# Patient Record
Sex: FEMALE | Race: BLACK OR AFRICAN AMERICAN | NOT HISPANIC OR LATINO | Employment: UNEMPLOYED | URBAN - METROPOLITAN AREA
[De-identification: names, ages, dates, MRNs, and addresses within clinical notes are randomized per-mention and may not be internally consistent; named-entity substitution may affect disease eponyms.]

---

## 2018-01-01 ENCOUNTER — ANESTHESIA EVENT (OUTPATIENT)
Dept: SURGERY | Facility: HOSPITAL | Age: 23
End: 2018-01-01

## 2018-01-01 ENCOUNTER — HOSPITAL ENCOUNTER (INPATIENT)
Facility: HOSPITAL | Age: 23
LOS: 22 days | DRG: 004 | End: 2018-04-25
Attending: INTERNAL MEDICINE | Admitting: INTERNAL MEDICINE
Payer: MEDICAID

## 2018-01-01 ENCOUNTER — ANESTHESIA EVENT (OUTPATIENT)
Dept: SURGERY | Facility: HOSPITAL | Age: 23
DRG: 004 | End: 2018-01-01
Payer: MEDICAID

## 2018-01-01 ENCOUNTER — TELEPHONE (OUTPATIENT)
Dept: PULMONOLOGY | Facility: HOSPITAL | Age: 23
End: 2018-01-01

## 2018-01-01 ENCOUNTER — ANESTHESIA (OUTPATIENT)
Dept: SURGERY | Facility: HOSPITAL | Age: 23
End: 2018-01-01

## 2018-01-01 ENCOUNTER — ANESTHESIA (OUTPATIENT)
Dept: SURGERY | Facility: HOSPITAL | Age: 23
DRG: 004 | End: 2018-01-01
Payer: MEDICAID

## 2018-01-01 ENCOUNTER — TELEPHONE (OUTPATIENT)
Dept: OBSTETRICS AND GYNECOLOGY | Facility: HOSPITAL | Age: 23
End: 2018-01-01

## 2018-01-01 VITALS
TEMPERATURE: 99 F | BODY MASS INDEX: 42.52 KG/M2 | RESPIRATION RATE: 27 BRPM | SYSTOLIC BLOOD PRESSURE: 129 MMHG | WEIGHT: 264.56 LBS | OXYGEN SATURATION: 90 % | DIASTOLIC BLOOD PRESSURE: 46 MMHG | HEART RATE: 105 BPM | HEIGHT: 66 IN

## 2018-01-01 DIAGNOSIS — E78.1 PURE HYPERGLYCERIDEMIA: ICD-10-CM

## 2018-01-01 DIAGNOSIS — B37.49 CANDIDA UTI: ICD-10-CM

## 2018-01-01 DIAGNOSIS — J15.212 PNEUMONIA OF RIGHT LOWER LOBE DUE TO METHICILLIN RESISTANT STAPHYLOCOCCUS AUREUS (MRSA): ICD-10-CM

## 2018-01-01 DIAGNOSIS — D69.6 THROMBOCYTOPENIA: ICD-10-CM

## 2018-01-01 DIAGNOSIS — K70.11 ALCOHOLIC HEPATITIS WITH ASCITES: ICD-10-CM

## 2018-01-01 DIAGNOSIS — E16.2 HYPOGLYCEMIA: ICD-10-CM

## 2018-01-01 DIAGNOSIS — R57.9 SHOCK, UNSPECIFIED: ICD-10-CM

## 2018-01-01 DIAGNOSIS — D68.9 COAGULOPATHY: ICD-10-CM

## 2018-01-01 DIAGNOSIS — E87.70 HYPERVOLEMIA, UNSPECIFIED HYPERVOLEMIA TYPE: ICD-10-CM

## 2018-01-01 DIAGNOSIS — Z98.890 S/P DILATATION AND CURETTAGE: ICD-10-CM

## 2018-01-01 DIAGNOSIS — J96.01 ACUTE RESPIRATORY FAILURE WITH HYPOXIA AND HYPERCAPNIA: ICD-10-CM

## 2018-01-01 DIAGNOSIS — K81.9 ACALCULOUS CHOLECYSTITIS: ICD-10-CM

## 2018-01-01 DIAGNOSIS — R79.89 ELEVATED LFTS: ICD-10-CM

## 2018-01-01 DIAGNOSIS — B96.89 UTI DUE TO KLEBSIELLA SPECIES: ICD-10-CM

## 2018-01-01 DIAGNOSIS — N39.0 UTI DUE TO KLEBSIELLA SPECIES: ICD-10-CM

## 2018-01-01 DIAGNOSIS — K72.00 SHOCK LIVER: ICD-10-CM

## 2018-01-01 DIAGNOSIS — R56.9 SEIZURE: ICD-10-CM

## 2018-01-01 DIAGNOSIS — R65.20 SEPSIS DUE TO UNDETERMINED ORGANISM WITH METABOLIC ENCEPHALOPATHY: ICD-10-CM

## 2018-01-01 DIAGNOSIS — I46.9 ASYSTOLE: ICD-10-CM

## 2018-01-01 DIAGNOSIS — E87.8 ELECTROLYTE IMBALANCE: ICD-10-CM

## 2018-01-01 DIAGNOSIS — R73.9 HYPERGLYCEMIA, UNSPECIFIED: ICD-10-CM

## 2018-01-01 DIAGNOSIS — N17.9 AKI (ACUTE KIDNEY INJURY): ICD-10-CM

## 2018-01-01 DIAGNOSIS — R91.8 LUNG INFILTRATE ON CT: ICD-10-CM

## 2018-01-01 DIAGNOSIS — A49.8 CLOSTRIDIUM DIFFICILE INFECTION: ICD-10-CM

## 2018-01-01 DIAGNOSIS — Z87.59 HISTORY OF IUFD: ICD-10-CM

## 2018-01-01 DIAGNOSIS — R16.0 HEPATOMEGALY: ICD-10-CM

## 2018-01-01 DIAGNOSIS — E78.1 HYPERTRIGLYCERIDEMIA: ICD-10-CM

## 2018-01-01 DIAGNOSIS — G93.41 SEPSIS DUE TO UNDETERMINED ORGANISM WITH METABOLIC ENCEPHALOPATHY: ICD-10-CM

## 2018-01-01 DIAGNOSIS — E83.52 HYPERCALCEMIA: ICD-10-CM

## 2018-01-01 DIAGNOSIS — E80.6 HYPERBILIRUBINEMIA: ICD-10-CM

## 2018-01-01 DIAGNOSIS — A41.9 SEPSIS DUE TO UNDETERMINED ORGANISM WITH METABOLIC ENCEPHALOPATHY: ICD-10-CM

## 2018-01-01 DIAGNOSIS — R65.20 SEVERE SEPSIS: ICD-10-CM

## 2018-01-01 DIAGNOSIS — E87.20 LACTIC ACIDOSIS: ICD-10-CM

## 2018-01-01 DIAGNOSIS — R50.9 FEVER, UNSPECIFIED FEVER CAUSE: ICD-10-CM

## 2018-01-01 DIAGNOSIS — A41.9 SEPTIC SHOCK: ICD-10-CM

## 2018-01-01 DIAGNOSIS — F10.10 ETOH ABUSE: Chronic | ICD-10-CM

## 2018-01-01 DIAGNOSIS — J96.90 RESPIRATORY FAILURE: ICD-10-CM

## 2018-01-01 DIAGNOSIS — J96.90 RESPIRATORY FAILURE, UNSPECIFIED CHRONICITY, UNSPECIFIED WHETHER WITH HYPOXIA OR HYPERCAPNIA: ICD-10-CM

## 2018-01-01 DIAGNOSIS — R65.21 SEPTIC SHOCK: ICD-10-CM

## 2018-01-01 DIAGNOSIS — N39.0 ACUTE URINARY TRACT INFECTION: ICD-10-CM

## 2018-01-01 DIAGNOSIS — E87.20 METABOLIC ACIDOSIS: ICD-10-CM

## 2018-01-01 DIAGNOSIS — J96.01 ACUTE HYPOXEMIC RESPIRATORY FAILURE: ICD-10-CM

## 2018-01-01 DIAGNOSIS — J96.02 ACUTE RESPIRATORY FAILURE WITH HYPOXIA AND HYPERCAPNIA: ICD-10-CM

## 2018-01-01 DIAGNOSIS — E87.1 HYPONATREMIA: ICD-10-CM

## 2018-01-01 DIAGNOSIS — J15.212 PNEUMONIA OF BOTH LOWER LOBES DUE TO METHICILLIN RESISTANT STAPHYLOCOCCUS AUREUS (MRSA): ICD-10-CM

## 2018-01-01 DIAGNOSIS — E87.3 ALKALOSIS: ICD-10-CM

## 2018-01-01 DIAGNOSIS — E83.51 HYPOCALCEMIA: ICD-10-CM

## 2018-01-01 DIAGNOSIS — I46.9 CARDIOPULMONARY ARREST: ICD-10-CM

## 2018-01-01 DIAGNOSIS — A41.9 SEVERE SEPSIS: ICD-10-CM

## 2018-01-01 DIAGNOSIS — K76.82 HEPATIC ENCEPHALOPATHY: ICD-10-CM

## 2018-01-01 DIAGNOSIS — D62 ACUTE BLOOD LOSS ANEMIA: ICD-10-CM

## 2018-01-01 LAB
25(OH)D3+25(OH)D2 SERPL-MCNC: 5 NG/ML
ABO + RH BLD: NORMAL
ALBUMIN FLD-MCNC: 1.2 G/DL
ALBUMIN SERPL BCP-MCNC: 1.3 G/DL
ALBUMIN SERPL BCP-MCNC: 1.5 G/DL
ALBUMIN SERPL BCP-MCNC: 1.6 G/DL
ALBUMIN SERPL BCP-MCNC: 1.6 G/DL
ALBUMIN SERPL BCP-MCNC: 1.7 G/DL
ALBUMIN SERPL BCP-MCNC: 1.7 G/DL
ALBUMIN SERPL BCP-MCNC: 1.8 G/DL
ALBUMIN SERPL BCP-MCNC: 1.9 G/DL
ALBUMIN SERPL BCP-MCNC: 2 G/DL
ALBUMIN SERPL BCP-MCNC: 2.1 G/DL
ALLENS TEST: ABNORMAL
ALLENS TEST: NORMAL
ALLENS TEST: NORMAL
ALP SERPL-CCNC: 106 U/L
ALP SERPL-CCNC: 107 U/L
ALP SERPL-CCNC: 120 U/L
ALP SERPL-CCNC: 125 U/L
ALP SERPL-CCNC: 125 U/L
ALP SERPL-CCNC: 126 U/L
ALP SERPL-CCNC: 127 U/L
ALP SERPL-CCNC: 127 U/L
ALP SERPL-CCNC: 129 U/L
ALP SERPL-CCNC: 129 U/L
ALP SERPL-CCNC: 132 U/L
ALP SERPL-CCNC: 142 U/L
ALP SERPL-CCNC: 144 U/L
ALP SERPL-CCNC: 149 U/L
ALP SERPL-CCNC: 159 U/L
ALP SERPL-CCNC: 86 U/L
ALP SERPL-CCNC: 91 U/L
ALP SERPL-CCNC: 93 U/L
ALP SERPL-CCNC: 93 U/L
ALP SERPL-CCNC: 95 U/L
ALP SERPL-CCNC: 96 U/L
ALP SERPL-CCNC: 97 U/L
ALP SERPL-CCNC: 97 U/L
ALT SERPL W/O P-5'-P-CCNC: 11 U/L
ALT SERPL W/O P-5'-P-CCNC: 11 U/L
ALT SERPL W/O P-5'-P-CCNC: 12 U/L
ALT SERPL W/O P-5'-P-CCNC: 14 U/L
ALT SERPL W/O P-5'-P-CCNC: 15 U/L
ALT SERPL W/O P-5'-P-CCNC: 16 U/L
ALT SERPL W/O P-5'-P-CCNC: 16 U/L
ALT SERPL W/O P-5'-P-CCNC: 17 U/L
ALT SERPL W/O P-5'-P-CCNC: 20 U/L
ALT SERPL W/O P-5'-P-CCNC: 20 U/L
ALT SERPL W/O P-5'-P-CCNC: 25 U/L
ALT SERPL W/O P-5'-P-CCNC: 28 U/L
ALT SERPL W/O P-5'-P-CCNC: 29 U/L
ALT SERPL W/O P-5'-P-CCNC: 31 U/L
ALT SERPL W/O P-5'-P-CCNC: 36 U/L
ALT SERPL W/O P-5'-P-CCNC: 42 U/L
ALT SERPL W/O P-5'-P-CCNC: 44 U/L
ALT SERPL W/O P-5'-P-CCNC: 48 U/L
ALT SERPL W/O P-5'-P-CCNC: 49 U/L
ALT SERPL W/O P-5'-P-CCNC: 50 U/L
ALT SERPL W/O P-5'-P-CCNC: 53 U/L
ALT SERPL W/O P-5'-P-CCNC: 57 U/L
ALT SERPL W/O P-5'-P-CCNC: 66 U/L
AMMONIA PLAS-SCNC: 101 UMOL/L
AMMONIA PLAS-SCNC: 54 UMOL/L
AMMONIA PLAS-SCNC: 67 UMOL/L
AMMONIA PLAS-SCNC: 68 UMOL/L
AMMONIA PLAS-SCNC: 71 UMOL/L
AMMONIA PLAS-SCNC: 77 UMOL/L
AMMONIA PLAS-SCNC: 80 UMOL/L
AMMONIA PLAS-SCNC: 82 UMOL/L
AMMONIA PLAS-SCNC: 86 UMOL/L
AMYLASE SERPL-CCNC: 34 U/L
AMYLASE, BODY FLUID: 18 U/L
ANA SER QL IF: NORMAL
ANION GAP SERPL CALC-SCNC: 11 MMOL/L
ANION GAP SERPL CALC-SCNC: 12 MMOL/L
ANION GAP SERPL CALC-SCNC: 13 MMOL/L
ANION GAP SERPL CALC-SCNC: 14 MMOL/L
ANION GAP SERPL CALC-SCNC: 15 MMOL/L
ANION GAP SERPL CALC-SCNC: 16 MMOL/L
ANION GAP SERPL CALC-SCNC: 17 MMOL/L
ANION GAP SERPL CALC-SCNC: 18 MMOL/L
ANION GAP SERPL CALC-SCNC: 19 MMOL/L
ANION GAP SERPL CALC-SCNC: 20 MMOL/L
ANION GAP SERPL CALC-SCNC: 21 MMOL/L
ANION GAP SERPL CALC-SCNC: 21 MMOL/L
ANION GAP SERPL CALC-SCNC: 23 MMOL/L
ANION GAP SERPL CALC-SCNC: 26 MMOL/L
ANION GAP SERPL CALC-SCNC: 29 MMOL/L
ANION GAP SERPL CALC-SCNC: 29 MMOL/L
ANISOCYTOSIS BLD QL SMEAR: ABNORMAL
ANISOCYTOSIS BLD QL SMEAR: SLIGHT
APPEARANCE FLD: NORMAL
APTT BLDCRRT: 39.9 SEC
APTT BLDCRRT: 53.1 SEC
AST SERPL-CCNC: 100 U/L
AST SERPL-CCNC: 100 U/L
AST SERPL-CCNC: 101 U/L
AST SERPL-CCNC: 103 U/L
AST SERPL-CCNC: 106 U/L
AST SERPL-CCNC: 109 U/L
AST SERPL-CCNC: 109 U/L
AST SERPL-CCNC: 113 U/L
AST SERPL-CCNC: 115 U/L
AST SERPL-CCNC: 118 U/L
AST SERPL-CCNC: 120 U/L
AST SERPL-CCNC: 122 U/L
AST SERPL-CCNC: 160 U/L
AST SERPL-CCNC: 74 U/L
AST SERPL-CCNC: 77 U/L
AST SERPL-CCNC: 78 U/L
AST SERPL-CCNC: 83 U/L
AST SERPL-CCNC: 86 U/L
AST SERPL-CCNC: 87 U/L
AST SERPL-CCNC: 93 U/L
AST SERPL-CCNC: 967 U/L
BACTERIA #/AREA URNS HPF: ABNORMAL /HPF
BACTERIA #/AREA URNS HPF: ABNORMAL /HPF
BACTERIA BLD CULT: NORMAL
BACTERIA SPEC AEROBE CULT: NORMAL
BACTERIA UR CULT: NO GROWTH
BACTERIA UR CULT: NORMAL
BASO STIPL BLD QL SMEAR: ABNORMAL
BASOPHILS # BLD AUTO: 0.02 K/UL
BASOPHILS # BLD AUTO: 0.03 K/UL
BASOPHILS # BLD AUTO: 0.04 K/UL
BASOPHILS # BLD AUTO: 0.06 K/UL
BASOPHILS # BLD AUTO: 0.1 K/UL
BASOPHILS # BLD AUTO: 0.12 K/UL
BASOPHILS # BLD AUTO: 0.19 K/UL
BASOPHILS # BLD AUTO: 0.24 K/UL
BASOPHILS # BLD AUTO: 0.28 K/UL
BASOPHILS # BLD AUTO: 0.29 K/UL
BASOPHILS # BLD AUTO: ABNORMAL K/UL
BASOPHILS NFR BLD: 0 %
BASOPHILS NFR BLD: 0.1 %
BASOPHILS NFR BLD: 0.1 %
BASOPHILS NFR BLD: 0.2 %
BASOPHILS NFR BLD: 0.3 %
BASOPHILS NFR BLD: 0.6 %
BASOPHILS NFR BLD: 0.6 %
BASOPHILS NFR BLD: 0.9 %
BASOPHILS NFR BLD: 1 %
BASOPHILS NFR BLD: 1.3 %
BASOPHILS NFR BLD: 1.8 %
BASOPHILS NFR BLD: 2 %
BASOPHILS NFR BLD: 3 %
BILIRUB FLD-MCNC: 9.5 MG/DL
BILIRUB SERPL-MCNC: 11.1 MG/DL
BILIRUB SERPL-MCNC: 11.7 MG/DL
BILIRUB SERPL-MCNC: 12.2 MG/DL
BILIRUB SERPL-MCNC: 12.9 MG/DL
BILIRUB SERPL-MCNC: 13.2 MG/DL
BILIRUB SERPL-MCNC: 13.8 MG/DL
BILIRUB SERPL-MCNC: 13.9 MG/DL
BILIRUB SERPL-MCNC: 14.2 MG/DL
BILIRUB SERPL-MCNC: 14.8 MG/DL
BILIRUB SERPL-MCNC: 15.9 MG/DL
BILIRUB SERPL-MCNC: 18.3 MG/DL
BILIRUB SERPL-MCNC: 3.9 MG/DL
BILIRUB SERPL-MCNC: 4.1 MG/DL
BILIRUB SERPL-MCNC: 4.6 MG/DL
BILIRUB SERPL-MCNC: 5.3 MG/DL
BILIRUB SERPL-MCNC: 6.2 MG/DL
BILIRUB SERPL-MCNC: 6.5 MG/DL
BILIRUB SERPL-MCNC: 7.2 MG/DL
BILIRUB SERPL-MCNC: 8.7 MG/DL
BILIRUB SERPL-MCNC: 9.1 MG/DL
BILIRUB SERPL-MCNC: 9.5 MG/DL
BILIRUB SERPL-MCNC: 9.8 MG/DL
BILIRUB SERPL-MCNC: 9.8 MG/DL
BILIRUB UR QL STRIP: ABNORMAL
BILIRUB UR QL STRIP: ABNORMAL
BLASTS NFR BLD MANUAL: 1 %
BLASTS NFR BLD MANUAL: 2 %
BLASTS NFR BLD MANUAL: 2 %
BLD GP AB SCN CELLS X3 SERPL QL: NORMAL
BLD PROD TYP BPU: NORMAL
BLOOD UNIT EXPIRATION DATE: NORMAL
BLOOD UNIT TYPE CODE: 5100
BLOOD UNIT TYPE CODE: 6200
BLOOD UNIT TYPE CODE: 9500
BLOOD UNIT TYPE: NORMAL
BNP SERPL-MCNC: 257 PG/ML
BODY FLD TYPE: NORMAL
BODY FLUID SOURCE AMYLASE: NORMAL
BODY FLUID SOURCE, BILIRUBIN: NORMAL
BODY FLUID SOURCE, CREATININE: NORMAL
BODY FLUID SOURCE, LDH: NORMAL
BUN SERPL-MCNC: 10 MG/DL
BUN SERPL-MCNC: 10 MG/DL
BUN SERPL-MCNC: 12 MG/DL
BUN SERPL-MCNC: 14 MG/DL
BUN SERPL-MCNC: 16 MG/DL
BUN SERPL-MCNC: 19 MG/DL
BUN SERPL-MCNC: 2 MG/DL
BUN SERPL-MCNC: 22 MG/DL
BUN SERPL-MCNC: 25 MG/DL
BUN SERPL-MCNC: 28 MG/DL
BUN SERPL-MCNC: 29 MG/DL
BUN SERPL-MCNC: 3 MG/DL
BUN SERPL-MCNC: 4 MG/DL
BUN SERPL-MCNC: 5 MG/DL
BUN SERPL-MCNC: 6 MG/DL
BUN SERPL-MCNC: 7 MG/DL
BUN SERPL-MCNC: 7 MG/DL
BUN SERPL-MCNC: 8 MG/DL
BUN SERPL-MCNC: 9 MG/DL
BUN SERPL-MCNC: 9 MG/DL
BUN SERPL-MCNC: <2 MG/DL
C DIFF GDH STL QL: NEGATIVE
C DIFF GDH STL QL: POSITIVE
C DIFF TOX A+B STL QL IA: NEGATIVE
C DIFF TOX A+B STL QL IA: NEGATIVE
C DIFF TOX GENS STL QL NAA+PROBE: POSITIVE
CA-I BLDV-SCNC: 0.64 MMOL/L
CA-I BLDV-SCNC: 0.86 MMOL/L
CALCIUM SERPL-MCNC: 10 MG/DL
CALCIUM SERPL-MCNC: 10 MG/DL
CALCIUM SERPL-MCNC: 10.1 MG/DL
CALCIUM SERPL-MCNC: 10.2 MG/DL
CALCIUM SERPL-MCNC: 10.3 MG/DL
CALCIUM SERPL-MCNC: 10.4 MG/DL
CALCIUM SERPL-MCNC: 10.4 MG/DL
CALCIUM SERPL-MCNC: 10.5 MG/DL
CALCIUM SERPL-MCNC: 10.6 MG/DL
CALCIUM SERPL-MCNC: 10.6 MG/DL
CALCIUM SERPL-MCNC: 10.9 MG/DL
CALCIUM SERPL-MCNC: 11.2 MG/DL
CALCIUM SERPL-MCNC: 11.5 MG/DL
CALCIUM SERPL-MCNC: 5.6 MG/DL
CALCIUM SERPL-MCNC: 5.7 MG/DL
CALCIUM SERPL-MCNC: 5.7 MG/DL
CALCIUM SERPL-MCNC: 5.8 MG/DL
CALCIUM SERPL-MCNC: 5.9 MG/DL
CALCIUM SERPL-MCNC: 5.9 MG/DL
CALCIUM SERPL-MCNC: 6 MG/DL
CALCIUM SERPL-MCNC: 6.2 MG/DL
CALCIUM SERPL-MCNC: 6.4 MG/DL
CALCIUM SERPL-MCNC: 6.4 MG/DL
CALCIUM SERPL-MCNC: 6.5 MG/DL
CALCIUM SERPL-MCNC: 6.6 MG/DL
CALCIUM SERPL-MCNC: 6.7 MG/DL
CALCIUM SERPL-MCNC: 6.8 MG/DL
CALCIUM SERPL-MCNC: 7 MG/DL
CALCIUM SERPL-MCNC: 7.2 MG/DL
CALCIUM SERPL-MCNC: 7.3 MG/DL
CALCIUM SERPL-MCNC: 7.3 MG/DL
CALCIUM SERPL-MCNC: 7.5 MG/DL
CALCIUM SERPL-MCNC: 7.6 MG/DL
CALCIUM SERPL-MCNC: 7.8 MG/DL
CALCIUM SERPL-MCNC: 8 MG/DL
CALCIUM SERPL-MCNC: 8.4 MG/DL
CALCIUM SERPL-MCNC: 8.5 MG/DL
CALCIUM SERPL-MCNC: 8.7 MG/DL
CALCIUM SERPL-MCNC: 8.8 MG/DL
CALCIUM SERPL-MCNC: 8.9 MG/DL
CALCIUM SERPL-MCNC: 8.9 MG/DL
CALCIUM SERPL-MCNC: 9 MG/DL
CALCIUM SERPL-MCNC: 9.2 MG/DL
CALCIUM SERPL-MCNC: 9.2 MG/DL
CALCIUM SERPL-MCNC: 9.4 MG/DL
CALCIUM SERPL-MCNC: 9.6 MG/DL
CALCIUM SERPL-MCNC: 9.6 MG/DL
CALCIUM SERPL-MCNC: 9.7 MG/DL
CHLORIDE SERPL-SCNC: 100 MMOL/L
CHLORIDE SERPL-SCNC: 101 MMOL/L
CHLORIDE SERPL-SCNC: 102 MMOL/L
CHLORIDE SERPL-SCNC: 103 MMOL/L
CHLORIDE SERPL-SCNC: 103 MMOL/L
CHLORIDE SERPL-SCNC: 104 MMOL/L
CHLORIDE SERPL-SCNC: 105 MMOL/L
CHLORIDE SERPL-SCNC: 105 MMOL/L
CHLORIDE SERPL-SCNC: 106 MMOL/L
CHLORIDE SERPL-SCNC: 107 MMOL/L
CHLORIDE SERPL-SCNC: 108 MMOL/L
CHLORIDE SERPL-SCNC: 109 MMOL/L
CHLORIDE SERPL-SCNC: 111 MMOL/L
CHLORIDE SERPL-SCNC: 113 MMOL/L
CHLORIDE SERPL-SCNC: 96 MMOL/L
CHLORIDE SERPL-SCNC: 97 MMOL/L
CHLORIDE SERPL-SCNC: 98 MMOL/L
CHLORIDE SERPL-SCNC: 99 MMOL/L
CK SERPL-CCNC: 2296 U/L
CK SERPL-CCNC: 237 U/L
CK SERPL-CCNC: 475 U/L
CLARITY UR: ABNORMAL
CLARITY UR: ABNORMAL
CO2 SERPL-SCNC: 10 MMOL/L
CO2 SERPL-SCNC: 11 MMOL/L
CO2 SERPL-SCNC: 12 MMOL/L
CO2 SERPL-SCNC: 13 MMOL/L
CO2 SERPL-SCNC: 13 MMOL/L
CO2 SERPL-SCNC: 14 MMOL/L
CO2 SERPL-SCNC: 15 MMOL/L
CO2 SERPL-SCNC: 17 MMOL/L
CO2 SERPL-SCNC: 18 MMOL/L
CO2 SERPL-SCNC: 19 MMOL/L
CO2 SERPL-SCNC: 19 MMOL/L
CO2 SERPL-SCNC: 20 MMOL/L
CO2 SERPL-SCNC: 21 MMOL/L
CO2 SERPL-SCNC: 22 MMOL/L
CO2 SERPL-SCNC: 23 MMOL/L
CO2 SERPL-SCNC: 25 MMOL/L
CO2 SERPL-SCNC: 26 MMOL/L
CO2 SERPL-SCNC: 27 MMOL/L
CO2 SERPL-SCNC: 27 MMOL/L
CO2 SERPL-SCNC: 28 MMOL/L
CO2 SERPL-SCNC: 29 MMOL/L
CO2 SERPL-SCNC: 29 MMOL/L
CO2 SERPL-SCNC: 30 MMOL/L
CO2 SERPL-SCNC: 31 MMOL/L
CO2 SERPL-SCNC: 32 MMOL/L
CO2 SERPL-SCNC: 8 MMOL/L
CO2 SERPL-SCNC: 8 MMOL/L
CODING SYSTEM: NORMAL
COLOR FLD: YELLOW
COLOR UR: ABNORMAL
COLOR UR: YELLOW
CORTIS SERPL-MCNC: 13.7 UG/DL
CREAT FLD-MCNC: 3.1 MG/DL
CREAT SERPL-MCNC: 0.7 MG/DL
CREAT SERPL-MCNC: 0.8 MG/DL
CREAT SERPL-MCNC: 0.9 MG/DL
CREAT SERPL-MCNC: 1 MG/DL
CREAT SERPL-MCNC: 1.1 MG/DL
CREAT SERPL-MCNC: 1.3 MG/DL
CREAT SERPL-MCNC: 1.4 MG/DL
CREAT SERPL-MCNC: 1.6 MG/DL
CREAT SERPL-MCNC: 1.7 MG/DL
CREAT SERPL-MCNC: 1.7 MG/DL
CREAT SERPL-MCNC: 1.8 MG/DL
CREAT SERPL-MCNC: 1.9 MG/DL
CREAT SERPL-MCNC: 2 MG/DL
CREAT SERPL-MCNC: 2.1 MG/DL
CREAT SERPL-MCNC: 2.2 MG/DL
CREAT SERPL-MCNC: 2.3 MG/DL
CREAT SERPL-MCNC: 2.4 MG/DL
CREAT SERPL-MCNC: 2.4 MG/DL
CREAT SERPL-MCNC: 2.5 MG/DL
CREAT SERPL-MCNC: 2.5 MG/DL
CREAT SERPL-MCNC: 2.6 MG/DL
CREAT SERPL-MCNC: 2.6 MG/DL
CREAT SERPL-MCNC: 2.9 MG/DL
CREAT SERPL-MCNC: 3.1 MG/DL
CREAT SERPL-MCNC: 3.1 MG/DL
CREAT SERPL-MCNC: 3.2 MG/DL
CREAT SERPL-MCNC: 3.4 MG/DL
DACRYOCYTES BLD QL SMEAR: ABNORMAL
DELSYS: ABNORMAL
DELSYS: NORMAL
DELSYS: NORMAL
DIASTOLIC DYSFUNCTION: NO
DIFFERENTIAL METHOD: ABNORMAL
DISPENSE STATUS: NORMAL
EOSINOPHIL # BLD AUTO: 0 K/UL
EOSINOPHIL # BLD AUTO: 0.1 K/UL
EOSINOPHIL # BLD AUTO: 0.2 K/UL
EOSINOPHIL # BLD AUTO: 0.3 K/UL
EOSINOPHIL # BLD AUTO: 0.3 K/UL
EOSINOPHIL # BLD AUTO: ABNORMAL K/UL
EOSINOPHIL NFR BLD: 0 %
EOSINOPHIL NFR BLD: 0.1 %
EOSINOPHIL NFR BLD: 0.5 %
EOSINOPHIL NFR BLD: 0.6 %
EOSINOPHIL NFR BLD: 0.7 %
EOSINOPHIL NFR BLD: 0.7 %
EOSINOPHIL NFR BLD: 0.8 %
EOSINOPHIL NFR BLD: 0.9 %
EOSINOPHIL NFR BLD: 0.9 %
EOSINOPHIL NFR BLD: 1 %
EOSINOPHIL NFR BLD: 1.2 %
EOSINOPHIL NFR BLD: 1.9 %
EOSINOPHIL NFR BLD: 3 %
EOSINOPHIL NFR BLD: 4 %
EOSINOPHIL NFR BLD: 7 %
ERYTHROCYTE [DISTWIDTH] IN BLOOD BY AUTOMATED COUNT: 17.5 %
ERYTHROCYTE [DISTWIDTH] IN BLOOD BY AUTOMATED COUNT: 17.8 %
ERYTHROCYTE [DISTWIDTH] IN BLOOD BY AUTOMATED COUNT: 17.9 %
ERYTHROCYTE [DISTWIDTH] IN BLOOD BY AUTOMATED COUNT: 18 %
ERYTHROCYTE [DISTWIDTH] IN BLOOD BY AUTOMATED COUNT: 18.1 %
ERYTHROCYTE [DISTWIDTH] IN BLOOD BY AUTOMATED COUNT: 18.2 %
ERYTHROCYTE [DISTWIDTH] IN BLOOD BY AUTOMATED COUNT: 18.4 %
ERYTHROCYTE [DISTWIDTH] IN BLOOD BY AUTOMATED COUNT: 18.4 %
ERYTHROCYTE [DISTWIDTH] IN BLOOD BY AUTOMATED COUNT: 18.5 %
ERYTHROCYTE [DISTWIDTH] IN BLOOD BY AUTOMATED COUNT: 18.5 %
ERYTHROCYTE [DISTWIDTH] IN BLOOD BY AUTOMATED COUNT: 18.6 %
ERYTHROCYTE [DISTWIDTH] IN BLOOD BY AUTOMATED COUNT: 18.8 %
ERYTHROCYTE [DISTWIDTH] IN BLOOD BY AUTOMATED COUNT: 18.8 %
ERYTHROCYTE [DISTWIDTH] IN BLOOD BY AUTOMATED COUNT: 19.2 %
ERYTHROCYTE [DISTWIDTH] IN BLOOD BY AUTOMATED COUNT: 19.5 %
ERYTHROCYTE [DISTWIDTH] IN BLOOD BY AUTOMATED COUNT: 20.1 %
ERYTHROCYTE [DISTWIDTH] IN BLOOD BY AUTOMATED COUNT: 20.1 %
ERYTHROCYTE [DISTWIDTH] IN BLOOD BY AUTOMATED COUNT: 20.2 %
ERYTHROCYTE [DISTWIDTH] IN BLOOD BY AUTOMATED COUNT: 20.4 %
ERYTHROCYTE [DISTWIDTH] IN BLOOD BY AUTOMATED COUNT: 21.4 %
ERYTHROCYTE [DISTWIDTH] IN BLOOD BY AUTOMATED COUNT: 22.6 %
ERYTHROCYTE [DISTWIDTH] IN BLOOD BY AUTOMATED COUNT: 23.1 %
ERYTHROCYTE [DISTWIDTH] IN BLOOD BY AUTOMATED COUNT: 23.7 %
ERYTHROCYTE [DISTWIDTH] IN BLOOD BY AUTOMATED COUNT: 24.6 %
ERYTHROCYTE [DISTWIDTH] IN BLOOD BY AUTOMATED COUNT: 25.9 %
ERYTHROCYTE [DISTWIDTH] IN BLOOD BY AUTOMATED COUNT: 27.9 %
ERYTHROCYTE [SEDIMENTATION RATE] IN BLOOD BY WESTERGREN METHOD: 14 MM/H
ERYTHROCYTE [SEDIMENTATION RATE] IN BLOOD BY WESTERGREN METHOD: 14 MM/H
ERYTHROCYTE [SEDIMENTATION RATE] IN BLOOD BY WESTERGREN METHOD: 18 MM/H
ERYTHROCYTE [SEDIMENTATION RATE] IN BLOOD BY WESTERGREN METHOD: 20 MM/H
ERYTHROCYTE [SEDIMENTATION RATE] IN BLOOD BY WESTERGREN METHOD: 20 MM/H
EST. GFR  (AFRICAN AMERICAN): 21 ML/MIN/1.73 M^2
EST. GFR  (AFRICAN AMERICAN): 22 ML/MIN/1.73 M^2
EST. GFR  (AFRICAN AMERICAN): 23 ML/MIN/1.73 M^2
EST. GFR  (AFRICAN AMERICAN): 23 ML/MIN/1.73 M^2
EST. GFR  (AFRICAN AMERICAN): 25 ML/MIN/1.73 M^2
EST. GFR  (AFRICAN AMERICAN): 29 ML/MIN/1.73 M^2
EST. GFR  (AFRICAN AMERICAN): 29 ML/MIN/1.73 M^2
EST. GFR  (AFRICAN AMERICAN): 30 ML/MIN/1.73 M^2
EST. GFR  (AFRICAN AMERICAN): 30 ML/MIN/1.73 M^2
EST. GFR  (AFRICAN AMERICAN): 32 ML/MIN/1.73 M^2
EST. GFR  (AFRICAN AMERICAN): 32 ML/MIN/1.73 M^2
EST. GFR  (AFRICAN AMERICAN): 34 ML/MIN/1.73 M^2
EST. GFR  (AFRICAN AMERICAN): 35 ML/MIN/1.73 M^2
EST. GFR  (AFRICAN AMERICAN): 37 ML/MIN/1.73 M^2
EST. GFR  (AFRICAN AMERICAN): 40 ML/MIN/1.73 M^2
EST. GFR  (AFRICAN AMERICAN): 42 ML/MIN/1.73 M^2
EST. GFR  (AFRICAN AMERICAN): 45 ML/MIN/1.73 M^2
EST. GFR  (AFRICAN AMERICAN): 48 ML/MIN/1.73 M^2
EST. GFR  (AFRICAN AMERICAN): 48 ML/MIN/1.73 M^2
EST. GFR  (AFRICAN AMERICAN): 52 ML/MIN/1.73 M^2
EST. GFR  (AFRICAN AMERICAN): >60 ML/MIN/1.73 M^2
EST. GFR  (NON AFRICAN AMERICAN): 18 ML/MIN/1.73 M^2
EST. GFR  (NON AFRICAN AMERICAN): 20 ML/MIN/1.73 M^2
EST. GFR  (NON AFRICAN AMERICAN): 22 ML/MIN/1.73 M^2
EST. GFR  (NON AFRICAN AMERICAN): 25 ML/MIN/1.73 M^2
EST. GFR  (NON AFRICAN AMERICAN): 25 ML/MIN/1.73 M^2
EST. GFR  (NON AFRICAN AMERICAN): 26 ML/MIN/1.73 M^2
EST. GFR  (NON AFRICAN AMERICAN): 26 ML/MIN/1.73 M^2
EST. GFR  (NON AFRICAN AMERICAN): 28 ML/MIN/1.73 M^2
EST. GFR  (NON AFRICAN AMERICAN): 28 ML/MIN/1.73 M^2
EST. GFR  (NON AFRICAN AMERICAN): 29 ML/MIN/1.73 M^2
EST. GFR  (NON AFRICAN AMERICAN): 31 ML/MIN/1.73 M^2
EST. GFR  (NON AFRICAN AMERICAN): 32 ML/MIN/1.73 M^2
EST. GFR  (NON AFRICAN AMERICAN): 34 ML/MIN/1.73 M^2
EST. GFR  (NON AFRICAN AMERICAN): 37 ML/MIN/1.73 M^2
EST. GFR  (NON AFRICAN AMERICAN): 39 ML/MIN/1.73 M^2
EST. GFR  (NON AFRICAN AMERICAN): 42 ML/MIN/1.73 M^2
EST. GFR  (NON AFRICAN AMERICAN): 42 ML/MIN/1.73 M^2
EST. GFR  (NON AFRICAN AMERICAN): 45 ML/MIN/1.73 M^2
EST. GFR  (NON AFRICAN AMERICAN): 53 ML/MIN/1.73 M^2
EST. GFR  (NON AFRICAN AMERICAN): 58 ML/MIN/1.73 M^2
EST. GFR  (NON AFRICAN AMERICAN): >60 ML/MIN/1.73 M^2
FIBRINOGEN PPP-MCNC: 575 MG/DL
FIBRINOGEN PPP-MCNC: NORMAL MG/DL
FIO2: 100
FIO2: 30
FIO2: 35
FIO2: 40
FIO2: 45
FIO2: 45
FIO2: 60
FLOW: 2
GIANT PLATELETS BLD QL SMEAR: PRESENT
GLUCOSE FLD-MCNC: 97 MG/DL
GLUCOSE SERPL-MCNC: 108 MG/DL
GLUCOSE SERPL-MCNC: 108 MG/DL
GLUCOSE SERPL-MCNC: 109 MG/DL
GLUCOSE SERPL-MCNC: 109 MG/DL
GLUCOSE SERPL-MCNC: 110 MG/DL
GLUCOSE SERPL-MCNC: 118 MG/DL
GLUCOSE SERPL-MCNC: 119 MG/DL
GLUCOSE SERPL-MCNC: 119 MG/DL
GLUCOSE SERPL-MCNC: 120 MG/DL
GLUCOSE SERPL-MCNC: 121 MG/DL
GLUCOSE SERPL-MCNC: 123 MG/DL
GLUCOSE SERPL-MCNC: 123 MG/DL
GLUCOSE SERPL-MCNC: 124 MG/DL
GLUCOSE SERPL-MCNC: 124 MG/DL
GLUCOSE SERPL-MCNC: 125 MG/DL
GLUCOSE SERPL-MCNC: 129 MG/DL
GLUCOSE SERPL-MCNC: 136 MG/DL
GLUCOSE SERPL-MCNC: 141 MG/DL
GLUCOSE SERPL-MCNC: 145 MG/DL
GLUCOSE SERPL-MCNC: 148 MG/DL
GLUCOSE SERPL-MCNC: 151 MG/DL
GLUCOSE SERPL-MCNC: 153 MG/DL
GLUCOSE SERPL-MCNC: 153 MG/DL
GLUCOSE SERPL-MCNC: 154 MG/DL
GLUCOSE SERPL-MCNC: 154 MG/DL
GLUCOSE SERPL-MCNC: 159 MG/DL
GLUCOSE SERPL-MCNC: 159 MG/DL
GLUCOSE SERPL-MCNC: 161 MG/DL
GLUCOSE SERPL-MCNC: 163 MG/DL
GLUCOSE SERPL-MCNC: 177 MG/DL
GLUCOSE SERPL-MCNC: 189 MG/DL
GLUCOSE SERPL-MCNC: 189 MG/DL
GLUCOSE SERPL-MCNC: 190 MG/DL
GLUCOSE SERPL-MCNC: 210 MG/DL
GLUCOSE SERPL-MCNC: 216 MG/DL
GLUCOSE SERPL-MCNC: 221 MG/DL
GLUCOSE SERPL-MCNC: 228 MG/DL
GLUCOSE SERPL-MCNC: 231 MG/DL
GLUCOSE SERPL-MCNC: 231 MG/DL
GLUCOSE SERPL-MCNC: 259 MG/DL
GLUCOSE SERPL-MCNC: 268 MG/DL
GLUCOSE SERPL-MCNC: 287 MG/DL
GLUCOSE SERPL-MCNC: 37 MG/DL (ref 70–110)
GLUCOSE SERPL-MCNC: 68 MG/DL
GLUCOSE SERPL-MCNC: 71 MG/DL
GLUCOSE SERPL-MCNC: 72 MG/DL
GLUCOSE SERPL-MCNC: 73 MG/DL
GLUCOSE SERPL-MCNC: 77 MG/DL
GLUCOSE SERPL-MCNC: 93 MG/DL
GLUCOSE SERPL-MCNC: 94 MG/DL
GLUCOSE SERPL-MCNC: 95 MG/DL
GLUCOSE SERPL-MCNC: 95 MG/DL
GLUCOSE SERPL-MCNC: 96 MG/DL
GLUCOSE UR QL STRIP: ABNORMAL
GLUCOSE UR QL STRIP: NEGATIVE
GRAM STN SPEC: NORMAL
HAV IGM SERPL QL IA: NEGATIVE
HBV CORE IGM SERPL QL IA: NEGATIVE
HBV SURFACE AG SERPL QL IA: NEGATIVE
HCO3 UR-SCNC: 10 MMOL/L (ref 24–28)
HCO3 UR-SCNC: 10.9 MMOL/L (ref 24–28)
HCO3 UR-SCNC: 14.7 MMOL/L (ref 24–28)
HCO3 UR-SCNC: 14.9 MMOL/L (ref 24–28)
HCO3 UR-SCNC: 15.3 MMOL/L (ref 24–28)
HCO3 UR-SCNC: 18.3 MMOL/L (ref 24–28)
HCO3 UR-SCNC: 19.2 MMOL/L (ref 24–28)
HCO3 UR-SCNC: 21.5 MMOL/L (ref 24–28)
HCO3 UR-SCNC: 24 MMOL/L (ref 24–28)
HCO3 UR-SCNC: 24.1 MMOL/L (ref 24–28)
HCO3 UR-SCNC: 24.5 MMOL/L (ref 24–28)
HCO3 UR-SCNC: 24.9 MMOL/L (ref 24–28)
HCO3 UR-SCNC: 25.3 MMOL/L (ref 24–28)
HCO3 UR-SCNC: 27.5 MMOL/L (ref 24–28)
HCO3 UR-SCNC: 29.5 MMOL/L (ref 24–28)
HCO3 UR-SCNC: 29.6 MMOL/L (ref 24–28)
HCO3 UR-SCNC: 30.1 MMOL/L (ref 24–28)
HCO3 UR-SCNC: 30.2 MMOL/L (ref 24–28)
HCO3 UR-SCNC: 31.4 MMOL/L (ref 24–28)
HCO3 UR-SCNC: 31.8 MMOL/L (ref 24–28)
HCO3 UR-SCNC: 7.8 MMOL/L (ref 24–28)
HCO3 UR-SCNC: 8 MMOL/L (ref 24–28)
HCT VFR BLD AUTO: 20.8 %
HCT VFR BLD AUTO: 23.7 %
HCT VFR BLD AUTO: 24.3 %
HCT VFR BLD AUTO: 24.6 %
HCT VFR BLD AUTO: 24.8 %
HCT VFR BLD AUTO: 24.9 %
HCT VFR BLD AUTO: 25.4 %
HCT VFR BLD AUTO: 25.6 %
HCT VFR BLD AUTO: 25.7 %
HCT VFR BLD AUTO: 26.3 %
HCT VFR BLD AUTO: 26.7 %
HCT VFR BLD AUTO: 26.7 %
HCT VFR BLD AUTO: 26.9 %
HCT VFR BLD AUTO: 27.7 %
HCT VFR BLD AUTO: 28 %
HCT VFR BLD AUTO: 28.1 %
HCT VFR BLD AUTO: 28.2 %
HCT VFR BLD AUTO: 28.3 %
HCT VFR BLD AUTO: 29 %
HCT VFR BLD AUTO: 29.1 %
HCT VFR BLD AUTO: 29.9 %
HCT VFR BLD AUTO: 30.2 %
HCT VFR BLD AUTO: 30.8 %
HCT VFR BLD AUTO: 31.2 %
HCT VFR BLD CALC: 24 %PCV (ref 36–54)
HCV AB SERPL QL IA: NEGATIVE
HGB BLD-MCNC: 10.1 G/DL
HGB BLD-MCNC: 10.2 G/DL
HGB BLD-MCNC: 10.3 G/DL
HGB BLD-MCNC: 7.3 G/DL
HGB BLD-MCNC: 7.5 G/DL
HGB BLD-MCNC: 7.8 G/DL
HGB BLD-MCNC: 7.9 G/DL
HGB BLD-MCNC: 7.9 G/DL
HGB BLD-MCNC: 8 G/DL
HGB BLD-MCNC: 8 G/DL
HGB BLD-MCNC: 8.4 G/DL
HGB BLD-MCNC: 8.4 G/DL
HGB BLD-MCNC: 8.5 G/DL
HGB BLD-MCNC: 8.7 G/DL
HGB BLD-MCNC: 8.7 G/DL
HGB BLD-MCNC: 8.9 G/DL
HGB BLD-MCNC: 9 G/DL
HGB BLD-MCNC: 9.1 G/DL
HGB BLD-MCNC: 9.3 G/DL
HGB BLD-MCNC: 9.6 G/DL
HGB BLD-MCNC: 9.7 G/DL
HGB BLD-MCNC: 9.8 G/DL
HGB UR QL STRIP: ABNORMAL
HGB UR QL STRIP: ABNORMAL
HIV1+2 IGG SERPL QL IA.RAPID: NEGATIVE
HOWELL-JOLLY BOD BLD QL SMEAR: ABNORMAL
HYALINE CASTS #/AREA URNS LPF: 0 /LPF
HYPOCHROMIA BLD QL SMEAR: ABNORMAL
INR PPP: 1.2
INR PPP: 1.3
INR PPP: 1.5
INR PPP: 1.6
INR PPP: 1.7
INR PPP: 1.8
INR PPP: 1.9
INR PPP: 2.1
INR PPP: 2.2
INR PPP: 2.9
IP: 20
IP: 25
IT: 0.65
IT: 0.72
IT: 0.76
IT: 0.8
IT: 0.91
IT: 0.91
KETONES UR QL STRIP: ABNORMAL
KETONES UR QL STRIP: NEGATIVE
LACTATE SERPL-SCNC: 2.3 MMOL/L
LACTATE SERPL-SCNC: 2.4 MMOL/L
LACTATE SERPL-SCNC: 3.3 MMOL/L
LACTATE SERPL-SCNC: 4.8 MMOL/L
LACTATE SERPL-SCNC: 5.3 MMOL/L
LACTATE SERPL-SCNC: 6.1 MMOL/L
LACTATE SERPL-SCNC: 6.3 MMOL/L
LACTATE SERPL-SCNC: 6.6 MMOL/L
LACTATE SERPL-SCNC: >12 MMOL/L
LACTATE SERPL-SCNC: >12 MMOL/L
LDH FLD L TO P-CCNC: 194 U/L
LEUKOCYTE ESTERASE UR QL STRIP: ABNORMAL
LEUKOCYTE ESTERASE UR QL STRIP: ABNORMAL
LIPASE SERPL-CCNC: 34 U/L
LIPASE SERPL-CCNC: 54 U/L
LYMPHOCYTES # BLD AUTO: 1.8 K/UL
LYMPHOCYTES # BLD AUTO: 2 K/UL
LYMPHOCYTES # BLD AUTO: 2.2 K/UL
LYMPHOCYTES # BLD AUTO: 2.3 K/UL
LYMPHOCYTES # BLD AUTO: 2.4 K/UL
LYMPHOCYTES # BLD AUTO: 2.5 K/UL
LYMPHOCYTES # BLD AUTO: 3.5 K/UL
LYMPHOCYTES # BLD AUTO: 3.5 K/UL
LYMPHOCYTES # BLD AUTO: 3.6 K/UL
LYMPHOCYTES # BLD AUTO: 3.7 K/UL
LYMPHOCYTES # BLD AUTO: ABNORMAL K/UL
LYMPHOCYTES NFR BLD: 10 %
LYMPHOCYTES NFR BLD: 10 %
LYMPHOCYTES NFR BLD: 10.1 %
LYMPHOCYTES NFR BLD: 11 %
LYMPHOCYTES NFR BLD: 11.5 %
LYMPHOCYTES NFR BLD: 12.2 %
LYMPHOCYTES NFR BLD: 12.4 %
LYMPHOCYTES NFR BLD: 12.7 %
LYMPHOCYTES NFR BLD: 13 %
LYMPHOCYTES NFR BLD: 14 %
LYMPHOCYTES NFR BLD: 15 %
LYMPHOCYTES NFR BLD: 15 %
LYMPHOCYTES NFR BLD: 16 %
LYMPHOCYTES NFR BLD: 16.5 %
LYMPHOCYTES NFR BLD: 17 %
LYMPHOCYTES NFR BLD: 17 %
LYMPHOCYTES NFR BLD: 18.7 %
LYMPHOCYTES NFR BLD: 21.4 %
LYMPHOCYTES NFR BLD: 22 %
LYMPHOCYTES NFR BLD: 24 %
LYMPHOCYTES NFR BLD: 39 %
LYMPHOCYTES NFR BLD: 41 %
LYMPHOCYTES NFR BLD: 6 %
LYMPHOCYTES NFR BLD: 7 %
LYMPHOCYTES NFR BLD: 8 %
LYMPHOCYTES NFR FLD MANUAL: 39 %
MAGNESIUM SERPL-MCNC: 1.4 MG/DL
MAGNESIUM SERPL-MCNC: 1.5 MG/DL
MAGNESIUM SERPL-MCNC: 1.6 MG/DL
MAGNESIUM SERPL-MCNC: 1.6 MG/DL
MAGNESIUM SERPL-MCNC: 1.7 MG/DL
MAGNESIUM SERPL-MCNC: 1.8 MG/DL
MAGNESIUM SERPL-MCNC: 1.9 MG/DL
MAGNESIUM SERPL-MCNC: 2 MG/DL
MAGNESIUM SERPL-MCNC: 2.1 MG/DL
MAGNESIUM SERPL-MCNC: 2.1 MG/DL
MAGNESIUM SERPL-MCNC: 2.2 MG/DL
MAGNESIUM SERPL-MCNC: 2.3 MG/DL
MAGNESIUM SERPL-MCNC: 2.3 MG/DL
MAGNESIUM SERPL-MCNC: 2.6 MG/DL
MCH RBC QN AUTO: 29.8 PG
MCH RBC QN AUTO: 30 PG
MCH RBC QN AUTO: 30.2 PG
MCH RBC QN AUTO: 30.4 PG
MCH RBC QN AUTO: 30.4 PG
MCH RBC QN AUTO: 30.5 PG
MCH RBC QN AUTO: 30.6 PG
MCH RBC QN AUTO: 30.7 PG
MCH RBC QN AUTO: 30.8 PG
MCH RBC QN AUTO: 30.9 PG
MCH RBC QN AUTO: 31.1 PG
MCH RBC QN AUTO: 31.2 PG
MCH RBC QN AUTO: 31.3 PG
MCH RBC QN AUTO: 31.6 PG
MCH RBC QN AUTO: 31.7 PG
MCH RBC QN AUTO: 32.2 PG
MCH RBC QN AUTO: 32.9 PG
MCH RBC QN AUTO: 33.3 PG
MCH RBC QN AUTO: 33.7 PG
MCH RBC QN AUTO: 34.8 PG
MCHC RBC AUTO-ENTMCNC: 29.7 G/DL
MCHC RBC AUTO-ENTMCNC: 31.1 G/DL
MCHC RBC AUTO-ENTMCNC: 31.2 G/DL
MCHC RBC AUTO-ENTMCNC: 31.4 G/DL
MCHC RBC AUTO-ENTMCNC: 31.5 G/DL
MCHC RBC AUTO-ENTMCNC: 31.6 G/DL
MCHC RBC AUTO-ENTMCNC: 31.7 G/DL
MCHC RBC AUTO-ENTMCNC: 31.7 G/DL
MCHC RBC AUTO-ENTMCNC: 31.9 G/DL
MCHC RBC AUTO-ENTMCNC: 32 G/DL
MCHC RBC AUTO-ENTMCNC: 32 G/DL
MCHC RBC AUTO-ENTMCNC: 32.1 G/DL
MCHC RBC AUTO-ENTMCNC: 32.2 G/DL
MCHC RBC AUTO-ENTMCNC: 32.3 G/DL
MCHC RBC AUTO-ENTMCNC: 32.4 G/DL
MCHC RBC AUTO-ENTMCNC: 32.5 G/DL
MCHC RBC AUTO-ENTMCNC: 32.6 G/DL
MCHC RBC AUTO-ENTMCNC: 33.6 G/DL
MCHC RBC AUTO-ENTMCNC: 34.8 G/DL
MCHC RBC AUTO-ENTMCNC: 36.6 G/DL
MCHC RBC AUTO-ENTMCNC: 36.7 G/DL
MCHC RBC AUTO-ENTMCNC: 36.8 G/DL
MCHC RBC AUTO-ENTMCNC: 38 G/DL
MCV RBC AUTO: 102 FL
MCV RBC AUTO: 108 FL
MCV RBC AUTO: 89 FL
MCV RBC AUTO: 91 FL
MCV RBC AUTO: 92 FL
MCV RBC AUTO: 92 FL
MCV RBC AUTO: 93 FL
MCV RBC AUTO: 93 FL
MCV RBC AUTO: 94 FL
MCV RBC AUTO: 95 FL
MCV RBC AUTO: 95 FL
MCV RBC AUTO: 96 FL
MCV RBC AUTO: 97 FL
MCV RBC AUTO: 98 FL
MCV RBC AUTO: 98 FL
MCV RBC AUTO: 99 FL
METAMYELOCYTES NFR BLD MANUAL: 1 %
METAMYELOCYTES NFR BLD MANUAL: 10 %
METAMYELOCYTES NFR BLD MANUAL: 11 %
METAMYELOCYTES NFR BLD MANUAL: 2 %
METAMYELOCYTES NFR BLD MANUAL: 4 %
METAMYELOCYTES NFR BLD MANUAL: 4 %
METAMYELOCYTES NFR BLD MANUAL: 5 %
METAMYELOCYTES NFR BLD MANUAL: 6 %
METAMYELOCYTES NFR BLD MANUAL: ABNORMAL %
MICROSCOPIC COMMENT: ABNORMAL
MICROSCOPIC COMMENT: ABNORMAL
MODE: ABNORMAL
MODE: NORMAL
MODE: NORMAL
MONOCYTES # BLD AUTO: 0.9 K/UL
MONOCYTES # BLD AUTO: 1 K/UL
MONOCYTES # BLD AUTO: 1.4 K/UL
MONOCYTES # BLD AUTO: 1.7 K/UL
MONOCYTES # BLD AUTO: 2.2 K/UL
MONOCYTES # BLD AUTO: 2.2 K/UL
MONOCYTES # BLD AUTO: 2.3 K/UL
MONOCYTES # BLD AUTO: 2.3 K/UL
MONOCYTES # BLD AUTO: 3.6 K/UL
MONOCYTES # BLD AUTO: 4.1 K/UL
MONOCYTES # BLD AUTO: ABNORMAL K/UL
MONOCYTES NFR BLD: 0 %
MONOCYTES NFR BLD: 1 %
MONOCYTES NFR BLD: 10 %
MONOCYTES NFR BLD: 10.2 %
MONOCYTES NFR BLD: 10.4 %
MONOCYTES NFR BLD: 11.3 %
MONOCYTES NFR BLD: 12.1 %
MONOCYTES NFR BLD: 14 %
MONOCYTES NFR BLD: 16 %
MONOCYTES NFR BLD: 18 %
MONOCYTES NFR BLD: 19.3 %
MONOCYTES NFR BLD: 2 %
MONOCYTES NFR BLD: 22.2 %
MONOCYTES NFR BLD: 4 %
MONOCYTES NFR BLD: 5 %
MONOCYTES NFR BLD: 6 %
MONOCYTES NFR BLD: 6 %
MONOCYTES NFR BLD: 6.2 %
MONOCYTES NFR BLD: 8 %
MONOCYTES NFR BLD: 8 %
MONOCYTES NFR BLD: 9 %
MONOCYTES NFR BLD: 9.6 %
MONOS+MACROS NFR FLD MANUAL: 59 %
MYELOCYTES NFR BLD MANUAL: 1 %
MYELOCYTES NFR BLD MANUAL: 2 %
MYELOCYTES NFR BLD MANUAL: 3 %
MYELOCYTES NFR BLD MANUAL: 5 %
MYELOCYTES NFR BLD MANUAL: 6 %
MYELOCYTES NFR BLD MANUAL: 7 %
NEUTROPHILS # BLD AUTO: 10.7 K/UL
NEUTROPHILS # BLD AUTO: 10.8 K/UL
NEUTROPHILS # BLD AUTO: 13.3 K/UL
NEUTROPHILS # BLD AUTO: 14.2 K/UL
NEUTROPHILS # BLD AUTO: 15 K/UL
NEUTROPHILS # BLD AUTO: 15.7 K/UL
NEUTROPHILS # BLD AUTO: 16.7 K/UL
NEUTROPHILS # BLD AUTO: 18.9 K/UL
NEUTROPHILS # BLD AUTO: 29.2 K/UL
NEUTROPHILS # BLD AUTO: 7.5 K/UL
NEUTROPHILS # BLD AUTO: ABNORMAL K/UL
NEUTROPHILS # BLD AUTO: ABNORMAL K/UL
NEUTROPHILS NFR BLD: 23 %
NEUTROPHILS NFR BLD: 33 %
NEUTROPHILS NFR BLD: 34 %
NEUTROPHILS NFR BLD: 40 %
NEUTROPHILS NFR BLD: 44 %
NEUTROPHILS NFR BLD: 48 %
NEUTROPHILS NFR BLD: 50 %
NEUTROPHILS NFR BLD: 51 %
NEUTROPHILS NFR BLD: 55 %
NEUTROPHILS NFR BLD: 56 %
NEUTROPHILS NFR BLD: 56 %
NEUTROPHILS NFR BLD: 58 %
NEUTROPHILS NFR BLD: 58.3 %
NEUTROPHILS NFR BLD: 60 %
NEUTROPHILS NFR BLD: 65 %
NEUTROPHILS NFR BLD: 65 %
NEUTROPHILS NFR BLD: 66 %
NEUTROPHILS NFR BLD: 66.5 %
NEUTROPHILS NFR BLD: 67 %
NEUTROPHILS NFR BLD: 70.3 %
NEUTROPHILS NFR BLD: 72.8 %
NEUTROPHILS NFR BLD: 74 %
NEUTROPHILS NFR BLD: 76.8 %
NEUTROPHILS NFR BLD: 78.5 %
NEUTROPHILS NFR BLD: 81.9 %
NEUTROPHILS NFR BLD: 82.6 %
NEUTROPHILS NFR BLD: 83.1 %
NEUTROPHILS NFR BLD: 86.1 %
NEUTROPHILS NFR FLD MANUAL: 2 %
NEUTS BAND NFR BLD MANUAL: 1 %
NEUTS BAND NFR BLD MANUAL: 13 %
NEUTS BAND NFR BLD MANUAL: 14 %
NEUTS BAND NFR BLD MANUAL: 19 %
NEUTS BAND NFR BLD MANUAL: 20 %
NEUTS BAND NFR BLD MANUAL: 22 %
NEUTS BAND NFR BLD MANUAL: 23 %
NEUTS BAND NFR BLD MANUAL: 24 %
NEUTS BAND NFR BLD MANUAL: 26 %
NEUTS BAND NFR BLD MANUAL: 32 %
NEUTS BAND NFR BLD MANUAL: 33 %
NEUTS BAND NFR BLD MANUAL: 37 %
NEUTS BAND NFR BLD MANUAL: 4 %
NEUTS BAND NFR BLD MANUAL: 4 %
NEUTS BAND NFR BLD MANUAL: 5 %
NEUTS BAND NFR BLD MANUAL: 5 %
NITRITE UR QL STRIP: ABNORMAL
NITRITE UR QL STRIP: NEGATIVE
NUM UNITS TRANS FFP: NORMAL
NUM UNITS TRANS PACKED RBC: NORMAL
OVALOCYTES BLD QL SMEAR: ABNORMAL
PATH INTERP FLD-IMP: NORMAL
PATH REV BLD -IMP: NORMAL
PCO2 BLDA: 24.3 MMHG (ref 35–45)
PCO2 BLDA: 24.9 MMHG (ref 35–45)
PCO2 BLDA: 25.9 MMHG (ref 35–45)
PCO2 BLDA: 26.1 MMHG (ref 35–45)
PCO2 BLDA: 27.6 MMHG (ref 35–45)
PCO2 BLDA: 27.7 MMHG (ref 35–45)
PCO2 BLDA: 28 MMHG (ref 35–45)
PCO2 BLDA: 30.6 MMHG (ref 35–45)
PCO2 BLDA: 32.1 MMHG (ref 35–45)
PCO2 BLDA: 36.7 MMHG (ref 35–45)
PCO2 BLDA: 37 MMHG (ref 35–45)
PCO2 BLDA: 39.1 MMHG (ref 35–45)
PCO2 BLDA: 39.8 MMHG (ref 35–45)
PCO2 BLDA: 40 MMHG (ref 35–45)
PCO2 BLDA: 40.3 MMHG (ref 35–45)
PCO2 BLDA: 40.7 MMHG (ref 35–45)
PCO2 BLDA: 41.3 MMHG (ref 35–45)
PCO2 BLDA: 41.9 MMHG (ref 35–45)
PCO2 BLDA: 43.2 MMHG (ref 35–45)
PCO2 BLDA: 43.9 MMHG (ref 35–45)
PCO2 BLDA: 48.4 MMHG (ref 35–45)
PCO2 BLDA: 49.9 MMHG (ref 35–45)
PEEP: 5
PEEP: 8
PEEP: 8
PH SMN: 7.07 [PH] (ref 7.35–7.45)
PH SMN: 7.08 [PH] (ref 7.35–7.45)
PH SMN: 7.16 [PH] (ref 7.35–7.45)
PH SMN: 7.25 [PH] (ref 7.35–7.45)
PH SMN: 7.26 [PH] (ref 7.35–7.45)
PH SMN: 7.34 [PH] (ref 7.35–7.45)
PH SMN: 7.35 [PH] (ref 7.35–7.45)
PH SMN: 7.36 [PH] (ref 7.35–7.45)
PH SMN: 7.36 [PH] (ref 7.35–7.45)
PH SMN: 7.37 [PH] (ref 7.35–7.45)
PH SMN: 7.38 [PH] (ref 7.35–7.45)
PH SMN: 7.4 [PH] (ref 7.35–7.45)
PH SMN: 7.4 [PH] (ref 7.35–7.45)
PH SMN: 7.44 [PH] (ref 7.35–7.45)
PH SMN: 7.46 [PH] (ref 7.35–7.45)
PH SMN: 7.47 [PH] (ref 7.35–7.45)
PH SMN: 7.47 [PH] (ref 7.35–7.45)
PH SMN: 7.49 [PH] (ref 7.35–7.45)
PH SMN: 7.52 [PH] (ref 7.35–7.45)
PH SMN: 7.52 [PH] (ref 7.35–7.45)
PH UR STRIP: 6 [PH] (ref 5–8)
PH UR STRIP: ABNORMAL [PH] (ref 5–8)
PHOSPHATE SERPL-MCNC: 1.1 MG/DL
PHOSPHATE SERPL-MCNC: 1.2 MG/DL
PHOSPHATE SERPL-MCNC: 1.3 MG/DL
PHOSPHATE SERPL-MCNC: 1.4 MG/DL
PHOSPHATE SERPL-MCNC: 1.7 MG/DL
PHOSPHATE SERPL-MCNC: 1.7 MG/DL
PHOSPHATE SERPL-MCNC: 1.9 MG/DL
PHOSPHATE SERPL-MCNC: 2 MG/DL
PHOSPHATE SERPL-MCNC: 2.1 MG/DL
PHOSPHATE SERPL-MCNC: 2.2 MG/DL
PHOSPHATE SERPL-MCNC: 2.3 MG/DL
PHOSPHATE SERPL-MCNC: 2.3 MG/DL
PHOSPHATE SERPL-MCNC: 2.4 MG/DL
PHOSPHATE SERPL-MCNC: 2.4 MG/DL
PHOSPHATE SERPL-MCNC: 2.5 MG/DL
PHOSPHATE SERPL-MCNC: 2.5 MG/DL
PHOSPHATE SERPL-MCNC: 2.6 MG/DL
PHOSPHATE SERPL-MCNC: 2.7 MG/DL
PHOSPHATE SERPL-MCNC: 2.8 MG/DL
PHOSPHATE SERPL-MCNC: 3 MG/DL
PHOSPHATE SERPL-MCNC: 3.1 MG/DL
PHOSPHATE SERPL-MCNC: 3.3 MG/DL
PHOSPHATE SERPL-MCNC: 3.3 MG/DL
PHOSPHATE SERPL-MCNC: 3.5 MG/DL
PHOSPHATE SERPL-MCNC: 3.5 MG/DL
PHOSPHATE SERPL-MCNC: 3.6 MG/DL
PHOSPHATE SERPL-MCNC: 4.2 MG/DL
PHOSPHATE SERPL-MCNC: 4.4 MG/DL
PHOSPHATE SERPL-MCNC: 5 MG/DL
PHOSPHATE SERPL-MCNC: 6 MG/DL
PHOSPHATE SERPL-MCNC: 7 MG/DL
PHOSPHATE SERPL-MCNC: 7 MG/DL
PHOSPHATE SERPL-MCNC: <1 MG/DL
PLATELET # BLD AUTO: 102 K/UL
PLATELET # BLD AUTO: 115 K/UL
PLATELET # BLD AUTO: 148 K/UL
PLATELET # BLD AUTO: 153 K/UL
PLATELET # BLD AUTO: 154 K/UL
PLATELET # BLD AUTO: 162 K/UL
PLATELET # BLD AUTO: 162 K/UL
PLATELET # BLD AUTO: 165 K/UL
PLATELET # BLD AUTO: 171 K/UL
PLATELET # BLD AUTO: 188 K/UL
PLATELET # BLD AUTO: 193 K/UL
PLATELET # BLD AUTO: 202 K/UL
PLATELET # BLD AUTO: 217 K/UL
PLATELET # BLD AUTO: 223 K/UL
PLATELET # BLD AUTO: 234 K/UL
PLATELET # BLD AUTO: 24 K/UL
PLATELET # BLD AUTO: 246 K/UL
PLATELET # BLD AUTO: 25 K/UL
PLATELET # BLD AUTO: 27 K/UL
PLATELET # BLD AUTO: 29 K/UL
PLATELET # BLD AUTO: 43 K/UL
PLATELET # BLD AUTO: 50 K/UL
PLATELET # BLD AUTO: 57 K/UL
PLATELET # BLD AUTO: 77 K/UL
PLATELET # BLD AUTO: 78 K/UL
PLATELET # BLD AUTO: 96 K/UL
PLATELET # BLD AUTO: 97 K/UL
PLATELET # BLD AUTO: 99 K/UL
PLATELET BLD QL SMEAR: ABNORMAL
PMV BLD AUTO: 10 FL
PMV BLD AUTO: 10 FL
PMV BLD AUTO: 10.3 FL
PMV BLD AUTO: 11.2 FL
PMV BLD AUTO: 11.6 FL
PMV BLD AUTO: 12.1 FL
PMV BLD AUTO: 12.4 FL
PMV BLD AUTO: 12.5 FL
PMV BLD AUTO: 12.6 FL
PMV BLD AUTO: 12.6 FL
PMV BLD AUTO: 12.8 FL
PMV BLD AUTO: 12.8 FL
PMV BLD AUTO: 12.9 FL
PMV BLD AUTO: 13.1 FL
PMV BLD AUTO: 13.1 FL
PMV BLD AUTO: 13.2 FL
PMV BLD AUTO: 13.3 FL
PMV BLD AUTO: 13.5 FL
PMV BLD AUTO: 9.4 FL
PMV BLD AUTO: 9.5 FL
PMV BLD AUTO: 9.7 FL
PMV BLD AUTO: 9.9 FL
PMV BLD AUTO: ABNORMAL FL
PMV BLD AUTO: ABNORMAL FL
PO2 BLDA: 100 MMHG (ref 80–100)
PO2 BLDA: 116 MMHG (ref 80–100)
PO2 BLDA: 118 MMHG (ref 80–100)
PO2 BLDA: 120 MMHG (ref 80–100)
PO2 BLDA: 122 MMHG (ref 80–100)
PO2 BLDA: 124 MMHG (ref 80–100)
PO2 BLDA: 169 MMHG (ref 80–100)
PO2 BLDA: 252 MMHG (ref 80–100)
PO2 BLDA: 29 MMHG (ref 40–60)
PO2 BLDA: 344 MMHG (ref 80–100)
PO2 BLDA: 47 MMHG (ref 80–100)
PO2 BLDA: 67 MMHG (ref 80–100)
PO2 BLDA: 74 MMHG (ref 80–100)
PO2 BLDA: 75 MMHG (ref 80–100)
PO2 BLDA: 79 MMHG (ref 80–100)
PO2 BLDA: 79 MMHG (ref 80–100)
PO2 BLDA: 85 MMHG (ref 80–100)
PO2 BLDA: 87 MMHG (ref 80–100)
PO2 BLDA: 94 MMHG (ref 80–100)
PO2 BLDA: 95 MMHG (ref 80–100)
PO2 BLDA: 96 MMHG (ref 80–100)
PO2 BLDA: 99 MMHG (ref 80–100)
POC BE: -1 MMOL/L
POC BE: -1 MMOL/L
POC BE: -10 MMOL/L
POC BE: -11 MMOL/L
POC BE: -11 MMOL/L
POC BE: -16 MMOL/L
POC BE: -19 MMOL/L
POC BE: -22 MMOL/L
POC BE: -22 MMOL/L
POC BE: -6 MMOL/L
POC BE: -6 MMOL/L
POC BE: -7 MMOL/L
POC BE: 0 MMOL/L
POC BE: 0 MMOL/L
POC BE: 1 MMOL/L
POC BE: 2 MMOL/L
POC BE: 6 MMOL/L
POC BE: 6 MMOL/L
POC BE: 7 MMOL/L
POC BE: 7 MMOL/L
POC BE: 8 MMOL/L
POC BE: 9 MMOL/L
POC IONIZED CALCIUM: 1.25 MMOL/L (ref 1.06–1.42)
POC SATURATED O2: 100 % (ref 95–100)
POC SATURATED O2: 53 % (ref 95–100)
POC SATURATED O2: 66 % (ref 95–100)
POC SATURATED O2: 90 % (ref 95–100)
POC SATURATED O2: 94 % (ref 95–100)
POC SATURATED O2: 95 % (ref 95–100)
POC SATURATED O2: 95 % (ref 95–100)
POC SATURATED O2: 96 % (ref 95–100)
POC SATURATED O2: 96 % (ref 95–100)
POC SATURATED O2: 97 % (ref 95–100)
POC SATURATED O2: 98 % (ref 95–100)
POC SATURATED O2: 98 % (ref 95–100)
POC SATURATED O2: 99 % (ref 95–100)
POCT GLUCOSE: 102 MG/DL (ref 70–110)
POCT GLUCOSE: 103 MG/DL (ref 70–110)
POCT GLUCOSE: 107 MG/DL (ref 70–110)
POCT GLUCOSE: 108 MG/DL (ref 70–110)
POCT GLUCOSE: 110 MG/DL (ref 70–110)
POCT GLUCOSE: 111 MG/DL (ref 70–110)
POCT GLUCOSE: 112 MG/DL (ref 70–110)
POCT GLUCOSE: 112 MG/DL (ref 70–110)
POCT GLUCOSE: 113 MG/DL (ref 70–110)
POCT GLUCOSE: 114 MG/DL (ref 70–110)
POCT GLUCOSE: 115 MG/DL (ref 70–110)
POCT GLUCOSE: 117 MG/DL (ref 70–110)
POCT GLUCOSE: 117 MG/DL (ref 70–110)
POCT GLUCOSE: 120 MG/DL (ref 70–110)
POCT GLUCOSE: 123 MG/DL (ref 70–110)
POCT GLUCOSE: 123 MG/DL (ref 70–110)
POCT GLUCOSE: 125 MG/DL (ref 70–110)
POCT GLUCOSE: 125 MG/DL (ref 70–110)
POCT GLUCOSE: 127 MG/DL (ref 70–110)
POCT GLUCOSE: 128 MG/DL (ref 70–110)
POCT GLUCOSE: 129 MG/DL (ref 70–110)
POCT GLUCOSE: 130 MG/DL (ref 70–110)
POCT GLUCOSE: 131 MG/DL (ref 70–110)
POCT GLUCOSE: 132 MG/DL (ref 70–110)
POCT GLUCOSE: 132 MG/DL (ref 70–110)
POCT GLUCOSE: 136 MG/DL (ref 70–110)
POCT GLUCOSE: 136 MG/DL (ref 70–110)
POCT GLUCOSE: 137 MG/DL (ref 70–110)
POCT GLUCOSE: 137 MG/DL (ref 70–110)
POCT GLUCOSE: 139 MG/DL (ref 70–110)
POCT GLUCOSE: 141 MG/DL (ref 70–110)
POCT GLUCOSE: 145 MG/DL (ref 70–110)
POCT GLUCOSE: 153 MG/DL (ref 70–110)
POCT GLUCOSE: 156 MG/DL (ref 70–110)
POCT GLUCOSE: 157 MG/DL (ref 70–110)
POCT GLUCOSE: 163 MG/DL (ref 70–110)
POCT GLUCOSE: 179 MG/DL (ref 70–110)
POCT GLUCOSE: 182 MG/DL (ref 70–110)
POCT GLUCOSE: 182 MG/DL (ref 70–110)
POCT GLUCOSE: 184 MG/DL (ref 70–110)
POCT GLUCOSE: 186 MG/DL (ref 70–110)
POCT GLUCOSE: 190 MG/DL (ref 70–110)
POCT GLUCOSE: 205 MG/DL (ref 70–110)
POCT GLUCOSE: 209 MG/DL (ref 70–110)
POCT GLUCOSE: 257 MG/DL (ref 70–110)
POCT GLUCOSE: 266 MG/DL (ref 70–110)
POCT GLUCOSE: 274 MG/DL (ref 70–110)
POCT GLUCOSE: 33 MG/DL (ref 70–110)
POCT GLUCOSE: 53 MG/DL (ref 70–110)
POCT GLUCOSE: 60 MG/DL (ref 70–110)
POCT GLUCOSE: 64 MG/DL (ref 70–110)
POCT GLUCOSE: 65 MG/DL (ref 70–110)
POCT GLUCOSE: 69 MG/DL (ref 70–110)
POCT GLUCOSE: 71 MG/DL (ref 70–110)
POCT GLUCOSE: 71 MG/DL (ref 70–110)
POCT GLUCOSE: 72 MG/DL (ref 70–110)
POCT GLUCOSE: 73 MG/DL (ref 70–110)
POCT GLUCOSE: 75 MG/DL (ref 70–110)
POCT GLUCOSE: 75 MG/DL (ref 70–110)
POCT GLUCOSE: 76 MG/DL (ref 70–110)
POCT GLUCOSE: 77 MG/DL (ref 70–110)
POCT GLUCOSE: 82 MG/DL (ref 70–110)
POCT GLUCOSE: 82 MG/DL (ref 70–110)
POCT GLUCOSE: 83 MG/DL (ref 70–110)
POCT GLUCOSE: 86 MG/DL (ref 70–110)
POCT GLUCOSE: 92 MG/DL (ref 70–110)
POCT GLUCOSE: 93 MG/DL (ref 70–110)
POCT GLUCOSE: 98 MG/DL (ref 70–110)
POCT GLUCOSE: 98 MG/DL (ref 70–110)
POIKILOCYTOSIS BLD QL SMEAR: ABNORMAL
POIKILOCYTOSIS BLD QL SMEAR: ABNORMAL
POIKILOCYTOSIS BLD QL SMEAR: SLIGHT
POLYCHROMASIA BLD QL SMEAR: ABNORMAL
POTASSIUM BLD-SCNC: 4.1 MMOL/L (ref 3.5–5.1)
POTASSIUM SERPL-SCNC: 2.6 MMOL/L
POTASSIUM SERPL-SCNC: 2.9 MMOL/L
POTASSIUM SERPL-SCNC: 3 MMOL/L
POTASSIUM SERPL-SCNC: 3.1 MMOL/L
POTASSIUM SERPL-SCNC: 3.2 MMOL/L
POTASSIUM SERPL-SCNC: 3.3 MMOL/L
POTASSIUM SERPL-SCNC: 3.4 MMOL/L
POTASSIUM SERPL-SCNC: 3.5 MMOL/L
POTASSIUM SERPL-SCNC: 3.5 MMOL/L
POTASSIUM SERPL-SCNC: 3.6 MMOL/L
POTASSIUM SERPL-SCNC: 3.7 MMOL/L
POTASSIUM SERPL-SCNC: 3.8 MMOL/L
POTASSIUM SERPL-SCNC: 3.9 MMOL/L
POTASSIUM SERPL-SCNC: 4 MMOL/L
POTASSIUM SERPL-SCNC: 4.3 MMOL/L
POTASSIUM SERPL-SCNC: 4.5 MMOL/L
POTASSIUM SERPL-SCNC: 4.7 MMOL/L
POTASSIUM SERPL-SCNC: 5 MMOL/L
POTASSIUM SERPL-SCNC: 5.1 MMOL/L
POTASSIUM SERPL-SCNC: 5.1 MMOL/L
PROCALCITONIN SERPL IA-MCNC: 11.6 NG/ML
PROCALCITONIN SERPL IA-MCNC: 6.16 NG/ML
PROCALCITONIN SERPL IA-MCNC: 7.27 NG/ML
PROCALCITONIN SERPL IA-MCNC: 7.51 NG/ML
PROCALCITONIN SERPL IA-MCNC: 7.77 NG/ML
PROCALCITONIN SERPL IA-MCNC: 8.59 NG/ML
PROCALCITONIN SERPL IA-MCNC: 9.5 NG/ML
PROCALCITONIN SERPL IA-MCNC: 9.62 NG/ML
PROMYELOCYTES NFR BLD MANUAL: 1 %
PROMYELOCYTES NFR BLD MANUAL: 2 %
PROT FLD-MCNC: 3.2 G/DL
PROT SERPL-MCNC: 4.6 G/DL
PROT SERPL-MCNC: 4.7 G/DL
PROT SERPL-MCNC: 4.8 G/DL
PROT SERPL-MCNC: 4.8 G/DL
PROT SERPL-MCNC: 4.9 G/DL
PROT SERPL-MCNC: 5.2 G/DL
PROT SERPL-MCNC: 5.3 G/DL
PROT SERPL-MCNC: 5.3 G/DL
PROT SERPL-MCNC: 5.6 G/DL
PROT SERPL-MCNC: 5.7 G/DL
PROT SERPL-MCNC: 5.8 G/DL
PROT SERPL-MCNC: 6 G/DL
PROT SERPL-MCNC: 6.1 G/DL
PROT SERPL-MCNC: 6.1 G/DL
PROT SERPL-MCNC: 6.2 G/DL
PROT SERPL-MCNC: 6.3 G/DL
PROT SERPL-MCNC: 6.5 G/DL
PROT SERPL-MCNC: 6.6 G/DL
PROT SERPL-MCNC: 6.6 G/DL
PROT UR QL STRIP: ABNORMAL
PROT UR QL STRIP: ABNORMAL
PROTHROMBIN TIME: 12.3 SEC
PROTHROMBIN TIME: 12.7 SEC
PROTHROMBIN TIME: 12.9 SEC
PROTHROMBIN TIME: 13.6 SEC
PROTHROMBIN TIME: 15.3 SEC
PROTHROMBIN TIME: 16.2 SEC
PROTHROMBIN TIME: 16.3 SEC
PROTHROMBIN TIME: 16.4 SEC
PROTHROMBIN TIME: 16.7 SEC
PROTHROMBIN TIME: 16.8 SEC
PROTHROMBIN TIME: 17 SEC
PROTHROMBIN TIME: 17 SEC
PROTHROMBIN TIME: 17.5 SEC
PROTHROMBIN TIME: 18.3 SEC
PROTHROMBIN TIME: 18.7 SEC
PROTHROMBIN TIME: 18.7 SEC
PROTHROMBIN TIME: 19.4 SEC
PROTHROMBIN TIME: 21.7 SEC
PROTHROMBIN TIME: 22.7 SEC
PROTHROMBIN TIME: 29.9 SEC
PTH-INTACT SERPL-MCNC: 244.6 PG/ML
RBC # BLD AUTO: 2.27 M/UL
RBC # BLD AUTO: 2.27 M/UL
RBC # BLD AUTO: 2.4 M/UL
RBC # BLD AUTO: 2.51 M/UL
RBC # BLD AUTO: 2.53 M/UL
RBC # BLD AUTO: 2.56 M/UL
RBC # BLD AUTO: 2.6 M/UL
RBC # BLD AUTO: 2.64 M/UL
RBC # BLD AUTO: 2.73 M/UL
RBC # BLD AUTO: 2.76 M/UL
RBC # BLD AUTO: 2.81 M/UL
RBC # BLD AUTO: 2.83 M/UL
RBC # BLD AUTO: 2.86 M/UL
RBC # BLD AUTO: 2.91 M/UL
RBC # BLD AUTO: 2.91 M/UL
RBC # BLD AUTO: 2.92 M/UL
RBC # BLD AUTO: 3.01 M/UL
RBC # BLD AUTO: 3.03 M/UL
RBC # BLD AUTO: 3.03 M/UL
RBC # BLD AUTO: 3.05 M/UL
RBC # BLD AUTO: 3.08 M/UL
RBC # BLD AUTO: 3.09 M/UL
RBC # BLD AUTO: 3.1 M/UL
RBC # BLD AUTO: 3.16 M/UL
RBC # BLD AUTO: 3.2 M/UL
RBC # BLD AUTO: 3.31 M/UL
RBC #/AREA URNS HPF: 8 /HPF (ref 0–4)
RBC #/AREA URNS HPF: >100 /HPF (ref 0–4)
RETIRED EF AND QEF - SEE NOTES: 55 (ref 55–65)
SAMPLE: ABNORMAL
SAMPLE: NORMAL
SAMPLE: NORMAL
SITE: ABNORMAL
SITE: NORMAL
SITE: NORMAL
SMOOTH MUSCLE AB TITR SER IF: NORMAL {TITER}
SMUDGE CELLS BLD QL SMEAR: PRESENT
SODIUM BLD-SCNC: 146 MMOL/L (ref 136–145)
SODIUM SERPL-SCNC: 129 MMOL/L
SODIUM SERPL-SCNC: 130 MMOL/L
SODIUM SERPL-SCNC: 130 MMOL/L
SODIUM SERPL-SCNC: 131 MMOL/L
SODIUM SERPL-SCNC: 135 MMOL/L
SODIUM SERPL-SCNC: 136 MMOL/L
SODIUM SERPL-SCNC: 137 MMOL/L
SODIUM SERPL-SCNC: 137 MMOL/L
SODIUM SERPL-SCNC: 138 MMOL/L
SODIUM SERPL-SCNC: 139 MMOL/L
SODIUM SERPL-SCNC: 140 MMOL/L
SODIUM SERPL-SCNC: 141 MMOL/L
SODIUM SERPL-SCNC: 143 MMOL/L
SODIUM SERPL-SCNC: 143 MMOL/L
SODIUM SERPL-SCNC: 144 MMOL/L
SODIUM SERPL-SCNC: 145 MMOL/L
SODIUM SERPL-SCNC: 146 MMOL/L
SODIUM SERPL-SCNC: 146 MMOL/L
SODIUM SERPL-SCNC: 147 MMOL/L
SODIUM SERPL-SCNC: 147 MMOL/L
SODIUM SERPL-SCNC: 148 MMOL/L
SODIUM SERPL-SCNC: 148 MMOL/L
SP GR UR STRIP: 1.02 (ref 1–1.03)
SP GR UR STRIP: ABNORMAL (ref 1–1.03)
SPECIMEN SOURCE: NORMAL
SPHEROCYTES BLD QL SMEAR: ABNORMAL
SQUAMOUS #/AREA URNS HPF: 2 /HPF
STOMATOCYTES BLD QL SMEAR: PRESENT
TARGETS BLD QL SMEAR: ABNORMAL
TOXIC GRANULES BLD QL SMEAR: PRESENT
TRANS PLATPHERESIS VOL PATIENT: NORMAL ML
TRANS PLATPHERESIS VOL PATIENT: NORMAL ML
TRIGL SERPL-MCNC: 1077 MG/DL
TRIGL SERPL-MCNC: 1819 MG/DL
TRIGL SERPL-MCNC: 695 MG/DL
TROPONIN I SERPL DL<=0.01 NG/ML-MCNC: 0.01 NG/ML
TROPONIN I SERPL DL<=0.01 NG/ML-MCNC: 0.04 NG/ML
UNIT NUMBER: NORMAL
URN SPEC COLLECT METH UR: ABNORMAL
URN SPEC COLLECT METH UR: ABNORMAL
UROBILINOGEN UR STRIP-ACNC: ABNORMAL EU/DL
UROBILINOGEN UR STRIP-ACNC: NEGATIVE EU/DL
VANCOMYCIN SERPL-MCNC: 11.9 UG/ML
VANCOMYCIN SERPL-MCNC: 13.3 UG/ML
VANCOMYCIN SERPL-MCNC: 14.5 UG/ML
VANCOMYCIN SERPL-MCNC: 16.5 UG/ML
VANCOMYCIN SERPL-MCNC: 17.3 UG/ML
VANCOMYCIN SERPL-MCNC: 23.9 UG/ML
VANCOMYCIN SERPL-MCNC: 24.9 UG/ML
VANCOMYCIN TROUGH SERPL-MCNC: 19.1 UG/ML
VANCOMYCIN TROUGH SERPL-MCNC: 25.5 UG/ML
VANCOMYCIN TROUGH SERPL-MCNC: 33.8 UG/ML
VT: 400
VT: 450
VT: 450
VT: 500
VT: 500
WBC # BLD AUTO: 12.09 K/UL
WBC # BLD AUTO: 14.79 K/UL
WBC # BLD AUTO: 16.25 K/UL
WBC # BLD AUTO: 16.29 K/UL
WBC # BLD AUTO: 16.98 K/UL
WBC # BLD AUTO: 17.77 K/UL
WBC # BLD AUTO: 17.92 K/UL
WBC # BLD AUTO: 18.04 K/UL
WBC # BLD AUTO: 18.58 K/UL
WBC # BLD AUTO: 18.82 K/UL
WBC # BLD AUTO: 19.04 K/UL
WBC # BLD AUTO: 20.32 K/UL
WBC # BLD AUTO: 20.72 K/UL
WBC # BLD AUTO: 20.8 K/UL
WBC # BLD AUTO: 21.2 K/UL
WBC # BLD AUTO: 22.1 K/UL
WBC # BLD AUTO: 22.39 K/UL
WBC # BLD AUTO: 25.64 K/UL
WBC # BLD AUTO: 26.27 K/UL
WBC # BLD AUTO: 26.77 K/UL
WBC # BLD AUTO: 26.85 K/UL
WBC # BLD AUTO: 28.91 K/UL
WBC # BLD AUTO: 29.54 K/UL
WBC # BLD AUTO: 31.43 K/UL
WBC # BLD AUTO: 31.54 K/UL
WBC # BLD AUTO: 32.83 K/UL
WBC # BLD AUTO: 37.01 K/UL
WBC # BLD AUTO: 52.2 K/UL
WBC # FLD: 288 /CU MM
WBC #/AREA URNS HPF: 40 /HPF (ref 0–5)
WBC #/AREA URNS HPF: 5 /HPF (ref 0–5)
YEAST URNS QL MICRO: ABNORMAL

## 2018-01-01 PROCEDURE — 80069 RENAL FUNCTION PANEL: CPT

## 2018-01-01 PROCEDURE — 25000003 PHARM REV CODE 250: Performed by: INTERNAL MEDICINE

## 2018-01-01 PROCEDURE — 63600175 PHARM REV CODE 636 W HCPCS: Performed by: NURSE PRACTITIONER

## 2018-01-01 PROCEDURE — 83615 LACTATE (LD) (LDH) ENZYME: CPT

## 2018-01-01 PROCEDURE — 02HV33Z INSERTION OF INFUSION DEVICE INTO SUPERIOR VENA CAVA, PERCUTANEOUS APPROACH: ICD-10-PCS | Performed by: INTERNAL MEDICINE

## 2018-01-01 PROCEDURE — 85060 BLOOD SMEAR INTERPRETATION: CPT | Mod: ,,, | Performed by: PATHOLOGY

## 2018-01-01 PROCEDURE — 25000003 PHARM REV CODE 250: Performed by: NURSE PRACTITIONER

## 2018-01-01 PROCEDURE — 99291 CRITICAL CARE FIRST HOUR: CPT | Mod: ,,, | Performed by: NURSE PRACTITIONER

## 2018-01-01 PROCEDURE — 20000000 HC ICU ROOM

## 2018-01-01 PROCEDURE — 82803 BLOOD GASES ANY COMBINATION: CPT

## 2018-01-01 PROCEDURE — 87106 FUNGI IDENTIFICATION YEAST: CPT

## 2018-01-01 PROCEDURE — 36415 COLL VENOUS BLD VENIPUNCTURE: CPT

## 2018-01-01 PROCEDURE — 84100 ASSAY OF PHOSPHORUS: CPT

## 2018-01-01 PROCEDURE — 83735 ASSAY OF MAGNESIUM: CPT | Mod: 91

## 2018-01-01 PROCEDURE — 99900026 HC AIRWAY MAINTENANCE (STAT)

## 2018-01-01 PROCEDURE — 99291 CRITICAL CARE FIRST HOUR: CPT | Mod: ,,, | Performed by: INTERNAL MEDICINE

## 2018-01-01 PROCEDURE — 63600175 PHARM REV CODE 636 W HCPCS: Performed by: INTERNAL MEDICINE

## 2018-01-01 PROCEDURE — S0030 INJECTION, METRONIDAZOLE: HCPCS | Performed by: NURSE PRACTITIONER

## 2018-01-01 PROCEDURE — 99499 UNLISTED E&M SERVICE: CPT | Mod: ,,, | Performed by: SURGERY

## 2018-01-01 PROCEDURE — 99292 CRITICAL CARE ADDL 30 MIN: CPT | Mod: ,,, | Performed by: NURSE PRACTITIONER

## 2018-01-01 PROCEDURE — 85027 COMPLETE CBC AUTOMATED: CPT

## 2018-01-01 PROCEDURE — 37799 UNLISTED PX VASCULAR SURGERY: CPT

## 2018-01-01 PROCEDURE — 84132 ASSAY OF SERUM POTASSIUM: CPT

## 2018-01-01 PROCEDURE — 85007 BL SMEAR W/DIFF WBC COUNT: CPT

## 2018-01-01 PROCEDURE — 99900035 HC TECH TIME PER 15 MIN (STAT)

## 2018-01-01 PROCEDURE — 63600175 PHARM REV CODE 636 W HCPCS: Performed by: CLINIC/CENTER

## 2018-01-01 PROCEDURE — 82140 ASSAY OF AMMONIA: CPT

## 2018-01-01 PROCEDURE — S0030 INJECTION, METRONIDAZOLE: HCPCS | Performed by: INTERNAL MEDICINE

## 2018-01-01 PROCEDURE — 86901 BLOOD TYPING SEROLOGIC RH(D): CPT

## 2018-01-01 PROCEDURE — 80048 BASIC METABOLIC PNL TOTAL CA: CPT | Mod: 91

## 2018-01-01 PROCEDURE — 87077 CULTURE AEROBIC IDENTIFY: CPT | Mod: 59

## 2018-01-01 PROCEDURE — 36600 WITHDRAWAL OF ARTERIAL BLOOD: CPT

## 2018-01-01 PROCEDURE — 99291 CRITICAL CARE FIRST HOUR: CPT | Mod: 25,,, | Performed by: NURSE PRACTITIONER

## 2018-01-01 PROCEDURE — 80048 BASIC METABOLIC PNL TOTAL CA: CPT

## 2018-01-01 PROCEDURE — 83735 ASSAY OF MAGNESIUM: CPT

## 2018-01-01 PROCEDURE — 25000242 PHARM REV CODE 250 ALT 637 W/ HCPCS: Performed by: INTERNAL MEDICINE

## 2018-01-01 PROCEDURE — 80202 ASSAY OF VANCOMYCIN: CPT

## 2018-01-01 PROCEDURE — 85610 PROTHROMBIN TIME: CPT

## 2018-01-01 PROCEDURE — P9016 RBC LEUKOCYTES REDUCED: HCPCS

## 2018-01-01 PROCEDURE — 27201112

## 2018-01-01 PROCEDURE — 87070 CULTURE OTHR SPECIMN AEROBIC: CPT

## 2018-01-01 PROCEDURE — 25000003 PHARM REV CODE 250: Performed by: CLINIC/CENTER

## 2018-01-01 PROCEDURE — 83605 ASSAY OF LACTIC ACID: CPT

## 2018-01-01 PROCEDURE — 25000003 PHARM REV CODE 250: Performed by: PHYSICIAN ASSISTANT

## 2018-01-01 PROCEDURE — S0171 BUMETANIDE 0.5 MG: HCPCS | Performed by: INTERNAL MEDICINE

## 2018-01-01 PROCEDURE — 0BH17EZ INSERTION OF ENDOTRACHEAL AIRWAY INTO TRACHEA, VIA NATURAL OR ARTIFICIAL OPENING: ICD-10-PCS | Performed by: INTERNAL MEDICINE

## 2018-01-01 PROCEDURE — S0028 INJECTION, FAMOTIDINE, 20 MG: HCPCS | Performed by: NURSE PRACTITIONER

## 2018-01-01 PROCEDURE — 87186 SC STD MICRODIL/AGAR DIL: CPT

## 2018-01-01 PROCEDURE — 80053 COMPREHEN METABOLIC PANEL: CPT

## 2018-01-01 PROCEDURE — 86850 RBC ANTIBODY SCREEN: CPT

## 2018-01-01 PROCEDURE — 85384 FIBRINOGEN ACTIVITY: CPT

## 2018-01-01 PROCEDURE — 25500020 PHARM REV CODE 255: Performed by: INTERNAL MEDICINE

## 2018-01-01 PROCEDURE — 90937 HEMODIALYSIS REPEATED EVAL: CPT | Mod: ,,, | Performed by: INTERNAL MEDICINE

## 2018-01-01 PROCEDURE — 94003 VENT MGMT INPAT SUBQ DAY: CPT

## 2018-01-01 PROCEDURE — P9017 PLASMA 1 DONOR FRZ W/IN 8 HR: HCPCS

## 2018-01-01 PROCEDURE — 85025 COMPLETE CBC W/AUTO DIFF WBC: CPT

## 2018-01-01 PROCEDURE — 36569 INSJ PICC 5 YR+ W/O IMAGING: CPT

## 2018-01-01 PROCEDURE — 80202 ASSAY OF VANCOMYCIN: CPT | Mod: 91

## 2018-01-01 PROCEDURE — 84145 PROCALCITONIN (PCT): CPT

## 2018-01-01 PROCEDURE — 37000008 HC ANESTHESIA 1ST 15 MINUTES: Performed by: OTOLARYNGOLOGY

## 2018-01-01 PROCEDURE — 25000003 PHARM REV CODE 250

## 2018-01-01 PROCEDURE — 25000003 PHARM REV CODE 250: Performed by: STUDENT IN AN ORGANIZED HEALTH CARE EDUCATION/TRAINING PROGRAM

## 2018-01-01 PROCEDURE — 63600175 PHARM REV CODE 636 W HCPCS: Performed by: EMERGENCY MEDICINE

## 2018-01-01 PROCEDURE — 5A1955Z RESPIRATORY VENTILATION, GREATER THAN 96 CONSECUTIVE HOURS: ICD-10-PCS | Performed by: EMERGENCY MEDICINE

## 2018-01-01 PROCEDURE — 63600175 PHARM REV CODE 636 W HCPCS: Mod: JG | Performed by: NURSE PRACTITIONER

## 2018-01-01 PROCEDURE — 63600175 PHARM REV CODE 636 W HCPCS: Mod: JG | Performed by: INTERNAL MEDICINE

## 2018-01-01 PROCEDURE — 99291 CRITICAL CARE FIRST HOUR: CPT | Mod: ,,, | Performed by: OTOLARYNGOLOGY

## 2018-01-01 PROCEDURE — 99233 SBSQ HOSP IP/OBS HIGH 50: CPT | Mod: ,,, | Performed by: INTERNAL MEDICINE

## 2018-01-01 PROCEDURE — 87081 CULTURE SCREEN ONLY: CPT

## 2018-01-01 PROCEDURE — 97802 MEDICAL NUTRITION INDIV IN: CPT

## 2018-01-01 PROCEDURE — 25000003 PHARM REV CODE 250: Performed by: FAMILY MEDICINE

## 2018-01-01 PROCEDURE — S0028 INJECTION, FAMOTIDINE, 20 MG: HCPCS | Performed by: INTERNAL MEDICINE

## 2018-01-01 PROCEDURE — 27000221 HC OXYGEN, UP TO 24 HOURS

## 2018-01-01 PROCEDURE — 88312 SPECIAL STAINS GROUP 1: CPT | Mod: 26,,, | Performed by: PATHOLOGY

## 2018-01-01 PROCEDURE — 0BH17EZ INSERTION OF ENDOTRACHEAL AIRWAY INTO TRACHEA, VIA NATURAL OR ARTIFICIAL OPENING: ICD-10-PCS | Performed by: EMERGENCY MEDICINE

## 2018-01-01 PROCEDURE — P9047 ALBUMIN (HUMAN), 25%, 50ML: HCPCS | Mod: JG | Performed by: INTERNAL MEDICINE

## 2018-01-01 PROCEDURE — 83690 ASSAY OF LIPASE: CPT

## 2018-01-01 PROCEDURE — 25500020 PHARM REV CODE 255: Performed by: NURSE PRACTITIONER

## 2018-01-01 PROCEDURE — 36620 INSERTION CATHETER ARTERY: CPT

## 2018-01-01 PROCEDURE — 80074 ACUTE HEPATITIS PANEL: CPT

## 2018-01-01 PROCEDURE — 90945 DIALYSIS ONE EVALUATION: CPT

## 2018-01-01 PROCEDURE — S5010 5% DEXTROSE AND 0.45% SALINE: HCPCS | Performed by: INTERNAL MEDICINE

## 2018-01-01 PROCEDURE — P9035 PLATELET PHERES LEUKOREDUCED: HCPCS

## 2018-01-01 PROCEDURE — 84100 ASSAY OF PHOSPHORUS: CPT | Mod: 91

## 2018-01-01 PROCEDURE — 87116 MYCOBACTERIA CULTURE: CPT

## 2018-01-01 PROCEDURE — 31500 INSERT EMERGENCY AIRWAY: CPT | Mod: ,,, | Performed by: INTERNAL MEDICINE

## 2018-01-01 PROCEDURE — 93005 ELECTROCARDIOGRAM TRACING: CPT

## 2018-01-01 PROCEDURE — 87449 NOS EACH ORGANISM AG IA: CPT

## 2018-01-01 PROCEDURE — 37000009 HC ANESTHESIA EA ADD 15 MINS: Performed by: OTOLARYNGOLOGY

## 2018-01-01 PROCEDURE — 99233 SBSQ HOSP IP/OBS HIGH 50: CPT | Mod: ,,, | Performed by: PHYSICIAN ASSISTANT

## 2018-01-01 PROCEDURE — 27200966 HC CLOSED SUCTION SYSTEM

## 2018-01-01 PROCEDURE — 87086 URINE CULTURE/COLONY COUNT: CPT

## 2018-01-01 PROCEDURE — 5A1945Z RESPIRATORY VENTILATION, 24-96 CONSECUTIVE HOURS: ICD-10-PCS | Performed by: EMERGENCY MEDICINE

## 2018-01-01 PROCEDURE — P9045 ALBUMIN (HUMAN), 5%, 250 ML: HCPCS | Mod: JG | Performed by: INTERNAL MEDICINE

## 2018-01-01 PROCEDURE — 82150 ASSAY OF AMYLASE: CPT

## 2018-01-01 PROCEDURE — 99291 CRITICAL CARE FIRST HOUR: CPT | Mod: 25,,, | Performed by: INTERNAL MEDICINE

## 2018-01-01 PROCEDURE — 51702 INSERT TEMP BLADDER CATH: CPT

## 2018-01-01 PROCEDURE — 84157 ASSAY OF PROTEIN OTHER: CPT

## 2018-01-01 PROCEDURE — 36620 INSERTION CATHETER ARTERY: CPT | Mod: ,,, | Performed by: NURSE PRACTITIONER

## 2018-01-01 PROCEDURE — 82945 GLUCOSE OTHER FLUID: CPT

## 2018-01-01 PROCEDURE — 82550 ASSAY OF CK (CPK): CPT

## 2018-01-01 PROCEDURE — 97803 MED NUTRITION INDIV SUBSEQ: CPT

## 2018-01-01 PROCEDURE — 86256 FLUORESCENT ANTIBODY TITER: CPT

## 2018-01-01 PROCEDURE — 27800903 OPTIME MED/SURG SUP & DEVICES OTHER IMPLANTS: Performed by: OTOLARYNGOLOGY

## 2018-01-01 PROCEDURE — 84484 ASSAY OF TROPONIN QUANT: CPT

## 2018-01-01 PROCEDURE — 82330 ASSAY OF CALCIUM: CPT

## 2018-01-01 PROCEDURE — 99232 SBSQ HOSP IP/OBS MODERATE 35: CPT | Mod: ,,, | Performed by: SURGERY

## 2018-01-01 PROCEDURE — 83970 ASSAY OF PARATHORMONE: CPT

## 2018-01-01 PROCEDURE — 94002 VENT MGMT INPAT INIT DAY: CPT

## 2018-01-01 PROCEDURE — 81000 URINALYSIS NONAUTO W/SCOPE: CPT

## 2018-01-01 PROCEDURE — 99231 SBSQ HOSP IP/OBS SF/LOW 25: CPT | Mod: ,,, | Performed by: SURGERY

## 2018-01-01 PROCEDURE — 87040 BLOOD CULTURE FOR BACTERIA: CPT

## 2018-01-01 PROCEDURE — 88112 CYTOPATH CELL ENHANCE TECH: CPT | Mod: 26,,, | Performed by: PATHOLOGY

## 2018-01-01 PROCEDURE — 63600175 PHARM REV CODE 636 W HCPCS

## 2018-01-01 PROCEDURE — 88305 TISSUE EXAM BY PATHOLOGIST: CPT | Performed by: PATHOLOGY

## 2018-01-01 PROCEDURE — 0F9430Z DRAINAGE OF GALLBLADDER WITH DRAINAGE DEVICE, PERCUTANEOUS APPROACH: ICD-10-PCS | Performed by: EMERGENCY MEDICINE

## 2018-01-01 PROCEDURE — 89051 BODY FLUID CELL COUNT: CPT

## 2018-01-01 PROCEDURE — 94640 AIRWAY INHALATION TREATMENT: CPT

## 2018-01-01 PROCEDURE — 87015 SPECIMEN INFECT AGNT CONCNTJ: CPT

## 2018-01-01 PROCEDURE — 63600175 PHARM REV CODE 636 W HCPCS: Performed by: FAMILY MEDICINE

## 2018-01-01 PROCEDURE — 36000706: Performed by: OTOLARYNGOLOGY

## 2018-01-01 PROCEDURE — 85027 COMPLETE CBC AUTOMATED: CPT | Mod: 91

## 2018-01-01 PROCEDURE — 87040 BLOOD CULTURE FOR BACTERIA: CPT | Mod: 59

## 2018-01-01 PROCEDURE — 83880 ASSAY OF NATRIURETIC PEPTIDE: CPT

## 2018-01-01 PROCEDURE — 99292 CRITICAL CARE ADDL 30 MIN: CPT | Mod: 25,,, | Performed by: INTERNAL MEDICINE

## 2018-01-01 PROCEDURE — 88305 TISSUE EXAM BY PATHOLOGIST: CPT | Mod: 26,,, | Performed by: PATHOLOGY

## 2018-01-01 PROCEDURE — P9012 CRYOPRECIPITATE EACH UNIT: HCPCS

## 2018-01-01 PROCEDURE — 87077 CULTURE AEROBIC IDENTIFY: CPT

## 2018-01-01 PROCEDURE — S4991 NICOTINE PATCH NONLEGEND: HCPCS | Performed by: INTERNAL MEDICINE

## 2018-01-01 PROCEDURE — 63600175 PHARM REV CODE 636 W HCPCS: Performed by: STUDENT IN AN ORGANIZED HEALTH CARE EDUCATION/TRAINING PROGRAM

## 2018-01-01 PROCEDURE — 84295 ASSAY OF SERUM SODIUM: CPT

## 2018-01-01 PROCEDURE — 87088 URINE BACTERIA CULTURE: CPT

## 2018-01-01 PROCEDURE — 87205 SMEAR GRAM STAIN: CPT

## 2018-01-01 PROCEDURE — 99223 1ST HOSP IP/OBS HIGH 75: CPT | Mod: ,,, | Performed by: INTERNAL MEDICINE

## 2018-01-01 PROCEDURE — 87205 SMEAR GRAM STAIN: CPT | Mod: 59

## 2018-01-01 PROCEDURE — 82247 BILIRUBIN TOTAL: CPT

## 2018-01-01 PROCEDURE — 0B110F4 BYPASS TRACHEA TO CUTANEOUS WITH TRACHEOSTOMY DEVICE, OPEN APPROACH: ICD-10-PCS | Performed by: OTOLARYNGOLOGY

## 2018-01-01 PROCEDURE — 36556 INSERT NON-TUNNEL CV CATH: CPT

## 2018-01-01 PROCEDURE — 80069 RENAL FUNCTION PANEL: CPT | Mod: 91

## 2018-01-01 PROCEDURE — 31500 INSERT EMERGENCY AIRWAY: CPT | Mod: ,,, | Performed by: NURSE PRACTITIONER

## 2018-01-01 PROCEDURE — 31624 DX BRONCHOSCOPE/LAVAGE: CPT | Mod: RT,,, | Performed by: INTERNAL MEDICINE

## 2018-01-01 PROCEDURE — 85730 THROMBOPLASTIN TIME PARTIAL: CPT

## 2018-01-01 PROCEDURE — 25000003 PHARM REV CODE 250: Performed by: OTOLARYNGOLOGY

## 2018-01-01 PROCEDURE — 99232 SBSQ HOSP IP/OBS MODERATE 35: CPT | Mod: ,,, | Performed by: INTERNAL MEDICINE

## 2018-01-01 PROCEDURE — 87206 SMEAR FLUORESCENT/ACID STAI: CPT

## 2018-01-01 PROCEDURE — 36430 TRANSFUSION BLD/BLD COMPNT: CPT

## 2018-01-01 PROCEDURE — 84478 ASSAY OF TRIGLYCERIDES: CPT

## 2018-01-01 PROCEDURE — P9047 ALBUMIN (HUMAN), 25%, 50ML: HCPCS | Mod: JG | Performed by: NURSE PRACTITIONER

## 2018-01-01 PROCEDURE — 99900025 HC BRONCHOSCOPY-ASST (STAT)

## 2018-01-01 PROCEDURE — 87075 CULTR BACTERIA EXCEPT BLOOD: CPT

## 2018-01-01 PROCEDURE — 82570 ASSAY OF URINE CREATININE: CPT

## 2018-01-01 PROCEDURE — 36569 INSJ PICC 5 YR+ W/O IMAGING: CPT | Mod: ,,, | Performed by: NURSE PRACTITIONER

## 2018-01-01 PROCEDURE — 82533 TOTAL CORTISOL: CPT

## 2018-01-01 PROCEDURE — 0B9D8ZX DRAINAGE OF RIGHT MIDDLE LUNG LOBE, VIA NATURAL OR ARTIFICIAL OPENING ENDOSCOPIC, DIAGNOSTIC: ICD-10-PCS | Performed by: INTERNAL MEDICINE

## 2018-01-01 PROCEDURE — 93307 TTE W/O DOPPLER COMPLETE: CPT | Mod: 26,,, | Performed by: INTERNAL MEDICINE

## 2018-01-01 PROCEDURE — 80100008 HC CRRT DAILY MAINTENANCE

## 2018-01-01 PROCEDURE — 86703 HIV-1/HIV-2 1 RESULT ANTBDY: CPT

## 2018-01-01 PROCEDURE — 99499 UNLISTED E&M SERVICE: CPT | Mod: ,,, | Performed by: INTERNAL MEDICINE

## 2018-01-01 PROCEDURE — 93010 ELECTROCARDIOGRAM REPORT: CPT | Mod: ,,, | Performed by: INTERNAL MEDICINE

## 2018-01-01 PROCEDURE — 87070 CULTURE OTHR SPECIMN AEROBIC: CPT | Mod: 59

## 2018-01-01 PROCEDURE — 02HV33Z INSERTION OF INFUSION DEVICE INTO SUPERIOR VENA CAVA, PERCUTANEOUS APPROACH: ICD-10-PCS | Performed by: EMERGENCY MEDICINE

## 2018-01-01 PROCEDURE — 85014 HEMATOCRIT: CPT

## 2018-01-01 PROCEDURE — 87102 FUNGUS ISOLATION CULTURE: CPT

## 2018-01-01 PROCEDURE — 99292 CRITICAL CARE ADDL 30 MIN: CPT | Mod: 25,,, | Performed by: NURSE PRACTITIONER

## 2018-01-01 PROCEDURE — 31600 PLANNED TRACHEOSTOMY: CPT | Mod: ,,, | Performed by: OTOLARYNGOLOGY

## 2018-01-01 PROCEDURE — 86920 COMPATIBILITY TEST SPIN: CPT

## 2018-01-01 PROCEDURE — 31720 CLEARANCE OF AIRWAYS: CPT

## 2018-01-01 PROCEDURE — 87103 BLOOD FUNGUS CULTURE: CPT

## 2018-01-01 PROCEDURE — 82042 OTHER SOURCE ALBUMIN QUAN EA: CPT

## 2018-01-01 PROCEDURE — 80053 COMPREHEN METABOLIC PANEL: CPT | Mod: 91

## 2018-01-01 PROCEDURE — P9045 ALBUMIN (HUMAN), 5%, 250 ML: HCPCS | Mod: JG | Performed by: NURSE PRACTITIONER

## 2018-01-01 PROCEDURE — 5A1945Z RESPIRATORY VENTILATION, 24-96 CONSECUTIVE HOURS: ICD-10-PCS | Performed by: INTERNAL MEDICINE

## 2018-01-01 PROCEDURE — 86038 ANTINUCLEAR ANTIBODIES: CPT

## 2018-01-01 PROCEDURE — 93307 TTE W/O DOPPLER COMPLETE: CPT

## 2018-01-01 PROCEDURE — 99232 SBSQ HOSP IP/OBS MODERATE 35: CPT | Mod: ,,, | Performed by: OBSTETRICS & GYNECOLOGY

## 2018-01-01 PROCEDURE — 87493 C DIFF AMPLIFIED PROBE: CPT

## 2018-01-01 PROCEDURE — 36000707: Performed by: OTOLARYNGOLOGY

## 2018-01-01 PROCEDURE — 36620 INSERTION CATHETER ARTERY: CPT | Mod: 59,,, | Performed by: NURSE PRACTITIONER

## 2018-01-01 PROCEDURE — C1751 CATH, INF, PER/CENT/MIDLINE: HCPCS

## 2018-01-01 PROCEDURE — 87106 FUNGI IDENTIFICATION YEAST: CPT | Mod: 59

## 2018-01-01 PROCEDURE — 82306 VITAMIN D 25 HYDROXY: CPT

## 2018-01-01 RX ORDER — ONDANSETRON 2 MG/ML
4 INJECTION INTRAMUSCULAR; INTRAVENOUS EVERY 8 HOURS PRN
Status: DISCONTINUED | OUTPATIENT
Start: 2018-01-01 | End: 2018-01-01 | Stop reason: HOSPADM

## 2018-01-01 RX ORDER — POTASSIUM CHLORIDE 29.8 MG/ML
40 INJECTION INTRAVENOUS ONCE
Status: COMPLETED | OUTPATIENT
Start: 2018-01-01 | End: 2018-01-01

## 2018-01-01 RX ORDER — FENTANYL CITRATE-0.9 % NACL/PF 10 MCG/ML
PLASTIC BAG, INJECTION (ML) INTRAVENOUS CONTINUOUS
Status: DISCONTINUED | OUTPATIENT
Start: 2018-01-01 | End: 2018-01-01

## 2018-01-01 RX ORDER — POTASSIUM CHLORIDE 29.8 MG/ML
40 INJECTION INTRAVENOUS EVERY 4 HOURS
Status: DISCONTINUED | OUTPATIENT
Start: 2018-01-01 | End: 2018-01-01

## 2018-01-01 RX ORDER — MIDAZOLAM HYDROCHLORIDE 1 MG/ML
4 INJECTION INTRAMUSCULAR; INTRAVENOUS ONCE
Status: COMPLETED | OUTPATIENT
Start: 2018-01-01 | End: 2018-01-01

## 2018-01-01 RX ORDER — MEROPENEM 500 MG/1
500 INJECTION, POWDER, FOR SOLUTION INTRAVENOUS
Status: DISCONTINUED | OUTPATIENT
Start: 2018-01-01 | End: 2018-01-01

## 2018-01-01 RX ORDER — METOLAZONE 5 MG/1
5 TABLET ORAL DAILY
Status: DISCONTINUED | OUTPATIENT
Start: 2018-01-01 | End: 2018-01-01

## 2018-01-01 RX ORDER — CHLORHEXIDINE GLUCONATE ORAL RINSE 1.2 MG/ML
15 SOLUTION DENTAL 2 TIMES DAILY
Status: DISCONTINUED | OUTPATIENT
Start: 2018-01-01 | End: 2018-01-01

## 2018-01-01 RX ORDER — CHOLECALCIFEROL (VITAMIN D3) 25 MCG
5000 TABLET ORAL DAILY
Status: DISCONTINUED | OUTPATIENT
Start: 2018-01-01 | End: 2018-01-01

## 2018-01-01 RX ORDER — CHOLECALCIFEROL (VITAMIN D3) 125 MCG
5000 CAPSULE ORAL DAILY
Status: DISCONTINUED | OUTPATIENT
Start: 2018-01-01 | End: 2018-01-01 | Stop reason: RX

## 2018-01-01 RX ORDER — FUROSEMIDE 10 MG/ML
40 INJECTION INTRAMUSCULAR; INTRAVENOUS ONCE
Status: COMPLETED | OUTPATIENT
Start: 2018-01-01 | End: 2018-01-01

## 2018-01-01 RX ORDER — VANCOMYCIN/0.9 % SOD CHLORIDE 1 G/100 ML
1000 PLASTIC BAG, INJECTION (ML) INTRAVENOUS
Status: DISCONTINUED | OUTPATIENT
Start: 2018-01-01 | End: 2018-01-01

## 2018-01-01 RX ORDER — SUCCINYLCHOLINE CHLORIDE 20 MG/ML
100 INJECTION INTRAMUSCULAR; INTRAVENOUS ONCE
Status: COMPLETED | OUTPATIENT
Start: 2018-01-01 | End: 2018-01-01

## 2018-01-01 RX ORDER — SODIUM CHLORIDE 0.9 % (FLUSH) 0.9 %
3 SYRINGE (ML) INJECTION
Status: DISCONTINUED | OUTPATIENT
Start: 2018-01-01 | End: 2018-01-01

## 2018-01-01 RX ORDER — LIDOCAINE HYDROCHLORIDE AND EPINEPHRINE 10; 10 MG/ML; UG/ML
INJECTION, SOLUTION INFILTRATION; PERINEURAL
Status: DISCONTINUED | OUTPATIENT
Start: 2018-01-01 | End: 2018-01-01 | Stop reason: HOSPADM

## 2018-01-01 RX ORDER — MAGNESIUM SULFATE 1 G/100ML
1 INJECTION INTRAVENOUS ONCE
Status: COMPLETED | OUTPATIENT
Start: 2018-01-01 | End: 2018-01-01

## 2018-01-01 RX ORDER — THIAMINE HCL 100 MG
100 TABLET ORAL DAILY
Status: DISCONTINUED | OUTPATIENT
Start: 2018-01-01 | End: 2018-01-01

## 2018-01-01 RX ORDER — ACETAMINOPHEN 650 MG/20.3ML
650 LIQUID ORAL EVERY 8 HOURS PRN
Status: DISCONTINUED | OUTPATIENT
Start: 2018-01-01 | End: 2018-01-01

## 2018-01-01 RX ORDER — LINEZOLID 2 MG/ML
600 INJECTION, SOLUTION INTRAVENOUS
Status: DISCONTINUED | OUTPATIENT
Start: 2018-01-01 | End: 2018-01-01

## 2018-01-01 RX ORDER — POLYETHYLENE GLYCOL 3350 17 G/17G
17 POWDER, FOR SOLUTION ORAL DAILY
Status: DISCONTINUED | OUTPATIENT
Start: 2018-01-01 | End: 2018-01-01

## 2018-01-01 RX ORDER — SODIUM CHLORIDE 9 MG/ML
INJECTION, SOLUTION INTRAVENOUS CONTINUOUS
Status: DISCONTINUED | OUTPATIENT
Start: 2018-01-01 | End: 2018-01-01

## 2018-01-01 RX ORDER — POTASSIUM CHLORIDE 29.8 MG/ML
40 INJECTION INTRAVENOUS
Status: COMPLETED | OUTPATIENT
Start: 2018-01-01 | End: 2018-01-01

## 2018-01-01 RX ORDER — MIDAZOLAM HYDROCHLORIDE 1 MG/ML
INJECTION, SOLUTION INTRAMUSCULAR; INTRAVENOUS
Status: DISCONTINUED | OUTPATIENT
Start: 2018-01-01 | End: 2018-01-01

## 2018-01-01 RX ORDER — FENTANYL CITRATE 50 UG/ML
50 INJECTION, SOLUTION INTRAMUSCULAR; INTRAVENOUS
Status: DISCONTINUED | OUTPATIENT
Start: 2018-01-01 | End: 2018-01-01

## 2018-01-01 RX ORDER — POTASSIUM CHLORIDE 29.8 MG/ML
80 INJECTION INTRAVENOUS
Status: DISCONTINUED | OUTPATIENT
Start: 2018-01-01 | End: 2018-01-01

## 2018-01-01 RX ORDER — LORAZEPAM 2 MG/ML
INJECTION INTRAMUSCULAR
Status: DISPENSED
Start: 2018-01-01 | End: 2018-01-01

## 2018-01-01 RX ORDER — NOREPINEPHRINE BITARTRATE/D5W 4MG/250ML
0.02 PLASTIC BAG, INJECTION (ML) INTRAVENOUS CONTINUOUS
Status: DISCONTINUED | OUTPATIENT
Start: 2018-01-01 | End: 2018-01-01

## 2018-01-01 RX ORDER — POTASSIUM CHLORIDE 14.9 MG/ML
20 INJECTION INTRAVENOUS ONCE
Status: COMPLETED | OUTPATIENT
Start: 2018-01-01 | End: 2018-01-01

## 2018-01-01 RX ORDER — NOREPINEPHRINE BITARTRATE/D5W 4MG/250ML
PLASTIC BAG, INJECTION (ML) INTRAVENOUS
Status: COMPLETED | OUTPATIENT
Start: 2018-01-01 | End: 2018-01-01

## 2018-01-01 RX ORDER — LORAZEPAM 2 MG/ML
INJECTION INTRAMUSCULAR CODE/TRAUMA/SEDATION MEDICATION
Status: COMPLETED | OUTPATIENT
Start: 2018-01-01 | End: 2018-01-01

## 2018-01-01 RX ORDER — HEPARIN SODIUM 10000 [USP'U]/100ML
500 INJECTION, SOLUTION INTRAVENOUS CONTINUOUS
Status: DISCONTINUED | OUTPATIENT
Start: 2018-01-01 | End: 2018-01-01

## 2018-01-01 RX ORDER — HEPARIN SODIUM 1000 [USP'U]/ML
4000 INJECTION, SOLUTION INTRAVENOUS; SUBCUTANEOUS
Status: DISCONTINUED | OUTPATIENT
Start: 2018-01-01 | End: 2018-01-01

## 2018-01-01 RX ORDER — SODIUM CHLORIDE AND POTASSIUM CHLORIDE 150; 900 MG/100ML; MG/100ML
INJECTION, SOLUTION INTRAVENOUS CONTINUOUS
Status: DISCONTINUED | OUTPATIENT
Start: 2018-01-01 | End: 2018-01-01

## 2018-01-01 RX ORDER — MAGNESIUM SULFATE HEPTAHYDRATE 40 MG/ML
2 INJECTION, SOLUTION INTRAVENOUS
Status: COMPLETED | OUTPATIENT
Start: 2018-01-01 | End: 2018-01-01

## 2018-01-01 RX ORDER — LACTULOSE 10 G/15ML
20 SOLUTION ORAL DAILY
Status: DISCONTINUED | OUTPATIENT
Start: 2018-04-26 | End: 2018-01-01

## 2018-01-01 RX ORDER — CEFTAZIDIME 1 G/1
1 INJECTION, POWDER, FOR SOLUTION INTRAMUSCULAR; INTRAVENOUS
Status: DISCONTINUED | OUTPATIENT
Start: 2018-01-01 | End: 2018-01-01

## 2018-01-01 RX ORDER — ALBUMIN HUMAN 50 G/1000ML
25 SOLUTION INTRAVENOUS ONCE
Status: COMPLETED | OUTPATIENT
Start: 2018-01-01 | End: 2018-01-01

## 2018-01-01 RX ORDER — POTASSIUM CHLORIDE 20 MEQ/15ML
40 SOLUTION ORAL ONCE
Status: COMPLETED | OUTPATIENT
Start: 2018-01-01 | End: 2018-01-01

## 2018-01-01 RX ORDER — HEPARIN SODIUM 5000 [USP'U]/ML
5000 INJECTION, SOLUTION INTRAVENOUS; SUBCUTANEOUS EVERY 8 HOURS
Status: DISCONTINUED | OUTPATIENT
Start: 2018-01-01 | End: 2018-01-01

## 2018-01-01 RX ORDER — ALBUMIN HUMAN 250 G/1000ML
12.5 SOLUTION INTRAVENOUS ONCE
Status: COMPLETED | OUTPATIENT
Start: 2018-01-01 | End: 2018-01-01

## 2018-01-01 RX ORDER — IBUPROFEN 200 MG
1 TABLET ORAL DAILY
Status: DISCONTINUED | OUTPATIENT
Start: 2018-01-01 | End: 2018-01-01

## 2018-01-01 RX ORDER — MEPERIDINE HYDROCHLORIDE 50 MG/ML
12.5 INJECTION INTRAMUSCULAR; INTRAVENOUS; SUBCUTANEOUS ONCE AS NEEDED
Status: DISCONTINUED | OUTPATIENT
Start: 2018-01-01 | End: 2018-01-01

## 2018-01-01 RX ORDER — MAGNESIUM SULFATE HEPTAHYDRATE 40 MG/ML
4 INJECTION, SOLUTION INTRAVENOUS ONCE
Status: COMPLETED | OUTPATIENT
Start: 2018-01-01 | End: 2018-01-01

## 2018-01-01 RX ORDER — POTASSIUM CHLORIDE 14.9 MG/ML
20 INJECTION INTRAVENOUS
Status: DISCONTINUED | OUTPATIENT
Start: 2018-01-01 | End: 2018-01-01

## 2018-01-01 RX ORDER — MIDAZOLAM HYDROCHLORIDE 5 MG/ML
4 INJECTION INTRAMUSCULAR; INTRAVENOUS ONCE
Status: DISCONTINUED | OUTPATIENT
Start: 2018-01-01 | End: 2018-01-01

## 2018-01-01 RX ORDER — MAGNESIUM SULFATE HEPTAHYDRATE 40 MG/ML
2 INJECTION, SOLUTION INTRAVENOUS
Status: DISCONTINUED | OUTPATIENT
Start: 2018-01-01 | End: 2018-01-01 | Stop reason: SDUPTHER

## 2018-01-01 RX ORDER — FENTANYL CITRATE 50 UG/ML
25 INJECTION, SOLUTION INTRAMUSCULAR; INTRAVENOUS EVERY 5 MIN PRN
Status: DISCONTINUED | OUTPATIENT
Start: 2018-01-01 | End: 2018-01-01

## 2018-01-01 RX ORDER — SODIUM CHLORIDE 9 MG/ML
INJECTION, SOLUTION INTRAVENOUS CONTINUOUS
Status: ACTIVE | OUTPATIENT
Start: 2018-01-01 | End: 2018-01-01

## 2018-01-01 RX ORDER — MAGNESIUM SULFATE HEPTAHYDRATE 40 MG/ML
2 INJECTION, SOLUTION INTRAVENOUS ONCE
Status: COMPLETED | OUTPATIENT
Start: 2018-01-01 | End: 2018-01-01

## 2018-01-01 RX ORDER — NOREPINEPHRINE BITARTRATE/D5W 4MG/250ML
0.04 PLASTIC BAG, INJECTION (ML) INTRAVENOUS CONTINUOUS
Status: DISCONTINUED | OUTPATIENT
Start: 2018-01-01 | End: 2018-01-01

## 2018-01-01 RX ORDER — ONDANSETRON 2 MG/ML
4 INJECTION INTRAMUSCULAR; INTRAVENOUS DAILY PRN
Status: DISCONTINUED | OUTPATIENT
Start: 2018-01-01 | End: 2018-01-01

## 2018-01-01 RX ORDER — SODIUM BICARBONATE 1 MEQ/ML
100 SYRINGE (ML) INTRAVENOUS ONCE
Status: COMPLETED | OUTPATIENT
Start: 2018-01-01 | End: 2018-01-01

## 2018-01-01 RX ORDER — FAMOTIDINE 10 MG/ML
20 INJECTION INTRAVENOUS 2 TIMES DAILY
Status: DISCONTINUED | OUTPATIENT
Start: 2018-01-01 | End: 2018-01-01

## 2018-01-01 RX ORDER — MAGNESIUM SULFATE 1 G/100ML
1 INJECTION INTRAVENOUS
Status: COMPLETED | OUTPATIENT
Start: 2018-01-01 | End: 2018-01-01

## 2018-01-01 RX ORDER — BUMETANIDE 0.25 MG/ML
3 INJECTION INTRAMUSCULAR; INTRAVENOUS 3 TIMES DAILY
Status: DISCONTINUED | OUTPATIENT
Start: 2018-01-01 | End: 2018-01-01

## 2018-01-01 RX ORDER — METRONIDAZOLE 500 MG/100ML
500 INJECTION, SOLUTION INTRAVENOUS
Status: DISCONTINUED | OUTPATIENT
Start: 2018-01-01 | End: 2018-01-01

## 2018-01-01 RX ORDER — CALCIUM GLUCONATE 98 MG/ML
1 INJECTION, SOLUTION INTRAVENOUS ONCE
Status: COMPLETED | OUTPATIENT
Start: 2018-01-01 | End: 2018-01-01

## 2018-01-01 RX ORDER — POTASSIUM CHLORIDE 14.9 MG/ML
60 INJECTION INTRAVENOUS
Status: DISCONTINUED | OUTPATIENT
Start: 2018-01-01 | End: 2018-01-01

## 2018-01-01 RX ORDER — MAGNESIUM SULFATE HEPTAHYDRATE 40 MG/ML
2 INJECTION, SOLUTION INTRAVENOUS
Status: DISCONTINUED | OUTPATIENT
Start: 2018-01-01 | End: 2018-01-01

## 2018-01-01 RX ORDER — ETOMIDATE 2 MG/ML
0.3 INJECTION INTRAVENOUS ONCE
Status: DISCONTINUED | OUTPATIENT
Start: 2018-01-01 | End: 2018-01-01

## 2018-01-01 RX ORDER — POTASSIUM CHLORIDE 20 MEQ/15ML
40 SOLUTION ORAL 2 TIMES DAILY
Status: DISCONTINUED | OUTPATIENT
Start: 2018-01-01 | End: 2018-01-01 | Stop reason: SDUPTHER

## 2018-01-01 RX ORDER — DEXTROSE MONOHYDRATE 50 MG/ML
INJECTION, SOLUTION INTRAVENOUS CONTINUOUS
Status: DISCONTINUED | OUTPATIENT
Start: 2018-01-01 | End: 2018-01-01

## 2018-01-01 RX ORDER — MAGNESIUM SULFATE 1 G/100ML
3 INJECTION INTRAVENOUS
Status: DISCONTINUED | OUTPATIENT
Start: 2018-01-01 | End: 2018-01-01

## 2018-01-01 RX ORDER — ONDANSETRON 2 MG/ML
4 INJECTION INTRAMUSCULAR; INTRAVENOUS ONCE AS NEEDED
Status: DISCONTINUED | OUTPATIENT
Start: 2018-01-01 | End: 2018-01-01

## 2018-01-01 RX ORDER — LANOLIN ALCOHOL/MO/W.PET/CERES
400 CREAM (GRAM) TOPICAL ONCE
Status: COMPLETED | OUTPATIENT
Start: 2018-01-01 | End: 2018-01-01

## 2018-01-01 RX ORDER — VECURONIUM BROMIDE FOR INJECTION 1 MG/ML
INJECTION, POWDER, LYOPHILIZED, FOR SOLUTION INTRAVENOUS
Status: DISCONTINUED | OUTPATIENT
Start: 2018-01-01 | End: 2018-01-01

## 2018-01-01 RX ORDER — ALBUMIN HUMAN 250 G/1000ML
12.5 SOLUTION INTRAVENOUS 2 TIMES DAILY
Status: DISCONTINUED | OUTPATIENT
Start: 2018-01-01 | End: 2018-01-01

## 2018-01-01 RX ORDER — MORPHINE SULFATE 2 MG/ML
5 INJECTION, SOLUTION INTRAMUSCULAR; INTRAVENOUS ONCE
Status: COMPLETED | OUTPATIENT
Start: 2018-01-01 | End: 2018-01-01

## 2018-01-01 RX ORDER — SODIUM,POTASSIUM PHOSPHATES 280-250MG
1 POWDER IN PACKET (EA) ORAL 3 TIMES DAILY
Status: COMPLETED | OUTPATIENT
Start: 2018-01-01 | End: 2018-01-01

## 2018-01-01 RX ORDER — POTASSIUM CHLORIDE 29.8 MG/ML
40 INJECTION INTRAVENOUS
Status: DISCONTINUED | OUTPATIENT
Start: 2018-01-01 | End: 2018-01-01

## 2018-01-01 RX ORDER — DEXTROSE MONOHYDRATE AND SODIUM CHLORIDE 5; .45 G/100ML; G/100ML
INJECTION, SOLUTION INTRAVENOUS CONTINUOUS
Status: DISCONTINUED | OUTPATIENT
Start: 2018-01-01 | End: 2018-01-01

## 2018-01-01 RX ORDER — FOLIC ACID 1 MG/1
1 TABLET ORAL DAILY
Status: DISCONTINUED | OUTPATIENT
Start: 2018-01-01 | End: 2018-01-01

## 2018-01-01 RX ORDER — PROPOFOL 10 MG/ML
5 INJECTION, EMULSION INTRAVENOUS CONTINUOUS
Status: DISCONTINUED | OUTPATIENT
Start: 2018-01-01 | End: 2018-01-01

## 2018-01-01 RX ORDER — HYDROCODONE BITARTRATE AND ACETAMINOPHEN 500; 5 MG/1; MG/1
TABLET ORAL
Status: DISCONTINUED | OUTPATIENT
Start: 2018-01-01 | End: 2018-01-01

## 2018-01-01 RX ORDER — DEXTROSE MONOHYDRATE AND SODIUM CHLORIDE 5; .45 G/100ML; G/100ML
INJECTION, SOLUTION INTRAVENOUS CONTINUOUS
Status: ACTIVE | OUTPATIENT
Start: 2018-01-01 | End: 2018-01-01

## 2018-01-01 RX ORDER — FLUCONAZOLE 2 MG/ML
200 INJECTION, SOLUTION INTRAVENOUS
Status: DISCONTINUED | OUTPATIENT
Start: 2018-01-01 | End: 2018-01-01

## 2018-01-01 RX ORDER — POTASSIUM CHLORIDE 20 MEQ/15ML
50 SOLUTION ORAL ONCE
Status: COMPLETED | OUTPATIENT
Start: 2018-01-01 | End: 2018-01-01

## 2018-01-01 RX ORDER — CALCIUM CHLORIDE INJECTION 100 MG/ML
INJECTION, SOLUTION INTRAVENOUS CODE/TRAUMA/SEDATION MEDICATION
Status: COMPLETED | OUTPATIENT
Start: 2018-01-01 | End: 2018-01-01

## 2018-01-01 RX ORDER — DEXMEDETOMIDINE HYDROCHLORIDE 4 UG/ML
0.2 INJECTION, SOLUTION INTRAVENOUS CONTINUOUS
Status: DISCONTINUED | OUTPATIENT
Start: 2018-01-01 | End: 2018-01-01

## 2018-01-01 RX ORDER — DIPHENHYDRAMINE HYDROCHLORIDE 50 MG/ML
25 INJECTION INTRAMUSCULAR; INTRAVENOUS EVERY 6 HOURS PRN
Status: DISCONTINUED | OUTPATIENT
Start: 2018-01-01 | End: 2018-01-01

## 2018-01-01 RX ORDER — POTASSIUM CHLORIDE 14.9 MG/ML
20 INJECTION INTRAVENOUS
Status: COMPLETED | OUTPATIENT
Start: 2018-01-01 | End: 2018-01-01

## 2018-01-01 RX ORDER — FAMOTIDINE 10 MG/ML
20 INJECTION INTRAVENOUS DAILY
Status: DISCONTINUED | OUTPATIENT
Start: 2018-01-01 | End: 2018-01-01

## 2018-01-01 RX ORDER — IBUPROFEN 400 MG/1
400 TABLET ORAL ONCE
Status: COMPLETED | OUTPATIENT
Start: 2018-01-01 | End: 2018-01-01

## 2018-01-01 RX ORDER — POTASSIUM CHLORIDE 29.8 MG/ML
40 INJECTION INTRAVENOUS EVERY 4 HOURS
Status: COMPLETED | OUTPATIENT
Start: 2018-01-01 | End: 2018-01-01

## 2018-01-01 RX ORDER — GLUCAGON 1 MG
1 KIT INJECTION
Status: DISCONTINUED | OUTPATIENT
Start: 2018-01-01 | End: 2018-01-01

## 2018-01-01 RX ORDER — LACTULOSE 10 G/15ML
30 SOLUTION ORAL DAILY
Status: DISCONTINUED | OUTPATIENT
Start: 2018-01-01 | End: 2018-01-01

## 2018-01-01 RX ORDER — HYDROMORPHONE HYDROCHLORIDE 1 MG/ML
0.2 INJECTION, SOLUTION INTRAMUSCULAR; INTRAVENOUS; SUBCUTANEOUS EVERY 5 MIN PRN
Status: DISCONTINUED | OUTPATIENT
Start: 2018-01-01 | End: 2018-01-01

## 2018-01-01 RX ORDER — LACTULOSE 10 G/15ML
20 SOLUTION ORAL ONCE
Status: COMPLETED | OUTPATIENT
Start: 2018-01-01 | End: 2018-01-01

## 2018-01-01 RX ORDER — SODIUM CHLORIDE, SODIUM LACTATE, POTASSIUM CHLORIDE, CALCIUM CHLORIDE 600; 310; 30; 20 MG/100ML; MG/100ML; MG/100ML; MG/100ML
INJECTION, SOLUTION INTRAVENOUS CONTINUOUS PRN
Status: DISCONTINUED | OUTPATIENT
Start: 2018-01-01 | End: 2018-01-01

## 2018-01-01 RX ORDER — FUROSEMIDE 10 MG/ML
20 INJECTION INTRAMUSCULAR; INTRAVENOUS ONCE
Status: COMPLETED | OUTPATIENT
Start: 2018-01-01 | End: 2018-01-01

## 2018-01-01 RX ORDER — INSULIN ASPART 100 [IU]/ML
1-10 INJECTION, SOLUTION INTRAVENOUS; SUBCUTANEOUS EVERY 6 HOURS PRN
Status: DISCONTINUED | OUTPATIENT
Start: 2018-01-01 | End: 2018-01-01

## 2018-01-01 RX ORDER — EPINEPHRINE 0.1 MG/ML
INJECTION INTRAVENOUS CODE/TRAUMA/SEDATION MEDICATION
Status: COMPLETED | OUTPATIENT
Start: 2018-01-01 | End: 2018-01-01

## 2018-01-01 RX ORDER — FUROSEMIDE 10 MG/ML
80 INJECTION INTRAMUSCULAR; INTRAVENOUS ONCE
Status: COMPLETED | OUTPATIENT
Start: 2018-01-01 | End: 2018-01-01

## 2018-01-01 RX ORDER — IPRATROPIUM BROMIDE AND ALBUTEROL SULFATE 2.5; .5 MG/3ML; MG/3ML
3 SOLUTION RESPIRATORY (INHALATION) EVERY 4 HOURS PRN
Status: DISCONTINUED | OUTPATIENT
Start: 2018-01-01 | End: 2018-01-01

## 2018-01-01 RX ORDER — SUCCINYLCHOLINE CHLORIDE 20 MG/ML
1 INJECTION INTRAMUSCULAR; INTRAVENOUS ONCE
Status: DISCONTINUED | OUTPATIENT
Start: 2018-01-01 | End: 2018-01-01

## 2018-01-01 RX ORDER — ACETAMINOPHEN 500 MG
500 TABLET ORAL EVERY 8 HOURS PRN
Status: DISCONTINUED | OUTPATIENT
Start: 2018-01-01 | End: 2018-01-01

## 2018-01-01 RX ORDER — SODIUM CHLORIDE 0.9 % (FLUSH) 0.9 %
5 SYRINGE (ML) INJECTION
Status: DISCONTINUED | OUTPATIENT
Start: 2018-01-01 | End: 2018-01-01

## 2018-01-01 RX ORDER — MAGNESIUM SULFATE HEPTAHYDRATE 40 MG/ML
4 INJECTION, SOLUTION INTRAVENOUS
Status: DISCONTINUED | OUTPATIENT
Start: 2018-01-01 | End: 2018-01-01

## 2018-01-01 RX ORDER — CEFEPIME HYDROCHLORIDE 2 G/1
2 INJECTION, POWDER, FOR SOLUTION INTRAVENOUS
Status: DISCONTINUED | OUTPATIENT
Start: 2018-01-01 | End: 2018-01-01 | Stop reason: SDUPTHER

## 2018-01-01 RX ORDER — CHLORDIAZEPOXIDE HYDROCHLORIDE 25 MG/1
25 CAPSULE, GELATIN COATED ORAL 3 TIMES DAILY
Status: DISCONTINUED | OUTPATIENT
Start: 2018-01-01 | End: 2018-01-01

## 2018-01-01 RX ORDER — HYDROCODONE BITARTRATE AND ACETAMINOPHEN 5; 325 MG/1; MG/1
1 TABLET ORAL
Status: DISCONTINUED | OUTPATIENT
Start: 2018-01-01 | End: 2018-01-01

## 2018-01-01 RX ORDER — BISACODYL 10 MG
10 SUPPOSITORY, RECTAL RECTAL DAILY PRN
Status: DISCONTINUED | OUTPATIENT
Start: 2018-01-01 | End: 2018-01-01

## 2018-01-01 RX ORDER — POTASSIUM CHLORIDE 7.46 G/1000ML
10 INJECTION, SOLUTION INTRAVENOUS
Status: COMPLETED | OUTPATIENT
Start: 2018-01-01 | End: 2018-01-01

## 2018-01-01 RX ORDER — ETOMIDATE 2 MG/ML
20 INJECTION INTRAVENOUS ONCE
Status: COMPLETED | OUTPATIENT
Start: 2018-01-01 | End: 2018-01-01

## 2018-01-01 RX ORDER — FUROSEMIDE 10 MG/ML
60 INJECTION INTRAMUSCULAR; INTRAVENOUS EVERY 8 HOURS
Status: DISCONTINUED | OUTPATIENT
Start: 2018-01-01 | End: 2018-01-01

## 2018-01-01 RX ORDER — POTASSIUM CHLORIDE 14.9 MG/ML
20 INJECTION INTRAVENOUS DAILY
Status: DISCONTINUED | OUTPATIENT
Start: 2018-01-01 | End: 2018-01-01

## 2018-01-01 RX ORDER — CHOLESTYRAMINE 4 G/9G
1 POWDER, FOR SUSPENSION ORAL 2 TIMES DAILY
Status: DISCONTINUED | OUTPATIENT
Start: 2018-01-01 | End: 2018-01-01

## 2018-01-01 RX ORDER — SODIUM BICARBONATE 1 MEQ/ML
100 SYRINGE (ML) INTRAVENOUS ONCE
Status: DISCONTINUED | OUTPATIENT
Start: 2018-01-01 | End: 2018-01-01

## 2018-01-01 RX ORDER — POTASSIUM CHLORIDE 20 MEQ/15ML
40 SOLUTION ORAL 3 TIMES DAILY
Status: DISCONTINUED | OUTPATIENT
Start: 2018-01-01 | End: 2018-01-01

## 2018-01-01 RX ORDER — ACETAMINOPHEN 650 MG/20.3ML
650 LIQUID ORAL EVERY 6 HOURS PRN
Status: DISCONTINUED | OUTPATIENT
Start: 2018-01-01 | End: 2018-01-01

## 2018-01-01 RX ORDER — OXYCODONE AND ACETAMINOPHEN 5; 325 MG/1; MG/1
1 TABLET ORAL
Status: DISCONTINUED | OUTPATIENT
Start: 2018-01-01 | End: 2018-01-01

## 2018-01-01 RX ORDER — FUROSEMIDE 10 MG/ML
20 INJECTION INTRAMUSCULAR; INTRAVENOUS EVERY 8 HOURS
Status: DISCONTINUED | OUTPATIENT
Start: 2018-01-01 | End: 2018-01-01

## 2018-01-01 RX ORDER — MORPHINE SULFATE 10 MG/ML
5 INJECTION INTRAMUSCULAR; INTRAVENOUS; SUBCUTANEOUS ONCE
Status: DISCONTINUED | OUTPATIENT
Start: 2018-01-01 | End: 2018-01-01 | Stop reason: RX

## 2018-01-01 RX ORDER — LACTULOSE 10 G/15ML
30 SOLUTION ORAL 3 TIMES DAILY
Status: DISCONTINUED | OUTPATIENT
Start: 2018-01-01 | End: 2018-01-01

## 2018-01-01 RX ORDER — FENTANYL CITRATE 50 UG/ML
150 INJECTION, SOLUTION INTRAMUSCULAR; INTRAVENOUS EVERY 30 MIN PRN
Status: DISCONTINUED | OUTPATIENT
Start: 2018-01-01 | End: 2018-01-01 | Stop reason: HOSPADM

## 2018-01-01 RX ADMIN — LORAZEPAM 1 MG: 2 INJECTION, SOLUTION INTRAMUSCULAR; INTRAVENOUS at 06:04

## 2018-01-01 RX ADMIN — ALBUMIN (HUMAN) 25 G: 12.5 SOLUTION INTRAVENOUS at 03:04

## 2018-01-01 RX ADMIN — POTASSIUM CHLORIDE 40 MEQ: 20 SOLUTION ORAL at 08:04

## 2018-01-01 RX ADMIN — THERA TABS 1 TABLET: TAB at 08:04

## 2018-01-01 RX ADMIN — MEROPENEM 500 MG: 500 INJECTION, POWDER, FOR SOLUTION INTRAVENOUS at 12:04

## 2018-01-01 RX ADMIN — INSULIN ASPART 1 UNITS: 100 INJECTION, SOLUTION INTRAVENOUS; SUBCUTANEOUS at 12:04

## 2018-01-01 RX ADMIN — MIDAZOLAM 2 MG: 1 INJECTION INTRAMUSCULAR; INTRAVENOUS at 01:04

## 2018-01-01 RX ADMIN — LINEZOLID 600 MG: 600 INJECTION, SOLUTION INTRAVENOUS at 09:04

## 2018-01-01 RX ADMIN — Medication 250 MG: at 05:04

## 2018-01-01 RX ADMIN — MEROPENEM 500 MG: 500 INJECTION, POWDER, FOR SOLUTION INTRAVENOUS at 08:04

## 2018-01-01 RX ADMIN — ALBUMIN HUMAN 12.5 G: 0.25 SOLUTION INTRAVENOUS at 08:04

## 2018-01-01 RX ADMIN — MIDAZOLAM HYDROCHLORIDE 4 MG: 1 INJECTION, SOLUTION INTRAMUSCULAR; INTRAVENOUS at 11:04

## 2018-01-01 RX ADMIN — Medication 0.2 MCG/KG/MIN: at 08:04

## 2018-01-01 RX ADMIN — Medication 250 MG: at 06:04

## 2018-01-01 RX ADMIN — ACETAMINOPHEN 650 MG: 650 SOLUTION ORAL at 03:04

## 2018-01-01 RX ADMIN — VANCOMYCIN HYDROCHLORIDE 500 MG: 1 INJECTION, POWDER, LYOPHILIZED, FOR SOLUTION INTRAVENOUS at 10:04

## 2018-01-01 RX ADMIN — CHLORHEXIDINE GLUCONATE 15 ML: 1.2 RINSE ORAL at 08:04

## 2018-01-01 RX ADMIN — FAMOTIDINE 20 MG: 10 INJECTION INTRAVENOUS at 09:04

## 2018-01-01 RX ADMIN — NOREPINEPHRINE BITARTRATE 0.26 MCG/KG/MIN: 1 INJECTION INTRAVENOUS at 07:04

## 2018-01-01 RX ADMIN — ALBUMIN HUMAN 12.5 G: 0.25 SOLUTION INTRAVENOUS at 09:04

## 2018-01-01 RX ADMIN — FAMOTIDINE 20 MG: 10 INJECTION INTRAVENOUS at 08:04

## 2018-01-01 RX ADMIN — SODIUM BICARBONATE: 84 INJECTION, SOLUTION INTRAVENOUS at 04:04

## 2018-01-01 RX ADMIN — LINEZOLID 600 MG: 600 INJECTION, SOLUTION INTRAVENOUS at 08:04

## 2018-01-01 RX ADMIN — MICAFUNGIN SODIUM 100 MG: 20 INJECTION, POWDER, LYOPHILIZED, FOR SOLUTION INTRAVENOUS at 09:04

## 2018-01-01 RX ADMIN — FENTANYL CITRATE 150 MCG: 50 INJECTION INTRAMUSCULAR; INTRAVENOUS at 02:04

## 2018-01-01 RX ADMIN — BUMETANIDE 3 MG: 0.25 INJECTION INTRAMUSCULAR; INTRAVENOUS at 09:04

## 2018-01-01 RX ADMIN — Medication 1 TABLET: at 08:04

## 2018-01-01 RX ADMIN — VANCOMYCIN HYDROCHLORIDE 500 MG: 1 INJECTION, POWDER, LYOPHILIZED, FOR SOLUTION INTRAVENOUS at 05:04

## 2018-01-01 RX ADMIN — VANCOMYCIN HCL-SODIUM CHLORIDE IV SOLN 1 GM/250ML-0.9% 1 G: 1-0.9/25 SOLUTION at 05:04

## 2018-01-01 RX ADMIN — HEPARIN SODIUM 5000 UNITS: 5000 INJECTION, SOLUTION INTRAVENOUS; SUBCUTANEOUS at 10:04

## 2018-01-01 RX ADMIN — LORAZEPAM 2 MG: 2 INJECTION INTRAMUSCULAR; INTRAVENOUS at 04:04

## 2018-01-01 RX ADMIN — SODIUM CHLORIDE 1500 MG: 900 INJECTION, SOLUTION INTRAVENOUS at 10:04

## 2018-01-01 RX ADMIN — LORAZEPAM 2 MG: 2 INJECTION INTRAMUSCULAR; INTRAVENOUS at 02:04

## 2018-01-01 RX ADMIN — FLUCONAZOLE IN SODIUM CHLORIDE 200 MG: 2 INJECTION, SOLUTION INTRAVENOUS at 03:04

## 2018-01-01 RX ADMIN — VANCOMYCIN HYDROCHLORIDE 500 MG: 1 INJECTION, POWDER, LYOPHILIZED, FOR SOLUTION INTRAVENOUS at 03:04

## 2018-01-01 RX ADMIN — CHLORHEXIDINE GLUCONATE 15 ML: 1.2 RINSE ORAL at 09:04

## 2018-01-01 RX ADMIN — Medication 100 MG: at 08:04

## 2018-01-01 RX ADMIN — HEPARIN SODIUM 5000 UNITS: 5000 INJECTION, SOLUTION INTRAVENOUS; SUBCUTANEOUS at 05:04

## 2018-01-01 RX ADMIN — FENTANYL CITRATE 50 MCG: 50 INJECTION INTRAMUSCULAR; INTRAVENOUS at 09:04

## 2018-01-01 RX ADMIN — POTASSIUM CHLORIDE 40 MEQ: 400 INJECTION, SOLUTION INTRAVENOUS at 09:04

## 2018-01-01 RX ADMIN — POTASSIUM CHLORIDE 40 MEQ: 400 INJECTION, SOLUTION INTRAVENOUS at 02:04

## 2018-01-01 RX ADMIN — Medication 1 TABLET: at 09:04

## 2018-01-01 RX ADMIN — PIPERACILLIN AND TAZOBACTAM 4.5 G: 4; .5 INJECTION, POWDER, LYOPHILIZED, FOR SOLUTION INTRAVENOUS; PARENTERAL at 12:04

## 2018-01-01 RX ADMIN — DEXMEDETOMIDINE HYDROCHLORIDE 0.2 MCG/KG/HR: 4 INJECTION, SOLUTION INTRAVENOUS at 07:04

## 2018-01-01 RX ADMIN — ALBUMIN HUMAN 25 G: 0.05 INJECTION, SOLUTION INTRAVENOUS at 11:04

## 2018-01-01 RX ADMIN — BUMETANIDE 3 MG: 0.25 INJECTION INTRAMUSCULAR; INTRAVENOUS at 08:04

## 2018-01-01 RX ADMIN — Medication 250 MG: at 11:04

## 2018-01-01 RX ADMIN — VANCOMYCIN HYDROCHLORIDE 1500 MG: 1 INJECTION, POWDER, LYOPHILIZED, FOR SOLUTION INTRAVENOUS at 11:04

## 2018-01-01 RX ADMIN — POTASSIUM PHOSPHATE, MONOBASIC AND POTASSIUM PHOSPHATE, DIBASIC 30 MMOL: 224; 236 INJECTION, SOLUTION, CONCENTRATE INTRAVENOUS at 09:04

## 2018-01-01 RX ADMIN — FENTANYL CITRATE 50 MCG: 50 INJECTION INTRAMUSCULAR; INTRAVENOUS at 01:04

## 2018-01-01 RX ADMIN — Medication 0.16 MCG/KG/MIN: at 10:04

## 2018-01-01 RX ADMIN — METOLAZONE 5 MG: 5 TABLET ORAL at 08:04

## 2018-01-01 RX ADMIN — FUROSEMIDE 20 MG: 10 INJECTION, SOLUTION INTRAMUSCULAR; INTRAVENOUS at 09:04

## 2018-01-01 RX ADMIN — METRONIDAZOLE 500 MG: 500 INJECTION, SOLUTION INTRAVENOUS at 11:04

## 2018-01-01 RX ADMIN — MEROPENEM 500 MG: 500 INJECTION, POWDER, FOR SOLUTION INTRAVENOUS at 04:04

## 2018-01-01 RX ADMIN — ACETAMINOPHEN 650 MG: 650 SOLUTION ORAL at 12:04

## 2018-01-01 RX ADMIN — SODIUM BICARBONATE: 84 INJECTION, SOLUTION INTRAVENOUS at 03:04

## 2018-01-01 RX ADMIN — METRONIDAZOLE 500 MG: 500 INJECTION, SOLUTION INTRAVENOUS at 07:04

## 2018-01-01 RX ADMIN — Medication 1 TABLET: at 12:04

## 2018-01-01 RX ADMIN — NOREPINEPHRINE BITARTRATE 0.32 MCG/KG/MIN: 1 INJECTION INTRAVENOUS at 01:04

## 2018-01-01 RX ADMIN — DEXTROSE MONOHYDRATE 12.5 G: 25 INJECTION, SOLUTION INTRAVENOUS at 03:04

## 2018-01-01 RX ADMIN — ALBUMIN HUMAN 12.5 G: 0.25 SOLUTION INTRAVENOUS at 11:04

## 2018-01-01 RX ADMIN — NOREPINEPHRINE BITARTRATE 0.06 MCG/KG/MIN: 1 INJECTION, SOLUTION, CONCENTRATE INTRAVENOUS at 09:04

## 2018-01-01 RX ADMIN — MAGNESIUM SULFATE IN DEXTROSE 1 G: 10 INJECTION, SOLUTION INTRAVENOUS at 05:04

## 2018-01-01 RX ADMIN — HEPARIN SODIUM 5000 UNITS: 5000 INJECTION, SOLUTION INTRAVENOUS; SUBCUTANEOUS at 09:04

## 2018-01-01 RX ADMIN — METRONIDAZOLE 500 MG: 500 INJECTION, SOLUTION INTRAVENOUS at 03:04

## 2018-01-01 RX ADMIN — IOHEXOL 15 ML: 350 INJECTION, SOLUTION INTRAVENOUS at 07:04

## 2018-01-01 RX ADMIN — VASOPRESSIN 0.04 UNITS/MIN: 20 INJECTION INTRAVENOUS at 09:04

## 2018-01-01 RX ADMIN — INSULIN ASPART 1 UNITS: 100 INJECTION, SOLUTION INTRAVENOUS; SUBCUTANEOUS at 06:04

## 2018-01-01 RX ADMIN — VANCOMYCIN HYDROCHLORIDE 500 MG: 1 INJECTION, POWDER, LYOPHILIZED, FOR SOLUTION INTRAVENOUS at 09:04

## 2018-01-01 RX ADMIN — ETOMIDATE 20 MG: 2 INJECTION, SOLUTION INTRAVENOUS at 08:04

## 2018-01-01 RX ADMIN — Medication 0.26 MCG/KG/MIN: at 11:04

## 2018-01-01 RX ADMIN — METRONIDAZOLE 500 MG: 500 INJECTION, SOLUTION INTRAVENOUS at 02:04

## 2018-01-01 RX ADMIN — NOREPINEPHRINE BITARTRATE 0.06 MCG/KG/MIN: 1 INJECTION, SOLUTION, CONCENTRATE INTRAVENOUS at 08:04

## 2018-01-01 RX ADMIN — IOHEXOL 15 ML: 350 INJECTION, SOLUTION INTRAVENOUS at 06:04

## 2018-01-01 RX ADMIN — LACTULOSE 30 G: 20 SOLUTION ORAL at 02:04

## 2018-01-01 RX ADMIN — METRONIDAZOLE 500 MG: 500 INJECTION, SOLUTION INTRAVENOUS at 12:04

## 2018-01-01 RX ADMIN — Medication 100 MG: at 10:04

## 2018-01-01 RX ADMIN — THERA TABS 1 TABLET: TAB at 02:04

## 2018-01-01 RX ADMIN — VITAMIN D, TAB 1000IU (100/BT) 5000 UNITS: 25 TAB at 09:04

## 2018-01-01 RX ADMIN — VITAMIN D, TAB 1000IU (100/BT) 5000 UNITS: 25 TAB at 08:04

## 2018-01-01 RX ADMIN — Medication 250 MG: at 12:04

## 2018-01-01 RX ADMIN — Medication 100 MG: at 09:04

## 2018-01-01 RX ADMIN — SODIUM BICARBONATE: 84 INJECTION, SOLUTION INTRAVENOUS at 09:04

## 2018-01-01 RX ADMIN — VITAMIN D, TAB 1000IU (100/BT) 5000 UNITS: 25 TAB at 12:04

## 2018-01-01 RX ADMIN — VANCOMYCIN HYDROCHLORIDE 1250 MG: 1 INJECTION, POWDER, LYOPHILIZED, FOR SOLUTION INTRAVENOUS at 02:04

## 2018-01-01 RX ADMIN — VASOPRESSIN 0.04 UNITS/MIN: 20 INJECTION INTRAVENOUS at 04:04

## 2018-01-01 RX ADMIN — RIFAXIMIN 550 MG: 550 TABLET ORAL at 10:04

## 2018-01-01 RX ADMIN — MICAFUNGIN SODIUM 100 MG: 20 INJECTION, POWDER, LYOPHILIZED, FOR SOLUTION INTRAVENOUS at 10:04

## 2018-01-01 RX ADMIN — LORAZEPAM 2 MG: 2 INJECTION INTRAMUSCULAR; INTRAVENOUS at 10:04

## 2018-01-01 RX ADMIN — SODIUM CHLORIDE 1000 ML: 0.9 INJECTION, SOLUTION INTRAVENOUS at 10:04

## 2018-01-01 RX ADMIN — VASOPRESSIN 0.04 UNITS/MIN: 20 INJECTION INTRAVENOUS at 08:04

## 2018-01-01 RX ADMIN — CHOLESTYRAMINE 4 G: 4 POWDER, FOR SUSPENSION ORAL at 08:04

## 2018-01-01 RX ADMIN — MAGNESIUM SULFATE IN WATER 2 G: 40 INJECTION, SOLUTION INTRAVENOUS at 02:04

## 2018-01-01 RX ADMIN — SODIUM PHOSPHATE, MONOBASIC, MONOHYDRATE 20.01 MMOL: 276; 142 INJECTION, SOLUTION INTRAVENOUS at 08:04

## 2018-01-01 RX ADMIN — VASOPRESSIN 0.04 UNITS/MIN: 20 INJECTION INTRAVENOUS at 06:04

## 2018-01-01 RX ADMIN — RIFAXIMIN 550 MG: 550 TABLET ORAL at 08:04

## 2018-01-01 RX ADMIN — THERA TABS 1 TABLET: TAB at 09:04

## 2018-01-01 RX ADMIN — HEPARIN SODIUM 5000 UNITS: 5000 INJECTION, SOLUTION INTRAVENOUS; SUBCUTANEOUS at 03:04

## 2018-01-01 RX ADMIN — POTASSIUM CHLORIDE 40 MEQ: 29.8 INJECTION, SOLUTION INTRAVENOUS at 02:04

## 2018-01-01 RX ADMIN — MEROPENEM 500 MG: 500 INJECTION, POWDER, FOR SOLUTION INTRAVENOUS at 09:04

## 2018-01-01 RX ADMIN — PIPERACILLIN AND TAZOBACTAM 4.5 G: 4; .5 INJECTION, POWDER, LYOPHILIZED, FOR SOLUTION INTRAVENOUS; PARENTERAL at 10:04

## 2018-01-01 RX ADMIN — DEXMEDETOMIDINE HYDROCHLORIDE 0.5 MCG/KG/HR: 4 INJECTION, SOLUTION INTRAVENOUS at 09:04

## 2018-01-01 RX ADMIN — Medication 200 MCG/HR: at 07:04

## 2018-01-01 RX ADMIN — SODIUM CHLORIDE 1000 ML: 0.9 INJECTION, SOLUTION INTRAVENOUS at 02:04

## 2018-01-01 RX ADMIN — FENTANYL CITRATE 50 MCG: 50 INJECTION INTRAMUSCULAR; INTRAVENOUS at 03:04

## 2018-01-01 RX ADMIN — MAGNESIUM SULFATE 1 G: 1 INJECTION INTRAVENOUS at 07:04

## 2018-01-01 RX ADMIN — METRONIDAZOLE 500 MG: 500 INJECTION, SOLUTION INTRAVENOUS at 04:04

## 2018-01-01 RX ADMIN — LACTULOSE 30 G: 20 SOLUTION ORAL at 08:04

## 2018-01-01 RX ADMIN — DEXMEDETOMIDINE HYDROCHLORIDE 0.5 MCG/KG/HR: 4 INJECTION, SOLUTION INTRAVENOUS at 04:04

## 2018-01-01 RX ADMIN — LORAZEPAM 2 MG: 2 INJECTION INTRAMUSCULAR; INTRAVENOUS at 09:04

## 2018-01-01 RX ADMIN — IOHEXOL 75 ML: 350 INJECTION, SOLUTION INTRAVENOUS at 04:04

## 2018-01-01 RX ADMIN — PIPERACILLIN AND TAZOBACTAM 4.5 G: 4; .5 INJECTION, POWDER, LYOPHILIZED, FOR SOLUTION INTRAVENOUS; PARENTERAL at 03:04

## 2018-01-01 RX ADMIN — MAGNESIUM SULFATE IN WATER 2 G: 40 INJECTION, SOLUTION INTRAVENOUS at 10:04

## 2018-01-01 RX ADMIN — LACTULOSE 30 G: 20 SOLUTION ORAL at 09:04

## 2018-01-01 RX ADMIN — Medication 75 MCG/HR: at 01:04

## 2018-01-01 RX ADMIN — POTASSIUM CHLORIDE 20 MEQ: 200 INJECTION, SOLUTION INTRAVENOUS at 04:04

## 2018-01-01 RX ADMIN — PIPERACILLIN AND TAZOBACTAM 4.5 G: 4; .5 INJECTION, POWDER, LYOPHILIZED, FOR SOLUTION INTRAVENOUS; PARENTERAL at 02:04

## 2018-01-01 RX ADMIN — POTASSIUM CHLORIDE 40 MEQ: 400 INJECTION, SOLUTION INTRAVENOUS at 12:04

## 2018-01-01 RX ADMIN — METRONIDAZOLE 500 MG: 500 INJECTION, SOLUTION INTRAVENOUS at 08:04

## 2018-01-01 RX ADMIN — NICOTINE 1 PATCH: 21 PATCH, EXTENDED RELEASE TRANSDERMAL at 09:04

## 2018-01-01 RX ADMIN — THERA TABS 1 TABLET: TAB at 10:04

## 2018-01-01 RX ADMIN — BUMETANIDE 3 MG: 0.25 INJECTION INTRAMUSCULAR; INTRAVENOUS at 03:04

## 2018-01-01 RX ADMIN — INSULIN ASPART 2 UNITS: 100 INJECTION, SOLUTION INTRAVENOUS; SUBCUTANEOUS at 08:04

## 2018-01-01 RX ADMIN — METRONIDAZOLE 500 MG: 500 INJECTION, SOLUTION INTRAVENOUS at 09:04

## 2018-01-01 RX ADMIN — CALCIUM GLUCONATE 1 G: 98 INJECTION, SOLUTION INTRAVENOUS at 01:04

## 2018-01-01 RX ADMIN — DEXTROSE AND SODIUM CHLORIDE: 5; .45 INJECTION, SOLUTION INTRAVENOUS at 11:04

## 2018-01-01 RX ADMIN — SODIUM CHLORIDE, SODIUM LACTATE, POTASSIUM CHLORIDE, AND CALCIUM CHLORIDE: 600; 310; 30; 20 INJECTION, SOLUTION INTRAVENOUS at 01:04

## 2018-01-01 RX ADMIN — CALCIUM CHLORIDE 1 G: 100 INJECTION, SOLUTION INTRAVENOUS; INTRAVENTRICULAR at 07:04

## 2018-01-01 RX ADMIN — POTASSIUM CHLORIDE 40 MEQ: 29.8 INJECTION, SOLUTION INTRAVENOUS at 09:04

## 2018-01-01 RX ADMIN — Medication 0.2 MCG/KG/MIN: at 05:04

## 2018-01-01 RX ADMIN — HEPARIN SODIUM 5000 UNITS: 5000 INJECTION, SOLUTION INTRAVENOUS; SUBCUTANEOUS at 02:04

## 2018-01-01 RX ADMIN — PROPOFOL 50 MCG/KG/MIN: 10 INJECTION, EMULSION INTRAVENOUS at 04:04

## 2018-01-01 RX ADMIN — MEROPENEM 500 MG: 500 INJECTION, POWDER, FOR SOLUTION INTRAVENOUS at 11:04

## 2018-01-01 RX ADMIN — VANCOMYCIN HYDROCHLORIDE 500 MG: 1 INJECTION, POWDER, LYOPHILIZED, FOR SOLUTION INTRAVENOUS at 04:04

## 2018-01-01 RX ADMIN — LORAZEPAM 4 MG: 2 INJECTION INTRAMUSCULAR; INTRAVENOUS at 08:04

## 2018-01-01 RX ADMIN — LORAZEPAM 2 MG: 2 INJECTION INTRAMUSCULAR; INTRAVENOUS at 01:04

## 2018-01-01 RX ADMIN — Medication 250 MG: at 03:04

## 2018-01-01 RX ADMIN — SODIUM BICARBONATE: 84 INJECTION, SOLUTION INTRAVENOUS at 06:04

## 2018-01-01 RX ADMIN — Medication 0.16 MCG/KG/MIN: at 06:04

## 2018-01-01 RX ADMIN — SODIUM PHOSPHATE, MONOBASIC, MONOHYDRATE 15 MMOL: 276; 142 INJECTION, SOLUTION INTRAVENOUS at 07:04

## 2018-01-01 RX ADMIN — MAGNESIUM OXIDE TAB 400 MG (241.3 MG ELEMENTAL MG) 400 MG: 400 (241.3 MG) TAB at 08:04

## 2018-01-01 RX ADMIN — FOLIC ACID 1 MG: 1 TABLET ORAL at 08:04

## 2018-01-01 RX ADMIN — MAGNESIUM SULFATE IN DEXTROSE 1 G: 10 INJECTION, SOLUTION INTRAVENOUS at 12:04

## 2018-01-01 RX ADMIN — DEXTROSE MONOHYDRATE 12.5 G: 25 INJECTION, SOLUTION INTRAVENOUS at 08:04

## 2018-01-01 RX ADMIN — LORAZEPAM 2 MG: 2 INJECTION INTRAMUSCULAR; INTRAVENOUS at 11:04

## 2018-01-01 RX ADMIN — PIPERACILLIN AND TAZOBACTAM 4.5 G: 4; .5 INJECTION, POWDER, LYOPHILIZED, FOR SOLUTION INTRAVENOUS; PARENTERAL at 11:04

## 2018-01-01 RX ADMIN — ACETAMINOPHEN 650 MG: 650 SOLUTION ORAL at 05:04

## 2018-01-01 RX ADMIN — SODIUM CHLORIDE 30 MMOL: 900 INJECTION, SOLUTION INTRAVENOUS at 12:04

## 2018-01-01 RX ADMIN — Medication 100 MG: at 12:04

## 2018-01-01 RX ADMIN — NOREPINEPHRINE BITARTRATE 0.14 MCG/KG/MIN: 1 INJECTION, SOLUTION, CONCENTRATE INTRAVENOUS at 05:04

## 2018-01-01 RX ADMIN — Medication 0.17 MCG/KG/MIN: at 06:04

## 2018-01-01 RX ADMIN — NOREPINEPHRINE BITARTRATE 0.02 MCG/KG/MIN: 1 INJECTION, SOLUTION, CONCENTRATE INTRAVENOUS at 02:04

## 2018-01-01 RX ADMIN — FUROSEMIDE 40 MG: 10 INJECTION, SOLUTION INTRAMUSCULAR; INTRAVENOUS at 04:04

## 2018-01-01 RX ADMIN — POTASSIUM CHLORIDE 40 MEQ: 400 INJECTION, SOLUTION INTRAVENOUS at 05:04

## 2018-01-01 RX ADMIN — MEROPENEM 500 MG: 500 INJECTION, POWDER, FOR SOLUTION INTRAVENOUS at 05:04

## 2018-01-01 RX ADMIN — POTASSIUM CHLORIDE 20 MEQ: 200 INJECTION, SOLUTION INTRAVENOUS at 08:04

## 2018-01-01 RX ADMIN — SODIUM CHLORIDE 750 MG: 9 INJECTION, SOLUTION INTRAVENOUS at 12:04

## 2018-01-01 RX ADMIN — RIFAXIMIN 550 MG: 550 TABLET ORAL at 09:04

## 2018-01-01 RX ADMIN — POTASSIUM CHLORIDE 40 MEQ: 400 INJECTION, SOLUTION INTRAVENOUS at 11:04

## 2018-01-01 RX ADMIN — MAGNESIUM SULFATE IN DEXTROSE 1 G: 10 INJECTION, SOLUTION INTRAVENOUS at 07:04

## 2018-01-01 RX ADMIN — POTASSIUM CHLORIDE 10 MEQ: 2 INJECTION, SOLUTION, CONCENTRATE INTRAVENOUS at 09:04

## 2018-01-01 RX ADMIN — Medication 250 MG: at 07:04

## 2018-01-01 RX ADMIN — MEROPENEM 500 MG: 500 INJECTION, POWDER, FOR SOLUTION INTRAVENOUS at 10:04

## 2018-01-01 RX ADMIN — ALBUMIN HUMAN 12.5 G: 0.25 SOLUTION INTRAVENOUS at 10:04

## 2018-01-01 RX ADMIN — POTASSIUM & SODIUM PHOSPHATES POWDER PACK 280-160-250 MG 1 PACKET: 280-160-250 PACK at 08:04

## 2018-01-01 RX ADMIN — CALCIUM GLUCONATE 1 G: 98 INJECTION, SOLUTION INTRAVENOUS at 10:04

## 2018-01-01 RX ADMIN — PIPERACILLIN AND TAZOBACTAM 4.5 G: 4; .5 INJECTION, POWDER, LYOPHILIZED, FOR SOLUTION INTRAVENOUS; PARENTERAL at 05:04

## 2018-01-01 RX ADMIN — POTASSIUM CHLORIDE 20 MEQ: 200 INJECTION, SOLUTION INTRAVENOUS at 10:04

## 2018-01-01 RX ADMIN — HEPARIN SODIUM 500 UNITS/HR: 10000 INJECTION, SOLUTION INTRAVENOUS at 06:04

## 2018-01-01 RX ADMIN — POTASSIUM CHLORIDE 50 MEQ: 20 SOLUTION ORAL at 08:04

## 2018-01-01 RX ADMIN — DEXMEDETOMIDINE HYDROCHLORIDE 0.5 MCG/KG/HR: 4 INJECTION, SOLUTION INTRAVENOUS at 10:04

## 2018-01-01 RX ADMIN — POTASSIUM CHLORIDE 40 MEQ: 29.8 INJECTION, SOLUTION INTRAVENOUS at 03:04

## 2018-01-01 RX ADMIN — MAGNESIUM SULFATE IN WATER 2 G: 40 INJECTION, SOLUTION INTRAVENOUS at 11:04

## 2018-01-01 RX ADMIN — Medication 0.1 MCG/KG/MIN: at 07:04

## 2018-01-01 RX ADMIN — METRONIDAZOLE 500 MG: 500 INJECTION, SOLUTION INTRAVENOUS at 06:04

## 2018-01-01 RX ADMIN — Medication 100 MCG/HR: at 08:04

## 2018-01-01 RX ADMIN — INSULIN ASPART 2 UNITS: 100 INJECTION, SOLUTION INTRAVENOUS; SUBCUTANEOUS at 05:04

## 2018-01-01 RX ADMIN — MAGNESIUM SULFATE IN WATER 2 G: 40 INJECTION, SOLUTION INTRAVENOUS at 07:04

## 2018-01-01 RX ADMIN — POTASSIUM CHLORIDE 40 MEQ: 400 INJECTION, SOLUTION INTRAVENOUS at 04:04

## 2018-01-01 RX ADMIN — NICOTINE 1 PATCH: 21 PATCH, EXTENDED RELEASE TRANSDERMAL at 02:04

## 2018-01-01 RX ADMIN — PIPERACILLIN AND TAZOBACTAM 4.5 G: 4; .5 INJECTION, POWDER, LYOPHILIZED, FOR SOLUTION INTRAVENOUS; PARENTERAL at 09:04

## 2018-01-01 RX ADMIN — Medication 200 MCG/HR: at 08:04

## 2018-01-01 RX ADMIN — PIPERACILLIN AND TAZOBACTAM 4.5 G: 4; .5 INJECTION, POWDER, LYOPHILIZED, FOR SOLUTION INTRAVENOUS; PARENTERAL at 06:04

## 2018-01-01 RX ADMIN — POTASSIUM CHLORIDE 40 MEQ: 400 INJECTION, SOLUTION INTRAVENOUS at 10:04

## 2018-01-01 RX ADMIN — Medication 0.12 MCG/KG/MIN: at 12:04

## 2018-01-01 RX ADMIN — CEFTAZIDIME 1 G: 1 INJECTION, POWDER, FOR SOLUTION INTRAMUSCULAR; INTRAVENOUS at 09:04

## 2018-01-01 RX ADMIN — BISACODYL 10 MG: 10 SUPPOSITORY RECTAL at 09:04

## 2018-01-01 RX ADMIN — FENTANYL CITRATE 150 MCG: 50 INJECTION INTRAMUSCULAR; INTRAVENOUS at 01:04

## 2018-01-01 RX ADMIN — MICAFUNGIN SODIUM 100 MG: 20 INJECTION, POWDER, LYOPHILIZED, FOR SOLUTION INTRAVENOUS at 08:04

## 2018-01-01 RX ADMIN — LORAZEPAM 2 MG: 2 INJECTION INTRAMUSCULAR; INTRAVENOUS at 08:04

## 2018-01-01 RX ADMIN — VASOPRESSIN 0.04 UNITS/MIN: 20 INJECTION INTRAVENOUS at 02:04

## 2018-01-01 RX ADMIN — POTASSIUM CHLORIDE 60 MEQ: 200 INJECTION, SOLUTION INTRAVENOUS at 08:04

## 2018-01-01 RX ADMIN — POTASSIUM CHLORIDE 40 MEQ: 29.8 INJECTION, SOLUTION INTRAVENOUS at 08:04

## 2018-01-01 RX ADMIN — METOLAZONE 5 MG: 5 TABLET ORAL at 12:04

## 2018-01-01 RX ADMIN — MAGNESIUM SULFATE IN WATER 2 G: 40 INJECTION, SOLUTION INTRAVENOUS at 04:04

## 2018-01-01 RX ADMIN — MAGNESIUM SULFATE IN DEXTROSE 1 G: 10 INJECTION, SOLUTION INTRAVENOUS at 06:04

## 2018-01-01 RX ADMIN — PROPOFOL 35 MCG/KG/MIN: 10 INJECTION, EMULSION INTRAVENOUS at 04:04

## 2018-01-01 RX ADMIN — VASOPRESSIN 0.04 UNITS/MIN: 20 INJECTION INTRAVENOUS at 01:04

## 2018-01-01 RX ADMIN — INSULIN ASPART 2 UNITS: 100 INJECTION, SOLUTION INTRAVENOUS; SUBCUTANEOUS at 09:04

## 2018-01-01 RX ADMIN — FUROSEMIDE 80 MG: 10 INJECTION, SOLUTION INTRAMUSCULAR; INTRAVENOUS at 11:04

## 2018-01-01 RX ADMIN — MEROPENEM 500 MG: 500 INJECTION, POWDER, FOR SOLUTION INTRAVENOUS at 06:04

## 2018-01-01 RX ADMIN — SODIUM CHLORIDE 1000 ML: 0.9 INJECTION, SOLUTION INTRAVENOUS at 06:04

## 2018-01-01 RX ADMIN — PROPOFOL 50 MCG/KG/MIN: 10 INJECTION, EMULSION INTRAVENOUS at 11:04

## 2018-01-01 RX ADMIN — INSULIN ASPART 1 UNITS: 100 INJECTION, SOLUTION INTRAVENOUS; SUBCUTANEOUS at 08:04

## 2018-01-01 RX ADMIN — Medication 0.04 MCG/KG/MIN: at 03:04

## 2018-01-01 RX ADMIN — IBUPROFEN 400 MG: 400 TABLET, FILM COATED ORAL at 04:04

## 2018-01-01 RX ADMIN — POTASSIUM CHLORIDE 40 MEQ: 29.8 INJECTION, SOLUTION INTRAVENOUS at 06:04

## 2018-01-01 RX ADMIN — FUROSEMIDE 60 MG: 10 INJECTION, SOLUTION INTRAMUSCULAR; INTRAVENOUS at 05:04

## 2018-01-01 RX ADMIN — LACTULOSE 20 G: 20 SOLUTION ORAL at 12:04

## 2018-01-01 RX ADMIN — SODIUM PHOSPHATE, MONOBASIC, MONOHYDRATE 30 MMOL: 276; 142 INJECTION, SOLUTION INTRAVENOUS at 11:04

## 2018-01-01 RX ADMIN — LACTULOSE 45 G: 20 SOLUTION ORAL at 09:04

## 2018-01-01 RX ADMIN — POTASSIUM CHLORIDE 40 MEQ: 29.8 INJECTION, SOLUTION INTRAVENOUS at 05:04

## 2018-01-01 RX ADMIN — Medication 175 MCG/HR: at 03:04

## 2018-01-01 RX ADMIN — VECURONIUM BROMIDE FOR INJECTION 10 MG: 1 INJECTION, POWDER, LYOPHILIZED, FOR SOLUTION INTRAVENOUS at 02:04

## 2018-01-01 RX ADMIN — HEPARIN SODIUM 5000 UNITS: 5000 INJECTION, SOLUTION INTRAVENOUS; SUBCUTANEOUS at 01:04

## 2018-01-01 RX ADMIN — MAGNESIUM SULFATE IN DEXTROSE 1 G: 10 INJECTION, SOLUTION INTRAVENOUS at 11:04

## 2018-01-01 RX ADMIN — PROPOFOL 50 MCG/KG/MIN: 10 INJECTION, EMULSION INTRAVENOUS at 01:04

## 2018-01-01 RX ADMIN — LINEZOLID 600 MG: 600 INJECTION, SOLUTION INTRAVENOUS at 10:04

## 2018-01-01 RX ADMIN — Medication 1000 MG: at 12:04

## 2018-01-01 RX ADMIN — Medication 0.16 MCG/KG/MIN: at 11:04

## 2018-01-01 RX ADMIN — SODIUM PHOSPHATE, MONOBASIC, MONOHYDRATE 15 MMOL: 276; 142 INJECTION, SOLUTION INTRAVENOUS at 10:04

## 2018-01-01 RX ADMIN — MIDAZOLAM 2 MG: 1 INJECTION INTRAMUSCULAR; INTRAVENOUS at 02:04

## 2018-01-01 RX ADMIN — INSULIN ASPART 2 UNITS: 100 INJECTION, SOLUTION INTRAVENOUS; SUBCUTANEOUS at 10:04

## 2018-01-01 RX ADMIN — PIPERACILLIN AND TAZOBACTAM 4.5 G: 4; .5 INJECTION, POWDER, LYOPHILIZED, FOR SOLUTION INTRAVENOUS; PARENTERAL at 04:04

## 2018-01-01 RX ADMIN — FENTANYL CITRATE 50 MCG: 50 INJECTION INTRAMUSCULAR; INTRAVENOUS at 05:04

## 2018-01-01 RX ADMIN — PIPERACILLIN AND TAZOBACTAM 4.5 G: 4; .5 INJECTION, POWDER, LYOPHILIZED, FOR SOLUTION INTRAVENOUS; PARENTERAL at 01:04

## 2018-01-01 RX ADMIN — Medication 250 MG: at 01:04

## 2018-01-01 RX ADMIN — POTASSIUM PHOSPHATE, MONOBASIC AND POTASSIUM PHOSPHATE, DIBASIC 20 MMOL: 224; 236 INJECTION, SOLUTION, CONCENTRATE INTRAVENOUS at 12:04

## 2018-01-01 RX ADMIN — Medication 0.26 MCG/KG/MIN: at 01:04

## 2018-01-01 RX ADMIN — BUMETANIDE 3 MG: 0.25 INJECTION INTRAMUSCULAR; INTRAVENOUS at 02:04

## 2018-01-01 RX ADMIN — ACETAMINOPHEN 650 MG: 650 SOLUTION ORAL at 06:04

## 2018-01-01 RX ADMIN — DEXTROSE MONOHYDRATE 12.5 G: 25 INJECTION, SOLUTION INTRAVENOUS at 09:04

## 2018-01-01 RX ADMIN — Medication 0.22 MCG/KG/MIN: at 06:04

## 2018-01-01 RX ADMIN — Medication 0.14 MCG/KG/MIN: at 02:04

## 2018-01-01 RX ADMIN — FUROSEMIDE 60 MG: 10 INJECTION, SOLUTION INTRAMUSCULAR; INTRAVENOUS at 02:04

## 2018-01-01 RX ADMIN — POTASSIUM CHLORIDE 40 MEQ: 400 INJECTION, SOLUTION INTRAVENOUS at 08:04

## 2018-01-01 RX ADMIN — SODIUM CHLORIDE 1000 ML: 0.9 INJECTION, SOLUTION INTRAVENOUS at 04:04

## 2018-01-01 RX ADMIN — MAGNESIUM SULFATE IN WATER 4 G: 40 INJECTION, SOLUTION INTRAVENOUS at 09:04

## 2018-01-01 RX ADMIN — DEXMEDETOMIDINE HYDROCHLORIDE 1 MCG/KG/HR: 4 INJECTION, SOLUTION INTRAVENOUS at 12:04

## 2018-01-01 RX ADMIN — IOHEXOL 100 ML: 350 INJECTION, SOLUTION INTRAVENOUS at 02:04

## 2018-01-01 RX ADMIN — Medication 0.16 MCG/KG/MIN: at 02:04

## 2018-01-01 RX ADMIN — SODIUM CHLORIDE 50 ML/HR: 0.9 INJECTION, SOLUTION INTRAVENOUS at 01:04

## 2018-01-01 RX ADMIN — ONDANSETRON 4 MG: 2 INJECTION INTRAMUSCULAR; INTRAVENOUS at 09:04

## 2018-01-01 RX ADMIN — POTASSIUM CHLORIDE 60 MEQ: 200 INJECTION, SOLUTION INTRAVENOUS at 11:04

## 2018-01-01 RX ADMIN — CALCIUM GLUCONATE 1000 MG: 94 INJECTION, SOLUTION INTRAVENOUS at 11:04

## 2018-01-01 RX ADMIN — INSULIN ASPART 6 UNITS: 100 INJECTION, SOLUTION INTRAVENOUS; SUBCUTANEOUS at 09:04

## 2018-01-01 RX ADMIN — Medication 0.06 MCG/KG/MIN: at 06:04

## 2018-01-01 RX ADMIN — Medication 100 MG: at 02:04

## 2018-01-01 RX ADMIN — MAGNESIUM SULFATE IN WATER 2 G: 40 INJECTION, SOLUTION INTRAVENOUS at 08:04

## 2018-01-01 RX ADMIN — FUROSEMIDE 20 MG: 10 INJECTION, SOLUTION INTRAMUSCULAR; INTRAVENOUS at 02:04

## 2018-01-01 RX ADMIN — METRONIDAZOLE 500 MG: 500 INJECTION, SOLUTION INTRAVENOUS at 05:04

## 2018-01-01 RX ADMIN — MEROPENEM 500 MG: 500 INJECTION, POWDER, FOR SOLUTION INTRAVENOUS at 02:04

## 2018-01-01 RX ADMIN — SODIUM CHLORIDE 100 ML/HR: 0.9 INJECTION, SOLUTION INTRAVENOUS at 11:04

## 2018-01-01 RX ADMIN — MAGNESIUM SULFATE IN DEXTROSE 1 G: 10 INJECTION, SOLUTION INTRAVENOUS at 08:04

## 2018-01-01 RX ADMIN — ACETAZOLAMIDE 250 MG: 500 INJECTION, POWDER, LYOPHILIZED, FOR SOLUTION INTRAVENOUS at 09:04

## 2018-01-01 RX ADMIN — INSULIN ASPART 1 UNITS: 100 INJECTION, SOLUTION INTRAVENOUS; SUBCUTANEOUS at 11:04

## 2018-01-01 RX ADMIN — SODIUM BICARBONATE 100 MEQ: 84 INJECTION, SOLUTION INTRAVENOUS at 06:04

## 2018-01-01 RX ADMIN — DEXTROSE MONOHYDRATE 12.5 G: 25 INJECTION, SOLUTION INTRAVENOUS at 01:04

## 2018-01-01 RX ADMIN — MAGNESIUM SULFATE HEPTAHYDRATE 2 G: 40 INJECTION, SOLUTION INTRAVENOUS at 12:04

## 2018-01-01 RX ADMIN — Medication 0.24 MCG/KG/MIN: at 04:04

## 2018-01-01 RX ADMIN — Medication 0.16 MCG/KG/MIN: at 05:04

## 2018-01-01 RX ADMIN — SODIUM BICARBONATE 100 MEQ: 84 INJECTION INTRAVENOUS at 08:04

## 2018-01-01 RX ADMIN — NOREPINEPHRINE BITARTRATE 0.22 MCG/KG/MIN: 1 INJECTION, SOLUTION, CONCENTRATE INTRAVENOUS at 08:04

## 2018-01-01 RX ADMIN — DEXTROSE MONOHYDRATE 12.5 G: 25 INJECTION, SOLUTION INTRAVENOUS at 06:04

## 2018-01-01 RX ADMIN — SODIUM BICARBONATE: 84 INJECTION, SOLUTION INTRAVENOUS at 07:04

## 2018-01-01 RX ADMIN — FUROSEMIDE 20 MG: 10 INJECTION, SOLUTION INTRAMUSCULAR; INTRAVENOUS at 05:04

## 2018-01-01 RX ADMIN — DEXMEDETOMIDINE HYDROCHLORIDE 1.2 MCG/KG/HR: 4 INJECTION, SOLUTION INTRAVENOUS at 03:04

## 2018-01-01 RX ADMIN — SUCCINYLCHOLINE CHLORIDE 100 MG: 20 INJECTION, SOLUTION INTRAMUSCULAR; INTRAVENOUS at 08:04

## 2018-01-01 RX ADMIN — SODIUM CHLORIDE 30 MMOL: 900 INJECTION, SOLUTION INTRAVENOUS at 04:04

## 2018-01-01 RX ADMIN — SODIUM PHOSPHATE, MONOBASIC, MONOHYDRATE 30 MMOL: 276; 142 INJECTION, SOLUTION INTRAVENOUS at 12:04

## 2018-01-01 RX ADMIN — METOLAZONE 5 MG: 5 TABLET ORAL at 11:04

## 2018-01-01 RX ADMIN — FLUCONAZOLE IN SODIUM CHLORIDE 200 MG: 2 INJECTION, SOLUTION INTRAVENOUS at 02:04

## 2018-01-01 RX ADMIN — EPINEPHRINE 1 MG: 0.1 INJECTION, SOLUTION ENDOTRACHEAL; INTRACARDIAC; INTRAVENOUS at 07:04

## 2018-01-01 RX ADMIN — DEXMEDETOMIDINE HYDROCHLORIDE 1.2 MCG/KG/HR: 4 INJECTION, SOLUTION INTRAVENOUS at 11:04

## 2018-01-01 RX ADMIN — INSULIN ASPART 1 UNITS: 100 INJECTION, SOLUTION INTRAVENOUS; SUBCUTANEOUS at 09:04

## 2018-01-01 RX ADMIN — SODIUM BICARBONATE 100 MEQ: 84 INJECTION INTRAVENOUS at 03:04

## 2018-01-01 RX ADMIN — IOHEXOL 50 ML: 350 INJECTION, SOLUTION INTRAVENOUS at 01:04

## 2018-01-01 RX ADMIN — POTASSIUM CHLORIDE 20 MEQ: 200 INJECTION, SOLUTION INTRAVENOUS at 07:04

## 2018-01-01 RX ADMIN — Medication 200 MCG/HR: at 09:04

## 2018-01-01 RX ADMIN — MAGNESIUM SULFATE IN WATER 2 G: 40 INJECTION, SOLUTION INTRAVENOUS at 05:04

## 2018-01-01 RX ADMIN — SODIUM CHLORIDE 30 MMOL: 900 INJECTION, SOLUTION INTRAVENOUS at 07:04

## 2018-01-01 RX ADMIN — FUROSEMIDE 20 MG: 10 INJECTION, SOLUTION INTRAMUSCULAR; INTRAVENOUS at 10:04

## 2018-01-01 RX ADMIN — PROPOFOL 50 MCG/KG/MIN: 10 INJECTION, EMULSION INTRAVENOUS at 12:04

## 2018-01-01 RX ADMIN — FENTANYL CITRATE 50 MCG: 50 INJECTION INTRAMUSCULAR; INTRAVENOUS at 07:04

## 2018-01-01 RX ADMIN — LORAZEPAM 2 MG: 2 INJECTION INTRAMUSCULAR; INTRAVENOUS at 03:04

## 2018-01-01 RX ADMIN — Medication 0.14 MCG/KG/MIN: at 01:04

## 2018-01-01 RX ADMIN — VANCOMYCIN HYDROCHLORIDE 1250 MG: 1 INJECTION, POWDER, LYOPHILIZED, FOR SOLUTION INTRAVENOUS at 01:04

## 2018-01-01 RX ADMIN — DEXMEDETOMIDINE HYDROCHLORIDE 1.4 MCG/KG/HR: 4 INJECTION, SOLUTION INTRAVENOUS at 08:04

## 2018-01-01 RX ADMIN — MEROPENEM 500 MG: 500 INJECTION, POWDER, FOR SOLUTION INTRAVENOUS at 03:04

## 2018-01-01 RX ADMIN — LORAZEPAM 2 MG: 2 INJECTION INTRAMUSCULAR; INTRAVENOUS at 07:04

## 2018-01-01 RX ADMIN — PROPOFOL 50 MCG/KG/MIN: 10 INJECTION, EMULSION INTRAVENOUS at 10:04

## 2018-01-01 RX ADMIN — FUROSEMIDE 20 MG: 10 INJECTION, SOLUTION INTRAMUSCULAR; INTRAVENOUS at 03:04

## 2018-01-01 RX ADMIN — POTASSIUM & SODIUM PHOSPHATES POWDER PACK 280-160-250 MG 1 PACKET: 280-160-250 PACK at 03:04

## 2018-01-01 RX ADMIN — Medication 50 MCG/HR: at 07:04

## 2018-01-01 RX ADMIN — MAGNESIUM SULFATE IN WATER 2 G: 40 INJECTION, SOLUTION INTRAVENOUS at 09:04

## 2018-01-01 RX ADMIN — PROPOFOL 50 MCG/KG/MIN: 10 INJECTION, EMULSION INTRAVENOUS at 07:04

## 2018-01-01 RX ADMIN — POTASSIUM CHLORIDE 40 MEQ: 29.8 INJECTION, SOLUTION INTRAVENOUS at 07:04

## 2018-01-01 RX ADMIN — POTASSIUM PHOSPHATE, MONOBASIC AND POTASSIUM PHOSPHATE, DIBASIC 30 MMOL: 224; 236 INJECTION, SOLUTION, CONCENTRATE INTRAVENOUS at 10:04

## 2018-01-01 RX ADMIN — CEFTAZIDIME 1 G: 1 INJECTION, POWDER, FOR SOLUTION INTRAMUSCULAR; INTRAVENOUS at 10:04

## 2018-01-01 RX ADMIN — POTASSIUM CHLORIDE 20 MEQ: 14.9 INJECTION, SOLUTION INTRAVENOUS at 07:04

## 2018-01-01 RX ADMIN — FOLIC ACID 1 MG: 1 TABLET ORAL at 10:04

## 2018-01-01 RX ADMIN — VANCOMYCIN HYDROCHLORIDE 1500 MG: 1 INJECTION, POWDER, LYOPHILIZED, FOR SOLUTION INTRAVENOUS at 12:04

## 2018-01-01 RX ADMIN — MAGNESIUM SULFATE IN DEXTROSE 1 G: 10 INJECTION, SOLUTION INTRAVENOUS at 09:04

## 2018-01-01 RX ADMIN — Medication 0.14 MCG/KG/MIN: at 06:04

## 2018-01-01 RX ADMIN — MAGNESIUM SULFATE HEPTAHYDRATE 2 G: 40 INJECTION, SOLUTION INTRAVENOUS at 01:04

## 2018-01-01 RX ADMIN — Medication 175 MCG/HR: at 06:04

## 2018-01-01 RX ADMIN — PROPOFOL 35 MCG/KG/MIN: 10 INJECTION, EMULSION INTRAVENOUS at 09:04

## 2018-01-01 RX ADMIN — SODIUM CHLORIDE 500 ML: 0.9 INJECTION, SOLUTION INTRAVENOUS at 03:04

## 2018-01-01 RX ADMIN — POTASSIUM CHLORIDE 40 MEQ: 400 INJECTION, SOLUTION INTRAVENOUS at 01:04

## 2018-01-01 RX ADMIN — SODIUM CHLORIDE 1000 ML: 0.9 INJECTION, SOLUTION INTRAVENOUS at 11:04

## 2018-01-01 RX ADMIN — BUMETANIDE 3 MG: 0.25 INJECTION INTRAMUSCULAR; INTRAVENOUS at 04:04

## 2018-01-01 RX ADMIN — POTASSIUM CHLORIDE 40 MEQ: 400 INJECTION, SOLUTION INTRAVENOUS at 07:04

## 2018-01-01 RX ADMIN — NOREPINEPHRINE BITARTRATE 0.58 MCG/KG/MIN: 1 INJECTION INTRAVENOUS at 07:04

## 2018-01-01 RX ADMIN — POLYETHYLENE GLYCOL 3350 17 G: 17 POWDER, FOR SOLUTION ORAL at 10:04

## 2018-01-01 RX ADMIN — METRONIDAZOLE 500 MG: 500 INJECTION, SOLUTION INTRAVENOUS at 01:04

## 2018-01-01 RX ADMIN — VASOPRESSIN 0.04 UNITS/MIN: 20 INJECTION INTRAVENOUS at 07:04

## 2018-01-01 RX ADMIN — Medication 0.2 MCG/KG/MIN: at 01:04

## 2018-01-01 RX ADMIN — POTASSIUM CHLORIDE 40 MEQ: 20 SOLUTION ORAL at 01:04

## 2018-01-01 RX ADMIN — POTASSIUM CHLORIDE 40 MEQ: 29.8 INJECTION, SOLUTION INTRAVENOUS at 04:04

## 2018-01-01 RX ADMIN — Medication 0.08 MCG/KG/MIN: at 04:04

## 2018-01-01 RX ADMIN — ACETAMINOPHEN 500 MG: 500 TABLET, FILM COATED ORAL at 12:04

## 2018-01-01 RX ADMIN — Medication 0.14 MCG/KG/MIN: at 10:04

## 2018-01-01 RX ADMIN — RIFAXIMIN 550 MG: 550 TABLET ORAL at 12:04

## 2018-01-01 RX ADMIN — VANCOMYCIN HCL-SODIUM CHLORIDE IV SOLN 1 GM/250ML-0.9% 1 G: 1-0.9/25 SOLUTION at 04:04

## 2018-01-01 RX ADMIN — SODIUM BICARBONATE: 84 INJECTION, SOLUTION INTRAVENOUS at 08:04

## 2018-01-01 RX ADMIN — SODIUM CHLORIDE 1000 ML: 0.9 INJECTION, SOLUTION INTRAVENOUS at 09:04

## 2018-01-01 RX ADMIN — POTASSIUM CHLORIDE 20 MEQ: 200 INJECTION, SOLUTION INTRAVENOUS at 09:04

## 2018-01-01 RX ADMIN — ACETAMINOPHEN 650 MG: 650 SOLUTION ORAL at 11:04

## 2018-01-01 RX ADMIN — ACETAMINOPHEN 650 MG: 650 SOLUTION ORAL at 08:04

## 2018-01-01 RX ADMIN — PROPOFOL 50 MCG/KG/MIN: 10 INJECTION, EMULSION INTRAVENOUS at 08:04

## 2018-01-01 RX ADMIN — Medication 0.1 MCG/KG/MIN: at 08:04

## 2018-01-01 RX ADMIN — CALCIUM GLUCONATE 1 G: 98 INJECTION, SOLUTION INTRAVENOUS at 12:04

## 2018-01-01 RX ADMIN — DEXTROSE AND SODIUM CHLORIDE: 5; .45 INJECTION, SOLUTION INTRAVENOUS at 06:04

## 2018-01-01 RX ADMIN — VECURONIUM BROMIDE FOR INJECTION 10 MG: 1 INJECTION, POWDER, LYOPHILIZED, FOR SOLUTION INTRAVENOUS at 01:04

## 2018-01-01 RX ADMIN — PIPERACILLIN AND TAZOBACTAM 4.5 G: 4; .5 INJECTION, POWDER, LYOPHILIZED, FOR SOLUTION INTRAVENOUS; PARENTERAL at 07:04

## 2018-01-01 RX ADMIN — SODIUM BICARBONATE: 84 INJECTION, SOLUTION INTRAVENOUS at 11:04

## 2018-01-01 RX ADMIN — DEXTROSE MONOHYDRATE 12.5 G: 25 INJECTION, SOLUTION INTRAVENOUS at 02:04

## 2018-01-01 RX ADMIN — HEPARIN SODIUM 4000 UNITS: 1000 INJECTION, SOLUTION INTRAVENOUS; SUBCUTANEOUS at 04:04

## 2018-01-01 RX ADMIN — Medication 0.14 MCG/KG/MIN: at 08:04

## 2018-01-01 RX ADMIN — INSULIN ASPART 6 UNITS: 100 INJECTION, SOLUTION INTRAVENOUS; SUBCUTANEOUS at 11:04

## 2018-01-01 RX ADMIN — POTASSIUM CHLORIDE 60 MEQ: 200 INJECTION, SOLUTION INTRAVENOUS at 04:04

## 2018-01-01 RX ADMIN — POTASSIUM CHLORIDE 10 MEQ: 2 INJECTION, SOLUTION, CONCENTRATE INTRAVENOUS at 08:04

## 2018-01-01 RX ADMIN — SODIUM PHOSPHATE, MONOBASIC, MONOHYDRATE 15 MMOL: 276; 142 INJECTION, SOLUTION INTRAVENOUS at 11:04

## 2018-01-01 RX ADMIN — LORAZEPAM 4 MG: 2 INJECTION INTRAMUSCULAR at 07:04

## 2018-01-01 RX ADMIN — POTASSIUM CHLORIDE 20 MEQ: 200 INJECTION, SOLUTION INTRAVENOUS at 05:04

## 2018-01-01 RX ADMIN — SODIUM PHOSPHATE, MONOBASIC, MONOHYDRATE 15 MMOL: 276; 142 INJECTION, SOLUTION INTRAVENOUS at 03:04

## 2018-01-01 RX ADMIN — IPRATROPIUM BROMIDE AND ALBUTEROL SULFATE 3 ML: .5; 3 SOLUTION RESPIRATORY (INHALATION) at 10:04

## 2018-01-01 RX ADMIN — POTASSIUM CHLORIDE AND SODIUM CHLORIDE: 900; 150 INJECTION, SOLUTION INTRAVENOUS at 11:04

## 2018-01-01 RX ADMIN — POTASSIUM CHLORIDE 40 MEQ: 20 SOLUTION ORAL at 06:04

## 2018-01-01 RX ADMIN — Medication 0.24 MCG/KG/MIN: at 05:04

## 2018-01-01 RX ADMIN — SODIUM CHLORIDE 50 ML/HR: 0.9 INJECTION, SOLUTION INTRAVENOUS at 08:04

## 2018-01-01 RX ADMIN — FAMOTIDINE 20 MG: 10 INJECTION INTRAVENOUS at 11:04

## 2018-01-01 RX ADMIN — SODIUM CHLORIDE 1000 ML: 0.9 INJECTION, SOLUTION INTRAVENOUS at 07:04

## 2018-01-01 RX ADMIN — VASOPRESSIN 0.04 UNITS/MIN: 20 INJECTION INTRAVENOUS at 05:04

## 2018-01-01 RX ADMIN — VITAMIN D, TAB 1000IU (100/BT) 5000 UNITS: 25 TAB at 11:04

## 2018-01-01 RX ADMIN — POTASSIUM CHLORIDE 40 MEQ: 29.8 INJECTION, SOLUTION INTRAVENOUS at 11:04

## 2018-01-01 RX ADMIN — CALCIUM GLUCONATE 1000 MG: 98 INJECTION, SOLUTION INTRAVENOUS at 04:04

## 2018-01-01 RX ADMIN — FUROSEMIDE 40 MG: 10 INJECTION, SOLUTION INTRAMUSCULAR; INTRAVENOUS at 11:04

## 2018-01-01 RX ADMIN — VASOPRESSIN 0.04 UNITS/MIN: 20 INJECTION INTRAVENOUS at 11:04

## 2018-01-01 RX ADMIN — FUROSEMIDE 60 MG: 10 INJECTION, SOLUTION INTRAMUSCULAR; INTRAVENOUS at 09:04

## 2018-01-01 RX ADMIN — NICOTINE 1 PATCH: 21 PATCH, EXTENDED RELEASE TRANSDERMAL at 08:04

## 2018-01-01 RX ADMIN — MORPHINE SULFATE 5 MG: 2 INJECTION, SOLUTION INTRAMUSCULAR; INTRAVENOUS at 11:04

## 2018-01-01 RX ADMIN — POTASSIUM CHLORIDE 40 MEQ: 400 INJECTION, SOLUTION INTRAVENOUS at 03:04

## 2018-01-01 RX ADMIN — IOHEXOL 30 ML: 350 INJECTION, SOLUTION INTRAVENOUS at 11:04

## 2018-01-01 RX ADMIN — CHLORDIAZEPOXIDE HYDROCHLORIDE 25 MG: 25 CAPSULE ORAL at 05:04

## 2018-01-01 RX ADMIN — CALCIUM GLUCONATE 1 G: 98 INJECTION, SOLUTION INTRAVENOUS at 06:04

## 2018-01-01 RX ADMIN — SODIUM CHLORIDE 1250 MG: 900 INJECTION, SOLUTION INTRAVENOUS at 09:04

## 2018-01-01 RX ADMIN — THERA TABS 1 TABLET: TAB at 12:04

## 2018-01-01 RX ADMIN — FOLIC ACID 1 MG: 1 TABLET ORAL at 02:04

## 2018-01-01 RX ADMIN — DEXMEDETOMIDINE HYDROCHLORIDE 1.4 MCG/KG/HR: 4 INJECTION, SOLUTION INTRAVENOUS at 05:04

## 2018-01-01 RX ADMIN — INSULIN ASPART 4 UNITS: 100 INJECTION, SOLUTION INTRAVENOUS; SUBCUTANEOUS at 06:04

## 2018-01-01 RX ADMIN — FENTANYL CITRATE 50 MCG: 50 INJECTION INTRAMUSCULAR; INTRAVENOUS at 08:04

## 2018-01-01 RX ADMIN — POLYETHYLENE GLYCOL 3350 17 G: 17 POWDER, FOR SOLUTION ORAL at 09:04

## 2018-01-01 RX ADMIN — LACTULOSE 30 G: 20 SOLUTION ORAL at 03:04

## 2018-04-03 PROBLEM — J96.90 RESPIRATORY FAILURE: Status: ACTIVE | Noted: 2018-01-01

## 2018-04-03 PROBLEM — J96.01 ACUTE HYPOXEMIC RESPIRATORY FAILURE: Status: ACTIVE | Noted: 2018-01-01

## 2018-04-03 PROBLEM — G93.40 ACUTE ENCEPHALOPATHY: Status: ACTIVE | Noted: 2018-01-01

## 2018-04-03 PROBLEM — A41.9 SEPTIC SHOCK: Status: ACTIVE | Noted: 2018-01-01

## 2018-04-03 PROBLEM — E87.6 HYPOKALEMIA: Status: ACTIVE | Noted: 2018-01-01

## 2018-04-03 PROBLEM — D62 ACUTE BLOOD LOSS ANEMIA: Status: ACTIVE | Noted: 2018-01-01

## 2018-04-03 PROBLEM — R56.9 SEIZURE: Status: ACTIVE | Noted: 2018-01-01

## 2018-04-03 PROBLEM — E87.1 HYPONATREMIA: Status: ACTIVE | Noted: 2018-01-01

## 2018-04-03 PROBLEM — R65.21 SEPTIC SHOCK: Status: ACTIVE | Noted: 2018-01-01

## 2018-04-04 PROBLEM — E87.8 ELECTROLYTE IMBALANCE: Status: ACTIVE | Noted: 2018-01-01

## 2018-04-04 PROBLEM — N39.0 ACUTE URINARY TRACT INFECTION: Status: ACTIVE | Noted: 2018-01-01

## 2018-04-04 PROBLEM — R73.9 HYPERGLYCEMIA, UNSPECIFIED: Status: ACTIVE | Noted: 2018-01-01

## 2018-04-04 PROBLEM — E87.20 METABOLIC ACIDOSIS: Status: ACTIVE | Noted: 2018-01-01

## 2018-04-04 PROBLEM — F10.10 ETOH ABUSE: Chronic | Status: ACTIVE | Noted: 2018-01-01

## 2018-04-04 PROBLEM — F10.10 ETOH ABUSE: Status: ACTIVE | Noted: 2018-01-01

## 2018-04-04 NOTE — HOSPITAL COURSE
4/4 - This AM she is sedated on vent on Levophed infusion spiking temp 101.9 with WBC 20, LA 6.3, UA+, electrolyte imbalance and Glucose 268    4/5 - SAT and SBT done this am, pt awake and following commands. She was successfully extubated to nasal cannula. Remains tachycardic with HR 140s and febrile with temp 103 overnight. WBC 18 and lactic acidosis improving to 4.8. Continuing to aggressively replace electrolytes.    4/6 - Over night patient had episode with bradycardia and hypotension during which she became unresponsive and agonally breathing. She responded with IVF and bicarb and was restarted on pressors. She experienced a similar episode again last night, during which she was intubated. Vent settings were reviewed and adjusted this am. No more bradycardic/hypotensive episodes since last night. Remains on pressors. Leukocystosis unimproved with worsening bandemia. Urine and blood cx NGTD. Sputum cx growing staph and pseudomonas however CXR this am is unremarkable.    04/7 - Tolerating SAT and SBT. Meets extubating criteria. Acidosis improving. Leukocytosis improving. Remains on bicarb gtt.     04/8 - Extubated successfully yesterday. Asystolic arrest this AM.  ACLS initiated. Re - intubated 2 rounds CPR with Iv epinephrine X 1  and IV CaCl X 1 given. ROSC attained.  A - line placed.      4/9 - now with mild vaginal bleeding and severe anasarca with blistering.  Still on vent with sedation and Levophed/Vasopressin infusing.  Worsening UOP and creatine.  Spoke with brother at bedside in detail and all questions answered.   4/10 - Opens eyes alert. Mild increase in Cr -. Intravascular volume depletion. Will add albumin. Still with vagnal bleeding  4/11 - low grade temp, Slight worsening of CR. Good urine output; trach placed today, remains on mechanical vent support and low dose pressor  4/12 - remains on mechanical ventilation via trach; HIDA scan abnormal with no gall bladder filling  4/13 - external gall  bladder drain placed by IR; remains on mechanical ventilation via Trach  4/14 - significant draining from gall bladder along with significant decline in leukocytosis although continued fevers last 24 hours; remains on mechanical ventilation via trach; intermittent hypotension overnight and creatinine bump this am  4/15 - continued high drainage from gall bladder; continued fevers 102+; remains on mechanical ventilation via trach, failed SBT with tachypnea, hypoventilation  4/16 continued mechanical ventilation via trach; fail SBT with tachypnea, hypoventilation; continued fever, controlling with external cooling blanket (24hr max 103.2); increased leukocytosis; procalcitonin continues rising (now 11.6); INR, bili continue rising; biliary drain with continued 500-600ml/day output  4/17 - remains vent dependent with continued pressor requirement, fevers, increasing leukocytosis; slight decline INR, Tbili, and procal (all remain elevated); CT abd last evening with mild pancolitis not consistent with severity of ongoing illness  4/18 remains on mechanical ventilation via trach on pressor support with continued fevers and wbc rising (now 29,000) along with worsening t bili; some decline in INR and procalcitonin today; tagged wbc scan yesterday concerning for lungs as septic source  4/19 - still on mech vent and Levophed infusion.  Yumiko TF.  Still spiking temp and WBC increasing.    4/20 - awake on vent still on low dose Levophed infusion.  Yumiko TF.  Tolerating SBT  4/21 - Yumiko SBT yesterday and tolerating TF.  Worsening UOP and creatine.  Off Levophed at 0700 hr this AM.  Awakens and nods head to questions  4/22 - tolerating TF.  UOP averaging 10 ml/hr.  Awake and responsive.  Tachypnea in mid 40s on SBT but no distress.   4/23 - Failed SBT yesterday.  Diuresed fair post Lasix yesterday.  Still spiking temps and persistent Leukocytosis  4/24 - Tachypnic on full vent support today - No SBT.  Yumiko TF.  LUQ Abd tender.  Still  spiking temps and WBC up off Antb  4/25 - Worsening met acidosis yesterday afternoon and not improving with CRRT started yesterday as well.  Pupils unequal.  Shock yesterday now on Vasopressin and Levophed infusions

## 2018-04-04 NOTE — CONSULTS
Rafael Duron [id number] is a 22 y.o. female who has been consulted for vancomycin dosing.  Dx: Opportunistic infection prophylaxis / goal trough 10-15  The patient has the following labs:     Date Creatinine (mg/dl)    BUN WBC Count   4/4/2018 Estimated Creatinine Clearance: 140.4 mL/min (based on SCr of 0.8 mg/dL). Lab Results   Component Value Date    BUN 2 (L) 04/03/2018     Lab Results   Component Value Date    WBC 22.10 (H) 04/03/2018      which calculates to an Estimated Creatinine Clearance: 140.4 mL/min (based on SCr of 0.8 mg/dL)..       Current weight is 114.2 kg (251 lb 12.3 oz)    The patient will be started on vancomycin at a dose of 1500 mg every 12 hours. Patient will be followed by pharmacy for changes in renal function, toxicity, and efficacy.  The vancomycin trough has been ordered for 4/5 at 09:30.      Thank you for allowing us to participate in this patient's care.     Mathieu Whitney

## 2018-04-04 NOTE — NURSING
0835 patient arrived to unit per ems stretcher. Upon transfer to icu bed, vent tubing got caught and patient was accidentally extubated. Patient immediately bagged per RN using bag mask. ER simultaneously called to have MD urgently reintubate patient. Dr trey jaquez arrived shortly and patient tolerated reintubation well. New 7.5 ett placed without complications using etomidate and succinylcholine. Prior to reintubation, patient was slightly anxious and did not answer questions when asked. IV sedation paused and patient was able to move all extremities at this time. Dr jaquez also placed a RIJ TLC at this time for a levophed infusion.
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
## 2018-04-04 NOTE — SUBJECTIVE & OBJECTIVE
Review of Systems   Unable to perform ROS: Intubated     Objective:     Vital Signs (Most Recent):  Pulse: (!) 125 (04/03/18 2100)  Resp: (!) 26 (04/03/18 2100)  BP: 128/73 (04/03/18 2100)  SpO2: 100 % (04/03/18 2100) Vital Signs (24h Range):  Pulse:  [115-125] 125  Resp:  [19-26] 26  SpO2:  [100 %] 100 %  BP: ()/(43-73) 128/73        There is no height or weight on file to calculate BMI.  No intake or output data in the 24 hours ending 04/03/18 2134   Physical Exam   Constitutional: She is intubated.   Morbidly Obese, intubated, sedated   HENT:   Head: Normocephalic and atraumatic.   Eyes: Conjunctivae are normal. Pupils are equal, round, and reactive to light. No scleral icterus.   Neck: Normal range of motion.   Cardiovascular: Normal heart sounds and intact distal pulses.  Tachycardia present.    No murmur heard.  Pulmonary/Chest: She is intubated.   Coarse breath sounds   Abdominal: Soft. She exhibits distension. No hernia.   Obese   Musculoskeletal: She exhibits no edema.   Lymphadenopathy:     She has no cervical adenopathy.   Neurological:   Intubated, sedated on propafol   Skin: Skin is warm. No erythema.   Psychiatric:   Unable to evaluate   Nursing note and vitals reviewed.      Significant Labs:   ABGs:   Recent Labs  Lab 04/03/18 2201   PH 7.385   PCO2 32.1*   HCO3 19.2*   POCSATURATED 100   BE -6     Bilirubin:   Recent Labs  Lab 04/03/18 2125   BILITOT 3.9*     BMP:   Recent Labs  Lab 04/03/18 2125   *   *   K 2.6*   CL 97   CO2 15*   BUN 2*   CREATININE 0.8   CALCIUM 7.0*   MG 2.0     CBC:   Recent Labs  Lab 04/03/18 2125   WBC 22.10*   HGB 8.9*   HCT 24.3*   *     CMP:   Recent Labs  Lab 04/03/18 2125   *   K 2.6*   CL 97   CO2 15*   *   BUN 2*   CREATININE 0.8   CALCIUM 7.0*   PROT 6.5   ALBUMIN 1.9*   BILITOT 3.9*   ALKPHOS 96   *   ALT 49*   ANIONGAP 18*   EGFRNONAA >60     Cardiac Markers: No results for input(s): CKMB, MYOGLOBIN, BNP, TROPISTAT  in the last 48 hours.  Coagulation:   Recent Labs  Lab 04/03/18 2125   INR 1.2     Lactic Acid:   Recent Labs  Lab 04/03/18 2125   LACTATE 5.3*     Troponin:   Recent Labs  Lab 04/03/18 2125   TROPONINI 0.015     Urine Studies: No results for input(s): COLORU, APPEARANCEUA, PHUR, SPECGRAV, PROTEINUA, GLUCUA, KETONESU, BILIRUBINUA, OCCULTUA, NITRITE, UROBILINOGEN, LEUKOCYTESUR, RBCUA, WBCUA, BACTERIA, SQUAMEPITHEL, HYALINECASTS in the last 48 hours.    Invalid input(s): BLAS  All pertinent labs within the past 24 hours have been reviewed.    Significant Imaging: I have reviewed and interpreted all pertinent imaging results/findings within the past 24 hours.     Imaging Results    None         I have independently reviewed and interpreted the EKG.     I have independently reviewed all pertinent labs within the past 24 hours.    I have independently reviewed, visualized and interpreted all pertinent imaging results within the past 24 hours and discussed the findings with Dr. Newman.

## 2018-04-04 NOTE — EICU
Calcium 6.7, but corrected calcium fine for low albumin of 1.9.  Po4 still low at 1.3    Replace Po4 with sod phosphate 15 mmols for now IVPB.
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
## 2018-04-04 NOTE — ASSESSMENT & PLAN NOTE
S/P D&C  UA +  Urine, Blood and Sputum cultures pending  Cont Zosyn and Vanc  Wean Levophed infusion as tolerated

## 2018-04-04 NOTE — PROGRESS NOTES
Still on mech vent at risk for pulling lines and OET.  Patient examined.  Restraints renewed.     CHANDRIKA Lowe [facility]

## 2018-04-04 NOTE — EICU
UOP still low.  Lactate went up to 6.5  Getting potassium replaced, last K level 3.3    On levophed.  BP 90/50.    Plan:  - Saline bolus 1 lit over one hr  - follow lactate in am  - keep map > 65  - get CVP level.  Keep above 12 at least  - follow VBG once for acidosis, if low need bicarb

## 2018-04-04 NOTE — ASSESSMENT & PLAN NOTE
Likely multifactorial: Hyponatremia, etoh abuse +/- WD, Met Acidosis  Cont Propofol infusion  Correct electrolyte imbalance and acidosis

## 2018-04-04 NOTE — EICU
ABG resulted. BE -11.  pco2 26.  Increase frequency from 16 to 18.  Decrease fio2 also.  pao2 252.

## 2018-04-04 NOTE — HPI
Ms Duron is a 23 yo obese BF with a PMH of HTN, gestational DM and HTN who presented to [facility] ED in [place], [place] on  with complaint of SOB and cough with known pregnancy.  OB US revealed no heart tones and she is also  with second trimester fetal demise estimated 22 week for which she passed with retained placenta.  After admission to ICU she had seizure activity with , K+ 1.9 and Bicarb 11.  She also had etoh level 268 and reportedly had been drinking etoh w/ honey heavily for several days.  She required intubation for airway protection per records and OB was unable to remove placenta manually and patient had to be taken to OR.  She received 2 liters IVF and Hgb dropped to 7.4 and was transfused 2 units PRBCs.  Vaginal bleeding was scant per records.  Yesterday she had temp 101.5 Ax, .  She as transferred to [facility] ICU yesterday evening for higher level of care.

## 2018-04-04 NOTE — CONSULTS
[facility]  Critical Care Medicine  Consult Note    Patient Name: Rafael Duron  MRN: [id number]  Admission Date: 4/3/2018  Hospital Length of Stay: 1 days  Code Status: Full Code  Attending Provider: Denzel Medina MD  Primary Care Provider: Herman Cowart MD   Principal Problem: Septic shock    Subjective:     HPI:  Ms Duron is a 21 yo obese BF with a PMH of HTN, gestational DM and HTN who presented to [facility] ED in [place], [place] on  with complaint of SOB and cough with known pregnancy.  OB US revealed no heart tones and she is also  with second trimester fetal demise estimated 22 week for which she passed with retained placenta.  After admission to ICU she had seizure activity with , K+ 1.9 and Bicarb 11.  She also had etoh level 268 and reportedly had been drinking etoh w/ honey heavily for several days.  She required intubation for airway protection per records and OB was unable to remove placenta manually and patient had to be taken to OR.  She received 2 liters IVF and Hgb dropped to 7.4 and was transfused 2 units PRBCs.  Vaginal bleeding was scant per records.  Yesterday she had temp 101.5 Ax, .  She as transferred to [facility] ICU yesterday evening for higher level of care.      Hospital/ICU Course:   - This AM she is sedated on vent on Levophed infusion spiking temp 101.9 with WBC 20, LA 6.3, UA+, electrolyte imbalance and Glucose 268    Past Medical History:   Diagnosis Date    Gestational diabetes     Hypertension        History reviewed. No pertinent surgical history.    Review of patient's allergies indicates:  No Known Allergies    Family History     Problem Relation (Age of Onset)    Diabetes Mother, Father, Sister    Heart disease Mother        Social History Main Topics    Smoking status: Never Smoker    Smokeless tobacco: Never Used    Alcohol use Yes    Drug use: No    Sexual activity: Not on file         Review of Systems    Unable to perform ROS: Intubated     Objective:     Vital Signs (Most Recent):  Temp: 99 °F (37.2 °C) (04/04/18 1100)  Pulse: 90 (04/04/18 1100)  Resp: (!) 26 (04/04/18 1100)  BP: (!) 115/55 (04/04/18 1100)  SpO2: 100 % (04/04/18 1100) Vital Signs (24h Range):  Temp:  [99 °F (37.2 °C)-101.9 °F (38.8 °C)] 99 °F (37.2 °C)  Pulse:  [] 90  Resp:  [6-33] 26  SpO2:  [99 %-100 %] 100 %  BP: ()/(36-80) 115/55     Weight: 115.3 kg (254 lb 3.1 oz)  Body mass index is 41.03 kg/m².      Intake/Output Summary (Last 24 hours) at 04/04/18 1200  Last data filed at 04/04/18 1105   Gross per 24 hour   Intake          5196.45 ml   Output             2424 ml   Net          2772.45 ml       Physical Exam   Constitutional: She appears well-developed and well-nourished. She appears toxic. She has a sickly appearance. She appears ill. No distress. She is sedated, intubated and restrained.   HENT:   Head: Normocephalic and atraumatic.   Nose:       Mouth/Throat: Oropharynx is clear and moist and mucous membranes are normal.   Eyes: Lids are normal. Pupils are equal, round, and reactive to light.   Neck: Trachea normal. Neck supple. Carotid bruit is not present.       Cardiovascular: Normal rate, regular rhythm, normal heart sounds and normal pulses.    Pulses:       Radial pulses are 2+ on the right side, and 2+ on the left side.        Dorsalis pedis pulses are 2+ on the right side, and 2+ on the left side.   Pulmonary/Chest: Breath sounds normal. Accessory muscle usage present. She is intubated. No respiratory distress.   Abdominal: Soft. She exhibits no distension. Bowel sounds are absent. There is no tenderness.   Obese    Genitourinary:   Genitourinary Comments: Rodriguez in place   Musculoskeletal: Normal range of motion.        Right foot: There is no deformity.        Left foot: There is no deformity.   Mild general edema   Lymphadenopathy:     She has no cervical adenopathy.   Neurological:   sedated   Skin: Skin is warm,  dry and intact. Capillary refill takes less than 2 seconds. No rash noted. No cyanosis.            Vents:  Vent Mode: A/C (04/04/18 1040)  Set Rate: 18 bmp (04/04/18 1040)  Vt Set: 0 mL (04/04/18 1040)  Pressure Support: 0 cmH20 (04/04/18 1040)  PEEP/CPAP: 5 cmH20 (04/04/18 1040)  Oxygen Concentration (%): 30 (04/04/18 1100)  Peak Airway Pressure: 26 cmH2O (04/04/18 1040)  Plateau Pressure: 0 cmH20 (04/04/18 1040)  Total Ve: 16.4 mL (04/04/18 1040)  F/VT Ratio<105 (RSBI): (!) 10.75 (04/04/18 1040)    Lines/Drains/Airways     Central Venous Catheter Line                 Percutaneous Central Line Insertion/Assessment - triple lumen  04/03/18 2045 right internal jugular less than 1 day          Drain                 NG/OG Tube 04/03/18 nasogastric Right nostril 1 day         Urethral Catheter 04/03/18 16 Fr. 1 day          Airway                 Airway - Non-Surgical 04/03/18 2040 Endotracheal Tube less than 1 day          Peripheral Intravenous Line                 Peripheral IV - Single Lumen 04/03/18 Left Antecubital 1 day         Peripheral IV - Single Lumen 04/03/18 Left Wrist 1 day         Peripheral IV - Single Lumen 04/03/18 Right Antecubital 1 day                Significant Labs:    CBC/Anemia Profile:    Recent Labs  Lab 04/03/18 2125 04/04/18 0518   WBC 22.10* 20.32*   HGB 8.9* 7.9*   HCT 24.3* 20.8*   * 115*   MCV 92 92   RDW 17.8* 18.1*        Chemistries:    Recent Labs  Lab 04/03/18 2125 04/04/18  0137 04/04/18 0518 04/04/18  0828   * 129* 130* 131*   K 2.6* 3.3* 3.4* 3.0*   CL 97 98 101 102   CO2 15* 13* 10* 10*   BUN 2* 2* 2* 2*   CREATININE 0.8 0.8 0.9 0.9   CALCIUM 7.0* 6.8* 6.7* 6.6*   ALBUMIN 1.9*  --   --   --    PROT 6.5  --   --   --    BILITOT 3.9*  --   --   --    ALKPHOS 96  --   --   --    ALT 49*  --   --   --    *  --   --   --    MG 2.0  --  1.9  --    PHOS <1.0*  --  1.3* 1.1*       ABGs:   Recent Labs  Lab 04/04/18  0515   PH 7.358   PCO2 26.1*   HCO3 14.7*    POCSATURATED 100   BE -11     Coagulation:   Recent Labs  Lab 04/03/18 2125   INR 1.2     Lactic Acid:   Recent Labs  Lab 04/03/18  2125 04/04/18  0137 04/04/18  0518   LACTATE 5.3* 6.6* 6.3*     Urine Studies:   Recent Labs  Lab 04/04/18  0327   COLORU Orange*   APPEARANCEUA Cloudy*   PHUR SEE COMMENT   SPECGRAV SEE COMMENT   PROTEINUA SEE COMMENT   GLUCUA SEE COMMENT   KETONESU SEE COMMENT   BILIRUBINUA SEE COMMENT   OCCULTUA SEE COMMENT   NITRITE SEE COMMENT   UROBILINOGEN SEE COMMENT   LEUKOCYTESUR SEE COMMENT   RBCUA >100*   WBCUA 40*   BACTERIA Many*     All pertinent labs within the past 24 hours have been reviewed.    Significant Imaging:   CXR: I have reviewed all pertinent results/findings within the past 24 hours and my personal findings are:  no acute pulm process noted    Assessment/Plan:     Neuro   Seizure    Likely multifactorial: Hyponatremia, etoh abuse +/- WD, Met Acidosis  Cont Propofol infusion  Correct electrolyte imbalance and acidosis        Psychiatric   ETOH abuse    Encourage cessation once awake and extubated  Cont Fentanyl and Propofol infusions        Pulmonary   Acute hypoxemic respiratory failure    Vent settings reviewed and adjusted  Daily SAT/SBT        Renal/   Acute urinary tract infection    Urine culture pending  Cont IVAB        Hyponatremia    Hypertonic IVFs  Limit free water        Metabolic acidosis    Bicarb infusion        Electrolyte imbalance    Replace Mg+, Phos and K+  Repeat electrolytes labs later today        ID   * Septic shock    S/P D&C  UA +  Urine, Blood and Sputum cultures pending  Cont Zosyn and Vanc  Wean Levophed infusion as tolerated        Oncology   Acute blood loss anemia    Transfuse 2 units PRBCs (total 4 units)  No active bleeding noted  Repeat H/H        Endocrine   Hyperglycemia, unspecified    SSI        Obstetric   Retained products of conception with hemorrhage    POD #2 D&C          Preventive Measures and Monitoring:   Stress Ulcer:  Pepcid  Nutrition: NPO  Glucose control: SSI  Bowel prophylaxis: PRN DUlcolax  DVT prophylaxis: SCDs  Hx CAD on B-Blocker: no hx CAD  Head of Bed/Reposition: Elevate HOB and turn Q1-2 hours   Early Mobility: Bed Rest  SAT/SBT: daily starting in AM  RASS goal: -1  Vent Day: #3  NG Day: #3  Central Line Right IJ Day: #3  Rodriguez Day: #3  IVAB Day: #2  Code Status: Full  Pneumonia Vaccine: ordered    Counseling/Consultation:I have discussed the care of this patient in detail with the bedside nursing staff and Dr. Harkins and Dr. Medina    Critical Care Time: 89 minutes  Critical secondary to Patient has a condition that poses threat to life and bodily function: Resp Failure on mech ventilation  Patient is currently on drug therapy requiring intensive monitoring for toxicity: Levophed infusion  Patient is currently receiving parenteral controlled substances: Propofol infusion and Fentanyl infusion      Critical care was time spent personally by me on the following activities: development of treatment plan with patient or surrogate and bedside caregivers, discussions with consultants, evaluation of patient's response to treatment, examination of patient, ordering and performing treatments and interventions, ordering and review of laboratory studies, ordering and review of radiographic studies, pulse oximetry, re-evaluation of patient's condition. This critical care time did not overlap with that of any other provider or involve time for any procedures.     Ed Lowe NP  Critical Care Medicine  [facility]

## 2018-04-04 NOTE — ASSESSMENT & PLAN NOTE
Currently intubated.  No acute infiltrates seen on CXR  Continue IV antibiotics empirically.  Pulmonary consult.

## 2018-04-04 NOTE — ED PROVIDER NOTES
Intubation  Date/Time: 4/3/2018 8:43 PM  Performed by: ADELFO JERRY  Authorized by: DAELFO JERRY   Indications: respiratory distress  Intubation method: direct  Patient status: sedated  Preoxygenation: mask  Sedatives: etomidate  Paralytic: succinylcholine  Laryngoscope size: Mac 4  Tube size: 7.5 mm  Tube type: cuffed  Number of attempts: 1  Ventilation between attempts: BVM  Cricoid pressure: yes  Cords visualized: yes  Post-procedure assessment: chest rise and ETCO2 monitor  Breath sounds: clear  Cuff inflated: yes  Tube secured with: adhesive tape  Chest x-ray interpreted by me.  Chest x-ray findings: endotracheal tube too low  Patient tolerance: Patient tolerated the procedure well with no immediate complications  Complications: No  Comments: I was called to ICU to intubate transferred pt after she was extubated en route. Tube was repositioned by Dr. Armas ([facility]) who took over care of pt.     Central Line  Date/Time: 4/3/2018 9:14 PM  Performed by: ADELFO JERRY  Indications: med administration and vascular access  Anesthesia: general anesthesia  Preparation: skin prepped with betadine  Skin prep agent dried: skin prep agent completely dried prior to procedure  Sterile barriers: all five maximum sterile barriers used - cap, mask, sterile gown, sterile gloves, and large sterile sheet  Hand hygiene: hand hygiene performed prior to central venous catheter insertion  Location details: right internal jugular  Catheter type: triple lumen  Catheter size: 7.5 Fr  Ultrasound guidance: yes  Vessel Caliber: medium, patent, compressibility normal  Needle advanced into vessel with real time Ultrasound guidance.  Sterile sheath used.  Number of attempts: 1  Assessment: successful placement and placement verified by x-ray  Complications: none  Post-procedure: line sutured  Complications: No       Adelfo Jerry MD  04/03/18 3723

## 2018-04-04 NOTE — ASSESSMENT & PLAN NOTE
No prior h/o seizures per mother.  One episode of seizure at outside facility, likely alcoholic with hyponatremia of 118 not helping.  Continue propofol and precedex

## 2018-04-04 NOTE — EICU
Bed side RN discussed about electrolyte imbalance.    Notes, labs , meds reviewed.       Transferred from remote OSH on Vent.  Post op D and C for retained placenta post fetal demise.  In septic shock on levophed 0. 12 mcg/sedation. Lactate down to 5.3 from 7.  Creatinine wnl.  UOP 15 ml/hr low. No CVP.  Has a central line.  ABG: pao2 > 300.  fio2 now at 60%, 100 % sats.  , getting levo on weaning side.  Po4 1, K 2.6.  Mild elvated AST/ALT.  Leukocytosis on Vanc/zosyn pending culture.     Plan:  - K Phos 30 mmol IVPB stat  - NS with 20 meq kcl at 100 ml/hr  - follow LA and potassium at 1 am.  CMP/cbc/po4 in am  - decrease fio2 to 50%  - hook up to CVP if MAP worsens.  Watch Urine output.

## 2018-04-04 NOTE — ED PROVIDER NOTES
Intubation  Date/Time: 4/3/2018 8:43 PM  Performed by: ADELFO JERRY  Authorized by: ADELFO JERRY   Indications: respiratory distress  Intubation method: direct  Patient status: sedated  Preoxygenation: mask  Sedatives: etomidate  Paralytic: succinylcholine  Laryngoscope size: Mac 4  Tube size: 7.5 mm  Tube type: cuffed  Number of attempts: 1  Ventilation between attempts: BVM  Cricoid pressure: yes  Cords visualized: yes  Post-procedure assessment: chest rise and ETCO2 monitor  Breath sounds: clear  Cuff inflated: yes  Tube secured with: adhesive tape  Chest x-ray interpreted by me.  Chest x-ray findings: endotracheal tube too low  Patient tolerance: Patient tolerated the procedure well with no immediate complications  Complications: No  Comments: I was called to ICU to intubate transferred pt after she was extubated en route. Tube was repositioned by Dr. Armas ([facility]) who took over care of pt.     Central Line  Date/Time: 4/3/2018 9:14 PM  Performed by: ADELFO JERRY  Indications: med administration and vascular access  Anesthesia: general anesthesia  Preparation: skin prepped with betadine  Skin prep agent dried: skin prep agent completely dried prior to procedure  Sterile barriers: all five maximum sterile barriers used - cap, mask, sterile gown, sterile gloves, and large sterile sheet  Hand hygiene: hand hygiene performed prior to central venous catheter insertion  Location details: right internal jugular  Catheter type: triple lumen  Catheter size: 7.5 Fr  Ultrasound guidance: yes  Vessel Caliber: medium, patent, compressibility normal  Needle advanced into vessel with real time Ultrasound guidance.  Sterile sheath used.  Number of attempts: 1  Assessment: successful placement and placement verified by x-ray  Complications: none  Post-procedure: line sutured  Complications: No

## 2018-04-04 NOTE — SUBJECTIVE & OBJECTIVE
Past Medical History:   Diagnosis Date    Gestational diabetes     Hypertension        History reviewed. No pertinent surgical history.    Review of patient's allergies indicates:  No Known Allergies    Family History     Problem Relation (Age of Onset)    Diabetes Mother, Father, Sister    Heart disease Mother        Social History Main Topics    Smoking status: Never Smoker    Smokeless tobacco: Never Used    Alcohol use Yes    Drug use: No    Sexual activity: Not on file         Review of Systems   Unable to perform ROS: Intubated     Objective:     Vital Signs (Most Recent):  Temp: 99 °F (37.2 °C) (04/04/18 1100)  Pulse: 90 (04/04/18 1100)  Resp: (!) 26 (04/04/18 1100)  BP: (!) 115/55 (04/04/18 1100)  SpO2: 100 % (04/04/18 1100) Vital Signs (24h Range):  Temp:  [99 °F (37.2 °C)-101.9 °F (38.8 °C)] 99 °F (37.2 °C)  Pulse:  [] 90  Resp:  [6-33] 26  SpO2:  [99 %-100 %] 100 %  BP: ()/(36-80) 115/55     Weight: 115.3 kg (254 lb 3.1 oz)  Body mass index is 41.03 kg/m².      Intake/Output Summary (Last 24 hours) at 04/04/18 1200  Last data filed at 04/04/18 1105   Gross per 24 hour   Intake          5196.45 ml   Output             2424 ml   Net          2772.45 ml       Physical Exam   Constitutional: She appears well-developed and well-nourished. She appears toxic. She has a sickly appearance. She appears ill. No distress. She is sedated, intubated and restrained.   HENT:   Head: Normocephalic and atraumatic.   Nose:       Mouth/Throat: Oropharynx is clear and moist and mucous membranes are normal.   Eyes: Lids are normal. Pupils are equal, round, and reactive to light.   Neck: Trachea normal. Neck supple. Carotid bruit is not present.       Cardiovascular: Normal rate, regular rhythm, normal heart sounds and normal pulses.    Pulses:       Radial pulses are 2+ on the right side, and 2+ on the left side.        Dorsalis pedis pulses are 2+ on the right side, and 2+ on the left side.    Pulmonary/Chest: Breath sounds normal. Accessory muscle usage present. She is intubated. No respiratory distress.   Abdominal: Soft. She exhibits no distension. Bowel sounds are absent. There is no tenderness.   Obese    Genitourinary:   Genitourinary Comments: Rodriguez in place   Musculoskeletal: Normal range of motion.        Right foot: There is no deformity.        Left foot: There is no deformity.   Mild general edema   Lymphadenopathy:     She has no cervical adenopathy.   Neurological:   sedated   Skin: Skin is warm, dry and intact. Capillary refill takes less than 2 seconds. No rash noted. No cyanosis.            Vents:  Vent Mode: A/C (04/04/18 1040)  Set Rate: 18 bmp (04/04/18 1040)  Vt Set: 0 mL (04/04/18 1040)  Pressure Support: 0 cmH20 (04/04/18 1040)  PEEP/CPAP: 5 cmH20 (04/04/18 1040)  Oxygen Concentration (%): 30 (04/04/18 1100)  Peak Airway Pressure: 26 cmH2O (04/04/18 1040)  Plateau Pressure: 0 cmH20 (04/04/18 1040)  Total Ve: 16.4 mL (04/04/18 1040)  F/VT Ratio<105 (RSBI): (!) 10.75 (04/04/18 1040)    Lines/Drains/Airways     Central Venous Catheter Line                 Percutaneous Central Line Insertion/Assessment - triple lumen  04/03/18 2045 right internal jugular less than 1 day          Drain                 NG/OG Tube 04/03/18 nasogastric Right nostril 1 day         Urethral Catheter 04/03/18 16 Fr. 1 day          Airway                 Airway - Non-Surgical 04/03/18 2040 Endotracheal Tube less than 1 day          Peripheral Intravenous Line                 Peripheral IV - Single Lumen 04/03/18 Left Antecubital 1 day         Peripheral IV - Single Lumen 04/03/18 Left Wrist 1 day         Peripheral IV - Single Lumen 04/03/18 Right Antecubital 1 day                Significant Labs:    CBC/Anemia Profile:    Recent Labs  Lab 04/03/18 2125 04/04/18 0518   WBC 22.10* 20.32*   HGB 8.9* 7.9*   HCT 24.3* 20.8*   * 115*   MCV 92 92   RDW 17.8* 18.1*        Chemistries:    Recent Labs  Lab  04/03/18 2125 04/04/18 0137 04/04/18 0518 04/04/18  0828   * 129* 130* 131*   K 2.6* 3.3* 3.4* 3.0*   CL 97 98 101 102   CO2 15* 13* 10* 10*   BUN 2* 2* 2* 2*   CREATININE 0.8 0.8 0.9 0.9   CALCIUM 7.0* 6.8* 6.7* 6.6*   ALBUMIN 1.9*  --   --   --    PROT 6.5  --   --   --    BILITOT 3.9*  --   --   --    ALKPHOS 96  --   --   --    ALT 49*  --   --   --    *  --   --   --    MG 2.0  --  1.9  --    PHOS <1.0*  --  1.3* 1.1*       ABGs:   Recent Labs  Lab 04/04/18 0515   PH 7.358   PCO2 26.1*   HCO3 14.7*   POCSATURATED 100   BE -11     Coagulation:   Recent Labs  Lab 04/03/18 2125   INR 1.2     Lactic Acid:   Recent Labs  Lab 04/03/18 2125 04/04/18 0137 04/04/18 0518   LACTATE 5.3* 6.6* 6.3*     Urine Studies:   Recent Labs  Lab 04/04/18 0327   COLORU Orange*   APPEARANCEUA Cloudy*   PHUR SEE COMMENT   SPECGRAV SEE COMMENT   PROTEINUA SEE COMMENT   GLUCUA SEE COMMENT   KETONESU SEE COMMENT   BILIRUBINUA SEE COMMENT   OCCULTUA SEE COMMENT   NITRITE SEE COMMENT   UROBILINOGEN SEE COMMENT   LEUKOCYTESUR SEE COMMENT   RBCUA >100*   WBCUA 40*   BACTERIA Many*     All pertinent labs within the past 24 hours have been reviewed.    Significant Imaging:   CXR: I have reviewed all pertinent results/findings within the past 24 hours and my personal findings are:  no acute pulm process noted

## 2018-04-04 NOTE — PROCEDURES
"Rafael Duron is a 22 y.o. female patient.    Temp: 99 °F (37.2 °C) (04/04/18 1100)  Pulse: 90 (04/04/18 1100)  Resp: (!) 26 (04/04/18 1100)  BP: (!) 115/55 (04/04/18 1100)  SpO2: 100 % (04/04/18 1100)  Weight: 115.3 kg (254 lb 3.1 oz) (04/04/18 0500)  Height: 5' 6" (167.6 cm) (04/03/18 2102)       Arterial Line  Date/Time: 4/4/2018 8:10 AM  Location procedure was performed: [facility] INTENSIVE CARE UNIT  Performed by: VINNY LOWE  Authorized by: VINNY LOWE   Pre-op Diagnosis: shock  Post-operative diagnosis: septic shock  Consent Done: Not Needed  Preparation: Patient was prepped and draped in the usual sterile fashion.  Indications: multiple ABGs, respiratory failure and hemodynamic monitoring  Location: left radial  Patient sedated: yes  Sedatives: propofol (on vent)  Vitals: Vital signs were monitored during sedation.  Demarco's test normal: yes  Needle gauge: 20  Seldinger technique: Seldinger technique used  Number of attempts: 2  Complications: No  Estimated blood loss (mL): 4  Specimens: No  Implants: No  Post-procedure: line sutured and dressing applied  Post-procedure CMS: unchanged  Patient tolerance: Patient tolerated the procedure well with no immediate complications          Vinny Lowe  4/4/2018  "

## 2018-04-04 NOTE — ASSESSMENT & PLAN NOTE
Source likely fetal demise of unknown duration and retained products of concepttion placenta, removed surgically at outside hospital.  Currently on levophed, titrate to maintain MAP > 65.  Continue IV fluids.  Follow up on blood and urine cultures.  Consider OBGYN consult in AM if no improvement.  Lactic acid improved to 5 from 7.

## 2018-04-04 NOTE — ASSESSMENT & PLAN NOTE
With fetal demise upon presentation (of unknown duration), surgically removed at outside facility.  Obgyn consult in AM.  Patient received 2 units of PRBC.

## 2018-04-05 PROBLEM — R50.9 FEVER: Status: ACTIVE | Noted: 2018-01-01

## 2018-04-05 PROBLEM — E78.1 HYPERTRIGLYCERIDEMIA: Status: ACTIVE | Noted: 2018-01-01

## 2018-04-05 PROBLEM — K72.00 SHOCK LIVER: Status: ACTIVE | Noted: 2018-01-01

## 2018-04-05 NOTE — EICU
Temp 103.  Earlier in day had 100.4  Already has panculture drawn in last 24 hrs.      Tylenol 500 mg q 8 hr prn for temp > 101  Watch urine output, MAP closely.

## 2018-04-05 NOTE — EICU
Notified about persisting fever.  Received tylenol earlier.  On cold saline via NGT and cold packs externally.  UOP 30 ml/hr concentrated.  Tachy in 110 to 120.  Had to go up on Levophed 0.1 to 0.14.  Creatinine wnl.     Recent notes, labs reviewed.  On Vanc/zosyn.  Blood cx last one from 3 rd no growth  Urine culture from 4 th - no growth  Resp culture from 4 th gram + cocc, rare GN rods.     Plan:  - stat saline 1 lit over 0ne hour  - get Lactate after bolus  - Blood cx again once  - Motrin 400 mg via NG tube once for temp 103.  - will consider CT abdomen if worsens.

## 2018-04-05 NOTE — EICU
K at 2.9, po4 1.4, calcium 6.4 ( corrected for low albumin fine). Getting kphos 30 mmol rider going now and another due at 1 am.  Follow am lab

## 2018-04-05 NOTE — PROGRESS NOTES
[facility]  Critical Care Medicine  Progress Note    Patient Name: Rafael Duron  MRN: [id number]  Admission Date: 4/3/2018  Hospital Length of Stay: 2 days  Code Status: Full Code  Attending Provider: Denzel Medina MD  Primary Care Provider: Herman Cowart MD   Principal Problem: Septic shock    Subjective:     HPI:  Ms Duron is a 21 yo obese BF with a PMH of HTN, gestational DM and HTN who presented to [facility] ED in [place], [place] on  with complaint of SOB and cough with known pregnancy.  OB US revealed no heart tones and she is also  with second trimester fetal demise estimated 22 week for which she passed with retained placenta.  After admission to ICU she had seizure activity with , K+ 1.9 and Bicarb 11.  She also had etoh level 268 and reportedly had been drinking etoh w/ honey heavily for several days.  She required intubation for airway protection per records and OB was unable to remove placenta manually and patient had to be taken to OR.  She received 2 liters IVF and Hgb dropped to 7.4 and was transfused 2 units PRBCs.  Vaginal bleeding was scant per records.  Yesterday she had temp 101.5 Ax, .  She as transferred to [facility] ICU yesterday evening for higher level of care.      Hospital/ICU Course:   - This AM she is sedated on vent on Levophed infusion spiking temp 101.9 with WBC 20, LA 6.3, UA+, electrolyte imbalance and Glucose 268  4/5 SAT and SBT tolerated this am, pt awake and following commands. She was successfully extubated to nasal cannula. Able to wean off Levophed infusion. Remains tachycardic with HR 140s and febrile with temp 103 overnight. Metabolic acidosis improving. Continuing to aggressively replace electrolytes.    Review of Systems   Unable to perform ROS: Intubated       Objective:     Vital Signs (Most Recent):  Temp: (!) 100.8 °F (38.2 °C) (18 1005)  Pulse: (!) 120 (18 1005)  Resp: (!) 24 (18  1005)  BP: (!) 103/51 (04/05/18 1005)  SpO2: 97 % (04/05/18 1005) Vital Signs (24h Range):  Temp:  [98.5 °F (36.9 °C)-103 °F (39.4 °C)] 100.8 °F (38.2 °C)  Pulse:  [] 120  Resp:  [20-32] 24  SpO2:  [91 %-100 %] 97 %  BP: ()/(40-69) 103/51     Weight: 120.7 kg (266 lb 1.5 oz)  Body mass index is 42.95 kg/m².      Intake/Output Summary (Last 24 hours) at 04/05/18 1052  Last data filed at 04/05/18 0958   Gross per 24 hour   Intake         65408.21 ml   Output             2446 ml   Net         99805.21 ml       Physical Exam   Constitutional: She appears well-developed and well-nourished. She is sedated, intubated and restrained.   HENT:   Head: Normocephalic and atraumatic.   Mouth/Throat: Oropharynx is clear and moist and mucous membranes are normal.   Eyes: EOM and lids are normal. Pupils are equal, round, and reactive to light.   jaundice   Neck: Trachea normal and normal range of motion. Carotid bruit is not present.   Cardiovascular: Regular rhythm, normal heart sounds and normal pulses.  Tachycardia present.    Pulses:       Radial pulses are 2+ on the right side, and 2+ on the left side.        Dorsalis pedis pulses are 2+ on the right side, and 2+ on the left side.   Pulmonary/Chest: Breath sounds normal. No accessory muscle usage. She is intubated. No respiratory distress. She has no decreased breath sounds.   Abdominal: Bowel sounds are normal.   Firmness palpated above umbilicus with noted facial grimacing on palpation   Genitourinary: Vaginal discharge found.   Genitourinary Comments: Rodriguez in place   Musculoskeletal: Normal range of motion.        Right foot: There is no deformity.        Left foot: There is no deformity.   No edema   Lymphadenopathy:     She has no cervical adenopathy.   Skin: Skin is dry and intact. Capillary refill takes less than 2 seconds. No rash noted. No cyanosis.            Vents:  Vent Mode: Spont (04/05/18 0756)  Set Rate: 0 bmp (04/05/18 0757)  Vt Set: 0 mL  (04/05/18 0757)  Pressure Support: 6 cmH20 (04/05/18 0757)  PEEP/CPAP: 5 cmH20 (04/05/18 0757)  Oxygen Concentration (%): 30 (04/05/18 0757)  Peak Airway Pressure: 12 cmH2O (04/05/18 0757)  Plateau Pressure: 0 cmH20 (04/05/18 0757)  Total Ve: 12.1 mL (04/05/18 0757)  F/VT Ratio<105 (RSBI): (!) 34.19 (04/05/18 0757)    Lines/Drains/Airways     Central Venous Catheter Line                 Percutaneous Central Line Insertion/Assessment - triple lumen  04/03/18 2045 right internal jugular 1 day          Drain                 Urethral Catheter 04/03/18 16 Fr. 2 days          Airway                 Airway - Non-Surgical 04/03/18 2040 Endotracheal Tube 1 day          Arterial Line                 Arterial Line 04/04/18 0825 Left Radial 1 day          Peripheral Intravenous Line                 Peripheral IV - Single Lumen 04/03/18 Left Antecubital 2 days         Peripheral IV - Single Lumen 04/03/18 Right Antecubital 2 days                Significant Labs:    CBC/Anemia Profile:    Recent Labs  Lab 04/04/18 0518 04/04/18  1530 04/05/18  0353   WBC 20.32* 17.77* 18.04*   HGB 7.9* 10.3* 10.2*   HCT 20.8* 28.1* 27.7*   * 96* 99*   MCV 92 91 89   RDW 18.1* 17.5* 18.5*        Chemistries:    Recent Labs  Lab 04/03/18 2125  04/04/18 0518  04/04/18  1530 04/04/18  2220 04/05/18  0353 04/05/18  0544 04/05/18  0623   *  < > 130*  < > 136 139  --  138  --    K 2.6*  < > 3.4*  < > 3.2* 2.9*  --  2.6*  --    CL 97  < > 101  < > 107 109  --  108  --    CO2 15*  < > 10*  < > 12* 13*  --  11*  --    BUN 2*  < > 2*  < > <2* 2*  --  2*  --    CREATININE 0.8  < > 0.9  < > 0.8 0.8  --  0.8  --    CALCIUM 7.0*  < > 6.7*  < > 6.5* 6.4*  --  6.0*  --    ALBUMIN 1.9*  --   --   --   --   --   --  1.6*  --    PROT 6.5  --   --   --   --   --   --  6.0  --    BILITOT 3.9*  --   --   --   --   --   --  4.1*  --    ALKPHOS 96  --   --   --   --   --   --  86  --    ALT 49*  --   --   --   --   --   --  44  --    *  --   --    --   --   --   --  120*  --    MG 2.0  --  1.9  --   --   --  1.5*  --   --    PHOS <1.0*  --  1.3*  < > 1.2* 1.4*  --   --  2.0*   < > = values in this interval not displayed.    ABGs:   Recent Labs  Lab 04/05/18  0434   PH 7.396   PCO2 24.9*   HCO3 15.3*   POCSATURATED 95   BE -10     CMP:   Recent Labs  Lab 04/03/18  2125  04/04/18  1530 04/04/18  2220 04/05/18  0544   *  < > 136 139 138   K 2.6*  < > 3.2* 2.9* 2.6*   CL 97  < > 107 109 108   CO2 15*  < > 12* 13* 11*   *  < > 216* 154* 94   BUN 2*  < > <2* 2* 2*   CREATININE 0.8  < > 0.8 0.8 0.8   CALCIUM 7.0*  < > 6.5* 6.4* 6.0*   PROT 6.5  --   --   --  6.0   ALBUMIN 1.9*  --   --   --  1.6*   BILITOT 3.9*  --   --   --  4.1*   ALKPHOS 96  --   --   --  86   *  --   --   --  120*   ALT 49*  --   --   --  44   ANIONGAP 18*  < > 17* 17* 19*   EGFRNONAA >60  < > >60 >60 >60   < > = values in this interval not displayed.  Lipid Panel:   Recent Labs  Lab 04/05/18  0623   TRIG 1,819*     POCT Glucose:   Recent Labs  Lab 04/04/18  1120 04/04/18  1805   POCTGLUCOSE 266* 209*       Significant Imaging:  CXR: I have reviewed all pertinent results/findings within the past 24 hours and my personal findings are:  no acute findings  I have reviewed all pertinent imaging results/findings within the past 24 hours.      Assessment/Plan:     Neuro   Seizure    Likely multifactorial: Hyponatremia, etoh abuse +/- WD, Met Acidosis  Correct electrolyte imbalance and acidosis  Close neuro monitoring in ICU        Psychiatric   ETOH abuse    Encourage cessation once clinically stable  Consider Librium if s/s of withdrawal present  CMP, hep panel  ICU neuro monitoring          Pulmonary   Acute hypoxemic respiratory failure    Vent settings reviewed and adjusted  Passed SAT/SBT this am with successful extubation to nasal cannula  Supplemental O2 to maintain SpO2 greater than 92%        Cardiac/Vascular   Hypertriglyceridemia    Propofol discontinued  Repeat  triglycerides in AM        Renal/   Acute urinary tract infection    Urine culture pending  Cont IVAB        Hyponatremia    Resolved, cont to monitor with daily CMP        Electrolyte imbalance    Sodium corrected  Replace Mg+, Phos and K+  Repeat electrolytes labs later today        Metabolic acidosis    Bicarb infusion  Likely due to lactic acidosis from septic shock, cont with abx        ID   * Septic shock    S/P D&C  Will consult OB  UA +  Urine, Blood and Sputum cultures NGTD  Cont Vanc, Flagyl, Meropenem  Wean Levophed infusion as tolerated        Oncology   Acute blood loss anemia    Total 4 units transfused  Minimal vaginal discharge  H/H stable  Repeat H/H in am        Endocrine   Hyperglycemia, unspecified    SSI        GI   Shock liver    -daily CMP        Obstetric   Retained products of conception with hemorrhage    POD #3 D&C  Will consult OB            Preventive Measures and Monitoring:   Stress Ulcer: Pepcid  Nutrition: will start diet once seen by OB  Glucose control: SSI  Bowel prophylaxis: Dulcolax prn  DVT prophylaxis: SCDs  Early Mobility: OOB to chair  Central Line Day:3  Rodriguez Day:3  IVAB Day:3  Code Status: full  Pneumonia Vaccine: ordered      Counseling/Consultation:I have discussed the care of this patient in detail with the bedside nursing staff and Dr. Harkins and Dr. Medina  Critical Care Time: 65 minutes  Critical secondary to Patient has a condition that poses threat to life and bodily function: Severe sepsis  Patient is currently receiving parenteral controlled substances: Fentanyl      Critical care was time spent personally by me on the following activities: development of treatment plan with patient or surrogate and bedside caregivers, discussions with consultants, evaluation of patient's response to treatment, examination of patient, ordering and performing treatments and interventions, ordering and review of laboratory studies, ordering and review of radiographic studies,  pulse oximetry, re-evaluation of patient's condition. This critical care time did not overlap with that of any other provider or involve time for any procedures.     Ed Lowe NP  Critical Care Medicine  [facility]

## 2018-04-05 NOTE — ASSESSMENT & PLAN NOTE
S/P D&C  Will consult OB  UA +  Urine, Blood and Sputum cultures NGTD  Cont Vanc, Flagyl, Meropenem  Wean Levophed infusion as tolerated

## 2018-04-05 NOTE — PROGRESS NOTES
Vancomycin Progress Note (day #3)    Indication: acute UTI, suspected intra-abdominal infection (possibly chorioamnionitis), septic shock s/p fetal demise/D&C @ 5 months gestation  WBC = 18.04 (down from 20.32)  Tmax = 103 (increased)   Cultures:     Blood x 2 (4/3-4/5) -- NGTD     Urine (4/4) -- pending     Resp (4/4) -- few WBC, rare GPC     Blood x 1 (4/5) -- pending  SCr = 0.8    Trough @ 0925 = 25.5 mcg/ml (slightly supra-therapeutic)  Goal trough is 15-20 mcg/ml  Collected at correct time before 4th total dose  All doses given about 12 hours apart however last dose given @ midnight instead of 2230 (1.5 hrs late)    Will change dose from 1500 mg every 12 hours to 1250 mg (15 mg/kg x adj wt) every 18 hours  Will order random level for 1800 tonight (18 hrs since last dose)  If random level < 18 mcg/ml, proceed with 1250 mg q18h (will order dose to start @ 2100 to give time for level to result)    Thank you for allowing us to participate in this patient's care.   Katherine McArdle, Pharm.D. 4/5/2018 10:47 AM

## 2018-04-05 NOTE — ASSESSMENT & PLAN NOTE
Encourage cessation once clinically stable  Consider Librium if s/s of withdrawal present  CMP, hep panel  ICU neuro monitoring

## 2018-04-05 NOTE — PLAN OF CARE
Patient is extubated. CM spoke with the patient and expl;ained role of CM , Advance Directives, Living Will, Pamphlet on discharge planning begins on admission and pharm bedside delivery. Patient verbalized understanding. Patient plans to return home. Patient does not have a PCP. She stated she will get one  So she can f/u. She does not have transportation but could get her sister in law to assist with transporting her to appt. Patient has no further needs at this time. Cm to f/u for safe transition     04/05/18 1148   Discharge Reassessment   Assessment Type Discharge Planning Reassessment   Provided patient/caregiver education on the expected discharge date and the discharge plan No   Do you have any problems affording any of your prescribed medications? No   Discharge Plan A Home with family;Home   Discharge Plan B Home with family;Home   Patient choice form signed by patient/caregiver N/A   Can the patient answer the patient profile reliably? Yes, cognitively intact   How does the patient rate their overall health at the present time? Fair   Describe the patient's ability to walk at the present time. Minor restrictions or changes   How often would a person be available to care for the patient? Whenever needed   Number of comorbid conditions (as recorded on the chart) One   During the past month, has the patient often been bothered by feeling down, depressed or hopeless? No   During the past month, has the patient often been bothered by little interest or pleasure in doing things? No

## 2018-04-05 NOTE — PROGRESS NOTES
Patient to and from CT with RN on cardiac monitoring via bed in stable condition.  No complications noted.

## 2018-04-05 NOTE — PROGRESS NOTES
"1745--Patient diaphoretic stating that she didn't feel right.  Sitting up in bed eating. 98.8 temperature, blood sugar WNL.  VSS.  No other complaints.  1800--Noticed patient HR dropped into the 60s, SBP 70s on arterial line.  LYNSEY Denson notified and at the bedside.  1L NS Bolus started per order.  Labs drawn and sent per order.  Placed patient on 2LNC.  Patient then became unresponsive, palpable pulse noted, agonal respirations.  Bagged patient, 2 amps Bicarb given.  Levophed started.  Patient then awoke, stating, "Did I die?"  Emotional support provided.  HR back to 110s, SBP 120s per arterial line.  1820--Patient AAOx4.  Resting quietly.  No acute distress noted.   "

## 2018-04-05 NOTE — SUBJECTIVE & OBJECTIVE
Review of Systems   Unable to perform ROS: Intubated       Objective:     Vital Signs (Most Recent):  Temp: (!) 100.8 °F (38.2 °C) (04/05/18 1005)  Pulse: (!) 120 (04/05/18 1005)  Resp: (!) 24 (04/05/18 1005)  BP: (!) 103/51 (04/05/18 1005)  SpO2: 97 % (04/05/18 1005) Vital Signs (24h Range):  Temp:  [98.5 °F (36.9 °C)-103 °F (39.4 °C)] 100.8 °F (38.2 °C)  Pulse:  [] 120  Resp:  [20-32] 24  SpO2:  [91 %-100 %] 97 %  BP: ()/(40-69) 103/51     Weight: 120.7 kg (266 lb 1.5 oz)  Body mass index is 42.95 kg/m².      Intake/Output Summary (Last 24 hours) at 04/05/18 1052  Last data filed at 04/05/18 0958   Gross per 24 hour   Intake         40177.21 ml   Output             2446 ml   Net         04627.21 ml       Physical Exam   Constitutional: She appears well-developed and well-nourished. She is sedated, intubated and restrained.   HENT:   Head: Normocephalic and atraumatic.   Mouth/Throat: Oropharynx is clear and moist and mucous membranes are normal.   Eyes: EOM and lids are normal. Pupils are equal, round, and reactive to light.   jaundice   Neck: Trachea normal and normal range of motion. Carotid bruit is not present.   Cardiovascular: Regular rhythm, normal heart sounds and normal pulses.  Tachycardia present.    Pulses:       Radial pulses are 2+ on the right side, and 2+ on the left side.        Dorsalis pedis pulses are 2+ on the right side, and 2+ on the left side.   Pulmonary/Chest: Breath sounds normal. No accessory muscle usage. She is intubated. No respiratory distress. She has no decreased breath sounds.   Abdominal: Bowel sounds are normal.   Firmness palpated above umbilicus with noted facial grimacing on palpation   Genitourinary: Vaginal discharge found.   Genitourinary Comments: Rodriguez in place   Musculoskeletal: Normal range of motion.        Right foot: There is no deformity.        Left foot: There is no deformity.   No edema   Lymphadenopathy:     She has no cervical adenopathy.   Skin:  Skin is dry and intact. Capillary refill takes less than 2 seconds. No rash noted. No cyanosis.            Vents:  Vent Mode: Spont (04/05/18 0757)  Set Rate: 0 bmp (04/05/18 0757)  Vt Set: 0 mL (04/05/18 0757)  Pressure Support: 6 cmH20 (04/05/18 0757)  PEEP/CPAP: 5 cmH20 (04/05/18 0757)  Oxygen Concentration (%): 30 (04/05/18 0757)  Peak Airway Pressure: 12 cmH2O (04/05/18 0757)  Plateau Pressure: 0 cmH20 (04/05/18 0757)  Total Ve: 12.1 mL (04/05/18 0757)  F/VT Ratio<105 (RSBI): (!) 34.19 (04/05/18 0757)    Lines/Drains/Airways     Central Venous Catheter Line                 Percutaneous Central Line Insertion/Assessment - triple lumen  04/03/18 2045 right internal jugular 1 day          Drain                 Urethral Catheter 04/03/18 16 Fr. 2 days          Airway                 Airway - Non-Surgical 04/03/18 2040 Endotracheal Tube 1 day          Arterial Line                 Arterial Line 04/04/18 0825 Left Radial 1 day          Peripheral Intravenous Line                 Peripheral IV - Single Lumen 04/03/18 Left Antecubital 2 days         Peripheral IV - Single Lumen 04/03/18 Right Antecubital 2 days                Significant Labs:    CBC/Anemia Profile:    Recent Labs  Lab 04/04/18 0518 04/04/18  1530 04/05/18  0353   WBC 20.32* 17.77* 18.04*   HGB 7.9* 10.3* 10.2*   HCT 20.8* 28.1* 27.7*   * 96* 99*   MCV 92 91 89   RDW 18.1* 17.5* 18.5*        Chemistries:    Recent Labs  Lab 04/03/18 2125  04/04/18 0518  04/04/18  1530 04/04/18  2220 04/05/18  0353 04/05/18  0544 04/05/18  0623   *  < > 130*  < > 136 139  --  138  --    K 2.6*  < > 3.4*  < > 3.2* 2.9*  --  2.6*  --    CL 97  < > 101  < > 107 109  --  108  --    CO2 15*  < > 10*  < > 12* 13*  --  11*  --    BUN 2*  < > 2*  < > <2* 2*  --  2*  --    CREATININE 0.8  < > 0.9  < > 0.8 0.8  --  0.8  --    CALCIUM 7.0*  < > 6.7*  < > 6.5* 6.4*  --  6.0*  --    ALBUMIN 1.9*  --   --   --   --   --   --  1.6*  --    PROT 6.5  --   --   --   --    --   --  6.0  --    BILITOT 3.9*  --   --   --   --   --   --  4.1*  --    ALKPHOS 96  --   --   --   --   --   --  86  --    ALT 49*  --   --   --   --   --   --  44  --    *  --   --   --   --   --   --  120*  --    MG 2.0  --  1.9  --   --   --  1.5*  --   --    PHOS <1.0*  --  1.3*  < > 1.2* 1.4*  --   --  2.0*   < > = values in this interval not displayed.    ABGs:   Recent Labs  Lab 04/05/18  0434   PH 7.396   PCO2 24.9*   HCO3 15.3*   POCSATURATED 95   BE -10     CMP:   Recent Labs  Lab 04/03/18  2125  04/04/18  1530 04/04/18  2220 04/05/18  0544   *  < > 136 139 138   K 2.6*  < > 3.2* 2.9* 2.6*   CL 97  < > 107 109 108   CO2 15*  < > 12* 13* 11*   *  < > 216* 154* 94   BUN 2*  < > <2* 2* 2*   CREATININE 0.8  < > 0.8 0.8 0.8   CALCIUM 7.0*  < > 6.5* 6.4* 6.0*   PROT 6.5  --   --   --  6.0   ALBUMIN 1.9*  --   --   --  1.6*   BILITOT 3.9*  --   --   --  4.1*   ALKPHOS 96  --   --   --  86   *  --   --   --  120*   ALT 49*  --   --   --  44   ANIONGAP 18*  < > 17* 17* 19*   EGFRNONAA >60  < > >60 >60 >60   < > = values in this interval not displayed.  Lipid Panel:   Recent Labs  Lab 04/05/18  0623   TRIG 1,819*     POCT Glucose:   Recent Labs  Lab 04/04/18  1120 04/04/18  1805   POCTGLUCOSE 266* 209*       Significant Imaging:  CXR: I have reviewed all pertinent results/findings within the past 24 hours and my personal findings are:  no acute findings  I have reviewed all pertinent imaging results/findings within the past 24 hours.

## 2018-04-05 NOTE — SUBJECTIVE & OBJECTIVE
Interval History:  On vasopressors, broad spectrum antibiotics, intubated, and sedated.    Review of Systems   Unable to perform ROS: Intubated     Objective:     Vital Signs (Most Recent):  Temp: (!) 100.9 °F (38.3 °C) (04/04/18 1900)  Pulse: 102 (04/04/18 2231)  Resp: (!) 28 (04/04/18 2200)  BP: (!) 103/52 (04/04/18 2200)  SpO2: 98 % (04/04/18 2231) Vital Signs (24h Range):  Temp:  [98.5 °F (36.9 °C)-101.9 °F (38.8 °C)] 100.9 °F (38.3 °C)  Pulse:  [] 102  Resp:  [10-33] 28  SpO2:  [96 %-100 %] 98 %  BP: ()/(36-75) 103/52     Weight: 115.3 kg (254 lb 3.1 oz)  Body mass index is 41.03 kg/m².    Intake/Output Summary (Last 24 hours) at 04/04/18 2259  Last data filed at 04/04/18 2200   Gross per 24 hour   Intake          8229.46 ml   Output             3854 ml   Net          4375.46 ml      Physical Exam   Constitutional: She appears well-developed and well-nourished. No distress.   HENT:   Head: Normocephalic and atraumatic.   Mouth/Throat: Oropharynx is clear and moist.   Eyes: Conjunctivae and EOM are normal. Pupils are equal, round, and reactive to light.   Neck: Neck supple. No JVD present. No thyromegaly present.   Cardiovascular: Normal rate and regular rhythm.  Exam reveals no gallop and no friction rub.    No murmur heard.  Pulmonary/Chest: Effort normal and breath sounds normal. She has no wheezes. She has no rales.   Abdominal: Soft. Bowel sounds are normal. She exhibits no distension. There is no tenderness. There is no rebound and no guarding.   Musculoskeletal: Normal range of motion. She exhibits no edema or deformity.   Lymphadenopathy:     She has no cervical adenopathy.   Neurological: She has normal reflexes.   Intubated and sedated.   Skin: Skin is warm and dry. No rash noted.   Nursing note and vitals reviewed.      Significant Labs: All pertinent labs within the past 24 hours have been reviewed.    Significant Imaging: I have reviewed all pertinent imaging results/findings within the  past 24 hours.

## 2018-04-05 NOTE — PLAN OF CARE
Problem: Patient Care Overview  Goal: Plan of Care Review  Outcome: Ongoing (interventions implemented as appropriate)  Pt stable. Pt is ST on the heart monitor, HR range 105-140s.  Pt extubated this AM, pt tolerated well and maintaining sats on 3L NC.  OB consulted and saw pt this shift.  Pt had transvaginal US.  Pt has scant vaginal bleeding, OB aware and okay.  Pt abd remains firm and tender to palpation, CT of abd with contrast ordered.  Pt remains on bicarb gtt.  Abx adjusted.  Serial labs monitored and replaced accordingly.  Rodriguez intact draining clear dark rosa urine.  Blood sugar monitored with no correction needed.  Pt was free from falls or injuries during duration of shift.  Pt turned and repositioned self and with assistance.  Pt assisted x2 to chair this shift.  Pt NPO with ice chips and expresses frustration with not being able to eat, POC and importance to remain NPO for test reviewed with pt.  PIVs, R IJ, and L radial art line intact with no redness, swelling or drainage.  Bed low, wheels locked, bed alarm on, call light in reach.  Pt instructed to call for assistance.   Plan of care reviewed.  Pt verbalizes understanding.

## 2018-04-05 NOTE — ASSESSMENT & PLAN NOTE
Likely multifactorial: Hyponatremia, etoh abuse +/- WD, Met Acidosis  Correct electrolyte imbalance and acidosis  Close neuro monitoring in ICU

## 2018-04-05 NOTE — CONSULTS
22 y.o. Rfaael Duron  G1 w IUFD followed by D+C for IUFD at [facility] -  from chart history. (Pt was extubated this AM). She was admitted in septic shock and transferred to [facility] for higher level of care.  Pt was stabilized and was successfully extubated today but continues with elevated WBC and mild fevers. Gyn was consulted to assess for chorioamnionitis as a possibilty of ongoing infection.    Limited history was able to be  elicited from patient. She reports generalized abdominal pain that was 10/10 when she was in [place], now better, but 8/10. Despite reporting this level of pain she tolerated the exam well, and asked the entire time to eat.  She was unaware of vaginal bleeding, but the RN reported it was very scant during her time in ICU.       Summary of initial events per primary team as follows:  Ms Duron is a 21 yo obese BF with a PMH of HTN, gestational DM and HTN who presented to [facility] ED in [place], [place] on  with complaint of SOB and cough with known pregnancy.  OB US revealed no heart tones and she is also  with second trimester fetal demise estimated 22 week for which she passed with retained placenta.  After admission to ICU she had seizure activity with , K+ 1.9 and Bicarb 11.  She also had etoh level 268 and reportedly had been drinking etoh w/ honey heavily for several days.  She required intubation for airway protection per records and OB was unable to remove placenta manually and patient had to be taken to OR.  She received 2 liters IVF and Hgb dropped to 7.4 and was transfused 2 units PRBCs.  Vaginal bleeding was scant per records.  Yesterday she had temp 101.5 Ax, .  She as transferred to [facility] ICU yesterday evening for higher level of care.      No chief complaint on file.      Patient Active Problem List    Diagnosis Date Noted    Shock liver 2018    Hypertriglyceridemia 2018    Metabolic acidosis 2018    Acute  urinary tract infection 04/04/2018    Hyperglycemia, unspecified 04/04/2018    Acute hypoxemic respiratory failure 04/03/2018    Seizure 04/03/2018    Hyponatremia 04/03/2018    Electrolyte imbalance 04/03/2018    ETOH abuse 04/03/2018    Septic shock 04/03/2018    Retained products of conception with hemorrhage 04/03/2018    Acute blood loss anemia 04/03/2018       Past Medical History:   Diagnosis Date    Gestational diabetes     Hypertension        History reviewed. No pertinent surgical history.    Family History   Problem Relation Age of Onset    Diabetes Mother     Heart disease Mother     Diabetes Father     Diabetes Sister        Social History     Social History    Marital status: Single     Spouse name: N/A    Number of children: N/A    Years of education: N/A     Social History Main Topics    Smoking status: Never Smoker    Smokeless tobacco: Never Used    Alcohol use Yes    Drug use: No    Sexual activity: Not Asked     Other Topics Concern    None     Social History Narrative    None       Current Facility-Administered Medications   Medication Dose Route Frequency Provider Last Rate Last Dose    0.9%  NaCl infusion (for blood administration)   Intravenous Q24H PRN Ed Lowe NP        acetaminophen oral solution 650 mg  650 mg Per NG tube Q8H PRN Ed Lowe NP   650 mg at 04/05/18 0824    albuterol-ipratropium 2.5mg-0.5mg/3mL nebulizer solution 3 mL  3 mL Nebulization Q4H PRN Kofi Armas MD        bisacodyl suppository 10 mg  10 mg Rectal Daily PRN Ed Lowe NP        dextrose 5 % 1,000 mL with sodium bicarbonate 1 mEq/mL (8.4 %) 150 mEq infusion   Intravenous Continuous Ed Lowe  mL/hr at 04/05/18 1659      dextrose 50% injection 12.5 g  12.5 g Intravenous PRN Ed Lowe NP        famotidine (PF) injection 20 mg  20 mg Intravenous BID Josiah Winston MD   20 mg at 04/05/18 0825    glucagon (human recombinant) injection 1 mg   1 mg Intramuscular PRN Ed Lowe NP        insulin aspart U-100 pen 1-10 Units  1-10 Units Subcutaneous Q6H PRN Ed Lowe NP   1 Units at 04/05/18 0002    magnesium sulfate 2g in water 50mL IVPB (premix)  2 g Intravenous PRN Ed Lowe NP   2 g at 04/05/18 1637    magnesium sulfate 2g in water 50mL IVPB (premix)  4 g Intravenous PRN Ed Lowe NP        meropenem injection 500 mg  500 mg Intravenous Q6H Ed Lowe NP   500 mg at 04/05/18 1656    metronidazole IVPB 500 mg  500 mg Intravenous Q8H Ed Lowe  mL/hr at 04/05/18 1656 500 mg at 04/05/18 1656    ondansetron injection 4 mg  4 mg Intravenous Q8H PRN Kofi Armas MD        pneumoc 13-bobby conj-dip cr(PF) 0.5 mL  0.5 mL Intramuscular vaccine x 1 dose Ed Lowe NP        potassium chloride 40 mEq in 100 mL IVPB (FOR CENTRAL LINE ADMINISTRATION ONLY)  40 mEq Intravenous PRN Ed Lowe NP        And    potassium chloride 20 mEq in 100 mL IVPB (FOR CENTRAL LINE ADMINISTRATION ONLY)  60 mEq Intravenous PRN Ed Lowe NP 50 mL/hr at 04/05/18 1650 60 mEq at 04/05/18 1650    And    potassium chloride 40 mEq in 100 mL IVPB (FOR CENTRAL LINE ADMINISTRATION ONLY)  80 mEq Intravenous PRN Ed Lowe NP        potassium chloride 40 mEq in 100 mL IVPB (FOR CENTRAL LINE ADMINISTRATION ONLY)  40 mEq Intravenous Q4H Ed Lowe NP        sodium chloride 0.9% flush 5 mL  5 mL Intravenous PRN Ed Lowe NP        sodium phosphate 15 mmol in dextrose 5 % 250 mL IVPB  15 mmol Intravenous PRN Ed Lowe NP        sodium phosphate 20.01 mmol in dextrose 5 % 250 mL IVPB  20.01 mmol Intravenous PRN Ed Lowe, NP        sodium phosphate 30 mmol in dextrose 5 % 250 mL IVPB  30 mmol Intravenous PRN Ed Lowe NP        vancomycin (VANCOCIN) 1,250 mg in sodium chloride 0.9% 250 mL IVPB  1,250 mg Intravenous Q18H Denzel Medina MD                 PHYSICAL  EXAM    Vitals:    04/05/18 1400 04/05/18 1505 04/05/18 1620 04/05/18 1705   BP: 101/69 111/88 (!) 96/38 (!) 99/55   Pulse: (!) 115 (!) 115 (!) 111 108   Resp: (!) 24 (!) 25 (!) 26    Temp:  99 °F (37.2 °C)     TempSrc:  Oral     SpO2: 98% 99% 96% 96%   Weight:       Height:          APPEARANCE: Well nourished, well developed, in no acute distress.   ABDOMEN: Soft. Not distended, but is obese and rounded.    PELVIC:   VULVA: No lesions. Normal female genitalia.  URETHRAL MEATUS: Normal size and location, no lesions, no prolapse.  URETHRA: No masses, tenderness, prolapse or scarring.  VAGINA: Moist and well rugated, no discharge, no significant cystocele or rectocele.  CERVIX: No lesions, normal diameter, closed, no cervical motion tenderness.  UTERUS: about 12-14 week size although difficult to estimate due to thick pannus, however it is not tender, very mobile, normal position, good support.  ADNEXA: No masses, tenderness or CDS nodularity.  ANUS PERINEUM: No lesions        HISTORY: Status post miscarriage with D&C performed at outside hospital referred for evaluation for retained products of conception    TECHNIQUE:  Transabdominal and transvaginal pelvic ultrasound performed.    COMPARISON: None     FINDINGS:    Uterus appears mildly enlarged measuring 11.2 x 6.7 x 8.2 cm.  The endometrial thickness is 7 mm. No retained products of conception are identified.    Right ovary is normal and measures 3.1 x 1.4 cm. Left ovary is normal and measures 2.1 x 2.4 cm. flow is identified bilaterally.    Small amount of free fluid in the cul-de-sac.   Impression          Mildly enlarged uterus without evidence of retained products of conception.           IMPRESSION /PLAN  1. Recently PP after spontaneous delivery of  IUFD  2. Retained placenta - s/p D+C  3. Septic shock - on broad antibiotic coverage with meropenem, vancomycin, flagyl  4. Critical care problems as above; including continued leukocytosis and febrile morbidity;    do not feel pt has endometritis. This is based on based on clinical picture of minimal vaginal bleeding and closed cervix, nontender uterus that has no retained products on u/s. Regardless, the broad specturm antibiotics for at least 48hr would be excellent coverage for the pathogens that are typically involved in endometritis.     Thank you for consultation. We will sign off. Please do not hesitate to re-consult.
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
## 2018-04-05 NOTE — PROGRESS NOTES
Patient had episode of bradycardia to 59 and SBP dropped to 70s and patient had syncopal episode but never lost pulse but did become agonal in respirations.  Gave IVF bolus, Levophed infusion and 2 amps bicarb and awakened and hemodynamics stabilized.  Fully awake and alert in no distress now.  Discussed with Dr. Torin Lowe [facility]

## 2018-04-05 NOTE — ASSESSMENT & PLAN NOTE
Vent settings reviewed and adjusted  Passed SAT/SBT this am with successful extubation to nasal cannula  Supplemental O2 to maintain SpO2 greater than 92%

## 2018-04-06 PROBLEM — R16.0 HEPATOMEGALY: Status: ACTIVE | Noted: 2018-01-01

## 2018-04-06 PROBLEM — J15.212 PNEUMONIA OF BOTH LOWER LOBES DUE TO METHICILLIN RESISTANT STAPHYLOCOCCUS AUREUS (MRSA): Status: ACTIVE | Noted: 2018-01-01

## 2018-04-06 NOTE — SUBJECTIVE & OBJECTIVE
Interval History:  Re-intubated last night for altered mental status.  Recurrent bradycardia.  Broad spectrum antibiotics.  Urine output improveing.  Monit and correct electrolytes.    Review of Systems   Constitutional: Negative for chills and fever.   HENT: Negative for congestion and sore throat.    Eyes: Negative for visual disturbance.   Respiratory: Negative for cough, shortness of breath and wheezing.    Cardiovascular: Negative for chest pain, palpitations and leg swelling.   Gastrointestinal: Positive for abdominal distention and abdominal pain. Negative for blood in stool, constipation, diarrhea, nausea and vomiting.   Genitourinary: Negative for dysuria and hematuria.   Musculoskeletal: Negative for arthralgias and back pain.   Skin: Negative for rash and wound.   Neurological: Negative for dizziness, weakness, light-headedness and numbness.   Hematological: Negative for adenopathy.     Objective:     Vital Signs (Most Recent):  Temp: (!) 100.4 °F (38 °C) (04/06/18 1600)  Pulse: 100 (04/06/18 1805)  Resp: 12 (04/06/18 1805)  BP: (!) 98/58 (04/06/18 1805)  SpO2: 100 % (04/06/18 1805) Vital Signs (24h Range):  Temp:  [99.2 °F (37.3 °C)-100.6 °F (38.1 °C)] 100.4 °F (38 °C)  Pulse:  [] 100  Resp:  [12-26] 12  SpO2:  [90 %-100 %] 100 %  BP: ()/(41-93) 98/58     Weight: 131.7 kg (290 lb 5.5 oz)  Body mass index is 46.86 kg/m².    Intake/Output Summary (Last 24 hours) at 04/06/18 1843  Last data filed at 04/06/18 1800   Gross per 24 hour   Intake          8386.25 ml   Output              896 ml   Net          7490.25 ml      Physical Exam   Constitutional: She is oriented to person, place, and time. She appears well-developed and well-nourished. No distress.   HENT:   Head: Normocephalic and atraumatic.   Mouth/Throat: Oropharynx is clear and moist.   Eyes: Conjunctivae and EOM are normal. Pupils are equal, round, and reactive to light.   Neck: Neck supple. No JVD present. No thyromegaly present.    Cardiovascular: Normal rate and regular rhythm.  Exam reveals no gallop and no friction rub.    No murmur heard.  Pulmonary/Chest: Effort normal and breath sounds normal. She has no wheezes. She has no rales.   Abdominal: Soft. Bowel sounds are normal. She exhibits distension. There is tenderness. There is no rebound and no guarding.   Upper abdomen firm with suprapubic tenderness.   Musculoskeletal: Normal range of motion. She exhibits no edema or deformity.   Lymphadenopathy:     She has no cervical adenopathy.   Neurological: She is alert and oriented to person, place, and time. She has normal reflexes.   Skin: Skin is warm and dry. No rash noted.   Psychiatric: She has a normal mood and affect. Her behavior is normal. Judgment and thought content normal.   Nursing note and vitals reviewed.      Significant Labs: All pertinent labs within the past 24 hours have been reviewed.    Significant Imaging: I have reviewed all pertinent imaging results/findings within the past 24 hours.

## 2018-04-06 NOTE — CONSULTS
"  [facility]  Adult Nutrition  Consult Note    SUMMARY     Recommendations    Recommendation/Intervention: 1. Consider tube feed for nutrition support until extubated: Peptamen VPH (Bariatric) at 80 ml/hr with flushes as needed with medications and additional per MD for hydration.  With current IV medications, provides 2328 calories, 179 g protein, and 1613 ml water. 2. Hold TF x 4 hrs for residuals > 250 mls.  3. Will continue to monitor.   Goals: 1. Initiate nutrition within 72 hrs. 2. Meet 85- 100 % of estimated nutritional needs without s/s of GI distress   Nutrition Goal Status: new  Communication of RD Recs:  (reviewed with NP )    Reason for Assessment    Reason for Assessment:  (Verbal request )  Dx:  1. Respiratory failure    2. Septic shock    3. Acute blood loss anemia    4. Acute hypoxemic respiratory failure    5. Acute urinary tract infection    6. Electrolyte imbalance    7. ETOH abuse    8. Hyperglycemia, unspecified    9. Hyponatremia    10. Metabolic acidosis    11. Retained products of conception with hemorrhage    12. Seizure    13. Hypertriglyceridemia    14. Shock liver    15. Fever, unspecified fever cause    16. S/P dilatation and curettage    17. History of IUFD      Past Medical History:   Diagnosis Date    Gestational diabetes     Hypertension        Interdisciplinary Rounds: attended  General Information Comments: Patient intubated and sedated. Reviewed TF recs with NP this am.   Nutrition Discharge Planning: too soon to determine    Nutrition Risk Screen    Nutrition Risk Screen: no indicators present    Nutrition/Diet History    Do you have any cultural, spiritual, Evangelical conflicts, given your current situation?: None  Factors Affecting Nutritional Intake: NPO    Anthropometrics    Temp: 99.2 °F (37.3 °C)  Height Method: Estimated  Height: 5' 6" (167.6 cm)  Height (inches): 66 in  Weight Method: Bed Scale  Weight: 131.7 kg (290 lb 5.5 oz)  Weight (lb): 290.35 " lb  Ideal Body Weight (IBW), Female: 130 lb  % Ideal Body Weight, Female (lb): 223.35 lb  BMI (Calculated): 47  BMI Grade: greater than 40 - morbid obesity       Lab/Procedures/Meds    Pertinent Labs Reviewed: reviewed  BMP  Lab Results   Component Value Date     04/06/2018    K 3.4 (L) 04/06/2018     04/06/2018    CO2 14 (L) 04/06/2018    BUN 2 (L) 04/06/2018    CREATININE 0.7 04/06/2018    CALCIUM 5.8 (LL) 04/06/2018    ANIONGAP 19 (H) 04/06/2018    ESTGFRAFRICA >60 04/06/2018    EGFRNONAA >60 04/06/2018     Lab Results   Component Value Date    CALCIUM 5.8 (LL) 04/06/2018    PHOS 2.0 (L) 04/06/2018     Lab Results   Component Value Date    ALBUMIN 1.5 (L) 04/06/2018       Recent Labs  Lab 04/05/18  2303   POCTGLUCOSE 184*     Pertinent Medications Reviewed: reviewed    Physical Findings/Assessment    Overall Physical Appearance: on ventilator support (obese)  Tubes:  (OG)  Oral/Mouth Cavity:  (WDL)  Skin:  (Daniel Score 13)    Estimated/Assessed Needs    Weight Used For Calorie Calculations: 131.7 kg (290 lb 5.5 oz)  Energy Calorie Requirements (kcal): 2424  Energy Need Method: Axel State (modified)  Protein Requirements: 148 - 180 g   Weight Used For Protein Calculations: 59 kg (130 lb) (IBW ~ Obese ICU)     Fluid Need Method: RDA Method (or per MD)  RDA Method (mL): 2424         Nutrition Prescription Ordered    Current Diet Order: NPO    Evaluation of Received Nutrient/Fluid Intake    Other Calories (kcal): 408 kcal ~ dextrose 5 % 1,000 mL with sodium bicarbonate at 100 ml/hr      Intake/Output Summary (Last 24 hours) at 04/06/18 1038  Last data filed at 04/06/18 1000   Gross per 24 hour   Intake          7771.63 ml   Output             1077 ml   Net          6694.63 ml       % Intake of Estimated Energy Needs: 0 - 25 %  % Meal Intake: NPO    Nutrition Risk          Assessment and Plan    Acute hypoxemic respiratory failure    Contributing Nutrition Diagnosis  Inadequate energy intake     Related  to (etiology):   Inability to consume sufficient energy     Signs and Symptoms (as evidenced by):   Intubated, NPO, no alternative means of nutrition.      Interventions/Recommendations (treatment strategy):  Please see RD recs above.    Nutrition Diagnosis Status:   New               Monitor and Evaluation    Food and Nutrient Intake: energy intake, enteral nutrition intake  Food and Nutrient Adminstration: enteral and parenteral nutrition administration  Anthropometric Measurements: weight  Biochemical Data, Medical Tests and Procedures: electrolyte and renal panel, glucose/endocrine profile  Nutrition-Focused Physical Findings: overall appearance     Nutrition Follow-Up    RD Follow-up?: Yes (2xweekly)

## 2018-04-06 NOTE — ASSESSMENT & PLAN NOTE
S/P D&C, U/S with no evidence of retained products  UA +  Urine, Blood NGTD  Sputum cx oxacillin resistant  Cont Vanc, Meropenem, consider changing vanc to zyvox if no growth in blood cx tomorrow  Wean Levophed infusion as tolerated

## 2018-04-06 NOTE — PROGRESS NOTES
Indication: acute UTI, suspected intra-abdominal infection, septic shock s/p fetal demise/D&C @ 5 months gestation   Goal trough 15-20  WBC = 18.82 (up from 18.04)  Tmax = 100.8 (decreased)  Other antimicrobials: Merrem/Flagyl  Cultures:     Blood x 2 (4/3-4/5) -- NGTD     Urine (4/4) -- NGTD     Resp (4/4) -- MRSA and Pseudomonas (HOWEVER, CXR Negative)     Blood x 1 (4/5) -- NGTD  SCr = 0.7 (down from 0.8)    ICU team is thinking urosepsis as the source     Random @ 0949 (12 hours after last dose) = 13.3 mcg/ml (slightly sub-therapeutic)    Will change dose from 1250 mg every 18 hours to 1250 mg (15 mg/kg x adj wt) every 12 hours  Will order trough level before 4th dose (4/7 @ 1400) - counting last dose as dose #1    Thank you for allowing us to participate in this patient's care!  Niya Anne, PharmD  [phone]

## 2018-04-06 NOTE — ASSESSMENT & PLAN NOTE
Encourage cessation once clinically stable  Monitor for withdrawals  Electrolyte replacement  CMP  Hep panel negative  ICU neuro monitoring

## 2018-04-06 NOTE — ASSESSMENT & PLAN NOTE
Likely multifactorial: Hyponatremia, etoh abuse +/- WD, Met Acidosis  No seizure activity since admit  Correct electrolyte imbalance and acidosis  Close neuro monitoring in ICU
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
## 2018-04-06 NOTE — ASSESSMENT & PLAN NOTE
Contributing Nutrition Diagnosis  Inadequate energy intake     Related to (etiology):   Inability to consume sufficient energy     Signs and Symptoms (as evidenced by):   Intubated, NPO, no alternative means of nutrition.      Interventions/Recommendations (treatment strategy):  Please see RD recs above.    Nutrition Diagnosis Status:   New

## 2018-04-06 NOTE — ASSESSMENT & PLAN NOTE
With fetal demise upon presentation (of unknown duration), surgically removed at outside facility.  Obgyn consult.  Patient received 2 units of PRBC

## 2018-04-06 NOTE — SUBJECTIVE & OBJECTIVE
Interval History:  Successfully extubates.  Hoarse complains of abdominal pain.  Evaluation by Gynecology.  Broad spectrum antibiotics and weaning vasopressors.    Review of Systems   Constitutional: Negative for chills and fever.   HENT: Negative for congestion and sore throat.    Eyes: Negative for visual disturbance.   Respiratory: Negative for cough, shortness of breath and wheezing.    Cardiovascular: Negative for chest pain, palpitations and leg swelling.   Gastrointestinal: Positive for abdominal distention and abdominal pain. Negative for blood in stool, constipation, diarrhea, nausea and vomiting.   Genitourinary: Negative for dysuria and hematuria.   Musculoskeletal: Negative for arthralgias and back pain.   Skin: Negative for rash and wound.   Neurological: Negative for dizziness, weakness, light-headedness and numbness.   Hematological: Negative for adenopathy.     Objective:     Vital Signs (Most Recent):  Temp: 99 °F (37.2 °C) (04/05/18 1505)  Pulse: 110 (04/05/18 1830)  Resp: (!) 25 (04/05/18 1830)  BP: (!) 66/39 (04/05/18 1800)  SpO2: 95 % (04/05/18 1830) Vital Signs (24h Range):  Temp:  [99 °F (37.2 °C)-103 °F (39.4 °C)] 99 °F (37.2 °C)  Pulse:  [] 110  Resp:  [20-31] 25  SpO2:  [91 %-100 %] 95 %  BP: ()/(38-88) 66/39     Weight: 120.7 kg (266 lb 1.5 oz)  Body mass index is 42.95 kg/m².    Intake/Output Summary (Last 24 hours) at 04/05/18 1918  Last data filed at 04/05/18 1915   Gross per 24 hour   Intake         82897.94 ml   Output             1126 ml   Net         21246.94 ml      Physical Exam   Constitutional: She is oriented to person, place, and time. She appears well-developed and well-nourished. No distress.   HENT:   Head: Normocephalic and atraumatic.   Mouth/Throat: Oropharynx is clear and moist.   Eyes: Conjunctivae and EOM are normal. Pupils are equal, round, and reactive to light.   Neck: Neck supple. No JVD present. No thyromegaly present.   Cardiovascular: Normal rate  and regular rhythm.  Exam reveals no gallop and no friction rub.    No murmur heard.  Pulmonary/Chest: Effort normal and breath sounds normal. She has no wheezes. She has no rales.   Abdominal: Soft. Bowel sounds are normal. She exhibits distension. There is tenderness. There is no rebound and no guarding.   Upper abdomen firm with suprapubic tenderness.   Musculoskeletal: Normal range of motion. She exhibits no edema or deformity.   Lymphadenopathy:     She has no cervical adenopathy.   Neurological: She is alert and oriented to person, place, and time. She has normal reflexes.   Skin: Skin is warm and dry. No rash noted.   Psychiatric: She has a normal mood and affect. Her behavior is normal. Judgment and thought content normal.   Nursing note and vitals reviewed.      Significant Labs: All pertinent labs within the past 24 hours have been reviewed.    Significant Imaging: I have reviewed all pertinent imaging results/findings within the past 24 hours.

## 2018-04-06 NOTE — PLAN OF CARE
Problem: Patient Care Overview  Goal: Plan of Care Review  Outcome: Ongoing (interventions implemented as appropriate)  Pt stable. Pt is NSR on the heart monitor.  Pt remains intubated, on fentanyl, levo, weaning as tolerated, and NaBicarb.  Pt weaned off precedex this shift.  Rodriguez intact draining dark rosa urine, output 20-50mL/hr.  Serial labs monitored and labs replaced per prn orders.  Abx given as ordered.  Blood sugar stable, tube feeding started this shift, pt tolerating well.  Pt was free from falls or injuries during duration of shift.  Pt turned and repositioned with use of pillows, pt wiggles/slides self off pillows frequently.  PIV, IJ, and art line intact with no redness, swelling or drainage.  Bed low, wheels locked, bed alarm on, call light in reach.  Pt instructed to call for assistance.   Plan of care reviewed with pt and pt bother via phone.  Pt nods head understanding.

## 2018-04-06 NOTE — ASSESSMENT & PLAN NOTE
Daily CBC  Cont vanc for now, will consider switching to zyvox if no blood culture growth tomorrow  Ventilatory support  Cont meropenem given continued leukocytosis

## 2018-04-06 NOTE — NURSING
4/5/18 4280 notified dr randle that replacement protocol calls for sodium phosphate. Instructed to use potassium phosphate instead d/t current K+ level 3.2--- 20mmol potassium phosphate x1 dose now ordered. Voiced understanding.

## 2018-04-06 NOTE — PROGRESS NOTES
[facility]  Critical Care Medicine  Progress Note    Patient Name: Rafael Duron  MRN: [id number]  Admission Date: 4/3/2018  Hospital Length of Stay: 3 days  Code Status: Full Code  Attending Provider: Denzel Medina MD  Primary Care Provider: Herman Cowart MD   Principal Problem: Septic shock    Subjective:     HPI:  Ms Duron is a 23 yo obese BF with a PMH of HTN, gestational DM and HTN who presented to [facility] ED in [place], [place] on  with complaint of SOB and cough with known pregnancy.  OB US revealed no heart tones and she is also  with second trimester fetal demise estimated 22 week for which she passed with retained placenta.  After admission to ICU she had seizure activity with , K+ 1.9 and Bicarb 11.  She also had etoh level 268 and reportedly had been drinking etoh w/ honey heavily for several days.  She required intubation for airway protection per records and OB was unable to remove placenta manually and patient had to be taken to OR.  She received 2 liters IVF and Hgb dropped to 7.4 and was transfused 2 units PRBCs.  Vaginal bleeding was scant per records.  Yesterday she had temp 101.5 Ax, .  She as transferred to [facility] yesterday evening for higher level of care.      Hospital/ICU Course:   - This AM she is sedated on vent on Levophed infusion spiking temp 101.9 with WBC 20, LA 6.3, UA+, electrolyte imbalance and Glucose 268    4/5 SAT and SBT done this am, pt awake and following commands. She was successfully extubated to nasal cannula. Remains tachycardic with HR 140s and febrile with temp 103 overnight. WBC 18 and lactic acidosis improving to 4.8. Continuing to aggressively replace electrolytes.    /6 Over night patient had episode with bradycardia and hypotension during which she became unresponsive and agonally breathing. She responded with IVF and bicarb and was restarted on pressors. She experienced a similar episode  again last night, during which she was intubated. Vent settings were reviewed and adjusted this am. No more bradycardic/hypotensive episodes since last night. Remains on pressors. Leukocystosis unimproved with worsening bandemia. Urine and blood cx NGTD. Sputum cx growing staph and pseudomonas however CXR this am is unremarkable.    Review of Systems   Unable to perform ROS: Intubated     Objective:     Vital Signs (Most Recent):  Temp: 100 °F (37.8 °C) (04/06/18 1105)  Pulse: 95 (04/06/18 1137)  Resp: (!) 21 (04/06/18 1137)  BP: (!) 91/51 (04/06/18 1105)  SpO2: 100 % (04/06/18 1137) Vital Signs (24h Range):  Temp:  [99 °F (37.2 °C)-100 °F (37.8 °C)] 100 °F (37.8 °C)  Pulse:  [] 95  Resp:  [18-26] 21  SpO2:  [90 %-100 %] 100 %  BP: ()/(38-93) 91/51     Weight: 131.7 kg (290 lb 5.5 oz)  Body mass index is 46.86 kg/m².      Intake/Output Summary (Last 24 hours) at 04/06/18 1334  Last data filed at 04/06/18 1133   Gross per 24 hour   Intake          7971.63 ml   Output              972 ml   Net          6999.63 ml       Physical Exam   Constitutional: She appears well-developed and well-nourished. She is sedated, intubated and restrained.   HENT:   Head: Normocephalic and atraumatic.   Mouth/Throat: Oropharynx is clear and moist and mucous membranes are normal.   Eyes: EOM and lids are normal. Pupils are equal, round, and reactive to light.   jaundice   Neck: Trachea normal and normal range of motion. Carotid bruit is not present.   Cardiovascular: Normal rate, regular rhythm, normal heart sounds and normal pulses.    Pulses:       Radial pulses are 2+ on the right side, and 2+ on the left side.        Dorsalis pedis pulses are 2+ on the right side, and 2+ on the left side.   Pulmonary/Chest: No accessory muscle usage. She is intubated. No respiratory distress.   Coarse breath sounds   Abdominal: Bowel sounds are decreased.   Firmness palpated above umbilicus    Genitourinary:   Genitourinary Comments:  Rodriguez in place   Musculoskeletal: Normal range of motion.        Right foot: There is no deformity.        Left foot: There is no deformity.   Generalized edema   Lymphadenopathy:     She has no cervical adenopathy.   Skin: Skin is dry and intact. Capillary refill takes less than 2 seconds. No rash noted. No cyanosis.            Vents:  Vent Mode: A/C (04/06/18 1137)  Ventilator Initiated: Yes (04/05/18 1930)  Set Rate: 20 bmp (04/06/18 1137)  Vt Set: 500 mL (04/06/18 1137)  Pressure Support: 0 cmH20 (04/06/18 1137)  PEEP/CPAP: 8 cmH20 (04/06/18 1137)  Oxygen Concentration (%): 35 (04/06/18 1137)  Peak Airway Pressure: 41 cmH2O (04/06/18 1137)  Plateau Pressure: 0 cmH20 (04/06/18 1137)  Total Ve: 11.3 mL (04/06/18 1137)  F/VT Ratio<105 (RSBI): (!) 34.26 (04/06/18 1137)    Lines/Drains/Airways     Central Venous Catheter Line                 Percutaneous Central Line Insertion/Assessment - triple lumen  04/03/18 2045 right internal jugular 2 days          Drain                 Urethral Catheter 04/03/18 16 Fr. 3 days         NG/OG Tube 04/05/18 1915 orogastric less than 1 day          Airway                 Airway - Non-Surgical 04/05/18 1900 Endotracheal Tube less than 1 day          Arterial Line                 Arterial Line 04/04/18 0825 Left Radial 2 days          Peripheral Intravenous Line                 Peripheral IV - Single Lumen 04/03/18 Left Antecubital 3 days         Peripheral IV - Single Lumen 04/03/18 Right Antecubital 3 days                Significant Labs:    CBC/Anemia Profile:    Recent Labs  Lab 04/05/18  0353 04/05/18  1804 04/06/18  0326   WBC 18.04* 16.25* 18.82*   HGB 10.2* 8.9* 9.3*   HCT 27.7* 25.6* 27.7*   PLT 99* 77* 78*   MCV 89 91 91   RDW 18.5* 18.8* 19.2*        Chemistries:    Recent Labs  Lab 04/05/18  0544  04/05/18  1804 04/05/18  2142 04/06/18  0326 04/06/18  0559     < > 137 138 136 136   K 2.6*  < > 3.6 3.2* 3.6 3.4*     < > 105 105 103 103   CO2 11*  < > 11* 15*  15* 14*   BUN 2*  < > 2* 2* 2* 2*   CREATININE 0.8  < > 0.9 0.8 0.8 0.7   CALCIUM 6.0*  < > 5.9* 5.6* 5.7* 5.8*   ALBUMIN 1.6*  --   --   --  1.5*  --    PROT 6.0  --   --   --  5.2*  --    BILITOT 4.1*  --   --   --  4.6*  --    ALKPHOS 86  --   --   --  95  --    ALT 44  --   --   --  57*  --    *  --   --   --  122*  --    MG  --   < > 2.2 1.8  --  1.8   PHOS  --   < > 2.6* 2.2*  --  2.0*   < > = values in this interval not displayed.    ABGs:     Recent Labs  Lab 04/06/18  0427   PH 7.383   PCO2 30.6*   HCO3 18.3*   POCSATURATED 100   BE -7     CMP:   Recent Labs  Lab 04/05/18  0544  04/05/18  2142 04/06/18  0326 04/06/18  0559     < > 138 136 136   K 2.6*  < > 3.2* 3.6 3.4*     < > 105 103 103   CO2 11*  < > 15* 15* 14*   GLU 94  < > 190* 189* 189*   BUN 2*  < > 2* 2* 2*   CREATININE 0.8  < > 0.8 0.8 0.7   CALCIUM 6.0*  < > 5.6* 5.7* 5.8*   PROT 6.0  --   --  5.2*  --    ALBUMIN 1.6*  --   --  1.5*  --    BILITOT 4.1*  --   --  4.6*  --    ALKPHOS 86  --   --  95  --    *  --   --  122*  --    ALT 44  --   --  57*  --    ANIONGAP 19*  < > 18* 18* 19*   EGFRNONAA >60  < > >60 >60 >60   < > = values in this interval not displayed.  Lipid Panel:     Recent Labs  Lab 04/05/18  0623   TRIG 1,819*     POCT Glucose:     Recent Labs  Lab 04/05/18  1806 04/05/18  2303 04/06/18  1219   POCTGLUCOSE 156* 184* 186*       Significant Imaging:  CXR: I have reviewed all pertinent results/findings within the past 24 hours and my personal findings are:  no acute findings  I have reviewed all pertinent imaging results/findings within the past 24 hours.      Assessment/Plan:     Neuro   Seizure    Likely multifactorial: Hyponatremia, etoh abuse +/- WD, Met Acidosis  No seizure activity since admit  Correct electrolyte imbalance and acidosis  Close neuro monitoring in ICU        Psychiatric   ETOH abuse    Encourage cessation once clinically stable  Monitor for withdrawals  Electrolyte  replacement  CMP  Hep panel negative  ICU neuro monitoring          Pulmonary   Pneumonia of both lower lobes due to methicillin resistant Staphylococcus aureus (MRSA)    Daily CBC  Cont vanc for now, will consider switching to zyvox if no blood culture growth tomorrow  Ventilatory support  Cont meropenem given continued leukocytosis          Acute hypoxemic respiratory failure    Requiring very little FiO2 on vent  Vent settings reviewed and adjusted  Reintubated overnight for hypotensive/bradycardic episodes with agonal breathing          Cardiac/Vascular   Hypertriglyceridemia    Propofol discontinued  Awaiting results of repeat triglycerides        Renal/   Acute urinary tract infection    Urine culture NGTD  Cont IVAB        Hyponatremia    Resolved, cont to monitor with daily CMP        Electrolyte imbalance    Sodium corrected  Serial chemistries  Replace Mg+, Phos and K+  Repeat electrolytes labs later today        Metabolic acidosis    Bicarb infusion  Monitoring acidosis with serial chemistries and ABGs as needed  Likely due to lactic acidosis from septic shock, cont with abx        ID   * Septic shock    S/P D&C, U/S with no evidence of retained products  UA +  Urine, Blood NGTD  Sputum cx oxacillin resistant  Cont Vanc, Meropenem, consider changing vanc to zyvox if no growth in blood cx tomorrow  Wean Levophed infusion as tolerated        Oncology   Acute blood loss anemia    Total 4 units transfused  Minimal vaginal discharge  H/H stable  Repeat H/H in am        Endocrine   Hyperglycemia, unspecified    SSI        GI   Hepatomegaly    Monitoring liver enzymes  Not currently coagulopathic        Shock liver    Daily CMP to monitor liver enzymes  Hep panel negative  Vasopressors to maintain perfusion        Obstetric   Retained products of conception with hemorrhage    POD #3 D&C  U/S done 4/5 negative for retained products in uterus          Preventive Measures and Monitoring:   Stress Ulcer:  Pepcid  Nutrition: tube feeds  Glucose control: SSI  Bowel prophylaxis: Miralax  DVT prophylaxis: SCDs  Head of Bed/Reposition: Elevate HOB and turn Q1-2 hours   Early Mobility: bedrest  SAT/SBT: in AM  RASS goal: -2  Vent Day:5  OG Day: 5  R IJ T  Rodriguez Day: 5  IVAB Day: 4  Code Status: Full  Pneumonia Vaccine: ordered      Counseling/Consultation:I have discussed the care of this patient in detail with the bedside nursing staff and Dr. Medina and Dr. Harkins    Critical Care Time: 60 minutes  Critical secondary to Patient has a condition that poses threat to life and bodily function: Severe Respiratory Distress and mechanical ventilator management  Patient is currently receiving parenteral controlled substances: Fentanyl infusion      Critical care was time spent personally by me on the following activities: development of treatment plan with patient or surrogate and bedside caregivers, discussions with consultants, evaluation of patient's response to treatment, examination of patient, ordering and performing treatments and interventions, ordering and review of laboratory studies, ordering and review of radiographic studies, pulse oximetry, re-evaluation of patient's condition. This critical care time did not overlap with that of any other provider or involve time for any procedures.     Ed Lowe NP  Critical Care Medicine  [facility]

## 2018-04-06 NOTE — PROGRESS NOTES
1850--Patient again stating she did not feel right; HR dropped into the 80s; SBP 80s.  Dr. Harkins notified and at bedside.  Levophed restarted per MD order; see flowsheet.  Patient intubated per MD; Propofol IVP given per MD.  Fentanyl and Precedex gtt started.  OGT inserted.  Report given to GLORIA Avila.  Sister in law, Loni, notified of events of the end of shift.  States she will pass the information to her brother-in-law, Kar,  and patient's mother.

## 2018-04-06 NOTE — PLAN OF CARE
Problem: Patient Care Overview  Goal: Plan of Care Review  Outcome: Ongoing (interventions implemented as appropriate)  Recommendations     Recommendation/Intervention: 1. Consider tube feed for nutrition support until extubated: Peptamen VPH (Bariatric) at 80 ml/hr with flushes as needed with medications and additional per MD for hydration.  With current IV medications, provides 2328 calories, 179 g protein, and 1613 ml water. 2. Hold TF x 4 hrs for residuals > 250 mls.  3. Will continue to monitor.   Goals: 1. Initiate nutrition within 72 hrs. 2. Meet 85- 100 % of estimated nutritional needs without s/s of GI distress   Nutrition Goal Status: new  Communication of RD Recs:  (reviewed with NP )

## 2018-04-06 NOTE — ASSESSMENT & PLAN NOTE
Source likely fetal demise of unknown duration and retained products of concepttion placenta, removed surgically at outside hospital.  Currently on levophed, titrate to maintain MAP > 65.  Continue IV fluids.  Follow up on blood and urine cultures.  Lactic acid improved to 5 from 7.  OB Gyn consult - Dr. Cardona.

## 2018-04-06 NOTE — SUBJECTIVE & OBJECTIVE
Review of Systems   Unable to perform ROS: Intubated     Objective:     Vital Signs (Most Recent):  Temp: 100 °F (37.8 °C) (04/06/18 1105)  Pulse: 95 (04/06/18 1137)  Resp: (!) 21 (04/06/18 1137)  BP: (!) 91/51 (04/06/18 1105)  SpO2: 100 % (04/06/18 1137) Vital Signs (24h Range):  Temp:  [99 °F (37.2 °C)-100 °F (37.8 °C)] 100 °F (37.8 °C)  Pulse:  [] 95  Resp:  [18-26] 21  SpO2:  [90 %-100 %] 100 %  BP: ()/(38-93) 91/51     Weight: 131.7 kg (290 lb 5.5 oz)  Body mass index is 46.86 kg/m².      Intake/Output Summary (Last 24 hours) at 04/06/18 1334  Last data filed at 04/06/18 1133   Gross per 24 hour   Intake          7971.63 ml   Output              972 ml   Net          6999.63 ml       Physical Exam   Constitutional: She appears well-developed and well-nourished. She is sedated, intubated and restrained.   HENT:   Head: Normocephalic and atraumatic.   Mouth/Throat: Oropharynx is clear and moist and mucous membranes are normal.   Eyes: EOM and lids are normal. Pupils are equal, round, and reactive to light.   jaundice   Neck: Trachea normal and normal range of motion. Carotid bruit is not present.   Cardiovascular: Normal rate, regular rhythm, normal heart sounds and normal pulses.    Pulses:       Radial pulses are 2+ on the right side, and 2+ on the left side.        Dorsalis pedis pulses are 2+ on the right side, and 2+ on the left side.   Pulmonary/Chest: No accessory muscle usage. She is intubated. No respiratory distress.   Coarse breath sounds   Abdominal: Bowel sounds are decreased.   Firmness palpated above umbilicus    Genitourinary:   Genitourinary Comments: Rodriguez in place   Musculoskeletal: Normal range of motion.        Right foot: There is no deformity.        Left foot: There is no deformity.   Generalized edema   Lymphadenopathy:     She has no cervical adenopathy.   Skin: Skin is dry and intact. Capillary refill takes less than 2 seconds. No rash noted. No cyanosis.             Vents:  Vent Mode: A/C (04/06/18 1137)  Ventilator Initiated: Yes (04/05/18 1930)  Set Rate: 20 bmp (04/06/18 1137)  Vt Set: 500 mL (04/06/18 1137)  Pressure Support: 0 cmH20 (04/06/18 1137)  PEEP/CPAP: 8 cmH20 (04/06/18 1137)  Oxygen Concentration (%): 35 (04/06/18 1137)  Peak Airway Pressure: 41 cmH2O (04/06/18 1137)  Plateau Pressure: 0 cmH20 (04/06/18 1137)  Total Ve: 11.3 mL (04/06/18 1137)  F/VT Ratio<105 (RSBI): (!) 34.26 (04/06/18 1137)    Lines/Drains/Airways     Central Venous Catheter Line                 Percutaneous Central Line Insertion/Assessment - triple lumen  04/03/18 2045 right internal jugular 2 days          Drain                 Urethral Catheter 04/03/18 16 Fr. 3 days         NG/OG Tube 04/05/18 1915 orogastric less than 1 day          Airway                 Airway - Non-Surgical 04/05/18 1900 Endotracheal Tube less than 1 day          Arterial Line                 Arterial Line 04/04/18 0825 Left Radial 2 days          Peripheral Intravenous Line                 Peripheral IV - Single Lumen 04/03/18 Left Antecubital 3 days         Peripheral IV - Single Lumen 04/03/18 Right Antecubital 3 days                Significant Labs:    CBC/Anemia Profile:    Recent Labs  Lab 04/05/18  0353 04/05/18  1804 04/06/18  0326   WBC 18.04* 16.25* 18.82*   HGB 10.2* 8.9* 9.3*   HCT 27.7* 25.6* 27.7*   PLT 99* 77* 78*   MCV 89 91 91   RDW 18.5* 18.8* 19.2*        Chemistries:    Recent Labs  Lab 04/05/18  0544  04/05/18  1804 04/05/18  2142 04/06/18  0326 04/06/18  0559     < > 137 138 136 136   K 2.6*  < > 3.6 3.2* 3.6 3.4*     < > 105 105 103 103   CO2 11*  < > 11* 15* 15* 14*   BUN 2*  < > 2* 2* 2* 2*   CREATININE 0.8  < > 0.9 0.8 0.8 0.7   CALCIUM 6.0*  < > 5.9* 5.6* 5.7* 5.8*   ALBUMIN 1.6*  --   --   --  1.5*  --    PROT 6.0  --   --   --  5.2*  --    BILITOT 4.1*  --   --   --  4.6*  --    ALKPHOS 86  --   --   --  95  --    ALT 44  --   --   --  57*  --    *  --   --   --   122*  --    MG  --   < > 2.2 1.8  --  1.8   PHOS  --   < > 2.6* 2.2*  --  2.0*   < > = values in this interval not displayed.    ABGs:     Recent Labs  Lab 04/06/18  0427   PH 7.383   PCO2 30.6*   HCO3 18.3*   POCSATURATED 100   BE -7     CMP:   Recent Labs  Lab 04/05/18  0544  04/05/18  2142 04/06/18  0326 04/06/18  0559     < > 138 136 136   K 2.6*  < > 3.2* 3.6 3.4*     < > 105 103 103   CO2 11*  < > 15* 15* 14*   GLU 94  < > 190* 189* 189*   BUN 2*  < > 2* 2* 2*   CREATININE 0.8  < > 0.8 0.8 0.7   CALCIUM 6.0*  < > 5.6* 5.7* 5.8*   PROT 6.0  --   --  5.2*  --    ALBUMIN 1.6*  --   --  1.5*  --    BILITOT 4.1*  --   --  4.6*  --    ALKPHOS 86  --   --  95  --    *  --   --  122*  --    ALT 44  --   --  57*  --    ANIONGAP 19*  < > 18* 18* 19*   EGFRNONAA >60  < > >60 >60 >60   < > = values in this interval not displayed.  Lipid Panel:     Recent Labs  Lab 04/05/18  0623   TRIG 1,819*     POCT Glucose:     Recent Labs  Lab 04/05/18  1806 04/05/18  2303 04/06/18  1219   POCTGLUCOSE 156* 184* 186*       Significant Imaging:  CXR: I have reviewed all pertinent results/findings within the past 24 hours and my personal findings are:  no acute findings  I have reviewed all pertinent imaging results/findings within the past 24 hours.

## 2018-04-06 NOTE — PROCEDURES
"Rafael Duron is a 22 y.o. female patient.    Temp: 99 °F (37.2 °C) (04/05/18 1505)  Pulse: (!) 123 (04/05/18 1930)  Resp: (!) 25 (04/05/18 1830)  BP: (!) 66/39 (04/05/18 1800)  SpO2: 96 % (04/05/18 1930)  Weight: 120.7 kg (266 lb 1.5 oz) (04/05/18 0500)  Height: 5' 6" (167.6 cm) (04/03/18 2102)       Intubation  Date/Time: 4/5/2018 7:00 PM  Location procedure was performed: [facility] INTENSIVE CARE UNIT  Performed by: TAMMIE HARKINS  Authorized by: ATMMIE HARKINS   Pre-operative diagnosis: Acute respiratory failure  Post-operative diagnosis: same  Consent Done: Emergent Situation  Indications: respiratory failure  Intubation method: direct  Patient status: awake  Preoxygenation: nonrebreather mask  Pretreatment medications: none  Sedatives: propofol  Paralytic: none  Laryngoscope size: Mac 4  Tube size: 7.5 mm  Tube type: cuffed  Number of attempts: 1  Cricoid pressure: no  Cords visualized: yes  Post-procedure assessment: ETCO2 monitor and CO2 detector  Breath sounds: clear  Cuff inflated: yes  ETT to lip: 25 cm  ETT to teeth: 23 cm  Tube secured with: ETT hill  Chest x-ray interpreted by me.  Chest x-ray findings: endotracheal tube in appropriate position  Patient tolerance: Patient tolerated the procedure well with no immediate complications  Complications: No  Estimated blood loss (mL): 0  Specimens: No  Implants: Odalis Harkins  4/5/2018  "

## 2018-04-06 NOTE — ASSESSMENT & PLAN NOTE
33.8 cm span with reduced echogenicity on ultrasound.  Of uncertain etiology but suspect fatty liver disease related to alcohol abuse and obesity.

## 2018-04-06 NOTE — HOSPITAL COURSE
Admitted as a transfer from [facility] for higher level of care.  Septic shock presumably from Chorioamnionitis/Endometritis.  Arrived intubated on vasopressors.  Started broad spectrum antibiotics.  Successfully extubated 05 April.  Evaluation by Gynecology - Dr. Cardona.  Pelvic ultrasound revealed and empty uterus.  She will need at least 48 hours broad spectrum antibiotics with anaerobic and gram-negative coverage.  Changed antibiotics to vancomycin, Meropenem, and Metronidazole.  Two episodes of altered mental status with bradycardia evening of 05 April.  Re-intubated.  Abdominal ultrasound revealed massive hepatomegaly with a liver span of 33.8 cm and homogenous hypoechoic texture.  Serum triglyceride level on admission was 1819.  Persistent hypocalcemia and continuing IV replacement.    04/07/2018- 22 year old woman with alcoholic liver disease /massive hepatomegaly -33.8cm, ,septic shock of uncertain etiology . She remains intubated .  Lab data -wbc -14.7 .  04/08/2018.- she was extubated yesterday and was re intubated today after an episode of asystolic cardiac  arrest . ACLS was initiated and she had 2 rounds of CPR with ROSC.   She had CTA of the chest and CT scan of the abdomen -did not show PE but showed markedly distended gall baldder. She remains febrile.  04/09/2018- She is intubated .She remains on vasopressor support .Volume review showed positive I/O balance with + 24 liters since admission,she now has worsening serum creatinine to 1.6  She remains critical .  We had family meeting done and plan of care and grave prognosis was discussed with them.  4/10/2018 - MAP holding at 65 on vasopressin, remains intubated, urine output increased on lasix. White count improving, remains on vanc PO and IV, zosyn and flagyl. Sputum culture positive for MRSA and pseudamonas. C. Diff positive. General surgery consulted for PEG and Trach placement, elevated liver enzymes thought secondary to alcohol abuse  vs cardiovascular shock and hypotension. Hemoglobin holding (4 units prbcs, 1 plat, 1 cryo, 1 ffp). Thrombocytopenia thought secondary to alcohol abuse and liver failure.  4/11/2018 - remains intubated, fever overnight, white count slightly increased, plat slightly improved, creatinine 2.0, ammonia slightly elevated,   04/12/18-  Trach, peg pending  04/13/18 -Cholestomy  placement   04/14/18- more alert today, wbc trending down  04/15/18 - persistent fever and leukocytosis , cultures- repeat cultures NGTD                   High out pt from cholectomy tube .  04/16/2018- 22 year old woman with septic shock, worsening leucocytosis,s/p tracheostomy tube,She remains febrile with high output from the cholecystectomy drain  . T max is 102.9.  Today is day 13 of admission.  Chest x-ray showed persistent right upper lobe infiltrate.  Lab data showed worsening INR to 2.2, wbc is 25,serum procalcitonin is 11.Serum creatinine is 2.6.She is on vasopressor support .  04/17 -Day 14.  She is s/p trach tube placement , remains on vasopressor support CT-scan of the abdomen scan showed Mild pancolitis.  No free air or abscess identified.   Labs showed wbc -26.   04/18- She remains critically ill on vasopressor support, wbc is increasing .  Indium scan showed  diffuse pulmonary uptake.  She has completed 14 days of therapy with vanco/meropenem for pneumonia .  Serum procalcitionin is 7.27.    4/19- remains the same, on Vent and Levophed, has been on Oral and Rectal vanco for persistent C diff. WBC still rising, hence placed back on Meropenem. K is low-- being replaced.     04/20-remains critically ill . Still on vasopressor support , wbc continues to increase, etiology is related to C difficle and possible other source.  All repeat cultures remain negative . She is on optimal  Antimicrobial therapy .  04/21- she remains febrile . Today is day 19 of total antimicrobial therapy .She is now off vasopressor support .   04/22 - day 20 of  total antimicrobial therapy , wbc is 26-on a downward trend , now off meropenem,serum creatinine is 2.5,she remains weak with intermittent fever .  Urine output is low -oliguria.  04/23- She remains on ventilator support , wbc is 28 , she is now off systemic antimicrobial therapy.  4/24- remains intubated on Vent, spiked a temp to 102 F last nite as well as WBC jumped to 37K- hence continued on IV abx-- Zyvox and Zosyn. Getting TF. CXR no new infiltrates. MRI C/S, T/S and L/S neg, CT abd neg except for massive hepatomegaly..     4/25- events since yesterday afternoon noted, remains unresponsive on Vent via Trach, remains severely hypotensive on two pressors-- Vasopressin and Levophed. Lactic Acid > 12, Hco3 only 8 despite CRRT ABG x 2 show pH 7.0. Also LFts and WBC have shot up. Family already notified again early this am, mom is apparently on her way but not made it here yet. Also has diarrhea but no evidence of C Diff or SBP. Meld Score 41- Hepatology following.     After a detailed meeting with the family, they decided to withdraw care and keep her comfortable only and make her DNR. Comfort care instituted and pt passed away peacefully and was pronounced dead at 1437 on 4/25/2018. Family present, condolences offered.

## 2018-04-06 NOTE — ASSESSMENT & PLAN NOTE
Sodium corrected  Serial chemistries  Replace Mg+, Phos and K+  Repeat electrolytes labs later today

## 2018-04-06 NOTE — ASSESSMENT & PLAN NOTE
Bicarb infusion  Monitoring acidosis with serial chemistries and ABGs as needed  Likely due to lactic acidosis from septic shock, cont with abx

## 2018-04-06 NOTE — ASSESSMENT & PLAN NOTE
Requiring very little FiO2 on vent  Vent settings reviewed and adjusted  Reintubated overnight for hypotensive/bradycardic episodes with agonal breathing

## 2018-04-06 NOTE — CARE UPDATE
Pateint with intermittent episodes on bradycardia, hypotension and progressive mental status decline.   Remains acidemic requiring IB Bicarb gtt . Will re intubate patient and place on full mechanical ventilation with high minute ventilation to facilitate CO2 elimination.     S. Adjei MD [facility]

## 2018-04-06 NOTE — ASSESSMENT & PLAN NOTE
Source likely fetal demise of unknown duration and retained products of concepttion placenta, removed surgically at outside hospital.  Continue IV fluids.  Follow up on blood and urine cultures.  Lactic acid improved to 5 from 7.  OB Gyn consult - Dr. Cardona.
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
## 2018-04-07 PROBLEM — E80.6 HYPERBILIRUBINEMIA: Status: ACTIVE | Noted: 2018-01-01

## 2018-04-07 NOTE — ASSESSMENT & PLAN NOTE
33.8 cm span with reduced echogenicity on ultrasound.  Of uncertain etiology but suspect fatty liver disease related to alcohol abuse and obesity.    04/07/2018- related to alcohol abuse and obesity . Will need out patient follow up

## 2018-04-07 NOTE — ASSESSMENT & PLAN NOTE
Currently intubated.  No acute infiltrates seen on CXR  Continue IV antibiotics empirically.  Pulmonary consult.    04/07/2018- will wean off ventilator as tolerated.  She is more awake and alert today.  Sputum cultures showed MRSA and pseudomonas-she is on vanco/meropenem-will deescalate soon.

## 2018-04-07 NOTE — ASSESSMENT & PLAN NOTE
04/07/2018- will closely monitor fever curve, will stop flagyl, change meropenem to zosyn,continue vancomycin for now.  Will deescalate soon.  Blood cultures -neg, sputum cultures-MRSA and pseudomonas

## 2018-04-07 NOTE — ASSESSMENT & PLAN NOTE
when able.    04/07/2018- will need close out patient follow up on discharge for alcohol cessation counseling .
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
## 2018-04-07 NOTE — PROGRESS NOTES
Pt extubated and OG tube pulled at same time.  Pt tolerating NC.  Pt encouraged to cough and suction secretions with kvng calderon in pt hand.  Pt verbalized understanding, will continue to monitor.

## 2018-04-07 NOTE — PROGRESS NOTES
Placed patient on SBT per order of LYNSEY pineda. Patient follows commands and doing well at this time

## 2018-04-07 NOTE — SUBJECTIVE & OBJECTIVE
Interval History:     04/07/2018- 22 year old woman with alcoholic liver disease /massive hepatomegaly -33.8cm, ,septic shock from presumed Chorioamnionitis/Endometritis. She remains intubated .  Lab data -wbc -14.7 .      Review of Systems   Unable to perform ROS: Intubated     Objective:     Vital Signs (Most Recent):  Temp: 98.8 °F (37.1 °C) (04/07/18 1505)  Pulse: 110 (04/07/18 1705)  Resp: (!) 24 (04/07/18 1705)  BP: 106/64 (04/07/18 1705)  SpO2: 99 % (04/07/18 1705) Vital Signs (24h Range):  Temp:  [98.8 °F (37.1 °C)-101.6 °F (38.7 °C)] 98.8 °F (37.1 °C)  Pulse:  [] 110  Resp:  [5-25] 24  SpO2:  [97 %-100 %] 99 %  BP: ()/(47-86) 106/64     Weight: (!) 140.3 kg (309 lb 4.9 oz)  Body mass index is 49.92 kg/m².    Intake/Output Summary (Last 24 hours) at 04/07/18 1705  Last data filed at 04/07/18 1645   Gross per 24 hour   Intake          7304.96 ml   Output             4670 ml   Net          2634.96 ml      Physical Exam   Constitutional: She appears well-developed and well-nourished. No distress.   Obese    HENT:   Head: Normocephalic and atraumatic.   Mouth/Throat: Oropharynx is clear and moist.   Eyes: Conjunctivae and EOM are normal. Pupils are equal, round, and reactive to light.   Neck: Neck supple. No JVD present. No thyromegaly present.   Cardiovascular: Normal rate and regular rhythm.  Exam reveals no gallop and no friction rub.    No murmur heard.  Pulmonary/Chest: Effort normal and breath sounds normal. She has no wheezes. She has no rales.   Abdominal: Soft. Bowel sounds are normal. She exhibits no distension. There is no tenderness. There is no rebound and no guarding.   Musculoskeletal: Normal range of motion. She exhibits no edema or deformity.   Lymphadenopathy:     She has no cervical adenopathy.   Neurological: She has normal reflexes.   Intubated and sedated.   Skin: Skin is warm and dry. No rash noted.   Nursing note and vitals reviewed.      Significant Labs:   Blood Culture: No  results for input(s): LABBLOO in the last 48 hours.  BMP:   Recent Labs  Lab 04/07/18  1300   *      K 3.9      CO2 20*   BUN 2*   CREATININE 0.9   CALCIUM 5.9*   MG 2.3     CBC:   Recent Labs  Lab 04/05/18  1804 04/06/18  0326 04/07/18  0500   WBC 16.25* 18.82* 14.79*   HGB 8.9* 9.3* 8.7*   HCT 25.6* 27.7* 26.7*   PLT 77* 78* 43*     All pertinent labs within the past 24 hours have been reviewed.    Significant Imaging: I have reviewed and interpreted all pertinent imaging results/findings within the past 24 hours.

## 2018-04-07 NOTE — PLAN OF CARE
Problem: Patient Care Overview  Goal: Plan of Care Review  Outcome: Ongoing (interventions implemented as appropriate)  Patient currently resting quietly in bed. VS currently stable. Patient sinus tachycardic on monitor at this time. Patient on bicarb drip. Patient currently receiving oxygen via ventilator. Patient currently on fentanyl drip for sedation. Patient also receiving levophed drip. Blood pressure controlled at this time. Patient turned in bed every 2 hours to avoid skin breakdown to back side. No sign of wound development during shift noted. Patient has no restraint related injuries noted at this time. Patient had fever earlier in shift which was relieved by prn tylenol. Plan of care updated with family. There no further questions after updated on plan of care at this time. Will continue to monitor.

## 2018-04-07 NOTE — ASSESSMENT & PLAN NOTE
Currently 8.9 after 2 units PRBC transfusion at outside facility.  No active bleeding at this time.  Labs in AM.      04/07/2018- hemoglobin is stable at 8.7(was 8.9) two days ago.  Will continue to monitor closely

## 2018-04-07 NOTE — ASSESSMENT & PLAN NOTE
No seizure activity since admit. Correct electrolyte imbalance and acidosis. Close neuro monitoring in ICU

## 2018-04-07 NOTE — ASSESSMENT & PLAN NOTE
[x] Respiratory rate >20 breaths/min or PaCO2 <32 mmHg   [x]  WBC >12,000 cells/mm3, <4000 cells/mm3, or >10 percent immature (band) forms  [] Heart rate >90 beats/min   [] Temperature >38ºC or <36ºC    Microbiology: Panculture for temperatures greater than 101 degrees F. Follow cultures.   Oxygenation: Ventilator liberation. Supplemental oxygen by nasal canula. Keep SAO2 > = 92%  Hemodynamics: IV norepinephrine. IV crystalloids. Keep MAP > = 65mmHg  Source control: IV Meropenem, IV Vancomycin, IV metronidazole.

## 2018-04-07 NOTE — CLINICAL REVIEW
Pharmacy Vancomycin Consult     Consult Day #5  Vancomycin trough 4/7 at 1300 = 19.1 (Therapeutic)  Will push back Vancomycin to 4/7 at 1700 and decrease dosing to 1,000 mg every 12 hours  Vancomycin trough due 4/9 at 0400  Dx: Sepsis/Urosepsis, requiring norepinephrine   Goal trough 15-20  Patient is also on Merrem (day #3) and Flagyl (day #3)   WBC 14.79 (was 18.82 4/6)  Tmax 101.6  Respiratory culture 4/4 - MRSA + Pseudomonas aeruginosa   Appropriately covered     Pharmacy will continue to closely monitor patient and make adjustments as necessary.   Thank you for allowing us to participate in this patient's care,   Salma Cummings 4/7/2018 2:22 PM

## 2018-04-07 NOTE — ASSESSMENT & PLAN NOTE
K initially 1.9, improved to 2.5.  Monitor and replace.    04/07/2018-will replete hypocalcemia -corrected calcium is 8.1,phosphorus -2.5 ,will replete .

## 2018-04-07 NOTE — ASSESSMENT & PLAN NOTE
Stable Hyperglycemia: INSULIN ASPART. Moderate correction dose.  Blood glucose target 100 - 180 mg/dl

## 2018-04-07 NOTE — ASSESSMENT & PLAN NOTE
Follow trend and avoid use of Propofol.  May be related to alcohol use and liver disease.    04/07/2018- will start fibrate when able to tolerate PO

## 2018-04-07 NOTE — PROGRESS NOTES
[facility]  Critical Care Medicine  Progress Note    Patient Name: Rafael Duron  MRN: [id number]  Admission Date: 4/3/2018  Hospital Length of Stay: 4 days  Code Status: Full Code  Attending Provider: Ed Ram MD  Primary Care Provider: Herman Cowart MD   Principal Problem: Septic shock    Subjective:     HPI:  Ms Duron is a 21 yo obese BF with a PMH of HTN, gestational DM and HTN who presented to [facility] ED in [place], [place] on  with complaint of SOB and cough with known pregnancy.  OB US revealed no heart tones and she is also  with second trimester fetal demise estimated 22 week for which she passed with retained placenta.  After admission to ICU she had seizure activity with , K+ 1.9 and Bicarb 11.  She also had etoh level 268 and reportedly had been drinking etoh w/ honey heavily for several days.  She required intubation for airway protection per records and OB was unable to remove placenta manually and patient had to be taken to OR.  She received 2 liters IVF and Hgb dropped to 7.4 and was transfused 2 units PRBCs.  Vaginal bleeding was scant per records.  Yesterday she had temp 101.5 Ax, .  She as transferred to [facility] ICU yesterday evening for higher level of care.        Hospital/ICU Course:   - This AM she is sedated on vent on Levophed infusion spiking temp 101.9 with WBC 20, LA 6.3, UA+, electrolyte imbalance and Glucose 268     - SAT and SBT done this am, pt awake and following commands. She was successfully extubated to nasal cannula. Remains tachycardic with HR 140s and febrile with temp 103 overnight. WBC 18 and lactic acidosis improving to 4.8. Continuing to aggressively replace electrolytes.     - Over night patient had episode with bradycardia and hypotension during which she became unresponsive and agonally breathing. She responded with IVF and bicarb and was restarted on pressors. She experienced a similar episode  again last night, during which she was intubated. Vent settings were reviewed and adjusted this am. No more bradycardic/hypotensive episodes since last night. Remains on pressors. Leukocystosis unimproved with worsening bandemia. Urine and blood cx NGTD. Sputum cx growing staph and pseudomonas however CXR this am is unremarkable.    04/7 - Tolerating SAT and SBT. Meets extubating criteria. Acidosis improving. Leukocytosis improving. Remains on bicarb gtt.     Interval History:  Seen and examined at bedside. Hospital chart reviewed. No acute interval detrimental events noted.      Review of Systems   Unable to perform ROS: Intubated     Objective:     Vital Signs (Most Recent):  Temp: (!) 100.5 °F (38.1 °C) (04/07/18 0705)  Pulse: (!) 120 (04/07/18 1005)  Resp: 18 (04/07/18 1005)  BP: (!) 102/48 (04/07/18 1005)  SpO2: 99 % (04/07/18 1005) Vital Signs (24h Range):  Temp:  [99.4 °F (37.4 °C)-101.6 °F (38.7 °C)] 100.5 °F (38.1 °C)  Pulse:  [] 120  Resp:  [5-23] 18  SpO2:  [97 %-100 %] 99 %  BP: ()/(46-86) 102/48     Weight: (!) 140.3 kg (309 lb 4.9 oz)  Body mass index is 49.92 kg/m².      Intake/Output Summary (Last 24 hours) at 04/07/18 1046  Last data filed at 04/07/18 0948   Gross per 24 hour   Intake           7035.9 ml   Output             1474 ml   Net           5561.9 ml       Physical Exam   Constitutional: She is oriented to person, place, and time. She appears well-developed and well-nourished. No distress.   HENT:   Head: Normocephalic and atraumatic.   Mouth/Throat: Oropharynx is clear and moist.   Eyes: Conjunctivae and EOM are normal. Pupils are equal, round, and reactive to light.   Neck: Neck supple. No JVD present. No thyromegaly present.   Cardiovascular: Normal rate and regular rhythm.  Exam reveals no gallop and no friction rub.    No murmur heard.  Pulmonary/Chest: Effort normal and breath sounds normal. She has no wheezes. She has no rales.   Abdominal: Soft. Bowel sounds are normal.  She exhibits distension. There is tenderness. There is no rebound and no guarding.   Upper abdomen firm with suprapubic tenderness.   Musculoskeletal: Normal range of motion. She exhibits no edema or deformity.   Lymphadenopathy:     She has no cervical adenopathy.   Neurological: She is alert and oriented to person, place, and time. She has normal reflexes.   Skin: Skin is warm and dry. No rash noted.   Psychiatric: She has a normal mood and affect. Her behavior is normal. Judgment and thought content normal.   Nursing note and vitals reviewed.      Vents:  Vent Mode: Spont (04/07/18 0822)  Ventilator Initiated: Yes (04/05/18 1930)  Set Rate: 0 bmp (04/07/18 0822)  Vt Set: 500 mL (04/07/18 0822)  Pressure Support: 6 cmH20 (04/07/18 0822)  PEEP/CPAP: 5 cmH20 (04/07/18 0822)  Oxygen Concentration (%): 30 (04/07/18 0822)  Peak Airway Pressure: 13 cmH2O (04/07/18 0822)  Plateau Pressure: 0 cmH20 (04/07/18 0822)  Total Ve: 10.8 mL (04/07/18 0822)  F/VT Ratio<105 (RSBI): (!) 58.06 (04/07/18 0822)    Lines/Drains/Airways     Central Venous Catheter Line                 Percutaneous Central Line Insertion/Assessment - triple lumen  04/03/18 2045 right internal jugular 3 days          Drain                 Urethral Catheter 04/03/18 16 Fr. 4 days         NG/OG Tube 04/05/18 1915 orogastric 1 day          Arterial Line                 Arterial Line 04/04/18 0825 Left Radial 3 days          Peripheral Intravenous Line                 Peripheral IV - Single Lumen 04/03/18 Left Antecubital 4 days         Peripheral IV - Single Lumen 04/03/18 Right Antecubital 4 days                Significant Labs:    CBC/Anemia Profile:    Recent Labs  Lab 04/05/18  1804 04/06/18  0326 04/07/18  0500   WBC 16.25* 18.82* 14.79*   HGB 8.9* 9.3* 8.7*   HCT 25.6* 27.7* 26.7*   PLT 77* 78* 43*   MCV 91 91 93   RDW 18.8* 19.2* 19.2*        Chemistries:    Recent Labs  Lab 04/06/18  0326  04/06/18  1347 04/06/18  2205 04/07/18  0500     < > 136  135* 135*  135*   K 3.6  < > 3.6 3.8 3.8  3.8     < > 102 101 100  100   CO2 15*  < > 17* 17* 19*  19*   BUN 2*  < > 2* 2* 2*  2*   CREATININE 0.8  < > 0.8 0.8 0.9  0.9   CALCIUM 5.7*  < > 5.8* 5.7* 5.6*  5.6*   ALBUMIN 1.5*  --   --   --  1.3*   PROT 5.2*  --   --   --  4.7*   BILITOT 4.6*  --   --   --  5.3*   ALKPHOS 95  --   --   --  97   ALT 57*  --   --   --  48*   *  --   --   --  87*   MG  --   < > 2.3 2.0 1.9   PHOS  --   < > 2.5* 2.4* 2.5*   < > = values in this interval not displayed.    ABGs:   Recent Labs  Lab 04/07/18  0509   PH 7.368   PCO2 41.9   HCO3 24.1   POCSATURATED 99   BE -1     Coagulation:   Recent Labs  Lab 04/05/18  1105   INR 1.2     Lactic Acid:   Recent Labs  Lab 04/06/18  2205   LACTATE 6.1*         Significant Imaging:    CXR:  Comparison yesterday's exam.  Endotracheal tube, nasogastric tube are in good position.Heart is stable in size. The lungs are relatively clear.  There is improved aeration of the left lower lobe.      Assessment/Plan:     I have reviewed all labs and imaging studies and compared to previous results. I have also discussed labs with all the teams in the medical care of the patient and my plan is outlined below     Neuro   Seizure    No seizure activity since admit. Correct electrolyte imbalance and acidosis. Close neuro monitoring in ICU        Psychiatric   ETOH abuse      Encourage cessation once clinically stable. Monitor for ETOH withdrawal once extubated. Electrolyte replacement              Pulmonary   Pneumonia of both lower lobes due to methicillin resistant Staphylococcus aureus (MRSA)    Daily CBC  Cont vanc for now, will consider switching to zyvox if no blood culture growth tomorrow  Ventilatory support  Cont meropenem given continued leukocytosis          Acute hypoxemic respiratory failure    Ventilator wean in progress. Meets extubation criteria. Extubate patient            Cardiac/Vascular     Monitor hemodynamics. MAP goal of  60 mmHg.        Hypertriglyceridemia    Propofol discontinued. Triglycerides trending down. Monitor and trend.           Renal/    Monitor BUN / Cr. Montor urine output.        Acute urinary tract infection    MEROPENEM + METRONIDAZOLE + VANCOMYCIN.        Metabolic acidosis    Lactic acidosis. Multifactorial.  Bicarb infusion. Monitor and replete electrolytes as needed.           Electrolyte imbalance    Monitor and replete electrolytes as needed.         Hyponatremia    Resolved, cont to monitor with daily CMP        ID   * Septic shock      [x] Respiratory rate >20 breaths/min or PaCO2 <32 mmHg   [x]  WBC >12,000 cells/mm3, <4000 cells/mm3, or >10 percent immature (band) forms  [] Heart rate >90 beats/min   [] Temperature >38ºC or <36ºC    Microbiology: Panculture for temperatures greater than 101 degrees F. Follow cultures.   Oxygenation: Ventilator liberation. Supplemental oxygen by nasal canula. Keep SAO2 > = 92%  Hemodynamics: IV norepinephrine. IV crystalloids. Keep MAP > = 65mmHg  Source control: IV Meropenem, IV Vancomycin, IV metronidazole.          Hematology    Monitor hemogram. Transfuse as needed.        Oncology   Acute blood loss anemia    Total 4 units transfused  Minimal vaginal discharge  H/H stable  Repeat H/H in am        Endocrine   Hyperglycemia, unspecified    Stable Hyperglycemia: INSULIN ASPART. Moderate correction dose.  Blood glucose target 100 - 180 mg/dl            GI   Hepatomegaly    ? Alcoholic liver disease / toxic hepatitis ( DILI). Mainly cholestatic picture.  Monitoring liver enzymes. LFTs down trending.  Discriminant function = 7. Hepatitis acute panel unremarkable. Screen for autoimmune hepatitis. Avoid the use of hepatotoxic meds.             Shock liver    Daily CMP to monitor liver enzymes  Hep panel negative  Vasopressors to maintain perfusion        Obstetric   Retained products of conception with hemorrhage    U/S done 4/5 negative for retained products in uterus         Other   Hyperbilirubinemia    Etioloy unclear.  Likely related to alcoholic liver disease. Monitor and trend.         History of IUFD    S/P D & C. No retained products of conception on ultrasound.            Critical Care Daily Checklist:    A: Awake: RASS Goal/Actual Goal: RASS Goal: -2-->light sedation  Actual: Lim Agitation Sedation Scale (RASS): Light sedation   B: Spontaneous Breathing Trial Performed? Spon. Breathing Trial Initiated?: Initiated (04/07/18 0800)   C: SAT & SBT Coordinated?  YES                  D: Delirium: CAM-ICU  NEGATIVE   E: Early Mobility Performed? Yes   F: Feeding Goal: Goals: 1. Initiate nutrition within 72 hrs. 2. Meet 85- 100 % of estimated nutritional needs without s/s of GI distress   Status: Nutrition Goal Status: new   Current Diet Order   Procedures    Diet NPO      AS: Analgesia/Sedation PRN   T: Thromboembolic Prophylaxis LDUFH   H: HOB > 300 Yes   U: Stress Ulcer Prophylaxis (if needed) FAMOTIDINE   G: Glucose Control INSULIN ASPART   B: Bowel Function Stool Occurrence: 0   I: Indwelling Catheter (Lines & Rodriguez) Necessity YES   D: De-escalation of Antimicrobials/Pharmacotherapies YES    Plan for the day/ETD Extubate patient. Continue all ongoing     Code Status:  Family/Goals of Care: Full Code  Home with family     Critical Care Time: 60 minutes  Critical secondary to Patient has a condition that poses threat to life and bodily function: Acute respiratory failure, Acute liver disease, septic shock, IUD @ 22/52 s/p D& C, Blood loss anemia, Alcohol abuse.   Patient is currently on drug therapy requiring intensive monitoring for toxicity: IV norepinephrine.      Critical care was time spent personally by me on the following activities: development of treatment plan with patient or surrogate and bedside caregivers, discussions with consultants, evaluation of patient's response to treatment, examination of patient, ordering and performing treatments and interventions,  ordering and review of laboratory studies, ordering and review of radiographic studies, pulse oximetry, re-evaluation of patient's condition. This critical care time did not overlap with that of any other provider or involve time for any procedures.     Jass Harkins MD  Critical Care Medicine  [facility]

## 2018-04-07 NOTE — PLAN OF CARE
Problem: Patient Care Overview  Goal: Plan of Care Review  Outcome: Ongoing (interventions implemented as appropriate)  Pt stable. Pt is NSR/ST on the heart monitor.  Pt was extubated this AM, pt tolerating 4L NC.  Pt coughing up a lot of secretions, coughing and suction encouraged.  Pt c/o upper and left sided abd pain, NP aware.  Pt had one episode of emesis when pt sat up on the side of the bed with assistance.  Pt received albumin and lasix with drastic improvement in urine output.  Serial labs drawn and electrolytes replaced per prn orders.  Pt was free from falls or injuries during duration of shift.  Pt turned and repositioned with use of wedge, pt wiggles/slides off wedge frequently.  PIV, IJ and artline intact with no redness, swelling or drainage.  Bed low, wheels locked, bed alarm on, call light in reach.  Pt instructed to call for assistance.   Plan of care reviewed.  Pt verbalizes understanding.

## 2018-04-07 NOTE — ASSESSMENT & PLAN NOTE
? Alcoholic liver disease / toxic hepatitis ( DILI). Mainly cholestatic picture.  Monitoring liver enzymes. LFTs down trending.  Discriminant function = 7. Hepatitis acute panel unremarkable. Screen for autoimmune hepatitis. Avoid the use of hepatotoxic meds.

## 2018-04-07 NOTE — PLAN OF CARE
Problem: Patient Care Overview  Goal: Plan of Care Review  Outcome: Ongoing (interventions implemented as appropriate)  Patient remains on ventilator at this time ETT 7.5 remains secure at 22 cm at the lips. No redness or breakdown noted around ETT or commercial tube hill. Patient tolerates well.

## 2018-04-07 NOTE — SUBJECTIVE & OBJECTIVE
Interval History:  Seen and examined at bedside. Hospital chart reviewed. No acute interval detrimental events noted.      Review of Systems   Unable to perform ROS: Intubated     Objective:     Vital Signs (Most Recent):  Temp: (!) 100.5 °F (38.1 °C) (04/07/18 0705)  Pulse: (!) 120 (04/07/18 1005)  Resp: 18 (04/07/18 1005)  BP: (!) 102/48 (04/07/18 1005)  SpO2: 99 % (04/07/18 1005) Vital Signs (24h Range):  Temp:  [99.4 °F (37.4 °C)-101.6 °F (38.7 °C)] 100.5 °F (38.1 °C)  Pulse:  [] 120  Resp:  [5-23] 18  SpO2:  [97 %-100 %] 99 %  BP: ()/(46-86) 102/48     Weight: (!) 140.3 kg (309 lb 4.9 oz)  Body mass index is 49.92 kg/m².      Intake/Output Summary (Last 24 hours) at 04/07/18 1046  Last data filed at 04/07/18 0948   Gross per 24 hour   Intake           7035.9 ml   Output             1474 ml   Net           5561.9 ml       Physical Exam   Constitutional: She is oriented to person, place, and time. She appears well-developed and well-nourished. No distress.   HENT:   Head: Normocephalic and atraumatic.   Mouth/Throat: Oropharynx is clear and moist.   Eyes: Conjunctivae and EOM are normal. Pupils are equal, round, and reactive to light.   Neck: Neck supple. No JVD present. No thyromegaly present.   Cardiovascular: Normal rate and regular rhythm.  Exam reveals no gallop and no friction rub.    No murmur heard.  Pulmonary/Chest: Effort normal and breath sounds normal. She has no wheezes. She has no rales.   Abdominal: Soft. Bowel sounds are normal. She exhibits distension. There is tenderness. There is no rebound and no guarding.   Upper abdomen firm with suprapubic tenderness.   Musculoskeletal: Normal range of motion. She exhibits no edema or deformity.   Lymphadenopathy:     She has no cervical adenopathy.   Neurological: She is alert and oriented to person, place, and time. She has normal reflexes.   Skin: Skin is warm and dry. No rash noted.   Psychiatric: She has a normal mood and affect. Her  behavior is normal. Judgment and thought content normal.   Nursing note and vitals reviewed.      Vents:  Vent Mode: Spont (04/07/18 0822)  Ventilator Initiated: Yes (04/05/18 1930)  Set Rate: 0 bmp (04/07/18 0822)  Vt Set: 500 mL (04/07/18 0822)  Pressure Support: 6 cmH20 (04/07/18 0822)  PEEP/CPAP: 5 cmH20 (04/07/18 0822)  Oxygen Concentration (%): 30 (04/07/18 0822)  Peak Airway Pressure: 13 cmH2O (04/07/18 0822)  Plateau Pressure: 0 cmH20 (04/07/18 0822)  Total Ve: 10.8 mL (04/07/18 0822)  F/VT Ratio<105 (RSBI): (!) 58.06 (04/07/18 0822)    Lines/Drains/Airways     Central Venous Catheter Line                 Percutaneous Central Line Insertion/Assessment - triple lumen  04/03/18 2045 right internal jugular 3 days          Drain                 Urethral Catheter 04/03/18 16 Fr. 4 days         NG/OG Tube 04/05/18 1915 orogastric 1 day          Arterial Line                 Arterial Line 04/04/18 0825 Left Radial 3 days          Peripheral Intravenous Line                 Peripheral IV - Single Lumen 04/03/18 Left Antecubital 4 days         Peripheral IV - Single Lumen 04/03/18 Right Antecubital 4 days                Significant Labs:    CBC/Anemia Profile:    Recent Labs  Lab 04/05/18  1804 04/06/18  0326 04/07/18  0500   WBC 16.25* 18.82* 14.79*   HGB 8.9* 9.3* 8.7*   HCT 25.6* 27.7* 26.7*   PLT 77* 78* 43*   MCV 91 91 93   RDW 18.8* 19.2* 19.2*        Chemistries:    Recent Labs  Lab 04/06/18  0326  04/06/18  1347 04/06/18  2205 04/07/18  0500     < > 136 135* 135*  135*   K 3.6  < > 3.6 3.8 3.8  3.8     < > 102 101 100  100   CO2 15*  < > 17* 17* 19*  19*   BUN 2*  < > 2* 2* 2*  2*   CREATININE 0.8  < > 0.8 0.8 0.9  0.9   CALCIUM 5.7*  < > 5.8* 5.7* 5.6*  5.6*   ALBUMIN 1.5*  --   --   --  1.3*   PROT 5.2*  --   --   --  4.7*   BILITOT 4.6*  --   --   --  5.3*   ALKPHOS 95  --   --   --  97   ALT 57*  --   --   --  48*   *  --   --   --  87*   MG  --   < > 2.3 2.0 1.9   PHOS  --   < >  2.5* 2.4* 2.5*   < > = values in this interval not displayed.    ABGs:   Recent Labs  Lab 04/07/18  0509   PH 7.368   PCO2 41.9   HCO3 24.1   POCSATURATED 99   BE -1     Coagulation:   Recent Labs  Lab 04/05/18  1105   INR 1.2     Lactic Acid:   Recent Labs  Lab 04/06/18  2205   LACTATE 6.1*         Significant Imaging:    CXR:  Comparison yesterday's exam.  Endotracheal tube, nasogastric tube are in good position.Heart is stable in size. The lungs are relatively clear.  There is improved aeration of the left lower lobe.

## 2018-04-07 NOTE — ASSESSMENT & PLAN NOTE
Encourage cessation once clinically stable. Monitor for ETOH withdrawal once extubated. Electrolyte replacement

## 2018-04-08 PROBLEM — E83.51 HYPOCALCEMIA: Status: ACTIVE | Noted: 2018-01-01

## 2018-04-08 PROBLEM — E87.20 LACTIC ACID ACIDOSIS: Status: ACTIVE | Noted: 2018-01-01

## 2018-04-08 PROBLEM — E78.1 PURE HYPERGLYCERIDEMIA: Status: ACTIVE | Noted: 2018-01-01

## 2018-04-08 PROBLEM — D69.6 THROMBOCYTOPENIA: Status: ACTIVE | Noted: 2018-01-01

## 2018-04-08 NOTE — PROGRESS NOTES
Notified by bedside RN patient acutely unresponsive.  Found patient apnic in asystole.  Code called.  2 rounds CPR with 1 Epi and 1 CaCl given and Intubated.  ROSC attained.  Placed on vent and arterial line placed.  Discussed care with Dr. Ram and Dr. Harkins.    CHANDRIKA Lowe [facility]

## 2018-04-08 NOTE — PROCEDURES
"Rafael Duron is a 22 y.o. female patient.    Temp: 98.3 °F (36.8 °C) (04/08/18 0305)  Pulse: (!) 124 (04/08/18 0730)  Resp: (!) 21 (04/08/18 0730)  BP: (!) 110/52 (04/08/18 0730)  SpO2: 100 % (04/08/18 0730)  Weight: (!) 137 kg (302 lb 0.5 oz) (04/08/18 0350)  Height: 5' 6" (167.6 cm) (04/06/18 0530)       Arterial Line  Date/Time: 4/8/2018 7:30 AM  Location procedure was performed: [facility] INTENSIVE CARE UNIT  Performed by: VINNY LOWE  Authorized by: VINNY LOWE   Pre-op Diagnosis: shock  Post-operative diagnosis: shock  Consent Done: Emergent Situation  Preparation: Patient was prepped and draped in the usual sterile fashion.  Indications: multiple ABGs, respiratory failure and hemodynamic monitoring  Location: left brachial  Demarco's test normal: yes  Needle gauge: 20  Number of attempts: 2  Complications: No  Estimated blood loss (mL): 2  Specimens: No  Implants: No  Post-procedure: line sutured and dressing applied  Post-procedure CMS: unchanged  Patient tolerance: Patient tolerated the procedure well with no immediate complications          Vinny Lowe  4/8/2018  "

## 2018-04-08 NOTE — ASSESSMENT & PLAN NOTE
Could be related to hepatic steatosis . Will rule out infectious etiology . CT scan of the abdomen showed distended gall bladder-will continue vanco/zosyn.  Follow cultures.  Prognosis is guarded.

## 2018-04-08 NOTE — ASSESSMENT & PLAN NOTE
-Patient with episode of bradycardia that progressed to asystole this AM, with successful ROSC and intubation  -Stable at present time  -Prolonged QT noted on baseline EKG; ? If arrest precipitated by electrolyte abnormalities due to excessive ETOH usage  -Keep K>4; Mg> 2  -Check 2D echo  -Would rule out PE as cause as well given underlying co-morbidities and recent surgery

## 2018-04-08 NOTE — PROGRESS NOTES
1905 Client tolerating extubation so far.  O2 sats via pulse oximetry stable on room air.  Client oriented x4.  Multiple attempts made to posture client upright to decrease work of breathing, but client continually slumps down in bed and off of wedge.  Blood pressure borderline with MAP 60's, but remaining stable.

## 2018-04-08 NOTE — PROGRESS NOTES
[facility]  Critical Care Medicine  Progress Note    Patient Name: Rafael Duron  MRN: [id number]  Admission Date: 4/3/2018  Hospital Length of Stay: 5 days  Code Status: Full Code  Attending Provider: Ed Ram MD  Primary Care Provider: Herman Cowart MD   Principal Problem: Septic shock    Subjective:     HPI:  Ms Duron is a 23 yo obese BF with a PMH of HTN, gestational DM and HTN who presented to [facility] ED in [place], [place] on  with complaint of SOB and cough with known pregnancy.  OB US revealed no heart tones and she is also  with second trimester fetal demise estimated 22 week for which she passed with retained placenta.  After admission to ICU she had seizure activity with , K+ 1.9 and Bicarb 11.  She also had etoh level 268 and reportedly had been drinking etoh w/ honey heavily for several days.  She required intubation for airway protection per records and OB was unable to remove placenta manually and patient had to be taken to OR.  She received 2 liters IVF and Hgb dropped to 7.4 and was transfused 2 units PRBCs.  Vaginal bleeding was scant per records.  Yesterday she had temp 101.5 Ax, .  She as transferred to [facility] ICU yesterday evening for higher level of care.        Hospital/ICU Course:   - This AM she is sedated on vent on Levophed infusion spiking temp 101.9 with WBC 20, LA 6.3, UA+, electrolyte imbalance and Glucose 268     - SAT and SBT done this am, pt awake and following commands. She was successfully extubated to nasal cannula. Remains tachycardic with HR 140s and febrile with temp 103 overnight. WBC 18 and lactic acidosis improving to 4.8. Continuing to aggressively replace electrolytes.     - Over night patient had episode with bradycardia and hypotension during which she became unresponsive and agonally breathing. She responded with IVF and bicarb and was restarted on pressors. She experienced a similar episode  again last night, during which she was intubated. Vent settings were reviewed and adjusted this am. No more bradycardic/hypotensive episodes since last night. Remains on pressors. Leukocystosis unimproved with worsening bandemia. Urine and blood cx NGTD. Sputum cx growing staph and pseudomonas however CXR this am is unremarkable.    04/7 - Tolerating SAT and SBT. Meets extubating criteria. Acidosis improving. Leukocytosis improving. Remains on bicarb gtt.     04/8 - Extubated successfully yesterday. Asystolic arrest this AM.  ACLS initiated. Re - intubated 2 rounds CPR with Iv epinephrine X 1  and IV CaCl X 1 given. ROSC attained.   A - line placed.       Interval History:  Seen and examined at bedside. Hospital chart reviewed. Extubated successfully yesterday. Asystolic arrest this AM.  ACLS initiated. Re - intubated 2 rounds CPR with Iv epinephrine X 1  and IV CaCl X 1 given. ROSC attained.  A - line placed.        Review of Systems   Unable to perform ROS: Intubated       Scheduled Meds:   chlorhexidine  15 mL Mouth/Throat BID    famotidine (PF)  20 mg Intravenous BID    folic acid  1 mg Oral Daily    furosemide  20 mg Intravenous Q8H    multivitamin  1 tablet Oral Daily    nicotine  1 patch Transdermal Daily    piperacillin-tazobactam (ZOSYN) IVPB  4.5 g Intravenous Q8H    polyethylene glycol  17 g Oral Daily    thiamine  100 mg Oral Daily    vancomycin (VANCOCIN) IVPB  1,000 mg Intravenous Q12H     Continuous Infusions:   sodium chloride 0.9% 100 mL/hr (04/08/18 1155)    fentanyl 20 mL/hr at 04/08/18 1300    norepinephrine bitartrate-D5W 0.18 mcg/kg/min (04/08/18 1347)     PRN Meds:.sodium chloride, acetaminophen, albuterol-ipratropium 2.5mg-0.5mg/3mL, bisacodyl, dextrose 50%, glucagon (human recombinant), insulin aspart U-100, lorazepam, magnesium sulfate IVPB, magnesium sulfate IVPB, ondansetron, pneumoc 13-bobby conj-dip cr(PF), potassium chloride **AND** potassium chloride **AND** potassium  chloride, sodium chloride 0.9%, sodium phosphate IVPB, sodium phosphate IVPB, sodium phosphate IVPB       Objective:     Vital Signs (Most Recent):  Temp: 99.9 °F (37.7 °C) (04/08/18 1115)  Pulse: (!) 126 (04/08/18 1300)  Resp: (!) 22 (04/08/18 1300)  BP: (!) 110/52 (04/08/18 0730)  SpO2: 99 % (04/08/18 1300) Vital Signs (24h Range):  Temp:  [98.3 °F (36.8 °C)-99.9 °F (37.7 °C)] 99.9 °F (37.7 °C)  Pulse:  [] 126  Resp:  [7-34] 22  SpO2:  [92 %-100 %] 99 %  BP: ()/(11-97) 110/52     Weight: (!) 137 kg (302 lb 0.5 oz)  Body mass index is 48.75 kg/m².      Intake/Output Summary (Last 24 hours) at 04/08/18 1346  Last data filed at 04/08/18 1300   Gross per 24 hour   Intake          1897.91 ml   Output             2700 ml   Net          -802.09 ml       Physical Exam   Constitutional: She appears well-developed and well-nourished. No distress. She is intubated.   HENT:   Head: Normocephalic and atraumatic.   Mouth/Throat: Oropharynx is clear and moist.   Eyes: Conjunctivae and EOM are normal. Pupils are equal, round, and reactive to light.   Neck: Neck supple. No JVD present. No thyromegaly present.   Cardiovascular: Normal rate and regular rhythm.  Exam reveals no gallop and no friction rub.    No murmur heard.  Pulmonary/Chest: Effort normal and breath sounds normal. She is intubated. She has no wheezes. She has no rales.   Abdominal: Soft. Bowel sounds are normal. She exhibits distension. There is tenderness. There is no rebound and no guarding.   Upper abdomen firm with suprapubic tenderness.   Musculoskeletal: Normal range of motion. She exhibits no edema or deformity.   Lymphadenopathy:     She has no cervical adenopathy.   Neurological: She has normal reflexes. She exhibits normal muscle tone.   Skin: Skin is warm and dry. Capillary refill takes less than 2 seconds. No rash noted.   Psychiatric: She has a normal mood and affect.   Nursing note and vitals reviewed.      Vents:  Vent Mode: A/C (04/08/18  1321)  Ventilator Initiated: Yes (04/05/18 1930)  Set Rate: 18 bmp (04/08/18 1321)  Vt Set: 400 mL (04/08/18 1321)  Pressure Support: 0 cmH20 (04/08/18 1321)  PEEP/CPAP: 5 cmH20 (04/08/18 1321)  Oxygen Concentration (%): 35 (04/08/18 1321)  Peak Airway Pressure: 16 cmH2O (04/08/18 1321)  Plateau Pressure: 0 cmH20 (04/08/18 1321)  Total Ve: 9.64 mL (04/08/18 1321)  F/VT Ratio<105 (RSBI): (!) 41.86 (04/08/18 1101)    Lines/Drains/Airways     Central Venous Catheter Line                 Percutaneous Central Line Insertion/Assessment - triple lumen  04/03/18 2045 right internal jugular 4 days          Drain                 Urethral Catheter 04/03/18 16 Fr. 5 days         NG/OG Tube 04/08/18 0730 Trimble sump Right mouth less than 1 day         Rectal Tube 04/08/18 0730 fecal management system less than 1 day          Airway                 Airway - Non-Surgical 04/08/18 0715 Endotracheal Tube less than 1 day          Arterial Line                 Arterial Line 04/08/18 0720 Left Brachial less than 1 day                Significant Labs:    CBC/Anemia Profile:    Recent Labs  Lab 04/07/18  0500 04/08/18  0343 04/08/18  0712   WBC 14.79* 12.09 22.39*   HGB 8.7* 8.5* 8.4*   HCT 26.7* 26.3* 26.7*   PLT 43* 27* 29*   MCV 93 93 97   RDW 19.2* 18.5* 18.8*        Chemistries:    Recent Labs  Lab 04/07/18  0500  04/07/18  2100 04/08/18  0343 04/08/18  0712   *  135*  < > 140 140 141   K 3.8  3.8  < > 3.7 3.8 5.0     100  < > 101 100 102   CO2 19*  19*  < > 25 25 18*   BUN 2*  2*  < > 2* 2* 3*   CREATININE 0.9  0.9  < > 1.0 1.1 1.3   CALCIUM 5.6*  5.6*  < > 6.2* 6.4* 8.5*   ALBUMIN 1.3*  --   --  1.5* 1.5*   PROT 4.7*  --   --  4.8* 4.8*   BILITOT 5.3*  --   --  6.2* 6.5*   ALKPHOS 97  --   --  91 97   ALT 48*  --   --  50* 53*   AST 87*  --   --  100* 118*   MG 1.9  < > 1.9 1.8 2.6   PHOS 2.5*  < > 2.3* 3.1 5.0*   < > = values in this interval not displayed.    ABGs:     Recent Labs  Lab 04/08/18  0738   PH  7.255*   PCO2 48.4*   HCO3 21.5*   POCSATURATED 98   BE -6     Coagulation:     Recent Labs  Lab 04/08/18  0712   INR 1.2   APTT 39.9*     Lactic Acid:     Recent Labs  Lab 04/06/18  2205 04/08/18  0712   LACTATE 6.1* >12.0*         Significant Imaging:    CXR:  Comparison yesterday's exam.  Endotracheal tube, nasogastric tube are in good position.Heart is stable in size. The lungs are relatively clear.  There is improved aeration of the left lower lobe.      US Lower Extremity Veins Bilateral:  No sonographic evidence of deep venous thrombosis.    Assessment/Plan:     Neuro   Seizure    No seizure activity since admit. Correct electrolyte imbalance and acidosis. Close neuro monitoring in ICU        Psychiatric   ETOH abuse     Monitor for ETOH withdrawal. Electrolyte replacement. Thiamine, Folate, MVI.              Pulmonary   Pneumonia of both lower lobes due to methicillin resistant Staphylococcus aureus (MRSA)    Sputum cultures positive for MRSA. IV Vancomycin. Continue IV Zosyn.        Acute hypoxemic respiratory failure    Re intubated this AM s/p asystolic arrest.Mechanical ventilation.  Ventilator settings reviewed and adjusted to optimize gas exchange. Daily wake up and breathe trials.  Titrate FIO2 to keep SAO2 > 92%. Bronchodilators per protocol.            Cardiac/Vascular    Asystolic arrest this AM. ROSC after ACLS.  IV Norepinephrine. Monitor hemodynamics. MAP goal of 60 mmHg.        Asystole    Asystolic arrest this AM.  ACLS initiated. Re - intubated 2 rounds CPR with Iv epinephrine X 1  and IV CaCl X 1 given. ROSC attained. Cardiology consulted. CTPA to r/o Acute massive PE.           Hypertriglyceridemia    Propofol discontinued. Triglycerides trending down. Monitor and trend.           Renal/    Oliguric. Nephrology consult. Monitor BUN / Cr. Montor urine output.        Acute urinary tract infection    ZOSYN + VANCOMYCIN.        Metabolic acidosis    Lactic acidosis. Multifactorial.  Bicarb  infusion. Monitor and replete electrolytes as needed.           Electrolyte imbalance    Monitor and replete electrolytes as needed.         Hyponatremia    Resolved, cont to monitor with daily CMP        ID   * Septic shock      [x] Respiratory rate >20 breaths/min or PaCO2 <32 mmHg   [x]  WBC >12,000 cells/mm3, <4000 cells/mm3, or >10 percent immature (band) forms  [] Heart rate >90 beats/min   [] Temperature >38ºC or <36ºC    Microbiology: Panculture for temperatures greater than 101 degrees F. Follow cultures.   Oxygenation: Ventilator liberation. Supplemental oxygen by nasal canula. Keep SAO2 > = 92%  Hemodynamics: IV norepinephrine. IV crystalloids. Keep MAP > = 65mmHg  Source control: IV Zosyn, IV Vancomycin.          Hematology    Monitor hemogram. Transfuse as needed.        Oncology   Acute blood loss anemia    Total 4 units transfused. Monitor hemogram. Transfuse as needed.           Endocrine   Hyperglycemia, unspecified    Stable Hyperglycemia: INSULIN ASPART. Moderate correction dose.  Blood glucose target 100 - 180 mg/dl            GI   Hepatomegaly    ? Alcoholic liver disease / toxic hepatitis ( DILI). Mainly cholestatic picture.  Monitoring liver enzymes. LFTs down trending.  Discriminant function = 7. Hepatitis acute panel unremarkable. Screen for autoimmune hepatitis. Avoid the use of hepatotoxic meds. GI / Hepatology consult.            Shock liver    Daily CMP to monitor liver enzymes  Hep panel negative  Vasopressors to maintain perfusion        Obstetric   Retained products of conception with hemorrhage    U/S done 4/5 negative for retained products in uterus        Other   Hyperbilirubinemia    Etioloy unclear.  Likely related to alcoholic liver disease. Monitor and trend.         History of IUFD    S/P D & C. No retained products of conception on ultrasound.            Critical Care Daily Checklist:    A: Awake: RASS Goal/Actual Goal: RASS Goal: -1-->drowsy  Actual: Lim Agitation  Sedation Scale (RASS): Light sedation   B: Spontaneous Breathing Trial Performed? Spon. Breathing Trial Initiated?: Initiated (04/07/18 0800)   C: SAT & SBT Coordinated?  N/A                  D: Delirium: CAM-ICU  N/A   E: Early Mobility Performed? Yes   F: Feeding Goal: Goals: 1. Initiate nutrition within 72 hrs. 2. Meet 85- 100 % of estimated nutritional needs without s/s of GI distress   Status: Nutrition Goal Status: new   Current Diet Order   Procedures    Diet NPO      AS: Analgesia/Sedation IV FENTANYL, LORATADINE   T: Thromboembolic Prophylaxis ENOXAPARIN   H: HOB > 300 Yes   U: Stress Ulcer Prophylaxis (if needed) FAMOTIDINE   G: Glucose Control INSULIN ASPART - Moderate correction dose.   B: Bowel Function Stool Occurrence: 1   I: Indwelling Catheter (Lines & Rodriguez) Necessity YES   D: De-escalation of Antimicrobials/Pharmacotherapies YES    Plan for the day/ETD Continue current    Code Status:  Family/Goals of Care: Full Code  home     Critical Care Time: 90  Minutes    Critical secondary to Patient has a condition that poses threat to life and bodily function: Asystolic cardiac arrest, Acute on chronic respiratory failure, Severe sepsis.    Patient is currently on drug therapy requiring intensive monitoring for toxicity: IV Fentanyl, IV norepinephrine.       Critical care was time spent personally by me on the following activities: development of treatment plan with patient or surrogate and bedside caregivers, discussions with consultants, evaluation of patient's response to treatment, examination of patient, ordering and performing treatments and interventions, ordering and review of laboratory studies, ordering and review of radiographic studies, pulse oximetry, re-evaluation of patient's condition. This critical care time did not overlap with that of any other provider or involve time for any procedures.     Jass Harkins MD  Critical Care Medicine  [facility]

## 2018-04-08 NOTE — ASSESSMENT & PLAN NOTE
Asystolic arrest this AM.  ACLS initiated. Re - intubated 2 rounds CPR with Iv epinephrine X 1  and IV CaCl X 1 given. ROSC attained. Cardiology consulted. CTPA to r/o Acute massive PE.

## 2018-04-08 NOTE — NURSING
0657 At bedside, pt diaphoretic, breathing labored, pale, heart rate went from 80's to 40's.    0658 LYNSEY Pineda called to bedside    0659 Pt went asystole. CPR began immediately. ROSC achieved    S/P code. Levo for BP and Fentanyl for sedation started. Pt intubated, r brachial art line place, OGT, and flexiseal placed.    0900 UOP low (5 mL/hr) Np pineda notified    1000 Brother updated on patient's status     1130 UOP remains low. BP not very responsive to levo. CVP 4. Gave 1 L bolus and 25 Albumin    1330 PT brought to CT scan    1600 UOP remains low. MD Harkins notified. Vasopressin added and lasix given. Transduced bladder pressure of 23. MD Harkins instructed to not start tube feedings, because surgery may come by later    1700 UOP picked up and BP responding well to vaso    1745 Plt of 25. MD Ram instructed not to transfuse until plt count reach 15

## 2018-04-08 NOTE — EICU
eICU Note :    Called by the [facility] eRN:    Problem: Low Calcium 6.2 with albumin 1.6    Pertinent History and labs reviewed :22-year-old obese female with past medical history significant for  Hypertension, gestational diabetes with sepsis and retained products of placenta       Treatment /Intervention given:Calcium Gluconate 1 gm IVPB given        Claudia Yates M.D  eICU Physician  4:47 AM  Decreased platelets from 43--27, no signs of over bleeding , will continue to monitor.

## 2018-04-08 NOTE — PLAN OF CARE
Problem: Patient Care Overview  Goal: Plan of Care Review  Outcome: Ongoing (interventions implemented as appropriate)  NEURO: Client oriented overnight x4, however would frequently ask for kids.  CARDIAC: Sinus tach on telemetry monitor overnight, BP marginal, but no MAP less than 60 and urine output adequate.  PULMONARY: O2 sats >94 % on room air overnight and resp even and unlabored.  : low dose diuretic therapy being administered with appx 2400mL the last 24 hours, but still about 22 L positive since admit.  GI: frequent creamy stools overnight, but not loose enough for flexiseal yet.      0705 day nurse Aracely CASTRO noticed pt in resp distress and called NP Nawaf to room.

## 2018-04-08 NOTE — PROGRESS NOTES
Critical Care Progress Note Addendum:    Patient seen and evaluated at bedside  Hemodynamic deterioration reported by RN.   Progressive oliguria reported by RN  Mechanically ventilated = stable settings.   CTA chest = No PE  CT abdomen / Pelvis : Thick walled gall bladder, anterior abdominal wall edema, pelvic ascitis.   Progressive oliguria reports by RN  Intra abdominal  ( Bladder )pressure 23 - 24 cmH20      ASSESSMENT/PLAN:     I have reviewed all labs and imaging studies and compared to previous results. I have also discussed labs with all the teams in the medical care of the patient and my plan is outlined below       Problem   Asystole   Hyperbilirubinemia   Pneumonia of Both Lower Lobes Due to Methicillin Resistant Staphylococcus Aureus (Mrsa)   Hepatomegaly   History of Iufd   Metabolic Acidosis   Acute Urinary Tract Infection   Hyperglycemia, Unspecified   Acute Hypoxemic Respiratory Failure   Seizure   Electrolyte Imbalance   Etoh Abuse   Septic Shock   Retained Products of Conception With Hemorrhage   Acute Blood Loss Anemia     Patient is currently on drug therapy requiring intensive monitoring for toxicity:        sodium chloride 0.9% 100 mL/hr (04/08/18 1155)    fentanyl 20 mL/hr at 04/08/18 1300    norepinephrine bitartrate-D5W 0.24 mcg/kg/min (04/08/18 1614)    vasopressin (PITRESSIN) infusion 0.04 Units/min (04/08/18 1614)           PLAN:    1. Neuro:   Sedated  Normal strength and tone. No focal numbness or weakness  Motor:grossly normal Sedation adequate for patient comfort   RASS -3  moderate sedation, movement or eye opening; no eye contact ICU neuro monitoring.    2. Pulmonary:    Stable Mechanical Ventilation . FIO2 titrate to > 92 %Ventilator settings reviewed and adjusted      3. Cardiac:   Deteriorating  tachycardic   regular rate and rhythm Monitor hemodynamics and  monitor for dysrhythmias MAP goal of  65 mmHg.  IV Norepinephrine. ADD IV VASOPRESSIN NON TITRATING.  CARDIAC  ECHO    4. Renal:    Deteriorating: OLIGURIA.  Renal failure:   FLUID CHALLENGE. IV FUROSEMIDE.  Rodriguez in place, monitor I/Os. NEPHROLOGY CONSULT    5. Infectious Disease:   Stable   Monitor fever curve, sepsis surveillance  and ID FOLLOWING.  Continue current antibiotic(s)    6. Hematology/Oncology:   Deteriorating THROMBOCYTOPENIA. CHECK CBC Conservative transfusion strategy and monitor for SS of occult or overt bleeding      monitor for bleeding    7. Endocrine: Stable EUGLYCEMIA. INSULIN ASPART moderate correction dose. Blood glucose target 100 - 180 mg/dl.    8. Fluids/Electrolytes/Nutrition/GI: Monitor and replete electrolytes.  GENERAL SURGERY CONSULTED requests a gall bladder ultrasound.  Monitor and replete electrolytes. Maintain negative fluid balance Tube feeds held pending general surgery evaluation.         Additional Critical Care Time greater than: 30 Minutes    Critical care was time spent personally by me on the following activities: development of treatment plan with patient or surrogate and bedside caregivers, discussions with consultants, evaluation of patient's response to treatment, examination of patient, ordering and performing treatments and interventions, ordering and review of laboratory studies, ordering and review of radiographic studies, pulse oximetry, re-evaluation of patient's condition.  This critical care time did not overlap with that of any other provider or involve time for any procedures.

## 2018-04-08 NOTE — ASSESSMENT & PLAN NOTE
Re intubated this AM s/p asystolic arrest.Mechanical ventilation.  Ventilator settings reviewed and adjusted to optimize gas exchange. Daily wake up and breathe trials.  Titrate FIO2 to keep SAO2 > 92%. Bronchodilators per protocol.

## 2018-04-08 NOTE — ASSESSMENT & PLAN NOTE
Currently intubated.  No acute infiltrates seen on CXR  Continue IV antibiotics empirically.  Pulmonary consult.    04/07/2018- will wean off ventilator as tolerated.  She is more awake and alert today.  Sputum cultures showed MRSA and pseudomonas-she is on vanco/meropenem-will deescalate soon.       04/08/2018- she is now intubated after asystolic cardiac arrest .Will wean off as tolerated.

## 2018-04-08 NOTE — ASSESSMENT & PLAN NOTE
-Drinking heavily over past few weeks  -ETOH level 268 upon arrival, contributing to electrolyte abnormalities and hepatomegaly  -Cessation advised

## 2018-04-08 NOTE — ASSESSMENT & PLAN NOTE
Asystolic arrest this AM. ROSC after ACLS.  IV Norepinephrine. Monitor hemodynamics. MAP goal of 60 mmHg.

## 2018-04-08 NOTE — ASSESSMENT & PLAN NOTE
Source likely fetal demise of unknown duration and retained products of concepttion placenta, removed surgically at outside hospital.  Continue IV fluids.  Follow up on blood and urine cultures.  Lactic acid improved to 5 from 7.  OB Gyn consult - Dr. Cardona.    04/08/2018- will continue vancomycin/zosyn ,follow repeat blood cultures .  Lactic acid is more than 12.  I called [facility] and discussed with the lab about her cultures-blood cultures done on 04/03-neg but urine culture -was positive for klebsiella -she is faxing the results. She will call [facility] to get the results.

## 2018-04-08 NOTE — SUBJECTIVE & OBJECTIVE
Interval History: 04/08/2018.- she was extubated yesterday and was re intubated today after an episode of asystolic cardiac  arrest . ACLS was initiated and she had 2 rounds of CPR with ROSC.   She had CTA of the chest and CT scan of the abdomen -did not show PE but showed markedly distended gall baldder. She remains febrile.       Review of Systems   Unable to perform ROS: Intubated     Objective:     Vital Signs (Most Recent):  Temp: 99.9 °F (37.7 °C) (04/08/18 1115)  Pulse: (!) 118 (04/08/18 1635)  Resp: 14 (04/08/18 1635)  BP: (!) 103/43 (04/08/18 1615)  SpO2: 96 % (04/08/18 1635) Vital Signs (24h Range):  Temp:  [98.3 °F (36.8 °C)-99.9 °F (37.7 °C)] 99.9 °F (37.7 °C)  Pulse:  [] 118  Resp:  [7-34] 14  SpO2:  [92 %-100 %] 96 %  BP: ()/(11-97) 103/43     Weight: (!) 137 kg (302 lb 0.5 oz)  Body mass index is 48.75 kg/m².    Intake/Output Summary (Last 24 hours) at 04/08/18 1646  Last data filed at 04/08/18 1300   Gross per 24 hour   Intake           789.58 ml   Output             1200 ml   Net          -410.42 ml      Physical Exam   Constitutional: She appears well-developed and well-nourished. No distress.   Obese    HENT:   Head: Normocephalic and atraumatic.   Mouth/Throat: Oropharynx is clear and moist.   Eyes: Conjunctivae and EOM are normal. Pupils are equal, round, and reactive to light.   Neck: Neck supple. No JVD present. No thyromegaly present.   Cardiovascular: Normal rate and regular rhythm.  Exam reveals no gallop and no friction rub.    No murmur heard.  Pulmonary/Chest: Effort normal and breath sounds normal. She has no wheezes. She has no rales.   Abdominal: Soft. Bowel sounds are normal. She exhibits no distension. There is no tenderness. There is no rebound and no guarding.   Musculoskeletal: Normal range of motion. She exhibits no edema or deformity.   Lymphadenopathy:     She has no cervical adenopathy.   Neurological: She has normal reflexes.   Intubated and sedated.   Skin: Skin  is warm and dry. No rash noted.   Nursing note and vitals reviewed.      Significant Labs:   Blood Culture: No results for input(s): LABBLOO in the last 48 hours.  BMP:   Recent Labs  Lab 04/08/18  1458         K 4.0   CL 99   CO2 22*   BUN 3*   CREATININE 1.4   CALCIUM 6.5*   MG 1.9     All pertinent labs within the past 24 hours have been reviewed.    Significant Imaging: I have reviewed and interpreted all pertinent imaging results/findings within the past 24 hours.

## 2018-04-08 NOTE — ASSESSMENT & PLAN NOTE
Source likely fetal demise of unknown duration and retained products of concepttion placenta, removed surgically at outside hospital.  Continue IV fluids.  Follow up on blood and urine cultures.  Lactic acid improved to 5 from 7.  OB Gyn consult - Dr. Cardona.    04/08/2018- will continue vancomycin/zosyn ,follow repeat blood cultures .  Lactic acid is more than 12.  I called [facility] and discussed with the lab about her cultures-blood cultures done on 04/03-neg but urine culture -was positive for klebsiella -she is faxing the results. She will call [facility] to get the results.

## 2018-04-08 NOTE — SUBJECTIVE & OBJECTIVE
No current facility-administered medications on file prior to encounter.      No current outpatient prescriptions on file prior to encounter.       Review of patient's allergies indicates:  No Known Allergies    Past Medical History:   Diagnosis Date    Gestational diabetes     Hypertension      History reviewed. No pertinent surgical history.  Family History     Problem Relation (Age of Onset)    Diabetes Mother, Father, Sister    Heart disease Mother        Social History Main Topics    Smoking status: Never Smoker    Smokeless tobacco: Never Used    Alcohol use Yes    Drug use: No    Sexual activity: Not on file     Review of Systems   Unable to perform ROS: Intubated     Objective:     Vital Signs (Most Recent):  Temp: 98.3 °F (36.8 °C) (04/08/18 0305)  Pulse: (!) 112 (04/08/18 0826)  Resp: (!) 21 (04/08/18 0826)  BP: (!) 110/52 (04/08/18 0730)  SpO2: 100 % (04/08/18 0826) Vital Signs (24h Range):  Temp:  [98.3 °F (36.8 °C)-99.4 °F (37.4 °C)] 98.3 °F (36.8 °C)  Pulse:  [] 112  Resp:  [7-34] 21  SpO2:  [92 %-100 %] 100 %  BP: ()/(11-91) 110/52     Weight: (!) 137 kg (302 lb 0.5 oz)  Body mass index is 48.75 kg/m².    Physical Exam   Constitutional: She is sedated and intubated.   Obese   Neck: Trachea normal. No tracheal deviation present.   Pulmonary/Chest: She is intubated.   Abdominal: She exhibits distension.       Significant Labs:  CBC:   Recent Labs  Lab 04/08/18  0712   WBC 22.39*   RBC 2.76*   HGB 8.4*   HCT 26.7*   PLT 29*   MCV 97   MCH 30.4   MCHC 31.5*       Significant Diagnostics:  I have reviewed all pertinent imaging results/findings within the past 24 hours.

## 2018-04-08 NOTE — PROCEDURES
"Rafael Duron is a 22 y.o. female patient.    Temp: 98.3 °F (36.8 °C) (04/08/18 0305)  Pulse: (!) 124 (04/08/18 0730)  Resp: (!) 21 (04/08/18 0730)  BP: (!) 110/52 (04/08/18 0730)  SpO2: 100 % (04/08/18 0730)  Weight: (!) 137 kg (302 lb 0.5 oz) (04/08/18 0350)  Height: 5' 6" (167.6 cm) (04/06/18 0530)       Intubation  Date/Time: 4/8/2018 7:00 AM  Location procedure was performed: [facility] INTENSIVE CARE UNIT  Performed by: VINNY LOWE  Authorized by: VINNY LOWE   Pre-operative diagnosis: cardiopulm arrest  Post-operative diagnosis: cardiopulm arrest  Consent Done: Emergent Situation  Indications: respiratory failure  Intubation method: oral  Patient status: unconscious  Preoxygenation: bag valve mask  Paralytic: none  Laryngoscope size: Mac 4  Tube size: 8.0 mm  Tube type: cuffed  Number of attempts: 1  Cricoid pressure: no  Cords visualized: yes  Post-procedure assessment: chest rise and CO2 detector  Breath sounds: rales/crackles and equal and absent over the epigastrium  Cuff inflated: yes  ETT to lip: 22 cm  Tube secured with: umbilical tape  Chest x-ray interpreted by me.  Chest x-ray findings: endotracheal tube in appropriate position  Patient tolerance: Patient tolerated the procedure well with no immediate complications  Complications: No  Estimated blood loss (mL): 0  Specimens: No  Implants: No          Vinny Lowe  4/8/2018  "
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
## 2018-04-08 NOTE — ASSESSMENT & PLAN NOTE
Consult for tracheostomy.  Trach will be technically difficult due to obesity, may need to do in conjunction with ENT.    Platelet count will also need to be >50 for procedure.
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
## 2018-04-08 NOTE — CONSULTS
[facility]  Cardiology  Consult Note    Patient Name: Rafael Duron  MRN: [id number]  Admission Date: 4/3/2018  Hospital Length of Stay: 5 days  Code Status: Full Code   Attending Provider: Vinny Ram MD   Consulting Provider: Madison Boucher PA-C  Primary Care Physician: Herman Cowart MD  Principal Problem:Septic shock    Patient information was obtained from patient, past medical records and ER records.     Inpatient consult to Cardiology  Consult performed by: MADISON BOUCHER.  Consult ordered by: VINNY RAM        Subjective:     Chief Complaint:  Sepsis     HPI:   HPI obtained from chart as patient was intubated at time of exam. No family present.    Ms. Duron is a 22 year old female patient with a PMHx of HTN, gestational DM who was transferred to [facility] from [facility] for further treatment and management of sepsis. Patient presented to hospital on 4/2 with chief complaint of SOB and cough with known pregnancy. OB US showed no fetal heart tones and fetus was subsequently delivered however patient had retained placenta. She was taken to OR for D+C, and post procedure experienced seizure like activity and was intubated and transferred to ICU. Labs at that time revealed hypomagnesia (1.9), ETOH of 268, and leukocytosis (WBC 16,000). After transfer, patient initially improved and was extubated however this AM she became bradycardia and arrested, requiring repeat intubation. Cardiology consulted to assist with management. Patient seen and examined today. Appears critically ill. Intubated, sedated. Chart reviewed. Troponin this AM 0.045. +Fever. Lactic acid > 12. Mg 2.6. Phosphorous 5.0. Ca 8.4, this AM (6.4 earlier this AM). Platelets 27,000. EKG reviewed, prolonged QT noted. 2D echo pending.     Review of chart indicates mother reports patient had been drinking heavily over the past few weeks due to depression/social issues.    Past Medical History:   Diagnosis  Date    Gestational diabetes     Hypertension        History reviewed. No pertinent surgical history.    Review of patient's allergies indicates:  No Known Allergies    No current facility-administered medications on file prior to encounter.      No current outpatient prescriptions on file prior to encounter.     Family History     Problem Relation (Age of Onset)    Diabetes Mother, Father, Sister    Heart disease Mother        Social History Main Topics    Smoking status: Never Smoker    Smokeless tobacco: Never Used    Alcohol use Yes    Drug use: No    Sexual activity: Not on file     Review of Systems   Unable to perform ROS: intubated   Constitution: Negative for decreased appetite.     Objective:     Vital Signs (Most Recent):  Temp: 98.3 °F (36.8 °C) (04/08/18 0305)  Pulse: (!) 120 (04/08/18 1101)  Resp: 18 (04/08/18 1101)  BP: (!) 110/52 (04/08/18 0730)  SpO2: 99 % (04/08/18 1101) Vital Signs (24h Range):  Temp:  [98.3 °F (36.8 °C)-99.1 °F (37.3 °C)] 98.3 °F (36.8 °C)  Pulse:  [] 120  Resp:  [7-34] 18  SpO2:  [92 %-100 %] 99 %  BP: ()/(11-91) 110/52     Weight: (!) 137 kg (302 lb 0.5 oz)  Body mass index is 48.75 kg/m².    SpO2: 99 %  O2 Device (Oxygen Therapy): ventilator      Intake/Output Summary (Last 24 hours) at 04/08/18 1122  Last data filed at 04/08/18 0556   Gross per 24 hour   Intake          1874.06 ml   Output             4025 ml   Net         -2150.94 ml       Lines/Drains/Airways     Central Venous Catheter Line                 Percutaneous Central Line Insertion/Assessment - triple lumen  04/03/18 2045 right internal jugular 4 days          Drain                 Urethral Catheter 04/03/18 16 Fr. 5 days          Airway                 Airway - Non-Surgical 04/08/18 0715 Endotracheal Tube less than 1 day          Arterial Line                 Arterial Line 04/08/18 0720 Left Brachial less than 1 day          Peripheral Intravenous Line                 Peripheral IV - Single  Lumen 04/03/18 Right Antecubital 5 days                Physical Exam   Constitutional:   Appears ill   HENT:   Head: Normocephalic and atraumatic.   Neck: Neck supple. No JVD present.   Cardiovascular: Regular rhythm and S1 normal.  Tachycardia present.    No murmur heard.  Pulmonary/Chest:   Coarse BS anteriorly   Abdominal:   Large palpable liver   Neurological:   Sedated   Skin: Skin is warm and dry.   Nursing note and vitals reviewed.      Significant Labs:   Blood Culture: No results for input(s): LABBLOO in the last 48 hours., CMP   Recent Labs  Lab 04/07/18  0500  04/07/18  2100 04/08/18  0343 04/08/18  0712   *  135*  < > 140 140 141   K 3.8  3.8  < > 3.7 3.8 5.0     100  < > 101 100 102   CO2 19*  19*  < > 25 25 18*   *  159*  < > 93 77 129*   BUN 2*  2*  < > 2* 2* 3*   CREATININE 0.9  0.9  < > 1.0 1.1 1.3   CALCIUM 5.6*  5.6*  < > 6.2* 6.4* 8.5*   PROT 4.7*  --   --  4.8* 4.8*   ALBUMIN 1.3*  --   --  1.5* 1.5*   BILITOT 5.3*  --   --  6.2* 6.5*   ALKPHOS 97  --   --  91 97   AST 87*  --   --  100* 118*   ALT 48*  --   --  50* 53*   ANIONGAP 16  16  < > 14 15 21*   ESTGFRAFRICA >60  >60  < > >60 >60 >60   EGFRNONAA >60  >60  < > >60 >60 58*   < > = values in this interval not displayed., CBC   Recent Labs  Lab 04/07/18  0500 04/08/18  0343 04/08/18  0712   WBC 14.79* 12.09 22.39*   HGB 8.7* 8.5* 8.4*   HCT 26.7* 26.3* 26.7*   PLT 43* 27* 29*   , INR   Recent Labs  Lab 04/08/18 0712   INR 1.2   , Lipid Panel   Recent Labs  Lab 04/07/18  0729   TRIG 695*   , Troponin   Recent Labs  Lab 04/08/18  0844   TROPONINI 0.045*    and All pertinent lab results from the last 24 hours have been reviewed.    Significant Imaging: Echocardiogram: 2D echo with color flow doppler: No results found for this or any previous visit., EKG: Reviewed and X-Ray: CXR: X-Ray Chest 1 View (CXR):   Results for orders placed or performed during the hospital encounter of 04/03/18   X-Ray Chest 1 View     Narrative    Exam: Portable chest xray    History:    Encounter for OG-tube placement.    Findings:     The exam compared to study performed at 7:17 AM.  Interval placement of an NG tube, the tip of which projects in the left upper abdomen.    Impression     See above.      Electronically signed by: VINNY CABRERA MD  Date:     04/08/18  Time:    11:17     and X-Ray Chest PA and Lateral (CXR): No results found for this visit on 04/03/18.    Assessment and Plan:   Critically ill patient who presents with septic shock and ETOH abuse. Arrested this AM and had to be re-intubated. Continue abx and supportive care. Check 2D echo. Keep electrolytes WNL. Rule out PE.    * Septic shock    -Sputum culture +MRSA and pseudomonas   -BC negative thus far  -Mgmt as per primary team  -Currently on abx  -Continue supportive care        Asystole    -Patient with episode of bradycardia that progressed to asystole this AM, with successful ROSC and intubation  -Stable at present time  -Prolonged QT noted on baseline EKG; ? If arrest precipitated by electrolyte abnormalities due to excessive ETOH usage  -Keep K>4; Mg> 2  -Check 2D echo  -Would rule out PE as cause as well given underlying co-morbidities and recent surgery        ETOH abuse    -Drinking heavily over past few weeks  -ETOH level 268 upon arrival, contributing to electrolyte abnormalities and hepatomegaly  -Cessation advised            VTE Risk Mitigation         Ordered     Place sequential compression device  Until discontinued      04/04/18 0559     IP VTE HIGH RISK PATIENT  Once      04/04/18 0559     Place sequential compression device  Until discontinued      04/03/18 1651          Thank you for your consult. I will follow-up with patient. Please contact us if you have any additional questions.    Madison Patterson PA-C  Cardiology   [facility]    Chart reviewed. Dr. Luna examined patient and agrees with plan as outlined above.

## 2018-04-08 NOTE — ASSESSMENT & PLAN NOTE
[x] Respiratory rate >20 breaths/min or PaCO2 <32 mmHg   [x]  WBC >12,000 cells/mm3, <4000 cells/mm3, or >10 percent immature (band) forms  [] Heart rate >90 beats/min   [] Temperature >38ºC or <36ºC    Microbiology: Panculture for temperatures greater than 101 degrees F. Follow cultures.   Oxygenation: Ventilator liberation. Supplemental oxygen by nasal canula. Keep SAO2 > = 92%  Hemodynamics: IV norepinephrine. IV crystalloids. Keep MAP > = 65mmHg  Source control: IV Zosyn, IV Vancomycin.

## 2018-04-08 NOTE — EICU
06:59 Called into room-pt in cardiac arrest-presenting rhythm asystole CPR in progress, see code blue documentation for events. ROSC achieved at 07:05, pt intubated per PATSY Zazueta NP. HR now 126

## 2018-04-08 NOTE — ASSESSMENT & PLAN NOTE
? Alcoholic liver disease / toxic hepatitis ( DILI). Mainly cholestatic picture.  Monitoring liver enzymes. LFTs down trending.  Discriminant function = 7. Hepatitis acute panel unremarkable. Screen for autoimmune hepatitis. Avoid the use of hepatotoxic meds. GI / Hepatology consult.

## 2018-04-08 NOTE — SUBJECTIVE & OBJECTIVE
Interval History:  Seen and examined at bedside. Hospital chart reviewed. Extubated successfully yesterday. Asystolic arrest this AM.  ACLS initiated. Re - intubated 2 rounds CPR with Iv epinephrine X 1  and IV CaCl X 1 given. ROSC attained.  A - line placed.        Review of Systems   Unable to perform ROS: Intubated       Scheduled Meds:   chlorhexidine  15 mL Mouth/Throat BID    famotidine (PF)  20 mg Intravenous BID    folic acid  1 mg Oral Daily    furosemide  20 mg Intravenous Q8H    multivitamin  1 tablet Oral Daily    nicotine  1 patch Transdermal Daily    piperacillin-tazobactam (ZOSYN) IVPB  4.5 g Intravenous Q8H    polyethylene glycol  17 g Oral Daily    thiamine  100 mg Oral Daily    vancomycin (VANCOCIN) IVPB  1,000 mg Intravenous Q12H     Continuous Infusions:   sodium chloride 0.9% 100 mL/hr (04/08/18 1155)    fentanyl 20 mL/hr at 04/08/18 1300    norepinephrine bitartrate-D5W 0.18 mcg/kg/min (04/08/18 1347)     PRN Meds:.sodium chloride, acetaminophen, albuterol-ipratropium 2.5mg-0.5mg/3mL, bisacodyl, dextrose 50%, glucagon (human recombinant), insulin aspart U-100, lorazepam, magnesium sulfate IVPB, magnesium sulfate IVPB, ondansetron, pneumoc 13-bobby conj-dip cr(PF), potassium chloride **AND** potassium chloride **AND** potassium chloride, sodium chloride 0.9%, sodium phosphate IVPB, sodium phosphate IVPB, sodium phosphate IVPB       Objective:     Vital Signs (Most Recent):  Temp: 99.9 °F (37.7 °C) (04/08/18 1115)  Pulse: (!) 126 (04/08/18 1300)  Resp: (!) 22 (04/08/18 1300)  BP: (!) 110/52 (04/08/18 0730)  SpO2: 99 % (04/08/18 1300) Vital Signs (24h Range):  Temp:  [98.3 °F (36.8 °C)-99.9 °F (37.7 °C)] 99.9 °F (37.7 °C)  Pulse:  [] 126  Resp:  [7-34] 22  SpO2:  [92 %-100 %] 99 %  BP: ()/(11-97) 110/52     Weight: (!) 137 kg (302 lb 0.5 oz)  Body mass index is 48.75 kg/m².      Intake/Output Summary (Last 24 hours) at 04/08/18 1346  Last data filed at 04/08/18 1300   Gross  per 24 hour   Intake          1897.91 ml   Output             2700 ml   Net          -802.09 ml       Physical Exam   Constitutional: She appears well-developed and well-nourished. No distress. She is intubated.   HENT:   Head: Normocephalic and atraumatic.   Mouth/Throat: Oropharynx is clear and moist.   Eyes: Conjunctivae and EOM are normal. Pupils are equal, round, and reactive to light.   Neck: Neck supple. No JVD present. No thyromegaly present.   Cardiovascular: Normal rate and regular rhythm.  Exam reveals no gallop and no friction rub.    No murmur heard.  Pulmonary/Chest: Effort normal and breath sounds normal. She is intubated. She has no wheezes. She has no rales.   Abdominal: Soft. Bowel sounds are normal. She exhibits distension. There is tenderness. There is no rebound and no guarding.   Upper abdomen firm with suprapubic tenderness.   Musculoskeletal: Normal range of motion. She exhibits no edema or deformity.   Lymphadenopathy:     She has no cervical adenopathy.   Neurological: She has normal reflexes. She exhibits normal muscle tone.   Skin: Skin is warm and dry. Capillary refill takes less than 2 seconds. No rash noted.   Psychiatric: She has a normal mood and affect.   Nursing note and vitals reviewed.      Vents:  Vent Mode: A/C (04/08/18 1321)  Ventilator Initiated: Yes (04/05/18 1930)  Set Rate: 18 bmp (04/08/18 1321)  Vt Set: 400 mL (04/08/18 1321)  Pressure Support: 0 cmH20 (04/08/18 1321)  PEEP/CPAP: 5 cmH20 (04/08/18 1321)  Oxygen Concentration (%): 35 (04/08/18 1321)  Peak Airway Pressure: 16 cmH2O (04/08/18 1321)  Plateau Pressure: 0 cmH20 (04/08/18 1321)  Total Ve: 9.64 mL (04/08/18 1321)  F/VT Ratio<105 (RSBI): (!) 41.86 (04/08/18 1101)    Lines/Drains/Airways     Central Venous Catheter Line                 Percutaneous Central Line Insertion/Assessment - triple lumen  04/03/18 2045 right internal jugular 4 days          Drain                 Urethral Catheter 04/03/18 16 Fr. 5 days          NG/OG Tube 04/08/18 0730 [place] sump Right mouth less than 1 day         Rectal Tube 04/08/18 0730 fecal management system less than 1 day          Airway                 Airway - Non-Surgical 04/08/18 0715 Endotracheal Tube less than 1 day          Arterial Line                 Arterial Line 04/08/18 0720 Left Brachial less than 1 day                Significant Labs:    CBC/Anemia Profile:    Recent Labs  Lab 04/07/18  0500 04/08/18  0343 04/08/18  0712   WBC 14.79* 12.09 22.39*   HGB 8.7* 8.5* 8.4*   HCT 26.7* 26.3* 26.7*   PLT 43* 27* 29*   MCV 93 93 97   RDW 19.2* 18.5* 18.8*        Chemistries:    Recent Labs  Lab 04/07/18  0500  04/07/18  2100 04/08/18 0343 04/08/18 0712   *  135*  < > 140 140 141   K 3.8  3.8  < > 3.7 3.8 5.0     100  < > 101 100 102   CO2 19*  19*  < > 25 25 18*   BUN 2*  2*  < > 2* 2* 3*   CREATININE 0.9  0.9  < > 1.0 1.1 1.3   CALCIUM 5.6*  5.6*  < > 6.2* 6.4* 8.5*   ALBUMIN 1.3*  --   --  1.5* 1.5*   PROT 4.7*  --   --  4.8* 4.8*   BILITOT 5.3*  --   --  6.2* 6.5*   ALKPHOS 97  --   --  91 97   ALT 48*  --   --  50* 53*   AST 87*  --   --  100* 118*   MG 1.9  < > 1.9 1.8 2.6   PHOS 2.5*  < > 2.3* 3.1 5.0*   < > = values in this interval not displayed.    ABGs:     Recent Labs  Lab 04/08/18  0738   PH 7.255*   PCO2 48.4*   HCO3 21.5*   POCSATURATED 98   BE -6     Coagulation:     Recent Labs  Lab 04/08/18 0712   INR 1.2   APTT 39.9*     Lactic Acid:     Recent Labs  Lab 04/06/18  2205 04/08/18  0712   LACTATE 6.1* >12.0*         Significant Imaging:    CXR:  Comparison yesterday's exam.  Endotracheal tube, nasogastric tube are in good position.Heart is stable in size. The lungs are relatively clear.  There is improved aeration of the left lower lobe.      US Lower Extremity Veins Bilateral:  No sonographic evidence of deep venous thrombosis.

## 2018-04-08 NOTE — PROGRESS NOTES
Patient assessed.  Soft bilat wrist restraints renewed due to risk of pulling lines, tubes and/or climbing OOB.    CHANDRIKA Lowe BannerP-BC

## 2018-04-08 NOTE — HPI
Ms. Duron is a 22 year old female patient with a PMHx of HTN, gestational DM who was transferred to [facility] from [facility] for further treatment and management of sepsis. Patient presented to hospital on 4/2 with chief complaint of SOB and cough with known pregnancy. OB US showed no fetal heart tones and fetus was subsequently delivered however patient had retained placenta. She was taken to OR for D+C, and post procedure experienced seizure like activity and was intubated and transferred to ICU. Labs at that time revealed hypomagnesia (1.9), ETOH of 268, and leukocytosis (WBC 16,000). After transfer, patient initially improved and was extubated however this AM she became bradycardia and arrested, requiring repeat intubation. Cardiology consulted to assist with management. Patient seen and examined today. Appears critically ill. Intubated, sedated. Chart reviewed. Troponin this AM 0.045. +Fever. Lactic acid > 12. Mg 2.6. Phosphorous 5.0. Ca 8.4, this AM (6.4 earlier this AM). Platelets 27,000. EKG reviewed, prolonged QT noted. 2D echo pending.     Review of chart indicates mother reports patient had been drinking heavily over the past few weeks due to depression/social issues.

## 2018-04-08 NOTE — ASSESSMENT & PLAN NOTE
-Sputum culture +MRSA and pseudomonas   -BC negative thus far  -Mgmt as per primary team  -Currently on abx  -Continue supportive care

## 2018-04-08 NOTE — PLAN OF CARE
Problem: Patient Care Overview  Goal: Plan of Care Review  Outcome: Ongoing (interventions implemented as appropriate)  Vitals signs closely monitored. Fall precautions in place. Patient turned every two hours. Pain assessed every two hours. Safety promoted. Infection risks reduced. Pt intubated, art line place, and fecal management system placed. GI, Gen surgery, and renal consulted. CTA and CT of Abd and pelvis done. Levo and Fentanyl titrated to patient's needs; see MAR. Vaso added for extra bp support.

## 2018-04-08 NOTE — CODE DOCUMENTATION
Code blue called over head. When I arrived in the room RT Robson was at bedside preparing for intubation while CPR was being preformed. successfully intubated by NP Nawaf Lowe with size 8 tube.  pulse was regained . I placed patient on the ventilator with settings ordered by Nawaf Lowe.

## 2018-04-08 NOTE — SUBJECTIVE & OBJECTIVE
Past Medical History:   Diagnosis Date    Gestational diabetes     Hypertension        History reviewed. No pertinent surgical history.    Review of patient's allergies indicates:  No Known Allergies    No current facility-administered medications on file prior to encounter.      No current outpatient prescriptions on file prior to encounter.     Family History     Problem Relation (Age of Onset)    Diabetes Mother, Father, Sister    Heart disease Mother        Social History Main Topics    Smoking status: Never Smoker    Smokeless tobacco: Never Used    Alcohol use Yes    Drug use: No    Sexual activity: Not on file     Review of Systems   Unable to perform ROS: intubated   Constitution: Negative for decreased appetite.     Objective:     Vital Signs (Most Recent):  Temp: 98.3 °F (36.8 °C) (04/08/18 0305)  Pulse: (!) 120 (04/08/18 1101)  Resp: 18 (04/08/18 1101)  BP: (!) 110/52 (04/08/18 0730)  SpO2: 99 % (04/08/18 1101) Vital Signs (24h Range):  Temp:  [98.3 °F (36.8 °C)-99.1 °F (37.3 °C)] 98.3 °F (36.8 °C)  Pulse:  [] 120  Resp:  [7-34] 18  SpO2:  [92 %-100 %] 99 %  BP: ()/(11-91) 110/52     Weight: (!) 137 kg (302 lb 0.5 oz)  Body mass index is 48.75 kg/m².    SpO2: 99 %  O2 Device (Oxygen Therapy): ventilator      Intake/Output Summary (Last 24 hours) at 04/08/18 1122  Last data filed at 04/08/18 0556   Gross per 24 hour   Intake          1874.06 ml   Output             4025 ml   Net         -2150.94 ml       Lines/Drains/Airways     Central Venous Catheter Line                 Percutaneous Central Line Insertion/Assessment - triple lumen  04/03/18 2045 right internal jugular 4 days          Drain                 Urethral Catheter 04/03/18 16 Fr. 5 days          Airway                 Airway - Non-Surgical 04/08/18 0715 Endotracheal Tube less than 1 day          Arterial Line                 Arterial Line 04/08/18 0720 Left Brachial less than 1 day          Peripheral Intravenous Line                  Peripheral IV - Single Lumen 04/03/18 Right Antecubital 5 days                Physical Exam   Constitutional:   Appears ill   HENT:   Head: Normocephalic and atraumatic.   Neck: Neck supple. No JVD present.   Cardiovascular: Regular rhythm and S1 normal.  Tachycardia present.    No murmur heard.  Pulmonary/Chest:   Coarse BS anteriorly   Abdominal:   Large palpable liver   Neurological:   Sedated   Skin: Skin is warm and dry.   Nursing note and vitals reviewed.      Significant Labs:   Blood Culture: No results for input(s): LABBLOO in the last 48 hours., CMP   Recent Labs  Lab 04/07/18  0500  04/07/18  2100 04/08/18  0343 04/08/18  0712   *  135*  < > 140 140 141   K 3.8  3.8  < > 3.7 3.8 5.0     100  < > 101 100 102   CO2 19*  19*  < > 25 25 18*   *  159*  < > 93 77 129*   BUN 2*  2*  < > 2* 2* 3*   CREATININE 0.9  0.9  < > 1.0 1.1 1.3   CALCIUM 5.6*  5.6*  < > 6.2* 6.4* 8.5*   PROT 4.7*  --   --  4.8* 4.8*   ALBUMIN 1.3*  --   --  1.5* 1.5*   BILITOT 5.3*  --   --  6.2* 6.5*   ALKPHOS 97  --   --  91 97   AST 87*  --   --  100* 118*   ALT 48*  --   --  50* 53*   ANIONGAP 16  16  < > 14 15 21*   ESTGFRAFRICA >60  >60  < > >60 >60 >60   EGFRNONAA >60  >60  < > >60 >60 58*   < > = values in this interval not displayed., CBC   Recent Labs  Lab 04/07/18  0500 04/08/18  0343 04/08/18  0712   WBC 14.79* 12.09 22.39*   HGB 8.7* 8.5* 8.4*   HCT 26.7* 26.3* 26.7*   PLT 43* 27* 29*   , INR   Recent Labs  Lab 04/08/18  0712   INR 1.2   , Lipid Panel   Recent Labs  Lab 04/07/18  0729   TRIG 695*   , Troponin   Recent Labs  Lab 04/08/18  0844   TROPONINI 0.045*    and All pertinent lab results from the last 24 hours have been reviewed.    Significant Imaging: Echocardiogram: 2D echo with color flow doppler: No results found for this or any previous visit., EKG: Reviewed and X-Ray: CXR: X-Ray Chest 1 View (CXR):   Results for orders placed or performed during the hospital encounter of  04/03/18   X-Ray Chest 1 View    Narrative    Exam: Portable chest xray    History:    Encounter for OG-tube placement.    Findings:     The exam compared to study performed at 7:17 AM.  Interval placement of an NG tube, the tip of which projects in the left upper abdomen.    Impression     See above.      Electronically signed by: VINNY CABRERA MD  Date:     04/08/18  Time:    11:17     and X-Ray Chest PA and Lateral (CXR): No results found for this visit on 04/03/18.

## 2018-04-08 NOTE — SIGNIFICANT EVENT
Called to bedside for code blue  21 y/o female in ICU for sepsis 2/2 MRSA PNA and possible retained products.    Pt was found in asystole at 0700.   Previous v/s on, AM labs unremarkable, excepting platelets 27, liver enzymes and total bili elevated, corrected Ca 8.4. Pt with fluid overload and given lasix yesterday.  No cardiac Hx.   ICU NP present, assigned hospital med MD updated.    CPR initiated, given Epi, Ca gluconate.  HR returned ~70's, /80's, now intubated and on pressors.     Repeat labs ordered: ABG, CBC, CMP, La, BNP, HIV, Xchest, 2D echo.

## 2018-04-09 PROBLEM — E87.1 HYPONATREMIA: Status: RESOLVED | Noted: 2018-01-01 | Resolved: 2018-01-01

## 2018-04-09 PROBLEM — A49.8 CLOSTRIDIUM DIFFICILE INFECTION: Status: ACTIVE | Noted: 2018-01-01

## 2018-04-09 PROBLEM — R56.9 SEIZURE: Status: RESOLVED | Noted: 2018-01-01 | Resolved: 2018-01-01

## 2018-04-09 PROBLEM — E78.1 HYPERTRIGLYCERIDEMIA: Status: RESOLVED | Noted: 2018-01-01 | Resolved: 2018-01-01

## 2018-04-09 PROBLEM — E87.70 VOLUME OVERLOAD: Status: ACTIVE | Noted: 2018-01-01

## 2018-04-09 PROBLEM — E78.1 PURE HYPERGLYCERIDEMIA: Status: ACTIVE | Noted: 2018-01-01

## 2018-04-09 PROBLEM — N17.9 AKI (ACUTE KIDNEY INJURY): Status: ACTIVE | Noted: 2018-01-01

## 2018-04-09 PROBLEM — E87.20 METABOLIC ACIDOSIS: Status: RESOLVED | Noted: 2018-01-01 | Resolved: 2018-01-01

## 2018-04-09 PROBLEM — R79.89 ELEVATED LFTS: Status: ACTIVE | Noted: 2018-01-01

## 2018-04-09 PROBLEM — B96.89 UTI DUE TO KLEBSIELLA SPECIES: Status: ACTIVE | Noted: 2018-01-01

## 2018-04-09 NOTE — CONSULTS
[facility] -   Hematology/Oncology  Consult Note    Patient Name: Rafael Duron  MRN: [id number]  Admission Date: 4/3/2018  Hospital Length of Stay: 6 days  Code Status: Full Code   Attending Provider: Ed Ram MD  Consulting Provider: Giulia Rutherford NP  Primary Care Physician: Herman Cowart MD  Principal Problem:Septic shock    Consults  Subjective:     HPI:  23 yo female with morbid obesity,  HTN, gestational DM; presented to [facility] ED in [place], [place] on 4/2 with complaint of SOB and cough with known pregnancy. Workup revealed fetal demise (estimated at 22 weeks) and patient passed dead fetus but retained placenta. Placenta remnants were removed in OR vis D & C.  After admission to ICU she had seizure activity with , K+ 1.9 and Bicarb 11.  She also had EtOH level of 268.  She required intubation for airway protection per records. She as transferred to [facility] ICU on 4/3/18 for higher level of care.    Patient presented with septic shock at [facility] and was started on IV pressors and IV antibiotics. Patient was initially stabilized and extubated on 4/5/18. OB/GYN following. She developed syncopal episode on 4/5/18 associated with bradycardia. She was successfully resuscitated with IV fluids and levophed. She subsequently deteriorated and was re-intubated on 4/5/18. Abdominal US revealed massive hepatomegaly with liver span of 33 cm. Patient's condition improved and she was extubated again on 4/7/18. Patient coded on 4/8/18 and was successfully resuscitated and again intubated. Patient was also diagnosed with C. Diff colitis and MRSA bacteremia.   Hem/Onc consulted to manage thrombocytopenia and anemia.  Patient is intubated and not able to provide any additional history.        I have reviewed all available records and progress notes and have used the information for my decision making.     [No treatment plan]    Medications:  Continuous Infusions:   fentanyl  200 mcg/hr (04/09/18 1300)    norepinephrine bitartrate-D5W 0.14 mcg/kg/min (04/09/18 1300)    vasopressin (PITRESSIN) infusion 0.04 Units/min (04/09/18 1300)     Scheduled Meds:   calcium gluconate 1g in dextrose 5% 100mL (ready to mix system)  1 g Intravenous Q1H    chlorhexidine  15 mL Mouth/Throat BID    famotidine (PF)  20 mg Intravenous BID    folic acid  1 mg Oral Daily    furosemide  60 mg Intravenous Q8H    metronidazole  500 mg Intravenous Q8H    multivitamin  1 tablet Oral Daily    nicotine  1 patch Transdermal Daily    piperacillin-tazobactam (ZOSYN) IVPB  4.5 g Intravenous Q8H    thiamine  100 mg Oral Daily    [START ON 4/11/2018] vancomycin (VANCOCIN) IVPB  1,000 mg Intravenous Q48H     PRN Meds:sodium chloride, acetaminophen, albuterol-ipratropium 2.5mg-0.5mg/3mL, bisacodyl, dextrose 50%, glucagon (human recombinant), insulin aspart U-100, lorazepam, magnesium sulfate IVPB, magnesium sulfate IVPB, ondansetron, pneumoc 13-bobby conj-dip cr(PF), potassium chloride **AND** potassium chloride **AND** potassium chloride, sodium chloride 0.9%, sodium phosphate IVPB, sodium phosphate IVPB, sodium phosphate IVPB     Review of patient's allergies indicates:  No Known Allergies     Past Medical History:   Diagnosis Date    Gestational diabetes     Hypertension      History reviewed. No pertinent surgical history.  Family History     Problem Relation (Age of Onset)    Diabetes Mother, Father, Sister    Heart disease Mother        Social History Main Topics    Smoking status: Never Smoker    Smokeless tobacco: Never Used    Alcohol use Yes    Drug use: No    Sexual activity: Not on file       Review of Systems   Unable to perform ROS: Intubated     Objective:     Vital Signs (Most Recent):  Temp: 99.5 °F (37.5 °C) (04/09/18 1305)  Pulse: 103 (04/09/18 1305)  Resp: (!) 22 (04/09/18 1305)  BP: (!) 105/55 (04/09/18 1305)  SpO2: 98 % (04/09/18 1305) Vital Signs (24h Range):  Temp:  [99 °F (37.2  °C)-100.3 °F (37.9 °C)] 99.5 °F (37.5 °C)  Pulse:  [] 103  Resp:  [4-30] 22  SpO2:  [95 %-100 %] 98 %  BP: ()/(34-71) 105/55     Weight: (!) 140.1 kg (308 lb 13.8 oz)  Body mass index is 49.85 kg/m².  Body surface area is 2.55 meters squared.      Intake/Output Summary (Last 24 hours) at 04/09/18 1338  Last data filed at 04/09/18 1330   Gross per 24 hour   Intake          4655.53 ml   Output             2275 ml   Net          2380.53 ml       Physical Exam   Constitutional: She is oriented to person, place, and time. She appears well-developed and well-nourished. No distress. She is intubated.   HENT:   Head: Normocephalic and atraumatic.   Right Ear: Hearing and external ear normal.   Left Ear: Hearing and external ear normal.   Nose: No rhinorrhea or sinus tenderness. Right sinus exhibits no maxillary sinus tenderness and no frontal sinus tenderness. Left sinus exhibits no maxillary sinus tenderness and no frontal sinus tenderness.   Mouth/Throat: Uvula is midline, oropharynx is clear and moist and mucous membranes are normal. No oral lesions.   Eyes: Conjunctivae are normal. Pupils are equal, round, and reactive to light. Right eye exhibits no discharge. Left eye exhibits no discharge.   Neck: Normal range of motion. Carotid bruit is not present. No tracheal deviation present. No thyromegaly present.   Cardiovascular: Normal rate, regular rhythm, S1 normal, S2 normal, normal heart sounds and intact distal pulses.    No murmur heard.  Pulses:       Dorsalis pedis pulses are 2+ on the right side, and 2+ on the left side.   Pulmonary/Chest: Breath sounds normal. She is intubated.   Intubated   Abdominal: She exhibits distension and fluid wave. She exhibits no mass. Bowel sounds are increased. No hernia.   Musculoskeletal: Normal range of motion. She exhibits edema.   Lymphadenopathy:     She has no cervical adenopathy.        Right: No supraclavicular adenopathy present.        Left: No supraclavicular  adenopathy present.   Neurological: She is oriented to person, place, and time. She has normal strength. She is unresponsive. No sensory deficit. Coordination and gait normal.   Skin: Skin is warm and dry. Capillary refill takes less than 2 seconds. No rash noted.   Psychiatric: Her speech is normal and behavior is normal. Judgment and thought content normal. Her mood appears anxious. Cognition and memory are normal. She does not exhibit a depressed mood.       Significant Labs:   CBC:   Recent Labs  Lab 04/08/18  0712 04/08/18  1725 04/09/18  0344   WBC 22.39* 16.98* 18.58*   HGB 8.4* 8.7* 7.9*   HCT 26.7* 27.7* 24.9*   PLT 29* 25* 24*    and CMP:   Recent Labs  Lab 04/08/18  0343 04/08/18  0712 04/08/18  1458 04/08/18  2130 04/09/18  0344    141 136 137 136  136   K 3.8 5.0 4.0 4.7 4.0  4.0    102 99 100 99  99   CO2 25 18* 22* 22* 22*  22*   GLU 77 129* 109 120* 95  95   BUN 2* 3* 3* 3* 4*  4*   CREATININE 1.1 1.3 1.4 1.6* 1.6*  1.6*   CALCIUM 6.4* 8.5* 6.5* 6.5* 6.6*  6.6*   PROT 4.8* 4.8*  --   --  4.6*   ALBUMIN 1.5* 1.5*  --   --  1.5*   BILITOT 6.2* 6.5*  --   --  7.2*   ALKPHOS 91 97  --   --  120   * 118*  --   --  100*   ALT 50* 53*  --   --  42   ANIONGAP 15 21* 15 15 15  15   EGFRNONAA >60 58* 53* 45* 45*  45*       Diagnostic Results:  I have reviewed all pertinent imaging results/findings within the past 24 hours.    Assessment/Plan:     Thrombocytopenia    Likely related to hepatomegaly and Liver Shock.    --Daily CBC  --If platelets <15,000, will need to transfuse platelets, unless actively bleeding then transfuse to maintain platelets >50,000.         Acute blood loss anemia    Vaginal bleeding related to vaginal birth of non-viable infant with retained placenta, was followed with D&C.     --Monitor H/H if Hgb <7.0 will need to transfuse with PRBC.             Thank you for your consult. I will follow-up with patient. Please contact us if you have any additional  questions.    Giulia Rutherford NP  Hematology/Oncology  [facility]

## 2018-04-09 NOTE — ASSESSMENT & PLAN NOTE
21 yo admitted for septic shock after retained products of conception.    Elevated LFTs likely to septic shock. She has been hypotensive and coded yesterday likely contributing. Her history doesn't support liver cirrhosis.   She doesn't meet criteria for fatty liver of pregnancy, in which, treatment would include delivery.   If acute alcoholic hepatitis, treatment wouldn't change.   INR relatively stable so acute failure not likely.   Recommend treating underlying shock and continue supportive care. Antibiotics per ID and ICU teams.      MELD-Na score: 22 at 4/9/2018  2:03 PM  MELD score: 22 at 4/9/2018  2:03 PM  Calculated from:  Serum Creatinine: 1.7 mg/dL at 4/9/2018  2:03 PM  Serum Sodium: 138 mmol/L (Rounded to 137) at 4/9/2018  2:03 PM  Total Bilirubin: 7.2 mg/dL at 4/9/2018  3:44 AM  INR(ratio): 1.3 at 4/9/2018 10:56 AM  Age: 22 years

## 2018-04-09 NOTE — SUBJECTIVE & OBJECTIVE
Interval History: 04/09/2018- She is intubated .She remains on vasopressor support .Volume review showed positive I/O balance with + 24 liters since admission,she now has worsening serum creatinine to 1.6  She remains critical .C difficile PCR is positive ,04/005-urine culture klebsiella .She has increasing bilirubin to 7.2.  We had family meeting done and plan of care and grave prognosis was discussed with them.      Review of Systems   Unable to perform ROS: Intubated     Objective:     Vital Signs (Most Recent):  Temp: 99.6 °F (37.6 °C) (04/09/18 1405)  Pulse: 99 (04/09/18 1405)  Resp: (!) 23 (04/09/18 1405)  BP: (!) 116/58 (04/09/18 1405)  SpO2: 99 % (04/09/18 1405) Vital Signs (24h Range):  Temp:  [99 °F (37.2 °C)-100.3 °F (37.9 °C)] 99.6 °F (37.6 °C)  Pulse:  [] 99  Resp:  [4-30] 23  SpO2:  [95 %-100 %] 99 %  BP: ()/(34-71) 116/58     Weight: (!) 140.1 kg (308 lb 13.8 oz)  Body mass index is 49.85 kg/m².    Intake/Output Summary (Last 24 hours) at 04/09/18 1416  Last data filed at 04/09/18 1330   Gross per 24 hour   Intake          3855.53 ml   Output             2270 ml   Net          1585.53 ml      Physical Exam   Constitutional: She appears well-developed and well-nourished. No distress.   Obese    HENT:   Head: Normocephalic and atraumatic.   Mouth/Throat: Oropharynx is clear and moist.   Eyes: EOM are normal. Pupils are equal, round, and reactive to light.   Neck: Neck supple. No JVD present. No thyromegaly present.   Cardiovascular: Regular rhythm.  Exam reveals no gallop and no friction rub.    No murmur heard.  Pulmonary/Chest: Effort normal and breath sounds normal. She has no wheezes. She has no rales.   Abdominal: Soft. Bowel sounds are normal. She exhibits no distension. There is no tenderness. There is no rebound and no guarding.   Musculoskeletal: She exhibits edema. She exhibits no deformity.   Lymphadenopathy:     She has no cervical adenopathy.   Neurological: She has normal  reflexes.   Intubated and sedated.   Skin: Skin is warm and dry. No rash noted.   Nursing note and vitals reviewed.      Significant Labs:   Bilirubin:   Recent Labs  Lab 04/06/18  0326 04/07/18  0500 04/08/18  0343 04/08/18  0712 04/09/18  0344   BILITOT 4.6* 5.3* 6.2* 6.5* 7.2*     Blood Culture: No results for input(s): LABBLOO in the last 48 hours.  BMP:   Recent Labs  Lab 04/09/18  0344   GLU 95  95     136   K 4.0  4.0   CL 99  99   CO2 22*  22*   BUN 4*  4*   CREATININE 1.6*  1.6*   CALCIUM 6.6*  6.6*   MG 2.0     CBC:   Recent Labs  Lab 04/08/18  0712 04/08/18  1725 04/09/18  0344   WBC 22.39* 16.98* 18.58*   HGB 8.4* 8.7* 7.9*   HCT 26.7* 27.7* 24.9*   PLT 29* 25* 24*     CMP:   Recent Labs  Lab 04/08/18  0343 04/08/18  0712 04/08/18  1458 04/08/18  2130 04/09/18  0344    141 136 137 136  136   K 3.8 5.0 4.0 4.7 4.0  4.0    102 99 100 99  99   CO2 25 18* 22* 22* 22*  22*   GLU 77 129* 109 120* 95  95   BUN 2* 3* 3* 3* 4*  4*   CREATININE 1.1 1.3 1.4 1.6* 1.6*  1.6*   CALCIUM 6.4* 8.5* 6.5* 6.5* 6.6*  6.6*   PROT 4.8* 4.8*  --   --  4.6*   ALBUMIN 1.5* 1.5*  --   --  1.5*   BILITOT 6.2* 6.5*  --   --  7.2*   ALKPHOS 91 97  --   --  120   * 118*  --   --  100*   ALT 50* 53*  --   --  42   ANIONGAP 15 21* 15 15 15  15   EGFRNONAA >60 58* 53* 45* 45*  45*     Coagulation:   Recent Labs  Lab 04/08/18  0712 04/09/18  1056   INR 1.2 1.3*   APTT 39.9*  --      Lactic Acid:   Recent Labs  Lab 04/08/18  0712   LACTATE >12.0*     Urine Culture: No results for input(s): LABURIN in the last 48 hours.  Urine Studies:   Recent Labs  Lab 04/09/18  1105   COLORU Yellow   APPEARANCEUA Hazy*   PHUR 6.0   SPECGRAV 1.025   PROTEINUA 1+*   GLUCUA Negative   KETONESU Negative   BILIRUBINUA 3+*   OCCULTUA 3+*   NITRITE Negative   UROBILINOGEN Negative   LEUKOCYTESUR Trace*   RBCUA 8*   WBCUA 5   BACTERIA Occasional   SQUAMEPITHEL 2   HYALINECASTS 0       Significant Imaging: I have  reviewed and interpreted all pertinent imaging results/findings within the past 24 hours.

## 2018-04-09 NOTE — SUBJECTIVE & OBJECTIVE
Interval History: Worsening renal function, patient now has C. Diff, elevated bladder pressure (23 yesterday)    Medications:  Continuous Infusions:   fentanyl 200 mcg/hr (04/09/18 0759)    norepinephrine bitartrate-D5W 0.14 mcg/kg/min (04/09/18 0605)    vasopressin (PITRESSIN) infusion 0.04 Units/min (04/09/18 0605)     Scheduled Meds:   calcium gluconate 1g in dextrose 5% 100mL (ready to mix system)  1 g Intravenous Q1H    chlorhexidine  15 mL Mouth/Throat BID    famotidine (PF)  20 mg Intravenous BID    folic acid  1 mg Oral Daily    furosemide  60 mg Intravenous Q8H    metronidazole  500 mg Intravenous Q8H    multivitamin  1 tablet Oral Daily    nicotine  1 patch Transdermal Daily    piperacillin-tazobactam (ZOSYN) IVPB  4.5 g Intravenous Q8H    thiamine  100 mg Oral Daily    [START ON 4/11/2018] vancomycin (VANCOCIN) IVPB  1,000 mg Intravenous Q48H     PRN Meds:sodium chloride, acetaminophen, albuterol-ipratropium 2.5mg-0.5mg/3mL, bisacodyl, dextrose 50%, glucagon (human recombinant), insulin aspart U-100, lorazepam, magnesium sulfate IVPB, magnesium sulfate IVPB, ondansetron, pneumoc 13-bobby conj-dip cr(PF), potassium chloride **AND** potassium chloride **AND** potassium chloride, sodium chloride 0.9%, sodium phosphate IVPB, sodium phosphate IVPB, sodium phosphate IVPB     Review of patient's allergies indicates:  No Known Allergies  Objective:     Vital Signs (Most Recent):  Temp: 100 °F (37.8 °C) (04/09/18 1105)  Pulse: (!) 111 (04/09/18 1110)  Resp: (!) 22 (04/09/18 1110)  BP: (!) 113/57 (04/09/18 1105)  SpO2: 98 % (04/09/18 1110) Vital Signs (24h Range):  Temp:  [99 °F (37.2 °C)-100.3 °F (37.9 °C)] 100 °F (37.8 °C)  Pulse:  [] 111  Resp:  [4-28] 22  SpO2:  [95 %-100 %] 98 %  BP: ()/(34-65) 113/57     Weight: (!) 140.1 kg (308 lb 13.8 oz)  Body mass index is 49.85 kg/m².    Intake/Output - Last 3 Shifts       04/07 0700 - 04/08 0659 04/08 0700 - 04/09 0659 04/09 0700 - 04/10 0659     I.V. (mL/kg) 1195.6 (8.7) 1314.1 (9.4)     Blood   581    NG/ 850 90    IV Piggyback 1100 1000     Total Intake(mL/kg) 2455.6 (17.9) 3164.1 (22.6) 671 (4.8)    Urine (mL/kg/hr) 4875 (1.5) 1085 (0.3) 200 (0.3)    Drains  500 (0.1)     Stool 0 (0) 0 (0) 0 (0)    Total Output 4875 1585 200    Net -2419.4 +1579.1 +471           Stool Occurrence 1 x 2 x 1 x          Physical Exam   Constitutional: She is sedated and intubated.   Obese   Neck: Trachea normal. No tracheal deviation present.   Pulmonary/Chest: She is intubated.   Abdominal: She exhibits distension. There is tenderness in the epigastric area.       Significant Labs:  CBC:   Recent Labs  Lab 04/09/18  0344   WBC 18.58*   RBC 2.56*   HGB 7.9*   HCT 24.9*   PLT 24*   MCV 97   MCH 30.9   MCHC 31.7*     CMP:   Recent Labs  Lab 04/09/18  0344   GLU 95  95   CALCIUM 6.6*  6.6*   ALBUMIN 1.5*   PROT 4.6*     136   K 4.0  4.0   CO2 22*  22*   CL 99  99   BUN 4*  4*   CREATININE 1.6*  1.6*   ALKPHOS 120   ALT 42   *   BILITOT 7.2*       Significant Diagnostics:  I have reviewed all pertinent imaging results/findings within the past 24 hours.

## 2018-04-09 NOTE — ASSESSMENT & PLAN NOTE
Patient initially presented with hyponatremia and hypokalemia both of which have now resolved. However, hypocalcemia has persisted. Will replete calcium with IV calcium gluconate. Will follow up with ionized calcium level and vitamin D level. Magnesium level is at goal.

## 2018-04-09 NOTE — PLAN OF CARE
Per MDR, patient was extubated then re-intubated 4/8/18 after becoming bradycardiac lethargic and coded x 2.  CM meet with her brother and his wife. They were updated per NP Nawaf. He stated there mother is at home watching the kids. She does not like to see her child like this. They stated they live in [place], [place]. They will keep in contact. CM unable to determine an optimal d/c plan presently CM to f/u for safe transition     04/09/18 1302   Discharge Reassessment   Assessment Type Discharge Planning Reassessment   Provided patient/caregiver education on the expected discharge date and the discharge plan No   Do you have any problems affording any of your prescribed medications? No   Discharge Plan A Skilled Nursing Facility   Discharge Plan B Home with family;Home Health   Patient choice form signed by patient/caregiver N/A   Can the patient answer the patient profile reliably? No, cognitively impaired   How does the patient rate their overall health at the present time? Fair   Describe the patient's ability to walk at the present time. Major restrictions/daily assistance from another person   How often would a person be available to care for the patient? Whenever needed   Number of comorbid conditions (as recorded on the chart) One   During the past month, has the patient often been bothered by feeling down, depressed or hopeless? No   During the past month, has the patient often been bothered by little interest or pleasure in doing things? No

## 2018-04-09 NOTE — SUBJECTIVE & OBJECTIVE
Past Medical History:   Diagnosis Date    Gestational diabetes     Hypertension        History reviewed. No pertinent surgical history.    Review of patient's allergies indicates:  No Known Allergies  Family History     Problem Relation (Age of Onset)    Diabetes Mother, Father, Sister    Heart disease Mother        Social History Main Topics    Smoking status: Never Smoker    Smokeless tobacco: Never Used    Alcohol use Yes    Drug use: No    Sexual activity: Not on file     Review of Systems   Unable to perform ROS: Intubated     Objective:     Vital Signs (Most Recent):  Temp: 100 °F (37.8 °C) (04/09/18 1105)  Pulse: (!) 112 (04/09/18 1139)  Resp: (!) 21 (04/09/18 1139)  BP: (!) 113/57 (04/09/18 1105)  SpO2: 97 % (04/09/18 1139) Vital Signs (24h Range):  Temp:  [99 °F (37.2 °C)-100.3 °F (37.9 °C)] 100 °F (37.8 °C)  Pulse:  [] 112  Resp:  [4-28] 21  SpO2:  [95 %-100 %] 97 %  BP: ()/(34-65) 113/57     Weight: (!) 140.1 kg (308 lb 13.8 oz) (04/09/18 1100)  Body mass index is 49.85 kg/m².      Intake/Output Summary (Last 24 hours) at 04/09/18 1147  Last data filed at 04/09/18 1101   Gross per 24 hour   Intake          4235.11 ml   Output             1735 ml   Net          2500.11 ml       Lines/Drains/Airways     Central Venous Catheter Line                 Percutaneous Central Line Insertion/Assessment - triple lumen  04/03/18 2045 right internal jugular 5 days          Drain                 Urethral Catheter 04/03/18 16 Fr. 6 days         NG/OG Tube 04/08/18 0730 [place] Right mouth 1 day         Rectal Tube 04/08/18 0730 fecal management system 1 day          Airway                 Airway - Non-Surgical 04/08/18 0715 Endotracheal Tube 1 day          Arterial Line                 Arterial Line 04/08/18 0720 Left Brachial 1 day                Physical Exam   Constitutional: She appears well-nourished. She appears ill.   HENT:   Head: Normocephalic and atraumatic.   Eyes: EOM are normal.   Neck:  Neck supple. Carotid bruit is not present.   Cardiovascular: Normal rate and regular rhythm.    No murmur heard.  Pulmonary/Chest: Effort normal and breath sounds normal. No respiratory distress. She has no wheezes.   Abdominal: Normal appearance. She exhibits distension. She exhibits no mass. Bowel sounds are decreased. There is generalized tenderness.   Body habits limits the exam.    Musculoskeletal: She exhibits no edema.   Neurological: No cranial nerve deficit.   She opens her eyes and nods some to questions.    Skin: Skin is warm and dry. No rash noted.       Significant Labs:  CBC:   Recent Labs  Lab 04/08/18  0712 04/08/18  1725 04/09/18  0344   WBC 22.39* 16.98* 18.58*   HGB 8.4* 8.7* 7.9*   HCT 26.7* 27.7* 24.9*   PLT 29* 25* 24*     CMP:   Recent Labs  Lab 04/09/18  0344   GLU 95  95   CALCIUM 6.6*  6.6*   ALBUMIN 1.5*   PROT 4.6*     136   K 4.0  4.0   CO2 22*  22*   CL 99  99   BUN 4*  4*   CREATININE 1.6*  1.6*   ALKPHOS 120   ALT 42   *   BILITOT 7.2*     Coagulation:   Recent Labs  Lab 04/08/18  0712 04/09/18  1056   INR 1.2 1.3*   APTT 39.9*  --      Stool C. diff:   Recent Labs  Lab 04/08/18  0844   CDIFFICILEAN Positive*   CDIFFTOX Negative       Significant Imaging:  Imaging results within the past 24 hours have been reviewed.

## 2018-04-09 NOTE — ASSESSMENT & PLAN NOTE
? Alcoholic liver disease / toxic hepatitis ( DILI). Mainly cholestatic picture.  Monitoring liver enzymes. LFTs down trending.  Discriminant function = 7. Hepatitis acute panel unremarkable. Screen for autoimmune hepatitis. Avoid the use of hepatotoxic meds. GI / Hepatology consult pending

## 2018-04-09 NOTE — HPI
Ms Duron is a 23 yo obese BF with a PMH of HTN and gestational DM who presented to [facility] ED in [place], [place] on 4/2 with complaint of SOB and cough.  OB US revealed no heart tones.  After admission to ICU she had seizure activity with , K+ 1.9 and Bicarb 11.  She also had etoh level 268 and reportedly had been drinking etoh heavily for several days.  She required intubation for airway protection per records and OB was unable to remove placenta manually and patient had to be taken to OR.  She received 2 liters IVF and Hgb dropped to 7.4 and was transfused 2 units PRBCs. She was febrile initially. She as transferred to [facility] ICU yesterday evening for higher level of care.      She has been extubated and then reintubated. She is requiring pressors when intubated. She was found to have C. Diff diarrhea and MRSA in the lung. ID was following for antibiotic therapy. She have been consulted for fatty liver and alcohol abuse. Her INR has remained around 1.2. PLT 24. AST>ALT and has remained relatively stable but Total bilirubin increasing. Triglycerides were 1800 on admission. She had an episode of asystole yesterday requiring resuscitation. Yesterday CPK was 2296, Lactate >12. Phos and K were significantly abnormal on admission but has been corrected. Creatinine up to 1.6 yesterday. Renal is following.

## 2018-04-09 NOTE — CONSULTS
04/09/18 1030   Handoff Report   Given To GLORIA Yoo   Pain/Comfort Assessments   Pain Assessment Performed Yes       Number Scale   Presence of Pain non-verbal indicators absent   Skin   Skin WDL ex   Skin Temperature warm   Skin Moisture Dry   Skin Elasticity Brisk   Daniel Risk Assessment   Sensory Perception 2-->very limited   Moisture 3-->occasionally moist   Activity 1-->bedfast   Mobility 2-->very limited   Nutrition 2-->probably inadequate   Friction and Shear 2-->potential problem   Daniel Score 12       Wound 04/09/18 0700 Shearing;Moisture associated dermatitis medial Thigh   Date First Assessed/Time First Assessed: 04/09/18 0700   Wound Type: Shearing;Moisture associated dermatitis  Side: (c)   Orientation: medial  Location: (c) Thigh   Wound WDL ex   Dressing Appearance Open to air   Drainage Amount Scant   Drainage Characteristics/Odor Serous   Appearance Red;Moist   Tissue loss description Partial thickness   Red (%), Wound Tissue Color 100 %   Periwound Area Moist;Swelling;Redness   Wound Edges Jagged   Care Cleansed with:;Soap and water;Applied:;Skin Barrier   Periwound Care Cleansed with pH balanced cleanser;Dry periwound area maintained;Skin barrier film applied       Wound 04/09/18 0700 Moisture associated dermatitis midline Gluteal cleft   Date First Assessed/Time First Assessed: 04/09/18 0700   Wound Type: Moisture associated dermatitis  Orientation: midline  Location: Gluteal cleft  Wound Length (cm): 5  Wound Width (cm): 0.5   Wound WDL ex   Dressing Appearance Open to air   Drainage Amount Scant   Drainage Characteristics/Odor Serous   Appearance Red;Moist   Tissue loss description Partial thickness   Red (%), Wound Tissue Color 100 %   Periwound Area Redness   Wound Edges Open   Wound Length (cm) 5   Wound Width (cm) 0.5   Care Cleansed with:;Soap and water;Applied:;Skin Barrier   Skin Interventions   Device Skin Pressure Protection absorbent pad utilized/changed;adhesive use  "limited;positioning supports utilized;pressure points protected;skin-to-device areas padded;skin-to-skin areas padded   Pressure Reduction Devices Pressure-redistributing mattress utilized;Specialty bed utilized  (ordered francia rehab platform 2 with big turn mattress)   Pressure Reduction Techniques frequent weight shift encouraged;heels elevated off bed;positioned off wounds;pressure points protected;weight shift assistance provided   Skin Protection Adhesive use limited;Incontinence pads;Pulse oximeter probe site assessed;Skin sealant/moisture barrier;Skin-to-device areas padded;Skin-to-skin areas padded;Tubing/devices free from skin contact;Urinary containment device     Consulted on this 23 y/o F patient due to skin breakdown to perineum and buttock.  Patient admittd 4/3 with septic shock from outlying facility. She has been extubated and re-intubated multiple times.  She is currently intubated.  She has a aiken catheter and a Felxi-seal for management of C-Diff colitis. Bilateral medial thighs, perineum, labia, and pubis with blistering noted from edema, few intact blisters, multiple open draining blisters with serous fluid noted.  Surrounding redness and excoriation due to moisture noted.  Cleansed with soap and water and patted dry.  Thin layer barrier paste cream applied to cover wounds.  Patient turned to right side and coccyx, bilateral buttock, sacrum assessed. MASD noted to intergluteal cleft with partial thickness tissue loss.  Thin layer barrier paste cream applied.  Recommend ordering specialty low air loss bariatric bed with lateral rotation due to patient's prolonged hospitalization and deteriorating status.  Please see below:    Skin Care Precautions / Pressure Injury Prevention:  1. Follow "Guidelines for Prevention of Pressure Ulcers in At Risk Patients"  These guidelines can be found on the [facility] Intr[facility] by searching "Wound Care / Ostomy Resources"  2. Document wound assessment in EPIC using " "guidelines in [place]'s "Assessment : Wound" procedure  3. Limit the amount of linen/underpad between patient and mattress surface to ONE fitted sheet and ONE covidien underpad - NO draw sheet/briefs.  4. Obtain Easi Cleans Foam Wipes for providing pasquale care - avoid the use of wash cloths to areas affected by IAD.  5. Apply Paste Barrier Ointment to perineal / perirectal areas in a thin even layer to clean dry skin BID and after each episode of pericare  6. Apply sween 24 moisturizer cream to all dry skin after daily bath and prn  7. Obtain foam wedge from [employer] to assist with maintaining proper position changes at least q 2hours and document actual position in EPIC q 2hours  8. Elevate heels off mattress on 2 separate pillows placed lengthwise under each leg supporting the leg from knee to ankle.  Document in EPIC flow sheet every 2 hours.  9. .Do NOT elevate HOB greater than 30 degrees unless contraindicated.  10. Remove SCD/Plexi Pulses/RAFAEL's every 12 hours for 30 minutes and assess skin underneath these devices for breakdown    Shearing/blisters/MASD - Bilateral upper thigh, perineum, labia, pubis:  1. Cleanse with easi cleanse foam wipes  2. Pat dry  3. Apply thin layer paste barrier cream to affected area BID and prn    MASD gluteal cleft:  1. Cleanse with easi cleanse foam wipes  2. Pat dry  3. Apply thin layer paste barrier cream to affected area BID and prn      "

## 2018-04-09 NOTE — PROGRESS NOTES
Rectal tube leaking, unsuccessful in attempt to correct leak, rectal tube removed at this time.  Will continue to monitor.

## 2018-04-09 NOTE — ASSESSMENT & PLAN NOTE
Patient with positive fluid balance of + 24 liters since admission.   Will increase Lasix to 60 mg IV tid and monitor volume status closely.
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
## 2018-04-09 NOTE — HPI
23 yo female with morbid obesity,  HTN, gestational DM; presented to [facility] ED in [place], [place] on 4/2 with complaint of SOB and cough with known pregnancy. Workup revealed fetal demise (estimated at 22 weeks) and patient passed dead fetus but retained placenta. Placenta remnants were removed in OR vis [facility].  After admission to ICU she had seizure activity with , K+ 1.9 and Bicarb 11.  She also had EtOH level of 268.  She required intubation for airway protection per records. She as transferred to [facility] ICU on 4/3/18 for higher level of care.    Patient presented with septic shock at [facility] and was started on IV pressors and IV antibiotics. Patient was initially stabilized and extubated on 4/5/18. OB/GYN following. She developed syncopal episode on 4/5/18 associated with bradycardia. She was successfully resuscitated with IV fluids and levophed. She subsequently deteriorated and was re-intubated on 4/5/18. Abdominal US revealed massive hepatomegaly with liver span of 33 cm. Patient's condition improved and she was extubated again on 4/7/18. Patient coded on 4/8/18 and was successfully resuscitated and again intubated. Patient was also diagnosed with C. Diff colitis and MRSA bacteremia.   Hem/Onc consulted to manage thrombocytopenia and anemia.  Patient is intubated and not able to provide any additional history.

## 2018-04-09 NOTE — ASSESSMENT & PLAN NOTE
Likely related to hepatomegaly and Liver Shock.    --Daily CBC  --If platelets <15,000, will need to transfuse platelets, unless actively bleeding then transfuse to maintain platelets >50,000.

## 2018-04-09 NOTE — SUBJECTIVE & OBJECTIVE
I have reviewed all available records and progress notes and have used the information for my decision making.     [No treatment plan]    Medications:  Continuous Infusions:   fentanyl 200 mcg/hr (04/09/18 1300)    norepinephrine bitartrate-D5W 0.14 mcg/kg/min (04/09/18 1300)    vasopressin (PITRESSIN) infusion 0.04 Units/min (04/09/18 1300)     Scheduled Meds:   calcium gluconate 1g in dextrose 5% 100mL (ready to mix system)  1 g Intravenous Q1H    chlorhexidine  15 mL Mouth/Throat BID    famotidine (PF)  20 mg Intravenous BID    folic acid  1 mg Oral Daily    furosemide  60 mg Intravenous Q8H    metronidazole  500 mg Intravenous Q8H    multivitamin  1 tablet Oral Daily    nicotine  1 patch Transdermal Daily    piperacillin-tazobactam (ZOSYN) IVPB  4.5 g Intravenous Q8H    thiamine  100 mg Oral Daily    [START ON 4/11/2018] vancomycin (VANCOCIN) IVPB  1,000 mg Intravenous Q48H     PRN Meds:sodium chloride, acetaminophen, albuterol-ipratropium 2.5mg-0.5mg/3mL, bisacodyl, dextrose 50%, glucagon (human recombinant), insulin aspart U-100, lorazepam, magnesium sulfate IVPB, magnesium sulfate IVPB, ondansetron, pneumoc 13-bobby conj-dip cr(PF), potassium chloride **AND** potassium chloride **AND** potassium chloride, sodium chloride 0.9%, sodium phosphate IVPB, sodium phosphate IVPB, sodium phosphate IVPB     Review of patient's allergies indicates:  No Known Allergies     Past Medical History:   Diagnosis Date    Gestational diabetes     Hypertension      History reviewed. No pertinent surgical history.  Family History     Problem Relation (Age of Onset)    Diabetes Mother, Father, Sister    Heart disease Mother        Social History Main Topics    Smoking status: Never Smoker    Smokeless tobacco: Never Used    Alcohol use Yes    Drug use: No    Sexual activity: Not on file       Review of Systems   Unable to perform ROS: Intubated     Objective:     Vital Signs (Most Recent):  Temp: 99.5 °F (37.5  °C) (04/09/18 1305)  Pulse: 103 (04/09/18 1305)  Resp: (!) 22 (04/09/18 1305)  BP: (!) 105/55 (04/09/18 1305)  SpO2: 98 % (04/09/18 1305) Vital Signs (24h Range):  Temp:  [99 °F (37.2 °C)-100.3 °F (37.9 °C)] 99.5 °F (37.5 °C)  Pulse:  [] 103  Resp:  [4-30] 22  SpO2:  [95 %-100 %] 98 %  BP: ()/(34-71) 105/55     Weight: (!) 140.1 kg (308 lb 13.8 oz)  Body mass index is 49.85 kg/m².  Body surface area is 2.55 meters squared.      Intake/Output Summary (Last 24 hours) at 04/09/18 1338  Last data filed at 04/09/18 1330   Gross per 24 hour   Intake          4655.53 ml   Output             2275 ml   Net          2380.53 ml       Physical Exam   Constitutional: She is oriented to person, place, and time. She appears well-developed and well-nourished. No distress. She is intubated.   HENT:   Head: Normocephalic and atraumatic.   Right Ear: Hearing and external ear normal.   Left Ear: Hearing and external ear normal.   Nose: No rhinorrhea or sinus tenderness. Right sinus exhibits no maxillary sinus tenderness and no frontal sinus tenderness. Left sinus exhibits no maxillary sinus tenderness and no frontal sinus tenderness.   Mouth/Throat: Uvula is midline, oropharynx is clear and moist and mucous membranes are normal. No oral lesions.   Eyes: Conjunctivae are normal. Pupils are equal, round, and reactive to light. Right eye exhibits no discharge. Left eye exhibits no discharge.   Neck: Normal range of motion. Carotid bruit is not present. No tracheal deviation present. No thyromegaly present.   Cardiovascular: Normal rate, regular rhythm, S1 normal, S2 normal, normal heart sounds and intact distal pulses.    No murmur heard.  Pulses:       Dorsalis pedis pulses are 2+ on the right side, and 2+ on the left side.   Pulmonary/Chest: Breath sounds normal. She is intubated.   Intubated   Abdominal: She exhibits distension and fluid wave. She exhibits no mass. Bowel sounds are increased. No hernia.   Musculoskeletal:  Normal range of motion. She exhibits edema.   Lymphadenopathy:     She has no cervical adenopathy.        Right: No supraclavicular adenopathy present.        Left: No supraclavicular adenopathy present.   Neurological: She is oriented to person, place, and time. She has normal strength. She is unresponsive. No sensory deficit. Coordination and gait normal.   Skin: Skin is warm and dry. Capillary refill takes less than 2 seconds. No rash noted.   Psychiatric: Her speech is normal and behavior is normal. Judgment and thought content normal. Her mood appears anxious. Cognition and memory are normal. She does not exhibit a depressed mood.       Significant Labs:   CBC:   Recent Labs  Lab 04/08/18  0712 04/08/18  1725 04/09/18  0344   WBC 22.39* 16.98* 18.58*   HGB 8.4* 8.7* 7.9*   HCT 26.7* 27.7* 24.9*   PLT 29* 25* 24*    and CMP:   Recent Labs  Lab 04/08/18  0343 04/08/18  0712 04/08/18  1458 04/08/18  2130 04/09/18  0344    141 136 137 136  136   K 3.8 5.0 4.0 4.7 4.0  4.0    102 99 100 99  99   CO2 25 18* 22* 22* 22*  22*   GLU 77 129* 109 120* 95  95   BUN 2* 3* 3* 3* 4*  4*   CREATININE 1.1 1.3 1.4 1.6* 1.6*  1.6*   CALCIUM 6.4* 8.5* 6.5* 6.5* 6.6*  6.6*   PROT 4.8* 4.8*  --   --  4.6*   ALBUMIN 1.5* 1.5*  --   --  1.5*   BILITOT 6.2* 6.5*  --   --  7.2*   ALKPHOS 91 97  --   --  120   * 118*  --   --  100*   ALT 50* 53*  --   --  42   ANIONGAP 15 21* 15 15 15  15   EGFRNONAA >60 58* 53* 45* 45*  45*       Diagnostic Results:  I have reviewed all pertinent imaging results/findings within the past 24 hours.

## 2018-04-09 NOTE — ASSESSMENT & PLAN NOTE
No overt GI bleed.  Attributed to vaginal bleeding.   Continue to monitor and transfuse as indicated.

## 2018-04-09 NOTE — ASSESSMENT & PLAN NOTE
Currently intubated.  No acute infiltrates seen on CXR  Continue IV antibiotics empirically.  Pulmonary consult.    04/07/2018- will wean off ventilator as tolerated.  She is more awake and alert today.  Sputum cultures showed MRSA and pseudomonas-she is on vanco/meropenem-will deescalate soon.       04/08/2018- she is now intubated after asystolic cardiac arrest .Will wean off as tolerated.  04/09/2018- she remains intubated ,critical care follow up .wean off ventilator as tolerated.

## 2018-04-09 NOTE — PLAN OF CARE
Problem: Patient Care Overview  Goal: Plan of Care Review  Outcome: Ongoing (interventions implemented as appropriate)  Patient continues to require mechanical ventilation. Will continue to monitor progress.

## 2018-04-09 NOTE — ASSESSMENT & PLAN NOTE
Stable Hyperglycemia: INSULIN ASPART. Moderate correction dose.  Blood glucose target 100 - 180 mg/dl  Cont SSI

## 2018-04-09 NOTE — ASSESSMENT & PLAN NOTE
Total 4 units transfused. Monitor hemogram.   Transfuse 2 more units PRBCs with FFP and Cryo given total now 6 units PRBCs

## 2018-04-09 NOTE — HPI
23 yo obese female with HTN, gestational DM  who presented to [facility] ED in [place], [place] on 4/2 with complaint of SOB and cough with known pregnancy. Workup revealed fetal demise (estimated at 22 weeks) and patient passed dead fetus but retained placenta. Placenta remnants were removed in OR vis D & C.  After admission to ICU she had seizure activity with , K+ 1.9 and Bicarb 11.  She also had EtOH level of 268.  She required intubation for airway protection per records. She as transferred to [facility] ICU on 4/3/18 for higher level of care.    Patient presented with septic shock at [facility] and was started on IV pressors and IV antibiotics. Patient was initially stabilized and extubated on 4/5/18. OB/GYN following. She developed syncopal episode on 4/5/18 associated with bradycardia. She was successfully resuscitated with IV fluids and levophed. She subsequently deteriorated and was re-intubated on 4/5/18. Abdominal US revealed massive hepatomegaly with liver span of 33 cm. Patient's condition improved and she was extubated again on 4/7/18. Patient coded on 4/8/18 and was successfully resuscitated and again intubated. Patient was also diagnosed with C. Diff colitis and MRSA bacteremia.   Nephrology was consulted to help with patient's electrolyte care, renal care and volume management. I saw and examined patient in her hospital room. Patient is intubated and not able to provide any additional history.   Patient remains on antibiotics and pressor support. Chart review revealed that hyponatremia and hypokalemia have now resolved. However, hypocalcemia has persisted. In addition, patient's renal function has worsened during current admission and creatinine has increased from 0.8 on 4/6/18 to 1.6 on 4/9/18. Volume review showed positive I/O balance with + 24 liters since admission,. Patient's UOP has been fluctuating with 1 to 3 liters of urine per day. Current UOP about 100 cc/hour.

## 2018-04-09 NOTE — ASSESSMENT & PLAN NOTE
33.8 cm span with reduced echogenicity on ultrasound.  Of uncertain etiology but suspect fatty liver disease related to alcohol abuse and obesity.    04/07/2018- related to alcohol abuse and obesity . Will need out patient follow up     04/09/2018-Hepatology consult in place-will follow recs,related to fatty liver and alcohol abuse

## 2018-04-09 NOTE — PLAN OF CARE
Problem: Patient Care Overview  Goal: Plan of Care Review  Outcome: Ongoing (interventions implemented as appropriate)  Recommendations     Recommendation/Intervention:  1. Resume tube feed and Consider increasing tube feed rate to 85 ml/hr. With current IV medications, provides 2125 kcal, 190 protein and 1714 ml free water. 2. Hold TF x 4 hrs for residuals > 250 mls. 3. Will continue to monitor.   Goals: Meet > 85 % EEN/EPN   Nutrition Goal Status: progressing towards goal  Communication of RD Recs:  (Reviewed with NP)

## 2018-04-09 NOTE — SUBJECTIVE & OBJECTIVE
Past Medical History:   Diagnosis Date    Gestational diabetes     Hypertension        History reviewed. No pertinent surgical history.    Review of patient's allergies indicates:  No Known Allergies  Current Facility-Administered Medications   Medication Frequency    0.9%  NaCl infusion (for blood administration) Q24H PRN    acetaminophen oral solution 650 mg Q8H PRN    albuterol-ipratropium 2.5mg-0.5mg/3mL nebulizer solution 3 mL Q4H PRN    bisacodyl suppository 10 mg Daily PRN    chlorhexidine 0.12 % solution 15 mL BID    dextrose 50% injection 12.5 g PRN    famotidine (PF) injection 20 mg BID    fentaNYL 2500 mcg in 0.9% sodium chloride 250 mL infusion premix (titrating) Continuous    folic acid tablet 1 mg Daily    furosemide injection 20 mg Q8H    glucagon (human recombinant) injection 1 mg PRN    insulin aspart U-100 pen 1-10 Units Q6H PRN    lorazepam (ATIVAN) injection 2 mg Q2H PRN    magnesium sulfate 2g in water 50mL IVPB (premix) PRN    magnesium sulfate 2g in water 50mL IVPB (premix) PRN    metronidazole IVPB 500 mg Q8H    multivitamin tablet 1 tablet Daily    nicotine 21 mg/24 hr 1 patch Daily    norepinephrine 32 mg in dextrose 5 % 250 mL infusion Continuous    ondansetron injection 4 mg Q8H PRN    piperacillin-tazobactam 4.5 g in dextrose 5 % 100 mL IVPB (ready to mix system) Q8H    pneumoc 13-bobby conj-dip cr(PF) 0.5 mL vaccine x 1 dose    potassium chloride 40 mEq in 100 mL IVPB (FOR CENTRAL LINE ADMINISTRATION ONLY) PRN    And    potassium chloride 20 mEq in 100 mL IVPB (FOR CENTRAL LINE ADMINISTRATION ONLY) PRN    And    potassium chloride 40 mEq in 100 mL IVPB (FOR CENTRAL LINE ADMINISTRATION ONLY) PRN    sodium chloride 0.9% flush 5 mL PRN    sodium phosphate 15 mmol in dextrose 5 % 250 mL IVPB PRN    sodium phosphate 20.01 mmol in dextrose 5 % 250 mL IVPB PRN    sodium phosphate 30 mmol in dextrose 5 % 250 mL IVPB PRN    thiamine tablet 100 mg Daily     vancomycin (VANCOCIN) 1,000 mg in sodium chloride 0.9% 250 mL IVPB Q24H    vasopressin (PITRESSIN) 0.2 Units/mL in dextrose 5 % 100 mL infusion Continuous     Family History     Problem Relation (Age of Onset)    Diabetes Mother, Father, Sister    Heart disease Mother        Social History Main Topics    Smoking status: Never Smoker    Smokeless tobacco: Never Used    Alcohol use Yes    Drug use: No    Sexual activity: Not on file     Review of Systems   Unable to perform ROS: Intubated     Objective:     Vital Signs (Most Recent):  Temp: 100.3 °F (37.9 °C) (04/09/18 0705)  Pulse: (!) 112 (04/09/18 0705)  Resp: (!) 4 (04/09/18 0658)  BP: (!) 107/53 (04/09/18 0705)  SpO2: 96 % (04/09/18 0705)  O2 Device (Oxygen Therapy): ventilator (04/09/18 0658) Vital Signs (24h Range):  Temp:  [99 °F (37.2 °C)-100.3 °F (37.9 °C)] 100.3 °F (37.9 °C)  Pulse:  [] 112  Resp:  [4-28] 4  SpO2:  [95 %-100 %] 96 %  BP: ()/(34-65) 107/53     Weight: (!) 140.1 kg (308 lb 13.8 oz) (04/09/18 0620)  Body mass index is 49.85 kg/m².  Body surface area is 2.55 meters squared.    I/O last 3 completed shifts:  In: 3614.1 [I.V.:1314.1; NG/GT:850; IV Piggyback:1450]  Out: 2360 [Urine:1860; Drains:500]    Physical Exam   Constitutional: She appears well-developed and well-nourished. She is intubated.   HENT:   Head: Normocephalic.   ET tube in place.    Eyes: Pupils are equal, round, and reactive to light.   Neck: No thyromegaly present.   Cardiovascular: Regular rhythm.  Tachycardia present.  Exam reveals no friction rub.    Pulmonary/Chest: Breath sounds normal. She is intubated. She has no wheezes. She exhibits no tenderness.   Abdominal: Soft. Bowel sounds are normal. She exhibits distension. There is no tenderness.   Musculoskeletal: She exhibits edema.   Bilateral pitting LE edema.    Lymphadenopathy:     She has no cervical adenopathy.   Neurological: She is alert.   Skin: Skin is warm and dry. No rash noted.       Significant  Labs:  Lab Results   Component Value Date    CREATININE 1.6 (H) 04/09/2018    CREATININE 1.6 (H) 04/09/2018    BUN 4 (L) 04/09/2018    BUN 4 (L) 04/09/2018     04/09/2018     04/09/2018    K 4.0 04/09/2018    K 4.0 04/09/2018    CL 99 04/09/2018    CL 99 04/09/2018    CO2 22 (L) 04/09/2018    CO2 22 (L) 04/09/2018     Lab Results   Component Value Date    CALCIUM 6.6 (LL) 04/09/2018    CALCIUM 6.6 (LL) 04/09/2018    CAION 0.64 (LL) 04/06/2018    PHOS 3.5 04/09/2018     Lab Results   Component Value Date    ALBUMIN 1.5 (L) 04/09/2018     Lab Results   Component Value Date    WBC 18.58 (H) 04/09/2018    HGB 7.9 (L) 04/09/2018    HCT 24.9 (L) 04/09/2018    MCV 97 04/09/2018    PLT 24 (LL) 04/09/2018       Recent Labs  Lab 04/09/18  0344   MG 2.0     Urinalysis: RBC > 100, 40 WBC, many bacteria (4/4/18)      Significant Imaging:    CT abdomen/pelvis from 4/8/18 (WITH CONTRAST):  Marked hepatomegaly with fatty liver.  Markedly thick walled gallbladder which was not identified 04/05/2018.  Small amount of pelvic ascites.  Edema in the subcutaneous fat of the anterior abdominal wall and pelvis.    CTA from 4/8/18:  No CT evidence of pulmonary embolism.  Right upper lobe atelectasis.  Marked hepatomegaly with fatty liver.    CXR (4/9/18):  Tubes and lines are stable. Right upper lobe atelectasis and decreased lung volumes. In comparison to the prior study, there is no adverse interval changes.

## 2018-04-09 NOTE — ASSESSMENT & PLAN NOTE
04/09/2018-will use PO vancomycin 250mg every 8 hours for 10 days .  Due to her multiple electrolyte abnormalities, there is concern for ileus ,will continue Flagyl for now and adjust therapy as needed   .

## 2018-04-09 NOTE — ASSESSMENT & PLAN NOTE
Vaginal bleeding related to vaginal birth of non-viable infant with retained placenta, was followed with D&C.     --Monitor H/H if Hgb <7.0 will need to transfuse with PRBC.

## 2018-04-09 NOTE — ASSESSMENT & PLAN NOTE
when able.    04/07/2018- will need close out patient follow up on discharge for alcohol cessation counseling .  04/09/2018- need close out patient follow up

## 2018-04-09 NOTE — CONSULTS
[facility]  Gastroenterology  Consult Note    Patient Name: Rafael Duron  MRN: [id number]  Admission Date: 4/3/2018  Hospital Length of Stay: 6 days  Code Status: Full Code   Attending Provider: Vinny Ram MD   Consulting Provider: Evan Pelayo PA-C  Primary Care Physician: Herman Cowart MD  Principal Problem:Septic shock    Inpatient consult to Gastroenterology  Consult performed by: EVAN PELAYO  Consult ordered by: VINNY RAM  Reason for consult: Alcohol liver disease        Subjective:     HPI:  Ms Duron is a 23 yo obese BF with a PMH of HTN and gestational DM who presented to [facility] ED in [place], [place] on 4/2 with complaint of SOB and cough.  OB US revealed no heart tones.  After admission to ICU she had seizure activity with , K+ 1.9 and Bicarb 11.  She also had etoh level 268 and reportedly had been drinking etoh heavily for several days.  She required intubation for airway protection per records and OB was unable to remove placenta manually and patient had to be taken to OR.  She received 2 liters IVF and Hgb dropped to 7.4 and was transfused 2 units PRBCs. She was febrile initially. She as transferred to [facility] ICU yesterday evening for higher level of care.      She has been extubated and then reintubated. She is requiring pressors when intubated. She was found to have C. Diff diarrhea and MRSA in the lung. ID was following for antibiotic therapy. She have been consulted for fatty liver and alcohol abuse. Her INR has remained around 1.2. PLT 24. AST>ALT and has remained relatively stable but Total bilirubin increasing. Triglycerides were 1800 on admission. She had an episode of asystole yesterday requiring resuscitation. Yesterday CPK was 2296, Lactate >12. Phos and K were significantly abnormal on admission but has been corrected. Creatinine up to 1.6 yesterday. Renal is following.        Past Medical History:   Diagnosis Date     Gestational diabetes     Hypertension        History reviewed. No pertinent surgical history.    Review of patient's allergies indicates:  No Known Allergies  Family History     Problem Relation (Age of Onset)    Diabetes Mother, Father, Sister    Heart disease Mother        Social History Main Topics    Smoking status: Never Smoker    Smokeless tobacco: Never Used    Alcohol use Yes    Drug use: No    Sexual activity: Not on file     Review of Systems   Unable to perform ROS: Intubated     Objective:     Vital Signs (Most Recent):  Temp: 100 °F (37.8 °C) (04/09/18 1105)  Pulse: (!) 112 (04/09/18 1139)  Resp: (!) 21 (04/09/18 1139)  BP: (!) 113/57 (04/09/18 1105)  SpO2: 97 % (04/09/18 1139) Vital Signs (24h Range):  Temp:  [99 °F (37.2 °C)-100.3 °F (37.9 °C)] 100 °F (37.8 °C)  Pulse:  [] 112  Resp:  [4-28] 21  SpO2:  [95 %-100 %] 97 %  BP: ()/(34-65) 113/57     Weight: (!) 140.1 kg (308 lb 13.8 oz) (04/09/18 1100)  Body mass index is 49.85 kg/m².      Intake/Output Summary (Last 24 hours) at 04/09/18 1147  Last data filed at 04/09/18 1101   Gross per 24 hour   Intake          4235.11 ml   Output             1735 ml   Net          2500.11 ml       Lines/Drains/Airways     Central Venous Catheter Line                 Percutaneous Central Line Insertion/Assessment - triple lumen  04/03/18 2045 right internal jugular 5 days          Drain                 Urethral Catheter 04/03/18 16 Fr. 6 days         NG/OG Tube 04/08/18 0730 Pickens sump Right mouth 1 day         Rectal Tube 04/08/18 0730 fecal management system 1 day          Airway                 Airway - Non-Surgical 04/08/18 0715 Endotracheal Tube 1 day          Arterial Line                 Arterial Line 04/08/18 0720 Left Brachial 1 day                Physical Exam   Constitutional: She appears well-nourished. She appears ill.   HENT:   Head: Normocephalic and atraumatic.   Eyes: EOM are normal.   Neck: Neck supple. Carotid bruit is not present.    Cardiovascular: Normal rate and regular rhythm.    No murmur heard.  Pulmonary/Chest: Effort normal and breath sounds normal. No respiratory distress. She has no wheezes.   Abdominal: Normal appearance. She exhibits distension. She exhibits no mass. Bowel sounds are decreased. There is generalized tenderness.   Body habits limits the exam.    Musculoskeletal: She exhibits no edema.   Neurological: No cranial nerve deficit.   She opens her eyes and nods some to questions.    Skin: Skin is warm and dry. No rash noted.       Significant Labs:  CBC:   Recent Labs  Lab 04/08/18  0712 04/08/18  1725 04/09/18  0344   WBC 22.39* 16.98* 18.58*   HGB 8.4* 8.7* 7.9*   HCT 26.7* 27.7* 24.9*   PLT 29* 25* 24*     CMP:   Recent Labs  Lab 04/09/18  0344   GLU 95  95   CALCIUM 6.6*  6.6*   ALBUMIN 1.5*   PROT 4.6*     136   K 4.0  4.0   CO2 22*  22*   CL 99  99   BUN 4*  4*   CREATININE 1.6*  1.6*   ALKPHOS 120   ALT 42   *   BILITOT 7.2*     Coagulation:   Recent Labs  Lab 04/08/18  0712 04/09/18  1056   INR 1.2 1.3*   APTT 39.9*  --      Stool C. diff:   Recent Labs  Lab 04/08/18  0844   CDIFFICILEAN Positive*   CDIFFTOX Negative       Significant Imaging:  Imaging results within the past 24 hours have been reviewed.    Assessment/Plan:     Elevated LFTs    23 yo admitted for septic shock after retained products of conception.    Elevated LFTs likely to septic shock. She has been hypotensive and coded yesterday likely contributing. Her history doesn't support liver cirrhosis.   She doesn't meet criteria for fatty liver of pregnancy, in which, treatment would include delivery.   If acute alcoholic hepatitis, treatment wouldn't change.   INR relatively stable so acute failure not likely.   Recommend treating underlying shock and continue supportive care. Antibiotics per ID and ICU teams.      MELD-Na score: 22 at 4/9/2018  2:03 PM  MELD score: 22 at 4/9/2018  2:03 PM  Calculated from:  Serum Creatinine: 1.7  mg/dL at 4/9/2018  2:03 PM  Serum Sodium: 138 mmol/L (Rounded to 137) at 4/9/2018  2:03 PM  Total Bilirubin: 7.2 mg/dL at 4/9/2018  3:44 AM  INR(ratio): 1.3 at 4/9/2018 10:56 AM  Age: 22 years               Thrombocytopenia    Secondary to sepsis rather than liver disease.   PLT were transfused earlier today.   Continue to monitor and replace as needed.         Clostridium difficile infection    The patient is on Flagyl. ID considering starting oral vancomycin.         Hepatomegaly    Secondary to fatty liver disease.         Pneumonia of both lower lobes due to methicillin resistant Staphylococcus aureus (MRSA)    On antibiotic therapy        Acute blood loss anemia    No overt GI bleed.  Attributed to vaginal bleeding.   Continue to monitor and transfuse as indicated.         ETOH abuse    Watch for withdrawal symptoms.             Thank you for your consult. I will follow-up with patient. Please contact us if you have any additional questions.    Jay Pelayo PA-C  Gastroenterology  [facility]

## 2018-04-09 NOTE — SUBJECTIVE & OBJECTIVE
Review of Systems   Unable to perform ROS: Intubated       Objective:     Vital Signs (Most Recent):  Temp: 100.3 °F (37.9 °C) (04/09/18 0705)  Pulse: 108 (04/09/18 0905)  Resp: (!) 24 (04/09/18 0905)  BP: (!) 114/56 (04/09/18 0905)  SpO2: 98 % (04/09/18 0905) Vital Signs (24h Range):  Temp:  [99 °F (37.2 °C)-100.3 °F (37.9 °C)] 100.3 °F (37.9 °C)  Pulse:  [] 108  Resp:  [4-28] 24  SpO2:  [95 %-100 %] 98 %  BP: ()/(34-65) 114/56     Weight: (!) 140.1 kg (308 lb 13.8 oz)  Body mass index is 49.85 kg/m².      Intake/Output Summary (Last 24 hours) at 04/09/18 1011  Last data filed at 04/09/18 0705   Gross per 24 hour   Intake          3164.11 ml   Output             1665 ml   Net          1499.11 ml       Physical Exam   Constitutional: She appears well-developed and well-nourished. She is easily aroused. She appears toxic. She has a sickly appearance. She appears ill. She is sedated, intubated and restrained.   HENT:   Head: Normocephalic and atraumatic.   Mouth/Throat: Oropharynx is clear and moist and mucous membranes are normal.   Eyes: EOM and lids are normal. Pupils are equal, round, and reactive to light.   jaundice   Neck: Trachea normal. Neck supple.   Obese    Cardiovascular: Regular rhythm and normal heart sounds.  Tachycardia present.    Pulses:       Radial pulses are 1+ on the right side, and 1+ on the left side.        Dorsalis pedis pulses are 0 on the right side, and 0 on the left side.   Pulmonary/Chest: No accessory muscle usage. She is intubated. No respiratory distress. She has decreased breath sounds.   Coarse breath sounds   Abdominal: Bowel sounds are decreased. There is hepatomegaly.   Firmness palpated above umbilicus.  Morbidly obese   Genitourinary:   Genitourinary Comments: Rodriguez in place   Musculoskeletal: Normal range of motion.        Right foot: There is no deformity.        Left foot: There is no deformity.   Diffuse anasarca   Lymphadenopathy:     She has no cervical  adenopathy.   Neurological: She is easily aroused.   Easily awakens on sedation and follows commands   Skin: Skin is warm and dry. Capillary refill takes less than 2 seconds. No rash noted. No cyanosis.        Blistering noted to extrem       Vents:  Vent Mode: A/C (04/09/18 0658)  Ventilator Initiated: Yes (04/05/18 1930)  Set Rate: 18 bmp (04/09/18 0658)  Vt Set: 400 mL (04/09/18 0658)  Pressure Support: 0 cmH20 (04/09/18 0658)  PEEP/CPAP: 5 cmH20 (04/09/18 0658)  Oxygen Concentration (%): 35 (04/09/18 0905)  Peak Airway Pressure: 23 cmH2O (04/09/18 0658)  Plateau Pressure: 0 cmH20 (04/09/18 0658)  Total Ve: 8.48 mL (04/09/18 0658)  F/VT Ratio<105 (RSBI): (!) 9.71 (04/09/18 0658)    Lines/Drains/Airways     Central Venous Catheter Line                 Percutaneous Central Line Insertion/Assessment - triple lumen  04/03/18 2045 right internal jugular 5 days          Drain                 Urethral Catheter 04/03/18 16 Fr. 6 days         NG/OG Tube 04/08/18 0730 [place] sump Right mouth 1 day         Rectal Tube 04/08/18 0730 fecal management system 1 day          Airway                 Airway - Non-Surgical 04/08/18 0715 Endotracheal Tube 1 day          Arterial Line                 Arterial Line 04/08/18 0720 Left Brachial 1 day                Significant Labs:    CBC/Anemia Profile:    Recent Labs  Lab 04/08/18  0712 04/08/18  1725 04/09/18  0344   WBC 22.39* 16.98* 18.58*   HGB 8.4* 8.7* 7.9*   HCT 26.7* 27.7* 24.9*   PLT 29* 25* 24*   MCV 97 95 97   RDW 18.8* 18.6* 18.4*        Chemistries:    Recent Labs  Lab 04/08/18  0343 04/08/18  0712 04/08/18  1458 04/08/18  2130 04/09/18  0344    141 136 137 136  136   K 3.8 5.0 4.0 4.7 4.0  4.0    102 99 100 99  99   CO2 25 18* 22* 22* 22*  22*   BUN 2* 3* 3* 3* 4*  4*   CREATININE 1.1 1.3 1.4 1.6* 1.6*  1.6*   CALCIUM 6.4* 8.5* 6.5* 6.5* 6.6*  6.6*   ALBUMIN 1.5* 1.5*  --   --  1.5*   PROT 4.8* 4.8*  --   --  4.6*   BILITOT 6.2* 6.5*  --   --  7.2*    ALKPHOS 91 97  --   --  120   ALT 50* 53*  --   --  42   * 118*  --   --  100*   MG 1.8 2.6 1.9 1.6 2.0   PHOS 3.1 5.0* 3.5 3.6 3.5       ABGs:   Recent Labs  Lab 04/09/18  0518   PH 7.362   PCO2 43.9   HCO3 24.9   POCSATURATED 97   BE 0     Coagulation:   Recent Labs  Lab 04/08/18  0712   INR 1.2   APTT 39.9*     Respiratory Culture: No results for input(s): GSRESP, RESPIRATORYC in the last 48 hours.  All pertinent labs within the past 24 hours have been reviewed.

## 2018-04-09 NOTE — CONSULTS
[facility] -   Nephrology  Consult Note    Patient Name: Rafael Duron  MRN: [id number]  Admission Date: 4/3/2018  Hospital Length of Stay: 6 days  Attending Provider: Ed Ram MD   Primary Care Physician: Herman Cowart MD  Principal Problem:Septic shock    Consults  Subjective:     HPI: 21 yo obese female with HTN, gestational DM  who presented to [facility] ED in [place], [place] on 4/2 with complaint of SOB and cough with known pregnancy. Workup revealed fetal demise (estimated at 22 weeks) and patient passed dead fetus but retained placenta. Placenta remnants were removed in OR vis D & C.  After admission to ICU she had seizure activity with , K+ 1.9 and Bicarb 11.  She also had EtOH level of 268.  She required intubation for airway protection per records. She as transferred to [facility] ICU on 4/3/18 for higher level of care.    Patient presented with septic shock at [facility] and was started on IV pressors and IV antibiotics. Patient was initially stabilized and extubated on 4/5/18. OB/GYN following. She developed syncopal episode on 4/5/18 associated with bradycardia. She was successfully resuscitated with IV fluids and levophed. She subsequently deteriorated and was re-intubated on 4/5/18. Abdominal US revealed massive hepatomegaly with liver span of 33 cm. Patient's condition improved and she was extubated again on 4/7/18. Patient coded on 4/8/18 and was successfully resuscitated and again intubated. Patient was also diagnosed with C. Diff colitis and MRSA bacteremia.   Nephrology was consulted to help with patient's electrolyte care, renal care and volume management. I saw and examined patient in her hospital room. Patient is intubated and not able to provide any additional history.   Patient remains on antibiotics and pressor support. Chart review revealed that hyponatremia and hypokalemia have now resolved. However, hypocalcemia has persisted. In addition,  patient's renal function has worsened during current admission and creatinine has increased from 0.8 on 4/6/18 to 1.6 on 4/9/18. Volume review showed positive I/O balance with + 24 liters since admission,. Patient's UOP has been fluctuating with 1 to 3 liters of urine per day. Current UOP about 100 cc/hour.       Past Medical History:   Diagnosis Date    Gestational diabetes     Hypertension        History reviewed. No pertinent surgical history.    Review of patient's allergies indicates:  No Known Allergies  Current Facility-Administered Medications   Medication Frequency    0.9%  NaCl infusion (for blood administration) Q24H PRN    acetaminophen oral solution 650 mg Q8H PRN    albuterol-ipratropium 2.5mg-0.5mg/3mL nebulizer solution 3 mL Q4H PRN    bisacodyl suppository 10 mg Daily PRN    chlorhexidine 0.12 % solution 15 mL BID    dextrose 50% injection 12.5 g PRN    famotidine (PF) injection 20 mg BID    fentaNYL 2500 mcg in 0.9% sodium chloride 250 mL infusion premix (titrating) Continuous    folic acid tablet 1 mg Daily    furosemide injection 20 mg Q8H    glucagon (human recombinant) injection 1 mg PRN    insulin aspart U-100 pen 1-10 Units Q6H PRN    lorazepam (ATIVAN) injection 2 mg Q2H PRN    magnesium sulfate 2g in water 50mL IVPB (premix) PRN    magnesium sulfate 2g in water 50mL IVPB (premix) PRN    metronidazole IVPB 500 mg Q8H    multivitamin tablet 1 tablet Daily    nicotine 21 mg/24 hr 1 patch Daily    norepinephrine 32 mg in dextrose 5 % 250 mL infusion Continuous    ondansetron injection 4 mg Q8H PRN    piperacillin-tazobactam 4.5 g in dextrose 5 % 100 mL IVPB (ready to mix system) Q8H    pneumoc 13-bobby conj-dip cr(PF) 0.5 mL vaccine x 1 dose    potassium chloride 40 mEq in 100 mL IVPB (FOR CENTRAL LINE ADMINISTRATION ONLY) PRN    And    potassium chloride 20 mEq in 100 mL IVPB (FOR CENTRAL LINE ADMINISTRATION ONLY) PRN    And    potassium chloride 40 mEq in 100 mL IVPB  (FOR CENTRAL LINE ADMINISTRATION ONLY) PRN    sodium chloride 0.9% flush 5 mL PRN    sodium phosphate 15 mmol in dextrose 5 % 250 mL IVPB PRN    sodium phosphate 20.01 mmol in dextrose 5 % 250 mL IVPB PRN    sodium phosphate 30 mmol in dextrose 5 % 250 mL IVPB PRN    thiamine tablet 100 mg Daily    vancomycin (VANCOCIN) 1,000 mg in sodium chloride 0.9% 250 mL IVPB Q24H    vasopressin (PITRESSIN) 0.2 Units/mL in dextrose 5 % 100 mL infusion Continuous     Family History     Problem Relation (Age of Onset)    Diabetes Mother, Father, Sister    Heart disease Mother        Social History Main Topics    Smoking status: Never Smoker    Smokeless tobacco: Never Used    Alcohol use Yes    Drug use: No    Sexual activity: Not on file     Review of Systems   Unable to perform ROS: Intubated     Objective:     Vital Signs (Most Recent):  Temp: 100.3 °F (37.9 °C) (04/09/18 0705)  Pulse: (!) 112 (04/09/18 0705)  Resp: (!) 4 (04/09/18 0658)  BP: (!) 107/53 (04/09/18 0705)  SpO2: 96 % (04/09/18 0705)  O2 Device (Oxygen Therapy): ventilator (04/09/18 0658) Vital Signs (24h Range):  Temp:  [99 °F (37.2 °C)-100.3 °F (37.9 °C)] 100.3 °F (37.9 °C)  Pulse:  [] 112  Resp:  [4-28] 4  SpO2:  [95 %-100 %] 96 %  BP: ()/(34-65) 107/53     Weight: (!) 140.1 kg (308 lb 13.8 oz) (04/09/18 0620)  Body mass index is 49.85 kg/m².  Body surface area is 2.55 meters squared.    I/O last 3 completed shifts:  In: 3614.1 [I.V.:1314.1; NG/GT:850; IV Piggyback:1450]  Out: 2360 [Urine:1860; Drains:500]    Physical Exam   Constitutional: She appears well-developed and well-nourished. She is intubated.   HENT:   Head: Normocephalic.   ET tube in place.    Eyes: Pupils are equal, round, and reactive to light.   Neck: No thyromegaly present.   Cardiovascular: Regular rhythm.  Tachycardia present.  Exam reveals no friction rub.    Pulmonary/Chest: Breath sounds normal. She is intubated. She has no wheezes. She exhibits no tenderness.    Abdominal: Soft. Bowel sounds are normal. She exhibits distension. There is no tenderness.   Musculoskeletal: She exhibits edema.   Bilateral pitting LE edema.    Lymphadenopathy:     She has no cervical adenopathy.   Neurological: She is alert.   Skin: Skin is warm and dry. No rash noted.       Significant Labs:  Lab Results   Component Value Date    CREATININE 1.6 (H) 04/09/2018    CREATININE 1.6 (H) 04/09/2018    BUN 4 (L) 04/09/2018    BUN 4 (L) 04/09/2018     04/09/2018     04/09/2018    K 4.0 04/09/2018    K 4.0 04/09/2018    CL 99 04/09/2018    CL 99 04/09/2018    CO2 22 (L) 04/09/2018    CO2 22 (L) 04/09/2018     Lab Results   Component Value Date    CALCIUM 6.6 (LL) 04/09/2018    CALCIUM 6.6 (LL) 04/09/2018    CAION 0.64 (LL) 04/06/2018    PHOS 3.5 04/09/2018     Lab Results   Component Value Date    ALBUMIN 1.5 (L) 04/09/2018     Lab Results   Component Value Date    WBC 18.58 (H) 04/09/2018    HGB 7.9 (L) 04/09/2018    HCT 24.9 (L) 04/09/2018    MCV 97 04/09/2018    PLT 24 (LL) 04/09/2018       Recent Labs  Lab 04/09/18  0344   MG 2.0     Urinalysis: RBC > 100, 40 WBC, many bacteria (4/4/18)      Significant Imaging:    CT abdomen/pelvis from 4/8/18 (WITH CONTRAST):  Marked hepatomegaly with fatty liver.  Markedly thick walled gallbladder which was not identified 04/05/2018.  Small amount of pelvic ascites.  Edema in the subcutaneous fat of the anterior abdominal wall and pelvis.    CTA from 4/8/18:  No CT evidence of pulmonary embolism.  Right upper lobe atelectasis.  Marked hepatomegaly with fatty liver.    CXR (4/9/18):  Tubes and lines are stable. Right upper lobe atelectasis and decreased lung volumes. In comparison to the prior study, there is no adverse interval changes.        Assessment/Plan:     * Septic shock    Continue antibiotics and pressor support (patient on levophed and vasopressin).  Patient was diagnosed with MRSA bacteremia.           Volume overload    Patient with  positive fluid balance of + 24 liters since admission.   Will increase Lasix to 60 mg IV tid and monitor volume status closely.         VIKRAM (acute kidney injury)    Patient's renal function has worsened and creatinine has increased from 0.8 on 4/6/18 to 1.6 on 4/9/18. VIKRAM likely multifactorial and related to hypotension, anemia, previous cardiac arrest. Also consider compartment syndrome in patient with severe abdominal distention. Will measure intra-bdominal pressure via bladder.   Will monitor closely. No dialysis indicated at present. Will redo urinalysis.   Kidneys were normal on recent CT scan.         Clostridium difficile infection    Continue Flagyl.         Thrombocytopenia    Platelet transfusion today.         Pneumonia of right lower lobe due to methicillin resistant Staphylococcus aureus (MRSA)    Continue antibiotics.         UTI due to Klebsiella species    Continue antibiotics.         Acute blood loss anemia    Blood transfusion as needed.         Electrolyte imbalance    Patient initially presented with hyponatremia and hypokalemia both of which have now resolved. However, hypocalcemia has persisted. Will replete calcium with IV calcium gluconate. Will follow up with ionized calcium level and vitamin D level. Magnesium level is at goal.        Acute hypoxemic respiratory failure    Continue mechanical ventilation.           Total ICU time spent: 65 minutes. More than 50% of time was needed for chart review and discussing case with ICU team in detail.     Thank you for your consult. I will follow-up with patient. Please contact us if you have any additional questions.    Lawrence Nguyen MD  Nephrology  [facility] -

## 2018-04-09 NOTE — ASSESSMENT & PLAN NOTE
Continue antibiotics and pressor support (patient on levophed and vasopressin).  Patient was diagnosed with MRSA bacteremia.

## 2018-04-09 NOTE — ASSESSMENT & PLAN NOTE
Source likely fetal demise of unknown duration and retained products of concepttion placenta, removed surgically at outside hospital.  Continue IV fluids.  Follow up on blood and urine cultures.  Lactic acid improved to 5 from 7.  OB Gyn consult - Dr. Cardona.    04/08/2018- will continue vancomycin/zosyn ,follow repeat blood cultures .  Lactic acid is more than 12.  I called [facility] and discussed with the lab about her cultures-blood cultures done on 04/03-neg but urine culture -was positive for klebsiella -she is faxing the results. She will call [facility] to get the results.     04/09/2018-continue vasopressor support , on vanco,zosyn and  will deescalate tomorrow and follow CBc closely.

## 2018-04-09 NOTE — PROGRESS NOTES
Patient assessed.  Soft bilat wrist restraints renewed due to risk of pulling lines, tubes and/or climbing OOB.    [facility]

## 2018-04-09 NOTE — ASSESSMENT & PLAN NOTE
Cont Levophed and Vasopressin infusions wean as tolerated  Cont IVAB  Volume replacement with blood products today

## 2018-04-09 NOTE — TELEPHONE ENCOUNTER
Hello,  Just to let y'll know, I reviewed this patient with our ob/gyn group at our weekly meeting this morning, including with Dmitry Kapoor. They all agree: we  do not suspect the uterus is a remaining cause of concern, as per my note on 4/5/18. We feel she was fully treated when she received her D+C in [place]. Review of the pelvic exam on 4/5 and ultrasound images further support the uterus is not a lingering source of infection.   Please let us know if there are any questions, or any new need for a repeat consultation in this unfortunate case.

## 2018-04-09 NOTE — ASSESSMENT & PLAN NOTE
Currently too unstable for tracheostomy.  Procedure will be technically difficult due to obesity, and should be done in conjunction with ENT.    Recheck bladder pressure.  If abd compartment syndrome is suspected, recommend IR drain.  If that is ineffective, would need laparotomy with open abdomen.

## 2018-04-09 NOTE — ASSESSMENT & PLAN NOTE
Related to sepsis /liver disease -will monitor closely for signs of bleeding   04/09/2018-oncology follow up appreciated -will transfuse as needed

## 2018-04-09 NOTE — ASSESSMENT & PLAN NOTE
Re intubated for third time 4/8 s/p asystolic arrest.  Cont Mechanical ventilation.  Ventilator settings reviewed and adjusted to optimize gas exchange. Daily wake up and breathe trials once more stable.  Would benefit from Trach once more stable.  Titrate FIO2 to keep SAO2 > 92%.

## 2018-04-09 NOTE — EICU
eICU Note :    Called by the [facility] eRN:    Problem: Quad strength Levophed, pt volume overloaded    Pertinent History and labs reviewed :22-year-old obese black female with history of hypertension,gestational diabetes, presented with septic shock due to retained placenta,Patient was successfully extubate and had to be reintubated on 4/8  due to asystolic arrest,ROSC attained after 1 epinephrine       Treatment /Intervention given: Levo quad strength to minimize fluid overload.    Claudia Yates M.D  eICU Physician  11:31 PM  Low calcium 6.5, corrected 8.5, will give 1 amp of Calcium gluconate IVPB x1 now  5:37 AM  Ca++ 6.9, corrected Calcium 8.9 , continue current treatment.  5:49 AM  Vanco  Trough 33.8, Vanco dose this AM will be held!

## 2018-04-09 NOTE — PROGRESS NOTES
[facility] -   Adult Nutrition  Progress Note    SUMMARY     Recommendations    Recommendation/Intervention:  1. Resume tube feed and Consider increasing tube feed rate to 85 ml/hr. With current IV medications, provides 2125 kcal, 190 protein and 1714 ml free water. 2. Hold TF x 4 hrs for residuals > 250 mls. 3. Will continue to monitor.   Goals: Meet > 85 % EEN/EPN   Nutrition Goal Status: progressing towards goal  Communication of RD Recs:  (Reviewed with NP)    Reason for Assessment    Reason for Assessment: RD follow-up  Dx:  1. Respiratory failure    2. Septic shock    3. Acute blood loss anemia    4. Acute hypoxemic respiratory failure    5. Acute urinary tract infection    6. Electrolyte imbalance    7. ETOH abuse    8. Hyperglycemia, unspecified    9. Hyponatremia    10. Metabolic acidosis    11. Retained products of conception with hemorrhage    12. Seizure    13. Hypertriglyceridemia    14. Shock liver    15. Fever, unspecified fever cause    16. S/P dilatation and curettage    17. History of IUFD    18. Hepatomegaly    19. Pneumonia of both lower lobes due to methicillin resistant Staphylococcus aureus (MRSA)    20. Asystole    21. Spontaneous  with cardiac arrest or failure    22. Hyperbilirubinemia    23. Cardiopulmonary arrest    24. Clostridium difficile infection    25. Pneumonia of right lower lobe due to methicillin resistant Staphylococcus aureus (MRSA)    26. Thrombocytopenia    27. UTI due to Klebsiella species    28. Respiratory failure, unspecified chronicity, unspecified whether with hypoxia or hypercapnia    29. VIKRAM (acute kidney injury)    30. Hypocalcemia    31. Hypervolemia, unspecified hypervolemia type      Past Medical History:   Diagnosis Date    Gestational diabetes     Hypertension        Interdisciplinary Rounds: attended  General Information Comments: Pt was extubated yesterday and has to be reintubated. Per RN ~TF stopped yesterday, TF will be restarted  "today. Reviewed TF recs with NP this am.   Nutrition Discharge Planning: too soon to determine    Nutrition Risk Screen    Nutrition Risk Screen: no indicators present    Nutrition/Diet History    Do you have any cultural, spiritual, Jew conflicts, given your current situation?: None      Anthropometrics    Temp: 100 °F (37.8 °C)  Height Method: Estimated  Height: 5' 6" (167.6 cm)  Height (inches): 66 in  Weight Method: Bed Scale  Weight: (!) 140.1 kg (308 lb 13.8 oz)  Weight (lb): (!) 308.87 lb  Ideal Body Weight (IBW), Female: 130 lb  % Ideal Body Weight, Female (lb): 237.59 lb  BMI (Calculated): 50  BMI Grade: greater than 40 - morbid obesity       Lab/Procedures/Meds    Pertinent Labs Reviewed: reviewed    BMP  Lab Results   Component Value Date     04/09/2018     04/09/2018    K 4.0 04/09/2018    K 4.0 04/09/2018    CL 99 04/09/2018    CL 99 04/09/2018    CO2 22 (L) 04/09/2018    CO2 22 (L) 04/09/2018    BUN 4 (L) 04/09/2018    BUN 4 (L) 04/09/2018    CREATININE 1.6 (H) 04/09/2018    CREATININE 1.6 (H) 04/09/2018    CALCIUM 6.6 (LL) 04/09/2018    CALCIUM 6.6 (LL) 04/09/2018    ANIONGAP 15 04/09/2018    ANIONGAP 15 04/09/2018    ESTGFRAFRICA 52 (A) 04/09/2018    ESTGFRAFRICA 52 (A) 04/09/2018    EGFRNONAA 45 (A) 04/09/2018    EGFRNONAA 45 (A) 04/09/2018     Lab Results   Component Value Date    CALCIUM 6.6 (LL) 04/09/2018    CALCIUM 6.6 (LL) 04/09/2018    PHOS 3.5 04/09/2018     Lab Results   Component Value Date    ALBUMIN 1.5 (L) 04/09/2018       Pertinent Medications Reviewed: reviewed    Physical Findings/Assessment    Overall Physical Appearance: on ventilator support (obese)  Tubes:  (OG)  Oral/Mouth Cavity:  (WDL)  Skin:  (Daniel Score 13)    Estimated/Assessed Needs    Weight Used For Calorie Calculations: (!) 140.1 kg (308 lb 13.8 oz)  Energy Calorie Requirements (kcal): 2268.53  Energy Need Method: Axel  (modified)  Protein Requirements: 148 - 190 g  Weight Used For Protein " Calculations: 59 kg (130 lb) (IBW - Obese ICU)     Fluid Need Method: RDA Method (or per MD)  RDA Method (mL): 2268.53         Nutrition Prescription Ordered    Current Diet Order: NPO  Current Nutrition Support Formula Ordered: Peptamen Intense VHP (not infusing at this time)  Current Nutrition Support Rate Ordered: 50 (ml)  Current Nutrition Support Frequency Ordered: ml/hr    Evaluation of Received Nutrient/Fluid Intake    Other Calories (kcal): 85.68 ~ IV medications  % Intake of Estimated Energy Needs: 0 - 25 %  % Meal Intake: NPO    Intake/Output Summary (Last 24 hours) at 04/09/18 1144  Last data filed at 04/09/18 1101   Gross per 24 hour   Intake          4235.11 ml   Output             1735 ml   Net          2500.11 ml       Nutrition Risk          Assessment and Plan    Acute hypoxemic respiratory failure    Contributing Nutrition Diagnosis  Inadequate energy intake     Related to (etiology):   Inability to consume sufficient energy     Signs and Symptoms (as evidenced by):   Intubated, NPO, no alternative means of nutrition.      Interventions/Recommendations (treatment strategy):  Please see RD recs above.    Nutrition Diagnosis Status:   Improving. 4.9.18. Pt was on TF. TF to be restarted today.                Monitor and Evaluation    Food and Nutrient Intake: energy intake, enteral nutrition intake  Food and Nutrient Adminstration: enteral and parenteral nutrition administration  Anthropometric Measurements: weight  Biochemical Data, Medical Tests and Procedures: electrolyte and renal panel, glucose/endocrine profile  Nutrition-Focused Physical Findings: overall appearance     Nutrition Follow-Up    RD Follow-up?: Yes (2xweekly)

## 2018-04-09 NOTE — ASSESSMENT & PLAN NOTE
Patient's renal function has worsened and creatinine has increased from 0.8 on 4/6/18 to 1.6 on 4/9/18. VIKRAM likely multifactorial and related to hypotension, anemia, previous cardiac arrest. Also consider compartment syndrome in patient with severe abdominal distention. Will measure intra-bdominal pressure via bladder.   Will monitor closely. No dialysis indicated at present. Will redo urinalysis.   Kidneys were normal on recent CT scan.

## 2018-04-09 NOTE — CONSULTS
Pharmacy Vancomycin Consult Note    WBC=18.58  Bugp=673.3  CrCl=79.1ml/min Scr=1.6 (up from 1.3)    Cultures: respiratory-staph aureus-vanc sensitive, pseudomonas-zosyn sensitive  Dx. Septic shock  Patient is being switched to pulse dosing.     Vancomycin trough this AM was 33.8 mcg/ml.  Dose was held. This is likely due to the fact that patient coded on the morning of 4/8.  I am switching patient to pulse dosing.   Patient will not receive a dose today.  Vancomycin 1 gram Q48 is a placeholder dose only.   Will draw vancomycin random level 4/10 with AM labs.    Thank you for allowing us to participate in this patient's care.  Maryann Smalls, Pharm D 4/9/2018 10:27 AM

## 2018-04-09 NOTE — PLAN OF CARE
Problem: Patient Care Overview  Goal: Plan of Care Review  Outcome: Ongoing (interventions implemented as appropriate)  Pt stable. Pt is NSR/ST on the heart monitor.  Pt remains intubated and on fentanyl, levo and vasopressin.  Serial labs monitored and replaced per prn orders.  Pt received 2 units of PRBC, 1 unit of FFP, 1 unit of Plts, 1 unit of cryo.  Hemonc consulted and saw pt this shift.  GI and Nephro also saw pt this shift.  Wound care consulted and saw pt this shift, see WC note for recommendation.  Tube feeding restarted at 25ml/hr and increasing as tolerated.  Pt was free from falls or injuries during duration of shift.  Pt turned and repositioned with use of wedge.  Right IJ intact with no redness, swelling or drainage.  Bed low, wheels locked, bed alarm on, all other alarms on and audible.  Pt family visited this AM (Zbigniew brother, his wife and his father in law; pt mother on speaker phone during meeting) and had detailed discussion with MD and NP.  Plan of care reviewed with pt and family.  Pt family verbalizes understanding.

## 2018-04-09 NOTE — ASSESSMENT & PLAN NOTE
Secondary to sepsis rather than liver disease.   PLT were transfused earlier today.   Continue to monitor and replace as needed.

## 2018-04-09 NOTE — PROGRESS NOTES
[facility] -   General Surgery  Progress Note    Subjective:     History of Present Illness:  22 y.o. female transferred from [place] with sepsis, s/p miscarriage with retained placenta.  Re-intubated today.    Post-Op Info:  * No surgery found *         Interval History: Worsening renal function, patient now has C. Diff, elevated bladder pressure (23 yesterday)    Medications:  Continuous Infusions:   fentanyl 200 mcg/hr (04/09/18 0759)    norepinephrine bitartrate-D5W 0.14 mcg/kg/min (04/09/18 0605)    vasopressin (PITRESSIN) infusion 0.04 Units/min (04/09/18 0605)     Scheduled Meds:   calcium gluconate 1g in dextrose 5% 100mL (ready to mix system)  1 g Intravenous Q1H    chlorhexidine  15 mL Mouth/Throat BID    famotidine (PF)  20 mg Intravenous BID    folic acid  1 mg Oral Daily    furosemide  60 mg Intravenous Q8H    metronidazole  500 mg Intravenous Q8H    multivitamin  1 tablet Oral Daily    nicotine  1 patch Transdermal Daily    piperacillin-tazobactam (ZOSYN) IVPB  4.5 g Intravenous Q8H    thiamine  100 mg Oral Daily    [START ON 4/11/2018] vancomycin (VANCOCIN) IVPB  1,000 mg Intravenous Q48H     PRN Meds:sodium chloride, acetaminophen, albuterol-ipratropium 2.5mg-0.5mg/3mL, bisacodyl, dextrose 50%, glucagon (human recombinant), insulin aspart U-100, lorazepam, magnesium sulfate IVPB, magnesium sulfate IVPB, ondansetron, pneumoc 13-bobby conj-dip cr(PF), potassium chloride **AND** potassium chloride **AND** potassium chloride, sodium chloride 0.9%, sodium phosphate IVPB, sodium phosphate IVPB, sodium phosphate IVPB     Review of patient's allergies indicates:  No Known Allergies  Objective:     Vital Signs (Most Recent):  Temp: 100 °F (37.8 °C) (04/09/18 1105)  Pulse: (!) 111 (04/09/18 1110)  Resp: (!) 22 (04/09/18 1110)  BP: (!) 113/57 (04/09/18 1105)  SpO2: 98 % (04/09/18 1110) Vital Signs (24h Range):  Temp:  [99 °F (37.2 °C)-100.3 °F (37.9 °C)] 100 °F (37.8 °C)  Pulse:   [] 111  Resp:  [4-28] 22  SpO2:  [95 %-100 %] 98 %  BP: ()/(34-65) 113/57     Weight: (!) 140.1 kg (308 lb 13.8 oz)  Body mass index is 49.85 kg/m².    Intake/Output - Last 3 Shifts       04/07 0700 - 04/08 0659 04/08 0700 - 04/09 0659 04/09 0700 - 04/10 0659    I.V. (mL/kg) 1195.6 (8.7) 1314.1 (9.4)     Blood   581    NG/ 850 90    IV Piggyback 1100 1000     Total Intake(mL/kg) 2455.6 (17.9) 3164.1 (22.6) 671 (4.8)    Urine (mL/kg/hr) 4875 (1.5) 1085 (0.3) 200 (0.3)    Drains  500 (0.1)     Stool 0 (0) 0 (0) 0 (0)    Total Output 4875 1585 200    Net -2419.4 +1579.1 +471           Stool Occurrence 1 x 2 x 1 x          Physical Exam   Constitutional: She is sedated and intubated.   Obese   Neck: Trachea normal. No tracheal deviation present.   Pulmonary/Chest: She is intubated.   Abdominal: She exhibits distension. There is tenderness in the epigastric area.       Significant Labs:  CBC:   Recent Labs  Lab 04/09/18  0344   WBC 18.58*   RBC 2.56*   HGB 7.9*   HCT 24.9*   PLT 24*   MCV 97   MCH 30.9   MCHC 31.7*     CMP:   Recent Labs  Lab 04/09/18  0344   GLU 95  95   CALCIUM 6.6*  6.6*   ALBUMIN 1.5*   PROT 4.6*     136   K 4.0  4.0   CO2 22*  22*   CL 99  99   BUN 4*  4*   CREATININE 1.6*  1.6*   ALKPHOS 120   ALT 42   *   BILITOT 7.2*       Significant Diagnostics:  I have reviewed all pertinent imaging results/findings within the past 24 hours.    Assessment/Plan:     Acute hypoxemic respiratory failure    Currently too unstable for tracheostomy.  Procedure will be technically difficult due to obesity, and should be done in conjunction with ENT.    Recheck bladder pressure.  If abd compartment syndrome is suspected, recommend IR drain.  If that is ineffective, would need laparotomy with open abdomen.            Louis O. Jeansonne, MD  General Surgery  [facility]

## 2018-04-09 NOTE — ASSESSMENT & PLAN NOTE
04/09/2018-case discussed with nephrology -she has positive fluid balance, will continue lasix 60mg every 8 hours .  Will need to closely monitor serum K and renal function on this regime

## 2018-04-09 NOTE — PROGRESS NOTES
[facility]  Critical Care Medicine  Progress Note    Patient Name: Rafael Duron  MRN: [id number]  Admission Date: 4/3/2018  Hospital Length of Stay: 6 days  Code Status: Full Code  Attending Provider: Ed Ram MD  Primary Care Provider: Herman Cowart MD   Principal Problem: Septic shock    Subjective:     HPI:  Ms Duron is a 21 yo obese BF with a PMH of HTN, gestational DM and HTN who presented to [facility] ED in [place], [place] on  with complaint of SOB and cough with known pregnancy.  OB US revealed no heart tones and she is also  with second trimester fetal demise estimated 22 week for which she passed with retained placenta.  After admission to ICU she had seizure activity with , K+ 1.9 and Bicarb 11.  She also had etoh level 268 and reportedly had been drinking etoh w/ honey heavily for several days.  She required intubation for airway protection per records and OB was unable to remove placenta manually and patient had to be taken to OR.  She received 2 liters IVF and Hgb dropped to 7.4 and was transfused 2 units PRBCs.  Vaginal bleeding was scant per records.  Yesterday she had temp 101.5 Ax, .  She as transferred to [facility] yesterday evening for higher level of care.        Hospital/ICU Course:   - This AM she is sedated on vent on Levophed infusion spiking temp 101.9 with WBC 20, LA 6.3, UA+, electrolyte imbalance and Glucose 268     - SAT and SBT done this am, pt awake and following commands. She was successfully extubated to nasal cannula. Remains tachycardic with HR 140s and febrile with temp 103 overnight. WBC 18 and lactic acidosis improving to 4.8. Continuing to aggressively replace electrolytes.     - Over night patient had episode with bradycardia and hypotension during which she became unresponsive and agonally breathing. She responded with IVF and bicarb and was restarted on pressors. She experienced a similar episode  again last night, during which she was intubated. Vent settings were reviewed and adjusted this am. No more bradycardic/hypotensive episodes since last night. Remains on pressors. Leukocystosis unimproved with worsening bandemia. Urine and blood cx NGTD. Sputum cx growing staph and pseudomonas however CXR this am is unremarkable.    04/7 - Tolerating SAT and SBT. Meets extubating criteria. Acidosis improving. Leukocytosis improving. Remains on bicarb gtt.     04/8 - Extubated successfully yesterday. Asystolic arrest this AM.  ACLS initiated. Re - intubated 2 rounds CPR with Iv epinephrine X 1  and IV CaCl X 1 given. ROSC attained.   A - line placed.      4/9 - now with mild vaginal bleeding and severe anasarca with blistering.  Still on vent with sedation and Levophed/Vasopressin infusing.  Worsening UOP and creatine.  Spoke with brother at bedside in detail and all questions answered.     Review of Systems   Unable to perform ROS: Intubated       Objective:     Vital Signs (Most Recent):  Temp: 100.3 °F (37.9 °C) (04/09/18 0705)  Pulse: 108 (04/09/18 0905)  Resp: (!) 24 (04/09/18 0905)  BP: (!) 114/56 (04/09/18 0905)  SpO2: 98 % (04/09/18 0905) Vital Signs (24h Range):  Temp:  [99 °F (37.2 °C)-100.3 °F (37.9 °C)] 100.3 °F (37.9 °C)  Pulse:  [] 108  Resp:  [4-28] 24  SpO2:  [95 %-100 %] 98 %  BP: ()/(34-65) 114/56     Weight: (!) 140.1 kg (308 lb 13.8 oz)  Body mass index is 49.85 kg/m².      Intake/Output Summary (Last 24 hours) at 04/09/18 1011  Last data filed at 04/09/18 0705   Gross per 24 hour   Intake          3164.11 ml   Output             1665 ml   Net          1499.11 ml       Physical Exam   Constitutional: She appears well-developed and well-nourished. She is easily aroused. She appears toxic. She has a sickly appearance. She appears ill. She is sedated, intubated and restrained.   HENT:   Head: Normocephalic and atraumatic.   Mouth/Throat: Oropharynx is clear and moist and mucous membranes  are normal.   Eyes: EOM and lids are normal. Pupils are equal, round, and reactive to light.   jaundice   Neck: Trachea normal. Neck supple.   Obese    Cardiovascular: Regular rhythm and normal heart sounds.  Tachycardia present.    Pulses:       Radial pulses are 1+ on the right side, and 1+ on the left side.        Dorsalis pedis pulses are 0 on the right side, and 0 on the left side.   Pulmonary/Chest: No accessory muscle usage. She is intubated. No respiratory distress. She has decreased breath sounds.   Coarse breath sounds   Abdominal: Bowel sounds are decreased. There is hepatomegaly.   Firmness palpated above umbilicus.  Morbidly obese   Genitourinary:   Genitourinary Comments: Rodriguez in place   Musculoskeletal: Normal range of motion.        Right foot: There is no deformity.        Left foot: There is no deformity.   Diffuse anasarca   Lymphadenopathy:     She has no cervical adenopathy.   Neurological: She is easily aroused.   Easily awakens on sedation and follows commands   Skin: Skin is warm and dry. Capillary refill takes less than 2 seconds. No rash noted. No cyanosis.        Blistering noted to extrem       Vents:  Vent Mode: A/C (04/09/18 0658)  Ventilator Initiated: Yes (04/05/18 1930)  Set Rate: 18 bmp (04/09/18 0658)  Vt Set: 400 mL (04/09/18 0658)  Pressure Support: 0 cmH20 (04/09/18 0658)  PEEP/CPAP: 5 cmH20 (04/09/18 0658)  Oxygen Concentration (%): 35 (04/09/18 0905)  Peak Airway Pressure: 23 cmH2O (04/09/18 0658)  Plateau Pressure: 0 cmH20 (04/09/18 0658)  Total Ve: 8.48 mL (04/09/18 0658)  F/VT Ratio<105 (RSBI): (!) 9.71 (04/09/18 0658)    Lines/Drains/Airways     Central Venous Catheter Line                 Percutaneous Central Line Insertion/Assessment - triple lumen  04/03/18 2045 right internal jugular 5 days          Drain                 Urethral Catheter 04/03/18 16 Fr. 6 days         NG/OG Tube 04/08/18 0730 [place] sump Right mouth 1 day         Rectal Tube 04/08/18 0730 fecal  management system 1 day          Airway                 Airway - Non-Surgical 04/08/18 0715 Endotracheal Tube 1 day          Arterial Line                 Arterial Line 04/08/18 0720 Left Brachial 1 day                Significant Labs:    CBC/Anemia Profile:    Recent Labs  Lab 04/08/18  0712 04/08/18  1725 04/09/18  0344   WBC 22.39* 16.98* 18.58*   HGB 8.4* 8.7* 7.9*   HCT 26.7* 27.7* 24.9*   PLT 29* 25* 24*   MCV 97 95 97   RDW 18.8* 18.6* 18.4*        Chemistries:    Recent Labs  Lab 04/08/18  0343 04/08/18  0712 04/08/18  1458 04/08/18  2130 04/09/18  0344    141 136 137 136  136   K 3.8 5.0 4.0 4.7 4.0  4.0    102 99 100 99  99   CO2 25 18* 22* 22* 22*  22*   BUN 2* 3* 3* 3* 4*  4*   CREATININE 1.1 1.3 1.4 1.6* 1.6*  1.6*   CALCIUM 6.4* 8.5* 6.5* 6.5* 6.6*  6.6*   ALBUMIN 1.5* 1.5*  --   --  1.5*   PROT 4.8* 4.8*  --   --  4.6*   BILITOT 6.2* 6.5*  --   --  7.2*   ALKPHOS 91 97  --   --  120   ALT 50* 53*  --   --  42   * 118*  --   --  100*   MG 1.8 2.6 1.9 1.6 2.0   PHOS 3.1 5.0* 3.5 3.6 3.5       ABGs:   Recent Labs  Lab 04/09/18 0518   PH 7.362   PCO2 43.9   HCO3 24.9   POCSATURATED 97   BE 0     Coagulation:   Recent Labs  Lab 04/08/18 0712   INR 1.2   APTT 39.9*     Respiratory Culture: No results for input(s): GSRESP, RESPIRATORYC in the last 48 hours.  All pertinent labs within the past 24 hours have been reviewed.          Assessment/Plan:     Psychiatric   ETOH abuse     Monitor for ETOH withdrawal. Electrolyte replacement. Thiamine, Folate, MVI.              Pulmonary   Pneumonia of right lower lobe due to methicillin resistant Staphylococcus aureus (MRSA)    Sputum cultures positive for MRSA and Pseudomonas. IV Vancomycin. Continue IV Zosyn.        Acute hypoxemic respiratory failure    Re intubated for third time 4/8 s/p asystolic arrest.  Cont Mechanical ventilation.  Ventilator settings reviewed and adjusted to optimize gas exchange. Daily wake up and breathe trials  once more stable.  Would benefit from Trach once more stable.  Titrate FIO2 to keep SAO2 > 92%.          VIKRAM       Renal consulted       Support BP             Cardiopulmonary arrest    Cardiology consulted. No PE on CTA chest  Cont supportive care and ICU cardiac monitoring       C-Diff       IV Vanc and Flagyl       ID following             Hyponatremia    Resolved, cont to monitor with daily CMP        UTI due to Klebsiella species    ZOSYN + VANCOMYCIN.        Electrolyte imbalance    Monitor and replete electrolytes as needed.         ID   * Septic shock    Cont Levophed and Vasopressin infusions wean as tolerated  Cont IVAB  Volume replacement with blood products today      Hematology    Monitor hemogram. Transfuse as needed.        Thrombocytopenia    Transfuse Plts given active bleeding        Oncology   Acute blood loss anemia    Total 4 units transfused. Monitor hemogram.   Transfuse 2 more units PRBCs with FFP and Cryo given total now 6 units PRBCs      Endocrine   Hyperglycemia, unspecified    Stable Hyperglycemia: INSULIN ASPART. Moderate correction dose.  Blood glucose target 100 - 180 mg/dl  Cont SSI      GI   Hepatomegaly    ? Alcoholic liver disease / toxic hepatitis ( DILI). Mainly cholestatic picture.  Monitoring liver enzymes. LFTs down trending.  Discriminant function = 7. Hepatitis acute panel unremarkable. Screen for autoimmune hepatitis. Avoid the use of hepatotoxic meds. GI / Hepatology consult pending      Shock liver    Daily CMP to monitor liver enzymes  Hep panel negative  Vasopressors to maintain perfusion        Other   History of IUFD    S/P D & C. No retained products of conception on ultrasound.         Hyperbilirubinemia    Etioloy unclear.  Likely related to alcoholic liver disease. Monitor and trend.   GI consulted        Preventive Measures and Monitoring:   Stress Ulcer: Pepcid  Nutrition: tube feeds  Glucose control: SSI  Bowel prophylaxis: Miralax stopped due to C-Diff  diarrhea  DVT prophylaxis: SCDs  Head of Bed/Reposition: Elevate HOB and turn Q1-2 hours   Early Mobility: bedrest  SAT daily and SBT:daily once more stable  RASS goal: -1  Vent Day:8  OG Day: 8  R IJ T  Rodriguez Day: 8  IVAB Day: 7  Code Status: Full  Pneumonia Vaccine: ordered        Counseling/Consultation:I have discussed the care of this patient in detail with the bedside nursing staff and Dr. Quijano and Dr. Ram     Critical Care Time: 88 minutes  Critical secondary to Patient has a condition that poses threat to life and bodily function: Acute Renal Failure and Resp Failure on [facility] ventilation  Patient is currently on drug therapy requiring intensive monitoring for toxicity: Levophed infusion  Patient is currently receiving parenteral controlled substances: Fentanyl infusion      Critical care was time spent personally by me on the following activities: development of treatment plan with patient or surrogate and bedside caregivers, discussions with consultants, evaluation of patient's response to treatment, examination of patient, ordering and performing treatments and interventions, ordering and review of laboratory studies, ordering and review of radiographic studies, pulse oximetry, re-evaluation of patient's condition. This critical care time did not overlap with that of any other provider or involve time for any procedures.     Ed Lowe, NP  Critical Care Medicine  [facility]

## 2018-04-09 NOTE — PROGRESS NOTES
1905 Assumed client care at this time.  Pt resting comfortably on current vent settings.  Levophed currently at the 4mg concentration, EICU notified to change concentration due to client being fluid overloaded.      2005 brother and mother updated to clients status.  Explained that they should come to hospital tomorrow or be available by phone to speak with physicians should consent for any procedures be needed.

## 2018-04-09 NOTE — CONSULTS
[facility] -   General Surgery  Consult Note    Patient Name: Rafael Duron  MRN: [id number]  Code Status: Full Code  Admission Date: 4/3/2018  Hospital Length of Stay: 6 days  Attending Physician: Ed Ram MD  Primary Care Provider: Herman Cowart MD    Patient information was obtained from records.     Consults  Subjective:     Principal Problem: Septic shock    History of Present Illness: 22 y.o. female transferred from [place] with sepsis, s/p miscarriage with retained placenta.  Re-intubated today.    No current facility-administered medications on file prior to encounter.      No current outpatient prescriptions on file prior to encounter.       Review of patient's allergies indicates:  No Known Allergies    Past Medical History:   Diagnosis Date    Gestational diabetes     Hypertension      History reviewed. No pertinent surgical history.  Family History     Problem Relation (Age of Onset)    Diabetes Mother, Father, Sister    Heart disease Mother        Social History Main Topics    Smoking status: Never Smoker    Smokeless tobacco: Never Used    Alcohol use Yes    Drug use: No    Sexual activity: Not on file     Review of Systems   Unable to perform ROS: Intubated     Objective:     Vital Signs (Most Recent):  Temp: 98.3 °F (36.8 °C) (04/08/18 0305)  Pulse: (!) 112 (04/08/18 0826)  Resp: (!) 21 (04/08/18 0826)  BP: (!) 110/52 (04/08/18 0730)  SpO2: 100 % (04/08/18 0826) Vital Signs (24h Range):  Temp:  [98.3 °F (36.8 °C)-99.4 °F (37.4 °C)] 98.3 °F (36.8 °C)  Pulse:  [] 112  Resp:  [7-34] 21  SpO2:  [92 %-100 %] 100 %  BP: ()/(11-91) 110/52     Weight: (!) 137 kg (302 lb 0.5 oz)  Body mass index is 48.75 kg/m².    Physical Exam   Constitutional: She is sedated and intubated.   Obese   Neck: Trachea normal. No tracheal deviation present.   Pulmonary/Chest: She is intubated.   Abdominal: She exhibits distension.       Significant Labs:  CBC:   Recent Labs  Lab  04/08/18  0712   WBC 22.39*   RBC 2.76*   HGB 8.4*   HCT 26.7*   PLT 29*   MCV 97   MCH 30.4   MCHC 31.5*       Significant Diagnostics:  I have reviewed all pertinent imaging results/findings within the past 24 hours.    Assessment/Plan:     Acute hypoxemic respiratory failure    Consult for tracheostomy.  Trach will be technically difficult due to obesity, may need to do in conjunction with ENT.    Platelet count will also need to be >50 for procedure.          VTE Risk Mitigation         Ordered     Place sequential compression device  Until discontinued      04/04/18 0559     IP VTE HIGH RISK PATIENT  Once      04/04/18 0559     Place sequential compression device  Until discontinued      04/03/18 1775          Thank you for your consult. I will follow-up with patient. Please contact us if you have any additional questions.    Louis O. Jeansonne, MD  General Surgery  [facility]

## 2018-04-10 PROBLEM — N39.0 ACUTE URINARY TRACT INFECTION: Status: ACTIVE | Noted: 2018-01-01

## 2018-04-10 PROBLEM — R23.8 BLISTERS OF MULTIPLE SITES: Status: ACTIVE | Noted: 2018-01-01

## 2018-04-10 NOTE — ASSESSMENT & PLAN NOTE
Patient's renal function has worsened and creatinine has increased from 0.8 on 4/6/18 to 1.6 on 4/9/18 and 1.8 on 4/10/18. VIKRAM likely multifactorial and related to hypotension, anemia, previous cardiac arrest. Also consider compartment syndrome in patient with severe abdominal distention. Will also check Vanco level to rule out Vancomycin toxicity. Consider replacing Vancomycin with alternative.   Will monitor closely. No dialysis indicated at present.   Kidneys were normal on recent CT scan.

## 2018-04-10 NOTE — SUBJECTIVE & OBJECTIVE
Interval History:     4/10/18:     Review of patient's allergies indicates:  No Known Allergies  Current Facility-Administered Medications   Medication Frequency    acetaminophen oral solution 650 mg Q8H PRN    albuterol-ipratropium 2.5mg-0.5mg/3mL nebulizer solution 3 mL Q4H PRN    bisacodyl suppository 10 mg Daily PRN    chlorhexidine 0.12 % solution 15 mL BID    dextrose 50% injection 12.5 g PRN    famotidine (PF) injection 20 mg Daily    fentaNYL 2500 mcg in 0.9% sodium chloride 250 mL infusion premix (titrating) Continuous    folic acid-vit B6-vit B12 2.5-25-2 mg tablet 1 tablet Daily    furosemide injection 60 mg Q8H    glucagon (human recombinant) injection 1 mg PRN    insulin aspart U-100 pen 1-10 Units Q6H PRN    lorazepam (ATIVAN) injection 2 mg Q2H PRN    metronidazole IVPB 500 mg Q8H    multivitamin tablet 1 tablet Daily    nicotine 21 mg/24 hr 1 patch Daily    norepinephrine 32 mg in dextrose 5 % 250 mL infusion Continuous    ondansetron injection 4 mg Q8H PRN    piperacillin-tazobactam 4.5 g in dextrose 5 % 100 mL IVPB (ready to mix system) Q8H    pneumoc 13-bobby conj-dip cr(PF) 0.5 mL vaccine x 1 dose    rifAXIMin tablet 550 mg BID    sodium chloride 0.9% flush 5 mL PRN    thiamine tablet 100 mg Daily    [START ON 4/11/2018] vancomycin (VANCOCIN) 1,000 mg in sodium chloride 0.9% 250 mL IVPB Q48H    vancomycin 250mg / 10ml oral suspension 250 mg Q6H    vasopressin (PITRESSIN) 0.2 Units/mL in dextrose 5 % 100 mL infusion Continuous       Objective:     Vital Signs (Most Recent):  Temp: 99.6 °F (37.6 °C) (04/10/18 0305)  Pulse: 100 (04/10/18 0909)  Resp: 17 (04/10/18 0909)  BP: (!) 88/48 (04/10/18 0505)  SpO2: 96 % (04/10/18 0909)  O2 Device (Oxygen Therapy): ventilator (04/10/18 0800) Vital Signs (24h Range):  Temp:  [99 °F (37.2 °C)-100.3 °F (37.9 °C)] 99.6 °F (37.6 °C)  Pulse:  [] 100  Resp:  [4-30] 17  SpO2:  [93 %-99 %] 96 %  BP: ()/(44-85) 88/48     Weight: (!)  138 kg (304 lb 3.8 oz) (04/10/18 0205)  Body mass index is 49.1 kg/m².  Body surface area is 2.53 meters squared.    I/O last 3 completed shifts:  In: 5040.5 [I.V.:1388.7; Blood:1476.8; NG/GT:975; IV Piggyback:1200]  Out: 4270 [Urine:4270]    Physical Exam   Constitutional: She appears well-developed and well-nourished. She is intubated.   HENT:   Head: Normocephalic.   ET tube in place.    Eyes: Pupils are equal, round, and reactive to light.   Neck: No thyromegaly present.   Cardiovascular: Regular rhythm.  Tachycardia present.  Exam reveals no friction rub.    Pulmonary/Chest: Breath sounds normal. She is intubated. She has no wheezes. She exhibits no tenderness.   Abdominal: Soft. Bowel sounds are normal. She exhibits distension. There is no tenderness.   Musculoskeletal: She exhibits edema.   Bilateral pitting LE edema.    Lymphadenopathy:     She has no cervical adenopathy.   Neurological: She is alert.   Skin: Skin is warm and dry. No rash noted.       Significant Labs:  Lab Results   Component Value Date    CREATININE 1.8 (H) 04/10/2018    BUN 4 (L) 04/10/2018     04/10/2018    K 3.5 04/10/2018     04/10/2018    CO2 26 04/10/2018     Lab Results   Component Value Date    .6 (H) 04/09/2018    CALCIUM 7.2 (L) 04/10/2018    CAION 0.64 (LL) 04/06/2018    PHOS 3.0 04/10/2018     Lab Results   Component Value Date    ALBUMIN 1.5 (L) 04/10/2018     Lab Results   Component Value Date    WBC 17.92 (H) 04/10/2018    HGB 9.1 (L) 04/10/2018    HCT 28.3 (L) 04/10/2018    MCV 94 04/10/2018    PLT 50 (L) 04/10/2018       Recent Labs  Lab 04/10/18  0452   MG 1.8     Urinalysis    Recent Labs  Lab 04/09/18  1105   COLORU Yellow   SPECGRAV 1.025   PHUR 6.0   PROTEINUA 1+*   BACTERIA Occasional   NITRITE Negative   LEUKOCYTESUR Trace*   UROBILINOGEN Negative   HYALINECASTS 0     8 RBC, 5 WBC       Vitamin D: pending.         Significant Imaging:  Imaging Results    None

## 2018-04-10 NOTE — PROGRESS NOTES
[facility] -   Hematology/Oncology  Progress Note    Patient Name: Rafael Duron  Admission Date: 4/3/2018  Hospital Length of Stay: 7 days  Code Status: Full Code     Subjective:     HPI:  23 yo female with morbid obesity,  HTN, gestational DM; presented to [facility] ED in [place], [place] on 4/2 with complaint of SOB and cough with known pregnancy. Workup revealed fetal demise (estimated at 22 weeks) and patient passed dead fetus but retained placenta. Placenta remnants were removed in OR vis D & C.  After admission to ICU she had seizure activity with , K+ 1.9 and Bicarb 11.  She also had EtOH level of 268.  She required intubation for airway protection per records. She as transferred to [facility] ICU on 4/3/18 for higher level of care.    Patient presented with septic shock at [facility] and was started on IV pressors and IV antibiotics. Patient was initially stabilized and extubated on 4/5/18. OB/GYN following. She developed syncopal episode on 4/5/18 associated with bradycardia. She was successfully resuscitated with IV fluids and levophed. She subsequently deteriorated and was re-intubated on 4/5/18. Abdominal US revealed massive hepatomegaly with liver span of 33 cm. Patient's condition improved and she was extubated again on 4/7/18. Patient coded on 4/8/18 and was successfully resuscitated and again intubated. Patient was also diagnosed with C. Diff colitis and MRSA bacteremia.   Hem/Onc consulted to manage thrombocytopenia and anemia.  Patient is intubated and not able to provide any additional history.       Interval History: Platelets: 50, negative for bleeding.     Oncology Treatment Plan:   [No treatment plan]    Medications:  Continuous Infusions:   fentanyl 20 mL/hr at 04/10/18 1105    norepinephrine bitartrate-D5W 0.04 mcg/kg/min (04/10/18 1105)    vasopressin (PITRESSIN) infusion 0.04 Units/min (04/10/18 1105)     Scheduled Meds:   albumin human 25%  12.5 g  Intravenous BID    bumetanide  3 mg Intravenous TID    chlorhexidine  15 mL Mouth/Throat BID    famotidine (PF)  20 mg Intravenous Daily    [START ON 4/11/2018] folic acid-vit B6-vit B12 2.5-25-2 mg  1 tablet Oral Daily    metOLazone  5 mg Oral Daily    metronidazole  500 mg Intravenous Q8H    multivitamin  1 tablet Oral Daily    piperacillin-tazobactam (ZOSYN) IVPB  4.5 g Intravenous Q8H    rifAXIMin  550 mg Oral BID    thiamine  100 mg Oral Daily    [START ON 4/12/2018] vancomycin (VANCOCIN) IVPB  1,000 mg Intravenous Q48H    vancomycin  250 mg Oral Q6H     PRN Meds:acetaminophen, albuterol-ipratropium 2.5mg-0.5mg/3mL, bisacodyl, dextrose 50%, glucagon (human recombinant), insulin aspart U-100, lorazepam, ondansetron, pneumoc 13-bobby conj-dip cr(PF), sodium chloride 0.9%     Review of Systems   Unable to perform ROS: Intubated     Objective:     Vital Signs (Most Recent):  Temp: 99.4 °F (37.4 °C) (04/10/18 1105)  Pulse: 102 (04/10/18 1358)  Resp: (!) 22 (04/10/18 1358)  BP: 102/61 (04/10/18 1305)  SpO2: 97 % (04/10/18 1358) Vital Signs (24h Range):  Temp:  [98.6 °F (37 °C)-99.9 °F (37.7 °C)] 99.4 °F (37.4 °C)  Pulse:  [] 102  Resp:  [4-25] 22  SpO2:  [93 %-99 %] 97 %  BP: ()/(40-85) 102/61     Weight: (!) 138 kg (304 lb 3.8 oz)  Body mass index is 49.1 kg/m².  Body surface area is 2.53 meters squared.      Intake/Output Summary (Last 24 hours) at 04/10/18 1430  Last data filed at 04/10/18 1113   Gross per 24 hour   Intake          3803.43 ml   Output             2775 ml   Net          1028.43 ml       Physical Exam   Constitutional: She is oriented to person, place, and time. She appears well-developed and well-nourished. No distress.   HENT:   Head: Normocephalic and atraumatic.   Right Ear: Hearing and external ear normal.   Left Ear: Hearing and external ear normal.   Nose: No rhinorrhea or sinus tenderness. Right sinus exhibits no maxillary sinus tenderness and no frontal sinus  tenderness. Left sinus exhibits no maxillary sinus tenderness and no frontal sinus tenderness.   Mouth/Throat: Uvula is midline, oropharynx is clear and moist and mucous membranes are normal. No oral lesions.   Eyes: Right eye exhibits no discharge. Left eye exhibits no discharge.   Neck: Carotid bruit is not present. No tracheal deviation present. No thyromegaly present.   Cardiovascular: Normal rate, regular rhythm, S1 normal, S2 normal, normal heart sounds and intact distal pulses.    No murmur heard.  Pulses:       Dorsalis pedis pulses are 2+ on the right side, and 2+ on the left side.   Pulmonary/Chest: No respiratory distress. She has rales.   Intubated   Abdominal: Soft. Bowel sounds are normal. She exhibits distension. She exhibits no mass. There is no tenderness.   Musculoskeletal: Normal range of motion. She exhibits edema (BLE). She exhibits no deformity.   Lymphadenopathy:     She has no cervical adenopathy.        Right: No supraclavicular adenopathy present.        Left: No supraclavicular adenopathy present.   Neurological: She is alert and oriented to person, place, and time. She has normal strength. No sensory deficit. Coordination and gait normal.   Skin: Skin is warm and dry. Capillary refill takes less than 2 seconds. No rash noted. No erythema. There is pallor.   Psychiatric:   Intubated   Nursing note and vitals reviewed.      Significant Labs:   CBC:   Recent Labs  Lab 04/08/18  1725 04/09/18  0344 04/10/18  0452   WBC 16.98* 18.58* 17.92*   HGB 8.7* 7.9* 9.1*   HCT 27.7* 24.9* 28.3*   PLT 25* 24* 50*    and CMP:   Recent Labs  Lab 04/09/18  0344 04/09/18  1403 04/09/18  2058 04/10/18  0452     136 138 138 138   K 4.0  4.0 3.7 3.7 3.5   CL 99  99 99 99 100   CO2 22*  22* 25 25 26   GLU 95  95 109 108 125*   BUN 4*  4* 4* 4* 4*   CREATININE 1.6*  1.6* 1.7* 1.7* 1.8*   CALCIUM 6.6*  6.6* 7.3* 7.3* 7.2*   PROT 4.6*  --   --  4.9*   ALBUMIN 1.5*  --   --  1.5*   BILITOT 7.2*  --    --  8.7*   ALKPHOS 120  --   --  132   *  --   --  86*   ALT 42  --   --  36   ANIONGAP 15  15 14 14 12   EGFRNONAA 45*  45* 42* 42* 39*       Diagnostic Results:  I have reviewed all pertinent imaging results/findings within the past 24 hours.    Assessment/Plan:     Thrombocytopenia    Likely related to hepatomegaly and Liver Shock.    --Daily CBC  --If platelets <15,000, will need to transfuse platelets, unless actively bleeding then transfuse to maintain platelets >50,000.         Acute blood loss anemia    Vaginal bleeding related to vaginal birth of non-viable infant with retained placenta, was followed with D&C.     --Monitor H/H if Hgb <7.0 will need to transfuse with PRBC.             Thank you for your consult. I will follow-up with patient. Please contact us if you have any additional questions.     Giulia Rutherford NP  Hematology/Oncology  [facility]

## 2018-04-10 NOTE — PROGRESS NOTES
[facility] -   Nephrology  Progress Note    Patient Name: Rafael Duron  MRN: [id number]  Admission Date: 4/3/2018  Hospital Length of Stay: 7 days  Attending Provider: Prabhu Lugo MD   Primary Care Physician: Herman Cowart MD  Principal Problem:Septic shock    Subjective:     HPI: 21 yo obese female with HTN, gestational DM  who presented to [facility] ED in [place], [place] on 4/2 with complaint of SOB and cough with known pregnancy. Workup revealed fetal demise (estimated at 22 weeks) and patient passed dead fetus but retained placenta. Placenta remnants were removed in OR vis D & C.  After admission to ICU she had seizure activity with , K+ 1.9 and Bicarb 11.  She also had EtOH level of 268.  She required intubation for airway protection per records. She as transferred to [facility] ICU on 4/3/18 for higher level of care.    Patient presented with septic shock at [facility] and was started on IV pressors and IV antibiotics. Patient was initially stabilized and extubated on 4/5/18. OB/GYN following. She developed syncopal episode on 4/5/18 associated with bradycardia. She was successfully resuscitated with IV fluids and levophed. She subsequently deteriorated and was re-intubated on 4/5/18. Abdominal US revealed massive hepatomegaly with liver span of 33 cm. Patient's condition improved and she was extubated again on 4/7/18. Patient coded on 4/8/18 and was successfully resuscitated and again intubated. Patient was also diagnosed with C. Diff colitis and MRSA bacteremia.   Nephrology was consulted to help with patient's electrolyte care, renal care and volume management. I saw and examined patient in her hospital room. Patient is intubated and not able to provide any additional history.   Patient remains on antibiotics and pressor support. Chart review revealed that hyponatremia and hypokalemia have now resolved. However, hypocalcemia has persisted. In addition, patient's  renal function has worsened during current admission and creatinine has increased from 0.8 on 4/6/18 to 1.6 on 4/9/18. Volume review showed positive I/O balance with + 24 liters since admission,. Patient's UOP has been fluctuating with 1 to 3 liters of urine per day. Current UOP about 100 cc/hour.       Interval History:     4/10/18: No changes as per nursing staff.       Review of patient's allergies indicates:  No Known Allergies  Current Facility-Administered Medications   Medication Frequency    acetaminophen oral solution 650 mg Q8H PRN    albuterol-ipratropium 2.5mg-0.5mg/3mL nebulizer solution 3 mL Q4H PRN    bisacodyl suppository 10 mg Daily PRN    chlorhexidine 0.12 % solution 15 mL BID    dextrose 50% injection 12.5 g PRN    famotidine (PF) injection 20 mg Daily    fentaNYL 2500 mcg in 0.9% sodium chloride 250 mL infusion premix (titrating) Continuous    folic acid-vit B6-vit B12 2.5-25-2 mg tablet 1 tablet Daily    furosemide injection 60 mg Q8H    glucagon (human recombinant) injection 1 mg PRN    insulin aspart U-100 pen 1-10 Units Q6H PRN    lorazepam (ATIVAN) injection 2 mg Q2H PRN    metronidazole IVPB 500 mg Q8H    multivitamin tablet 1 tablet Daily    nicotine 21 mg/24 hr 1 patch Daily    norepinephrine 32 mg in dextrose 5 % 250 mL infusion Continuous    ondansetron injection 4 mg Q8H PRN    piperacillin-tazobactam 4.5 g in dextrose 5 % 100 mL IVPB (ready to mix system) Q8H    pneumoc 13-bobby conj-dip cr(PF) 0.5 mL vaccine x 1 dose    rifAXIMin tablet 550 mg BID    sodium chloride 0.9% flush 5 mL PRN    thiamine tablet 100 mg Daily    [START ON 4/11/2018] vancomycin (VANCOCIN) 1,000 mg in sodium chloride 0.9% 250 mL IVPB Q48H    vancomycin 250mg / 10ml oral suspension 250 mg Q6H    vasopressin (PITRESSIN) 0.2 Units/mL in dextrose 5 % 100 mL infusion Continuous       Objective:     Vital Signs (Most Recent):  Temp: 99.6 °F (37.6 °C) (04/10/18 0305)  Pulse: 100 (04/10/18  0909)  Resp: 17 (04/10/18 0909)  BP: (!) 88/48 (04/10/18 0505)  SpO2: 96 % (04/10/18 0909)  O2 Device (Oxygen Therapy): ventilator (04/10/18 0800) Vital Signs (24h Range):  Temp:  [99 °F (37.2 °C)-100.3 °F (37.9 °C)] 99.6 °F (37.6 °C)  Pulse:  [] 100  Resp:  [4-30] 17  SpO2:  [93 %-99 %] 96 %  BP: ()/(44-85) 88/48     Weight: (!) 138 kg (304 lb 3.8 oz) (04/10/18 0205)  Body mass index is 49.1 kg/m².  Body surface area is 2.53 meters squared.    I/O last 3 completed shifts:  In: 5040.5 [I.V.:1388.7; Blood:1476.8; NG/GT:975; IV Piggyback:1200]  Out: 4270 [Urine:4270]    Physical Exam   Constitutional: She appears well-developed and well-nourished. She is intubated.   HENT:   Head: Normocephalic.   ET tube in place.    Eyes: Pupils are equal, round, and reactive to light.   Neck: No thyromegaly present.   Cardiovascular: Regular rhythm.  Tachycardia present.  Exam reveals no friction rub.    Pulmonary/Chest: Breath sounds normal. She is intubated. She has no wheezes. She exhibits no tenderness.   Abdominal: Soft. Bowel sounds are normal. She exhibits distension. There is no tenderness.   Musculoskeletal: She exhibits edema.   Bilateral pitting LE edema.    Lymphadenopathy:     She has no cervical adenopathy.   Neurological: She is alert.   Skin: Skin is warm and dry. No rash noted.       Significant Labs:  Lab Results   Component Value Date    CREATININE 1.8 (H) 04/10/2018    BUN 4 (L) 04/10/2018     04/10/2018    K 3.5 04/10/2018     04/10/2018    CO2 26 04/10/2018     Lab Results   Component Value Date    .6 (H) 04/09/2018    CALCIUM 7.2 (L) 04/10/2018    CAION 0.64 (LL) 04/06/2018    PHOS 3.0 04/10/2018     Lab Results   Component Value Date    ALBUMIN 1.5 (L) 04/10/2018     Lab Results   Component Value Date    WBC 17.92 (H) 04/10/2018    HGB 9.1 (L) 04/10/2018    HCT 28.3 (L) 04/10/2018    MCV 94 04/10/2018    PLT 50 (L) 04/10/2018       Recent Labs  Lab 04/10/18  0452   MG 1.8      Urinalysis    Recent Labs  Lab 04/09/18  1105   COLORU Yellow   SPECGRAV 1.025   PHUR 6.0   PROTEINUA 1+*   BACTERIA Occasional   NITRITE Negative   LEUKOCYTESUR Trace*   UROBILINOGEN Negative   HYALINECASTS 0     8 RBC, 5 WBC       Vitamin D: pending.         Significant Imaging:  Imaging Results    None           Assessment/Plan:     * Septic shock    Continue antibiotics and pressor support (patient on levophed and vasopressin).  Patient was diagnosed with MRSA bacteremia.           Volume overload    Patient with positive fluid balance of + 25 liters since admission.   Will change to Bumex 3 mg IV tid and add metolazone 5 mg daily.   Decrease IV intake where appropriate.         VIKRAM (acute kidney injury)    Patient's renal function has worsened and creatinine has increased from 0.8 on 4/6/18 to 1.6 on 4/9/18 and 1.8 on 4/10/18. VIKRAM likely multifactorial and related to hypotension, anemia, previous cardiac arrest. Also consider compartment syndrome in patient with severe abdominal distention. Will also check Vanco level to rule out Vancomycin toxicity. Consider replacing Vancomycin with alternative.   Will monitor closely. No dialysis indicated at present.   Kidneys were normal on recent CT scan.         Clostridium difficile infection    Continue Flagyl.         Thrombocytopenia    S/p platelet transfusion yesterday. Platelets have increased to 50.         Pneumonia of both lower lobes due to methicillin resistant Staphylococcus aureus (MRSA)    Continue antibiotics.         UTI due to Klebsiella species    Continue antibiotics.         Acute blood loss anemia    Blood transfusion as needed.         Electrolyte imbalance    Patient initially presented with hyponatremia and hypokalemia both of which have now resolved. However, mild hypocalcemia has persisted. Calcium was repleted with IV calcium gluconate. Will follow up on ionized calcium level and vitamin D level. Magnesium level is at goal.        Acute  hypoxemic respiratory failure    Continue mechanical ventilation.         Total ICU time: 40 minutes. More than 50% of time was spent on chart review and discussing patient's care with ICU team.       Thank you for your consult. I will follow-up with patient. Please contact us if you have any additional questions.    Lawrence Nguyen MD  Nephrology  [facility]

## 2018-04-10 NOTE — PLAN OF CARE
Problem: Patient Care Overview  Goal: Plan of Care Review  Outcome: Ongoing (interventions implemented as appropriate)  POC discussed with mother and sister at bedside. Update given. MD Quijano spoke with family at bedside. All questions answered. Pt remains on levo and vaso gtt. Pt resting comfortably on fentanyl gtt. Tolerating well. Arouses to voice and drifts back to sleep. Nods appropriately and follow commands. Pt on specialty bed with Q30 min rotation. Tolerating well. Diuretics adjusted today per Nephrology. ID consult in place for abx mgt. RIJ dressing changed. Will continue to monitor.

## 2018-04-10 NOTE — PROGRESS NOTES
1905 Assumed client care at this time. Current sedation / analgesiua regimen adequate with fentanyl and PRN ativan.  Client resting comfortably on vent.  Tolerating tube feeding so far.      2305 client moved to sizewise bed.  RT at bedside along with staff to assist with transfer.  Client tolerated well with no issue.

## 2018-04-10 NOTE — ASSESSMENT & PLAN NOTE
23 yo admitted for septic shock after retained products of conception.    Elevated LFTs likely multifactorial - septic shock, metabolic related cause and alcoholic hepatitis  INR up some today. Continue to monitor.    Continue Rifaximin.   Recommend treating underlying shock and continue supportive care. Antibiotics per ID and ICU teams.      MELD-Na score: 25 at 4/10/2018  4:52 AM  MELD score: 25 at 4/10/2018  4:52 AM  Calculated from:  Serum Creatinine: 1.8 mg/dL at 4/10/2018  4:52 AM  Serum Sodium: 138 mmol/L (Rounded to 137) at 4/10/2018  4:52 AM  Total Bilirubin: 8.7 mg/dL at 4/10/2018  4:52 AM  INR(ratio): 1.5 at 4/10/2018  4:52 AM  Age: 22 years

## 2018-04-10 NOTE — SUBJECTIVE & OBJECTIVE
Interval History: Platelets: 50, negative for bleeding.     Oncology Treatment Plan:   [No treatment plan]    Medications:  Continuous Infusions:   fentanyl 20 mL/hr at 04/10/18 1105    norepinephrine bitartrate-D5W 0.04 mcg/kg/min (04/10/18 1105)    vasopressin (PITRESSIN) infusion 0.04 Units/min (04/10/18 1105)     Scheduled Meds:   albumin human 25%  12.5 g Intravenous BID    bumetanide  3 mg Intravenous TID    chlorhexidine  15 mL Mouth/Throat BID    famotidine (PF)  20 mg Intravenous Daily    [START ON 4/11/2018] folic acid-vit B6-vit B12 2.5-25-2 mg  1 tablet Oral Daily    metOLazone  5 mg Oral Daily    metronidazole  500 mg Intravenous Q8H    multivitamin  1 tablet Oral Daily    piperacillin-tazobactam (ZOSYN) IVPB  4.5 g Intravenous Q8H    rifAXIMin  550 mg Oral BID    thiamine  100 mg Oral Daily    [START ON 4/12/2018] vancomycin (VANCOCIN) IVPB  1,000 mg Intravenous Q48H    vancomycin  250 mg Oral Q6H     PRN Meds:acetaminophen, albuterol-ipratropium 2.5mg-0.5mg/3mL, bisacodyl, dextrose 50%, glucagon (human recombinant), insulin aspart U-100, lorazepam, ondansetron, pneumoc 13-bobby conj-dip cr(PF), sodium chloride 0.9%     Review of Systems   Unable to perform ROS: Intubated     Objective:     Vital Signs (Most Recent):  Temp: 99.4 °F (37.4 °C) (04/10/18 1105)  Pulse: 102 (04/10/18 1358)  Resp: (!) 22 (04/10/18 1358)  BP: 102/61 (04/10/18 1305)  SpO2: 97 % (04/10/18 1358) Vital Signs (24h Range):  Temp:  [98.6 °F (37 °C)-99.9 °F (37.7 °C)] 99.4 °F (37.4 °C)  Pulse:  [] 102  Resp:  [4-25] 22  SpO2:  [93 %-99 %] 97 %  BP: ()/(40-85) 102/61     Weight: (!) 138 kg (304 lb 3.8 oz)  Body mass index is 49.1 kg/m².  Body surface area is 2.53 meters squared.      Intake/Output Summary (Last 24 hours) at 04/10/18 1430  Last data filed at 04/10/18 1113   Gross per 24 hour   Intake          3803.43 ml   Output             2775 ml   Net          1028.43 ml       Physical Exam    Constitutional: She is oriented to person, place, and time. She appears well-developed and well-nourished. No distress.   HENT:   Head: Normocephalic and atraumatic.   Right Ear: Hearing and external ear normal.   Left Ear: Hearing and external ear normal.   Nose: No rhinorrhea or sinus tenderness. Right sinus exhibits no maxillary sinus tenderness and no frontal sinus tenderness. Left sinus exhibits no maxillary sinus tenderness and no frontal sinus tenderness.   Mouth/Throat: Uvula is midline, oropharynx is clear and moist and mucous membranes are normal. No oral lesions.   Eyes: Right eye exhibits no discharge. Left eye exhibits no discharge.   Neck: Carotid bruit is not present. No tracheal deviation present. No thyromegaly present.   Cardiovascular: Normal rate, regular rhythm, S1 normal, S2 normal, normal heart sounds and intact distal pulses.    No murmur heard.  Pulses:       Dorsalis pedis pulses are 2+ on the right side, and 2+ on the left side.   Pulmonary/Chest: No respiratory distress. She has rales.   Intubated   Abdominal: Soft. Bowel sounds are normal. She exhibits distension. She exhibits no mass. There is no tenderness.   Musculoskeletal: Normal range of motion. She exhibits edema (BLE). She exhibits no deformity.   Lymphadenopathy:     She has no cervical adenopathy.        Right: No supraclavicular adenopathy present.        Left: No supraclavicular adenopathy present.   Neurological: She is alert and oriented to person, place, and time. She has normal strength. No sensory deficit. Coordination and gait normal.   Skin: Skin is warm and dry. Capillary refill takes less than 2 seconds. No rash noted. No erythema. There is pallor.   Psychiatric:   Intubated   Nursing note and vitals reviewed.      Significant Labs:   CBC:   Recent Labs  Lab 04/08/18  1725 04/09/18  0344 04/10/18  0452   WBC 16.98* 18.58* 17.92*   HGB 8.7* 7.9* 9.1*   HCT 27.7* 24.9* 28.3*   PLT 25* 24* 50*    and CMP:   Recent  Labs  Lab 04/09/18  0344 04/09/18  1403 04/09/18  2058 04/10/18  0452     136 138 138 138   K 4.0  4.0 3.7 3.7 3.5   CL 99  99 99 99 100   CO2 22*  22* 25 25 26   GLU 95  95 109 108 125*   BUN 4*  4* 4* 4* 4*   CREATININE 1.6*  1.6* 1.7* 1.7* 1.8*   CALCIUM 6.6*  6.6* 7.3* 7.3* 7.2*   PROT 4.6*  --   --  4.9*   ALBUMIN 1.5*  --   --  1.5*   BILITOT 7.2*  --   --  8.7*   ALKPHOS 120  --   --  132   *  --   --  86*   ALT 42  --   --  36   ANIONGAP 15  15 14 14 12   EGFRNONAA 45*  45* 42* 42* 39*       Diagnostic Results:  I have reviewed all pertinent imaging results/findings within the past 24 hours.

## 2018-04-10 NOTE — ASSESSMENT & PLAN NOTE
Patient with positive fluid balance of + 25 liters since admission.   Will change to Bumex 3 mg IV tid and add metolazone 5 mg daily.   Decrease IV intake where appropriate.

## 2018-04-10 NOTE — ASSESSMENT & PLAN NOTE
Patient initially presented with hyponatremia and hypokalemia both of which have now resolved. However, mild hypocalcemia has persisted. Calcium was repleted with IV calcium gluconate. Will follow up on ionized calcium level and vitamin D level. Magnesium level is at goal.

## 2018-04-10 NOTE — PROGRESS NOTES
[facility] -   Gastroenterology  Progress Note    Patient Name: Rafael Duron  MRN: [id number]  Admission Date: 4/3/2018  Hospital Length of Stay: 7 days  Code Status: Full Code   Attending Provider: Prabhu Lugo MD  Consulting Provider: Jay Pelayo PA-C  Primary Care Physician: Herman Cowart MD  Principal Problem: Septic shock      Subjective:     Interval History:   No interval change overnight. Still intubated and on pressor. Hgb up after transfusion. No overt GI bleed. INR up some.     Review of Systems   Unable to perform ROS: Intubated     Objective:     Vital Signs (Most Recent):  Temp: 99.4 °F (37.4 °C) (04/10/18 1105)  Pulse: 102 (04/10/18 1358)  Resp: (!) 22 (04/10/18 1358)  BP: 102/61 (04/10/18 1305)  SpO2: 97 % (04/10/18 1358) Vital Signs (24h Range):  Temp:  [98.6 °F (37 °C)-99.9 °F (37.7 °C)] 99.4 °F (37.4 °C)  Pulse:  [] 102  Resp:  [4-25] 22  SpO2:  [93 %-99 %] 97 %  BP: ()/(40-85) 102/61     Weight: (!) 138 kg (304 lb 3.8 oz) (04/10/18 0205)  Body mass index is 49.1 kg/m².      Intake/Output Summary (Last 24 hours) at 04/10/18 1432  Last data filed at 04/10/18 1113   Gross per 24 hour   Intake          3803.43 ml   Output             2775 ml   Net          1028.43 ml       Lines/Drains/Airways     Central Venous Catheter Line                 Percutaneous Central Line Insertion/Assessment - triple lumen  04/03/18 2045 right internal jugular 6 days          Drain                 Urethral Catheter 04/03/18 16 Fr. 7 days         NG/OG Tube 04/08/18 0730 [place] sump Right mouth 2 days          Airway                 Airway - Non-Surgical 04/08/18 0715 Endotracheal Tube 2 days          Arterial Line                 Arterial Line 04/08/18 0720 Left Brachial 2 days                Physical Exam   Constitutional: She appears well-nourished. She has a sickly appearance. She is intubated.   Obese female.    HENT:   Head: Normocephalic and atraumatic.   Cardiovascular: Normal  rate and regular rhythm.    Pulmonary/Chest: Breath sounds normal. She is intubated.   Abdominal: She exhibits distension (less distension today and able to palpate the liver in the epigastrium). Bowel sounds are decreased. There is hepatomegaly.   Neurological:   She didn't open her eyes for me today.        Significant Labs:  CBC:   Recent Labs  Lab 04/08/18  1725 04/09/18  0344 04/10/18  0452   WBC 16.98* 18.58* 17.92*   HGB 8.7* 7.9* 9.1*   HCT 27.7* 24.9* 28.3*   PLT 25* 24* 50*     CMP:   Recent Labs  Lab 04/10/18  0452   *   CALCIUM 7.2*   ALBUMIN 1.5*   PROT 4.9*      K 3.5   CO2 26      BUN 4*   CREATININE 1.8*   ALKPHOS 132   ALT 36   AST 86*   BILITOT 8.7*     Coagulation:   Recent Labs  Lab 04/10/18  0452   INR 1.5*         Significant Imaging:  Imaging results within the past 24 hours have been reviewed.    Assessment/Plan:     Elevated LFTs    21 yo admitted for septic shock after retained products of conception.    Elevated LFTs likely multifactorial - septic shock, metabolic related cause and alcoholic hepatitis  INR up some today. Continue to monitor.    Continue Rifaximin.   Recommend treating underlying shock and continue supportive care. Antibiotics per ID and ICU teams.      MELD-Na score: 25 at 4/10/2018  4:52 AM  MELD score: 25 at 4/10/2018  4:52 AM  Calculated from:  Serum Creatinine: 1.8 mg/dL at 4/10/2018  4:52 AM  Serum Sodium: 138 mmol/L (Rounded to 137) at 4/10/2018  4:52 AM  Total Bilirubin: 8.7 mg/dL at 4/10/2018  4:52 AM  INR(ratio): 1.5 at 4/10/2018  4:52 AM  Age: 22 years               Thrombocytopenia    Secondary to sepsis rather than liver disease.   Continue to monitor and replace as needed.         Clostridium difficile infection    No diarrhea.   The patient is on Flagyl and oral vancomycin.   ID is following.         Hepatomegaly    Secondary to fatty liver disease.         Pneumonia of both lower lobes due to methicillin resistant Staphylococcus aureus  (MRSA)    On antibiotic therapy        Acute blood loss anemia    No overt GI bleed.  Continue to monitor and transfuse as indicated.         ETOH abuse    Supportive care.             Thank you for your consult. I will follow-up with patient. Please contact us if you have any additional questions.    Jay Pelayo PA-C  Gastroenterology  [facility]

## 2018-04-10 NOTE — CONSULTS
Pharmacy Vancomycin Consult Note    WBC=17.92  Gazw=853  CrCL=69.7ml/min SCr=1.8    Cultures: respiratory-staph aureus-vanc sensitive, pseudomonas-zosyn sensitive  Dx. Septic shock  Patient is currently being pulse dosed.     Vancomycin random level=23.9mcg/ml  Patient will not receive a dose today.   Vancomycin 1 gram Q48 is a placeholder dose only and should not be given.  Will draw vancomycin random level 4/11 with AM labs.    Thank you for allowing us to participate in this patient's care.  Maryann Smalls, Pharm D 4/10/2018 10:53 AM

## 2018-04-10 NOTE — PROGRESS NOTES
[facility]  Critical Care Medicine  Progress Note    Patient Name: Rafael Duron  MRN: [id number]  Admission Date: 4/3/2018  Hospital Length of Stay: 7 days  Code Status: Full Code  Attending Provider: Prabhu Lugo MD  Primary Care Provider: Herman Cowart MD   Principal Problem: Septic shock    Subjective: opens eyes , follows commands      HPI:  Ms Duron is a 23 yo obese BF with a PMH of HTN, gestational DM and HTN who presented to [facility] ED in [place], [place] on  with complaint of SOB and cough with known pregnancy.  OB US revealed no heart tones and she is also  with second trimester fetal demise estimated 22 week for which she passed with retained placenta.  After admission to ICU she had seizure activity with , K+ 1.9 and Bicarb 11.  She also had etoh level 268 and reportedly had been drinking etoh w/ honey heavily for several days.  She required intubation for airway protection per records and OB was unable to remove placenta manually and patient had to be taken to OR.  She received 2 liters IVF and Hgb dropped to 7.4 and was transfused 2 units PRBCs.  Vaginal bleeding was scant per records.  Yesterday she had temp 101.5 Ax, .  She as transferred to [facility] ICU yesterday evening for higher level of care.        Hospital/ICU Course:   - This AM she is sedated on vent on Levophed infusion spiking temp 101.9 with WBC 20, LA 6.3, UA+, electrolyte imbalance and Glucose 268     - SAT and SBT done this am, pt awake and following commands. She was successfully extubated to nasal cannula. Remains tachycardic with HR 140s and febrile with temp 103 overnight. WBC 18 and lactic acidosis improving to 4.8. Continuing to aggressively replace electrolytes.     - Over night patient had episode with bradycardia and hypotension during which she became unresponsive and agonally breathing. She responded with IVF and bicarb and was restarted on pressors. She  experienced a similar episode again last night, during which she was intubated. Vent settings were reviewed and adjusted this am. No more bradycardic/hypotensive episodes since last night. Remains on pressors. Leukocystosis unimproved with worsening bandemia. Urine and blood cx NGTD. Sputum cx growing staph and pseudomonas however CXR this am is unremarkable.    04/7 - Tolerating SAT and SBT. Meets extubating criteria. Acidosis improving. Leukocytosis improving. Remains on bicarb gtt.     04/8 - Extubated successfully yesterday. Asystolic arrest this AM.  ACLS initiated. Re - intubated 2 rounds CPR with Iv epinephrine X 1  and IV CaCl X 1 given. ROSC attained.   A - line placed.      4/9 - now with mild vaginal bleeding and severe anasarca with blistering.  Still on vent with sedation and Levophed/Vasopressin infusing.  Worsening UOP and creatine.  Spoke with brother at bedside in detail and all questions answered.   4/10 - Opens eyes alert. Mild increase in Cr -. Intravascular volume depletion. Will add albumin. Still with vagnal bleeding    Review of Systems   Unable to perform ROS: Intubated   Constitutional: Positive for chills. Negative for fever.       Objective:     Vital Signs (Most Recent):  Temp: 99.6 °F (37.6 °C) (04/10/18 0305)  Pulse: 100 (04/10/18 0909)  Resp: 17 (04/10/18 0909)  BP: (!) 88/48 (04/10/18 0505)  SpO2: 96 % (04/10/18 0909) Vital Signs (24h Range):  Temp:  [99 °F (37.2 °C)-100.3 °F (37.9 °C)] 99.6 °F (37.6 °C)  Pulse:  [] 100  Resp:  [4-30] 17  SpO2:  [93 %-99 %] 96 %  BP: ()/(44-85) 88/48     Weight: (!) 138 kg (304 lb 3.8 oz)  Body mass index is 49.1 kg/m².      Intake/Output Summary (Last 24 hours) at 04/10/18 0957  Last data filed at 04/10/18 0605   Gross per 24 hour   Intake          3977.51 ml   Output             3365 ml   Net           612.51 ml       Physical Exam   Constitutional: She appears well-developed and well-nourished. She is easily aroused. She appears toxic.  She has a sickly appearance. She appears ill. She is sedated, intubated and restrained.   HENT:   Head: Normocephalic and atraumatic.   Mouth/Throat: Oropharynx is clear and moist and mucous membranes are normal.   Eyes: EOM and lids are normal. Pupils are equal, round, and reactive to light.   jaundice   Neck: Trachea normal. Neck supple.   Obese    Cardiovascular: Regular rhythm and normal heart sounds.  Tachycardia present.    Pulses:       Radial pulses are 1+ on the right side, and 1+ on the left side.        Dorsalis pedis pulses are 0 on the right side, and 0 on the left side.   Pulmonary/Chest: No accessory muscle usage. She is intubated. No respiratory distress. She has decreased breath sounds in the right lower field and the left lower field. She has rales in the right lower field and the left lower field.   Coarse breath sounds   Abdominal: She exhibits distension. Bowel sounds are decreased. There is hepatosplenomegaly and hepatomegaly. There is generalized tenderness.   Firmness palpated above umbilicus.  Morbidly obese   Genitourinary:   Genitourinary Comments: Rodriguez in place   Musculoskeletal: Normal range of motion. She exhibits edema.        Right foot: There is no deformity.        Left foot: There is no deformity.   Diffuse anasarca   Lymphadenopathy:     She has no cervical adenopathy.   Neurological: She is easily aroused.   Easily awakens on sedation and follows commands   Skin: Skin is warm and dry. Capillary refill takes less than 2 seconds. No rash noted. No cyanosis.        Blistering noted to extrem       Vents:  Vent Mode: A/C (04/10/18 0909)  Ventilator Initiated: Yes (04/05/18 1930)  Set Rate: 18 bmp (04/10/18 0909)  Vt Set: 400 mL (04/10/18 0909)  Pressure Support: 0 cmH20 (04/10/18 0909)  PEEP/CPAP: 5 cmH20 (04/10/18 0909)  Oxygen Concentration (%): 35 (04/10/18 0909)  Peak Airway Pressure: 16 cmH2O (04/10/18 0909)  Plateau Pressure: 0 cmH20 (04/10/18 0909)  Total Ve: 8.21 mL  (04/10/18 0909)  F/VT Ratio<105 (RSBI): (!) 40.48 (04/10/18 0909)    Lines/Drains/Airways     Central Venous Catheter Line                 Percutaneous Central Line Insertion/Assessment - triple lumen  04/03/18 2045 right internal jugular 6 days          Drain                 Urethral Catheter 04/03/18 16 Fr. 7 days         NG/OG Tube 04/08/18 0730 [place] sump Right mouth 2 days          Airway                 Airway - Non-Surgical 04/08/18 0715 Endotracheal Tube 2 days          Arterial Line                 Arterial Line 04/08/18 0720 Left Brachial 2 days                Significant Labs:    CBC/Anemia Profile:    Recent Labs  Lab 04/08/18  1725 04/09/18  0344 04/10/18  0452   WBC 16.98* 18.58* 17.92*   HGB 8.7* 7.9* 9.1*   HCT 27.7* 24.9* 28.3*   PLT 25* 24* 50*   MCV 95 97 94   RDW 18.6* 18.4* 17.9*        Chemistries:    Recent Labs  Lab 04/09/18  0344 04/09/18  1403 04/09/18  2058 04/10/18  0452     136 138 138 138   K 4.0  4.0 3.7 3.7 3.5   CL 99  99 99 99 100   CO2 22*  22* 25 25 26   BUN 4*  4* 4* 4* 4*   CREATININE 1.6*  1.6* 1.7* 1.7* 1.8*   CALCIUM 6.6*  6.6* 7.3* 7.3* 7.2*   ALBUMIN 1.5*  --   --  1.5*   PROT 4.6*  --   --  4.9*   BILITOT 7.2*  --   --  8.7*   ALKPHOS 120  --   --  132   ALT 42  --   --  36   *  --   --  86*   MG 2.0 1.8 2.0 1.8   PHOS 3.5 3.3 3.0 3.0       ABGs:     Recent Labs  Lab 04/10/18  0517   PH 7.349*   PCO2 49.9*   HCO3 27.5   POCSATURATED 95   BE 2     Coagulation:     Recent Labs  Lab 04/10/18  0452   INR 1.5*     Respiratory Culture: No results for input(s): GSRESP, RESPIRATORYC in the last 48 hours.  All pertinent labs within the past 24 hours have been reviewed.          Assessment/Plan:     Psychiatric   ETOH abuse     Monitor for ETOH withdrawal. Electrolyte replacement. Thiamine, Folate, MVI.              Pulmonary   Respiratory failure    4/10 Needs tracheostomy for long term care - discussed with family        Pneumonia of both lower lobes due to  methicillin resistant Staphylococcus aureus (MRSA)    Sputum cultures positive for MRSA and Pseudomonas. IV Vancomycin. Continue IV Zosyn.        Acute hypoxemic respiratory failure    Re intubated for third time 4/8 s/p asystolic arrest.  Cont Mechanical ventilation.  Ventilator settings reviewed and adjusted to optimize gas exchange. Daily wake up and breathe trials once more stable.  Would benefit from Trach once more stable.  Titrate FIO2 to keep SAO2 > 92%.             Cardiac/Vascular    Asystolic arrest this AM. ROSC after ACLS.  IV Norepinephrine. Monitor hemodynamics. MAP goal of 60 mmHg.        Cardiopulmonary arrest    Cardiology consulted. No PE on CTA chest  Cont supportive care and ICU cardiac monitoring          Renal/    Oliguric. Nephrology consult. Monitor BUN / Cr. [place] urine output.        Volume overload    4/10 Intravascular volume depletion - add albumin        UTI due to Klebsiella species    ZOSYN + VANCOMYCIN.        Electrolyte imbalance    Monitor and replete electrolytes as needed.         ID   * Septic shock    Cont Levophed and Vasopressin infusions wean as tolerated  Cont IVAB  Volume replacement with blood products today          Hematology    Monitor hemogram. Transfuse as needed.        Thrombocytopenia    Transfuse Plts given active bleeding        Oncology   Acute blood loss anemia    Total 4 units transfused. Monitor hemogram.   Transfuse 2 more units PRBCs with FFP and Cryo given total now 6 units PRBCs          Endocrine   Hyperglycemia, unspecified    Stable Hyperglycemia: INSULIN ASPART. Moderate correction dose.  Blood glucose target 100 - 180 mg/dl  Cont SSI          GI   Hepatomegaly    ? Alcoholic liver disease / toxic hepatitis ( DILI). Mainly cholestatic picture.  Monitoring liver enzymes. LFTs down trending.  Discriminant function = 7. Hepatitis acute panel unremarkable. Screen for autoimmune hepatitis. Avoid the use of hepatotoxic meds. GI / Hepatology consult  pending            Shock liver    Daily CMP to monitor liver enzymes  Hep panel negative  Vasopressors to maintain perfusion        Other   Hyperbilirubinemia    Etioloy unclear.  Likely related to alcoholic liver disease. Monitor and trend.   GI consulted        History of IUFD    S/P D & C. No retained products of conception on ultrasound.            Critical Care Daily Checklist:    A: Awake: RASS Goal/Actual Goal: RASS Goal: -1-->drowsy  Actual: Lim Agitation Sedation Scale (RASS): Light sedation   B: Spontaneous Breathing Trial Performed? Spon. Breathing Trial Initiated?: Not initiated (held per NP order) (04/09/18 0705)   C: SAT & SBT Coordinated?  na                      D: Delirium: CAM-ICU     E: Early Mobility Performed? No   F: Feeding Goal: Goals: Meet > 85 % EEN/EPN   Status: Nutrition Goal Status: progressing towards goal   Current Diet Order   Procedures    Diet NPO      AS: Analgesia/Sedation adequate   T: Thromboembolic Prophylaxis Low plateletes   H: HOB > 300 Yes   U: Stress Ulcer Prophylaxis (if needed) y   G: Glucose Control adequate   B: Bowel Function Stool Occurrence: 1 (rectal tube in place)   I: Indwelling Catheter (Lines & Rodriguez) Necessity needed   D: De-escalation of Antimicrobials/Pharmacotherapies na    Plan for the day/ETD Mild diuresis  Discussed tracheostomy with family    Code Status:  Family/Goals of Care: Full Code   discussed     Critical Care Time: 45  Critical secondary to Patient has a condition that poses threat to life and bodily function: Severe Respiratory Distress and liver failure      Critical care was time spent personally by me on the following activities: development of treatment plan with patient or surrogate and bedside caregivers, discussions with consultants, evaluation of patient's response to treatment, examination of patient, ordering and performing treatments and interventions, ordering and review of laboratory studies, ordering and review of  radiographic studies, pulse oximetry, re-evaluation of patient's condition. This critical care time did not overlap with that of any other provider or involve time for any procedures.     Angel Quijano MD  Critical Care Medicine  [facility]

## 2018-04-10 NOTE — SUBJECTIVE & OBJECTIVE
Subjective:     Interval History:       Review of Systems   Unable to perform ROS: Intubated     Objective:     Vital Signs (Most Recent):  Temp: 99.4 °F (37.4 °C) (04/10/18 1105)  Pulse: 102 (04/10/18 1358)  Resp: (!) 22 (04/10/18 1358)  BP: 102/61 (04/10/18 1305)  SpO2: 97 % (04/10/18 1358) Vital Signs (24h Range):  Temp:  [98.6 °F (37 °C)-99.9 °F (37.7 °C)] 99.4 °F (37.4 °C)  Pulse:  [] 102  Resp:  [4-25] 22  SpO2:  [93 %-99 %] 97 %  BP: ()/(40-85) 102/61     Weight: (!) 138 kg (304 lb 3.8 oz) (04/10/18 0205)  Body mass index is 49.1 kg/m².      Intake/Output Summary (Last 24 hours) at 04/10/18 1432  Last data filed at 04/10/18 1113   Gross per 24 hour   Intake          3803.43 ml   Output             2775 ml   Net          1028.43 ml       Lines/Drains/Airways     Central Venous Catheter Line                 Percutaneous Central Line Insertion/Assessment - triple lumen  04/03/18 2045 right internal jugular 6 days          Drain                 Urethral Catheter 04/03/18 16 Fr. 7 days         NG/OG Tube 04/08/18 0730 [place] sump Right mouth 2 days          Airway                 Airway - Non-Surgical 04/08/18 0715 Endotracheal Tube 2 days          Arterial Line                 Arterial Line 04/08/18 0720 Left Brachial 2 days                Physical Exam   Constitutional: She appears well-nourished. She has a sickly appearance. She is intubated.   Obese female.    HENT:   Head: Normocephalic and atraumatic.   Cardiovascular: Normal rate and regular rhythm.    Pulmonary/Chest: Breath sounds normal. She is intubated.   Abdominal: She exhibits distension (less distension today and able to palpate the liver in the epigastrium). Bowel sounds are decreased. There is hepatomegaly.   Neurological:   She didn't open her eyes for me today.        Significant Labs:  CBC:   Recent Labs  Lab 04/08/18  1725 04/09/18  0344 04/10/18  0452   WBC 16.98* 18.58* 17.92*   HGB 8.7* 7.9* 9.1*   HCT 27.7* 24.9* 28.3*   PLT  25* 24* 50*     CMP:   Recent Labs  Lab 04/10/18  0452   *   CALCIUM 7.2*   ALBUMIN 1.5*   PROT 4.9*      K 3.5   CO2 26      BUN 4*   CREATININE 1.8*   ALKPHOS 132   ALT 36   AST 86*   BILITOT 8.7*     Coagulation:   Recent Labs  Lab 04/10/18  0452   INR 1.5*         Significant Imaging:  Imaging results within the past 24 hours have been reviewed.

## 2018-04-10 NOTE — SUBJECTIVE & OBJECTIVE
Review of Systems   Unable to perform ROS: Intubated   Constitutional: Positive for chills. Negative for fever.       Objective:     Vital Signs (Most Recent):  Temp: 99.6 °F (37.6 °C) (04/10/18 0305)  Pulse: 100 (04/10/18 0909)  Resp: 17 (04/10/18 0909)  BP: (!) 88/48 (04/10/18 0505)  SpO2: 96 % (04/10/18 0909) Vital Signs (24h Range):  Temp:  [99 °F (37.2 °C)-100.3 °F (37.9 °C)] 99.6 °F (37.6 °C)  Pulse:  [] 100  Resp:  [4-30] 17  SpO2:  [93 %-99 %] 96 %  BP: ()/(44-85) 88/48     Weight: (!) 138 kg (304 lb 3.8 oz)  Body mass index is 49.1 kg/m².      Intake/Output Summary (Last 24 hours) at 04/10/18 0957  Last data filed at 04/10/18 0605   Gross per 24 hour   Intake          3977.51 ml   Output             3365 ml   Net           612.51 ml       Physical Exam   Constitutional: She appears well-developed and well-nourished. She is easily aroused. She appears toxic. She has a sickly appearance. She appears ill. She is sedated, intubated and restrained.   HENT:   Head: Normocephalic and atraumatic.   Mouth/Throat: Oropharynx is clear and moist and mucous membranes are normal.   Eyes: EOM and lids are normal. Pupils are equal, round, and reactive to light.   jaundice   Neck: Trachea normal. Neck supple.   Obese    Cardiovascular: Regular rhythm and normal heart sounds.  Tachycardia present.    Pulses:       Radial pulses are 1+ on the right side, and 1+ on the left side.        Dorsalis pedis pulses are 0 on the right side, and 0 on the left side.   Pulmonary/Chest: No accessory muscle usage. She is intubated. No respiratory distress. She has decreased breath sounds in the right lower field and the left lower field. She has rales in the right lower field and the left lower field.   Coarse breath sounds   Abdominal: She exhibits distension. Bowel sounds are decreased. There is hepatosplenomegaly and hepatomegaly. There is generalized tenderness.   Firmness palpated above umbilicus.  Morbidly obese    Genitourinary:   Genitourinary Comments: Rodriguez in place   Musculoskeletal: Normal range of motion. She exhibits edema.        Right foot: There is no deformity.        Left foot: There is no deformity.   Diffuse anasarca   Lymphadenopathy:     She has no cervical adenopathy.   Neurological: She is easily aroused.   Easily awakens on sedation and follows commands   Skin: Skin is warm and dry. Capillary refill takes less than 2 seconds. No rash noted. No cyanosis.        Blistering noted to extrem       Vents:  Vent Mode: A/C (04/10/18 0909)  Ventilator Initiated: Yes (04/05/18 1930)  Set Rate: 18 bmp (04/10/18 0909)  Vt Set: 400 mL (04/10/18 0909)  Pressure Support: 0 cmH20 (04/10/18 0909)  PEEP/CPAP: 5 cmH20 (04/10/18 0909)  Oxygen Concentration (%): 35 (04/10/18 0909)  Peak Airway Pressure: 16 cmH2O (04/10/18 0909)  Plateau Pressure: 0 cmH20 (04/10/18 0909)  Total Ve: 8.21 mL (04/10/18 0909)  F/VT Ratio<105 (RSBI): (!) 40.48 (04/10/18 0909)    Lines/Drains/Airways     Central Venous Catheter Line                 Percutaneous Central Line Insertion/Assessment - triple lumen  04/03/18 2045 right internal jugular 6 days          Drain                 Urethral Catheter 04/03/18 16 Fr. 7 days         NG/OG Tube 04/08/18 0730 [place] sump Right mouth 2 days          Airway                 Airway - Non-Surgical 04/08/18 0715 Endotracheal Tube 2 days          Arterial Line                 Arterial Line 04/08/18 0720 Left Brachial 2 days                Significant Labs:    CBC/Anemia Profile:    Recent Labs  Lab 04/08/18  1725 04/09/18  0344 04/10/18  0452   WBC 16.98* 18.58* 17.92*   HGB 8.7* 7.9* 9.1*   HCT 27.7* 24.9* 28.3*   PLT 25* 24* 50*   MCV 95 97 94   RDW 18.6* 18.4* 17.9*        Chemistries:    Recent Labs  Lab 04/09/18  0344 04/09/18  1403 04/09/18  2058 04/10/18  0452     136 138 138 138   K 4.0  4.0 3.7 3.7 3.5   CL 99  99 99 99 100   CO2 22*  22* 25 25 26   BUN 4*  4* 4* 4* 4*   CREATININE 1.6*   1.6* 1.7* 1.7* 1.8*   CALCIUM 6.6*  6.6* 7.3* 7.3* 7.2*   ALBUMIN 1.5*  --   --  1.5*   PROT 4.6*  --   --  4.9*   BILITOT 7.2*  --   --  8.7*   ALKPHOS 120  --   --  132   ALT 42  --   --  36   *  --   --  86*   MG 2.0 1.8 2.0 1.8   PHOS 3.5 3.3 3.0 3.0       ABGs:     Recent Labs  Lab 04/10/18  0517   PH 7.349*   PCO2 49.9*   HCO3 27.5   POCSATURATED 95   BE 2     Coagulation:     Recent Labs  Lab 04/10/18  0452   INR 1.5*     Respiratory Culture: No results for input(s): GSRESP, RESPIRATORYC in the last 48 hours.  All pertinent labs within the past 24 hours have been reviewed.

## 2018-04-10 NOTE — PLAN OF CARE
Problem: Patient Care Overview  Goal: Plan of Care Review  Outcome: Ongoing (interventions implemented as appropriate)  NEURO: Pt will awaken and follow commands.  Current sedation / analgesia protocol is adequate.   CARDIAC  Pt is still requiring vasopressin, but levophed weaned to 0.04mcg/kg/min to maintain MAP of 95.  PULMONARY: O2 sats stable on minimal vent support, no acute resp issues overnight.   : Poor response to lasix so far, client is now 25L positive since admit and is positive overnight.   GI: no stools overnight. Poor tolerance of tube feeding so far.    ID:  Afebrile overnight.  Flagyl, Zosyn and Vanc being administered as ordered.    SKIN: Client moved to size wise bed with bed rotation on to protect skin     MISC: Bladder pressure measured at 16 this shift.

## 2018-04-10 NOTE — PLAN OF CARE
CM spoke with patient's mother and she stated this is very hard for her. She did not want to come here to see her dtr like this. This is hard for her. She has to take care of her kids. CM offered comfort and support to her and the family. CM explained role again as transition of care and d/c planning. CM to f/u for safe transition

## 2018-04-10 NOTE — PROGRESS NOTES
Patient assessed.  Soft bilat wrist restraints renewed due to risk of pulling lines, tubes and/or climbing OOB.    CHANDRIKA Lowe [facility]

## 2018-04-11 NOTE — ASSESSMENT & PLAN NOTE
Currently 8.9 after 2 units PRBC transfusion at outside facility.  No active bleeding at this time.  Labs in AM.      04/07/2018- hemoglobin is stable at 8.7(was 8.9) two days ago.  Will continue to monitor closely  4/10/2018 - Pt transfused 4 units prbcs, plat, ffp and cryo. Hem/Onc following, will transfuse PRN.

## 2018-04-11 NOTE — SUBJECTIVE & OBJECTIVE
Interval History: Patient intubated, negative for bleeding or bruising, platelets: 57    Oncology Treatment Plan:   [No treatment plan]    Medications:  Continuous Infusions:   fentanyl 20 mL/hr at 04/11/18 1005    norepinephrine bitartrate-D5W 0.02 mcg/kg/min (04/11/18 0905)    vasopressin (PITRESSIN) infusion 0.04 Units/min (04/11/18 1005)     Scheduled Meds:   albumin human 25%  12.5 g Intravenous BID    bumetanide  3 mg Intravenous TID    chlorhexidine  15 mL Mouth/Throat BID    famotidine (PF)  20 mg Intravenous Daily    folic acid-vit B6-vit B12 2.5-25-2 mg  1 tablet Oral Daily    meropenem (MERREM) IVPB  500 mg Intravenous Q6H    metOLazone  5 mg Oral Daily    metronidazole  500 mg Intravenous Q8H    multivitamin  1 tablet Oral Daily    rifAXIMin  550 mg Oral BID    sodium phosphate IVPB  30 mmol Intravenous Once    thiamine  100 mg Oral Daily    vancomycin (VANCOCIN) IVPB  1,000 mg Intravenous Once    [START ON 4/13/2018] vancomycin (VANCOCIN) IVPB  1,000 mg Intravenous Q48H    vancomycin  250 mg Oral Q6H    vitamin D  5,000 Units Oral Daily     PRN Meds:acetaminophen, albuterol-ipratropium 2.5mg-0.5mg/3mL, bisacodyl, dextrose 50%, glucagon (human recombinant), insulin aspart U-100, lorazepam, ondansetron, pneumoc 13-bobby conj-dip cr(PF), sodium chloride 0.9%     Review of Systems   Unable to perform ROS: Intubated     Objective:     Vital Signs (Most Recent):  Temp: 99.4 °F (37.4 °C) (04/11/18 0305)  Pulse: 94 (04/11/18 1122)  Resp: 20 (04/11/18 1122)  BP: 109/60 (04/11/18 0905)  SpO2: 96 % (04/11/18 1122) Vital Signs (24h Range):  Temp:  [99.4 °F (37.4 °C)-101.6 °F (38.7 °C)] 99.4 °F (37.4 °C)  Pulse:  [] 94  Resp:  [4-29] 20  SpO2:  [94 %-99 %] 96 %  BP: ()/(40-71) 109/60     Weight: (!) 137 kg (302 lb 0.5 oz)  Body mass index is 48.75 kg/m².  Body surface area is 2.53 meters squared.      Intake/Output Summary (Last 24 hours) at 04/11/18 1138  Last data filed at 04/11/18  1005   Gross per 24 hour   Intake           2584.9 ml   Output             4785 ml   Net          -2200.1 ml       Physical Exam   Constitutional: She appears well-developed and well-nourished. She appears ill.   HENT:   Head: Normocephalic and atraumatic.   Right Ear: Hearing and external ear normal.   Left Ear: Hearing and external ear normal.   Nose: No rhinorrhea or sinus tenderness. Right sinus exhibits no maxillary sinus tenderness and no frontal sinus tenderness. Left sinus exhibits no maxillary sinus tenderness and no frontal sinus tenderness.   Mouth/Throat: Uvula is midline, oropharynx is clear and moist and mucous membranes are normal. No oral lesions.   Eyes: Conjunctivae are normal. Right eye exhibits no discharge. Left eye exhibits no discharge.   Neck: Normal range of motion. Carotid bruit is not present. No tracheal deviation present. No thyromegaly present.   Cardiovascular: Normal rate, regular rhythm, S1 normal, S2 normal, normal heart sounds and intact distal pulses.    No murmur heard.  Pulses:       Dorsalis pedis pulses are 2+ on the right side, and 2+ on the left side.   Pulmonary/Chest: Effort normal and breath sounds normal. No respiratory distress.   Abdominal: Soft. Bowel sounds are normal. She exhibits no distension and no mass. There is no tenderness.   Musculoskeletal: Normal range of motion. She exhibits edema (Generalized edema).   Lymphadenopathy:     She has no cervical adenopathy.        Right: No supraclavicular adenopathy present.        Left: No supraclavicular adenopathy present.   Neurological: She is unresponsive. No sensory deficit.   Skin: Skin is warm and dry. Capillary refill takes less than 2 seconds. No rash noted. There is pallor.   Nursing note and vitals reviewed.      Significant Labs:   CBC:   Recent Labs  Lab 04/10/18  0452 04/11/18  0321   WBC 17.92* 19.04*   HGB 9.1* 9.3*   HCT 28.3* 29.0*   PLT 50* 57*    and CMP:   Recent Labs  Lab 04/10/18  0452  04/10/18  1422 04/10/18  2100 04/11/18  0321    139 140 139   K 3.5 3.4* 3.4* 3.1*    100 100 99   CO2 26 28 26 26   * 119* 120* 118*   BUN 4* 5* 4* 5*   CREATININE 1.8* 1.9* 1.9* 2.0*   CALCIUM 7.2* 7.5* 7.6* 7.8*   PROT 4.9*  --   --  5.3*   ALBUMIN 1.5*  --   --  1.8*   BILITOT 8.7*  --   --  9.5*   ALKPHOS 132  --   --  142*   AST 86*  --   --  78*   ALT 36  --   --  31   ANIONGAP 12 11 14 14   EGFRNONAA 39* 37* 37* 34*       Diagnostic Results:  I have reviewed all pertinent imaging results/findings within the past 24 hours.

## 2018-04-11 NOTE — ASSESSMENT & PLAN NOTE
when able.    04/07/2018- will need close out patient follow up on discharge for alcohol cessation counseling .  04/09/2018- need close out patient follow up   4/10/2018 - possible etiology of thrombocytopenia and liver failure. Once patient recovers, will  and provide resources for substance abuse.

## 2018-04-11 NOTE — PROGRESS NOTES
[facility] -   Hematology/Oncology  Progress Note    Patient Name: Rafael Duron  Admission Date: 4/3/2018  Hospital Length of Stay: 8 days  Code Status: Full Code     Subjective:     HPI:  21 yo female with morbid obesity,  HTN, gestational DM; presented to [facility] ED in [place], [place] on 4/2 with complaint of SOB and cough with known pregnancy. Workup revealed fetal demise (estimated at 22 weeks) and patient passed dead fetus but retained placenta. Placenta remnants were removed in OR vis D & C.  After admission to ICU she had seizure activity with , K+ 1.9 and Bicarb 11.  She also had EtOH level of 268.  She required intubation for airway protection per records. She as transferred to [facility] ICU on 4/3/18 for higher level of care.    Patient presented with septic shock at [facility] and was started on IV pressors and IV antibiotics. Patient was initially stabilized and extubated on 4/5/18. OB/GYN following. She developed syncopal episode on 4/5/18 associated with bradycardia. She was successfully resuscitated with IV fluids and levophed. She subsequently deteriorated and was re-intubated on 4/5/18. Abdominal US revealed massive hepatomegaly with liver span of 33 cm. Patient's condition improved and she was extubated again on 4/7/18. Patient coded on 4/8/18 and was successfully resuscitated and again intubated. Patient was also diagnosed with C. Diff colitis and MRSA bacteremia.   Hem/Onc consulted to manage thrombocytopenia and anemia.  Patient is intubated and not able to provide any additional history.       Interval History: Patient intubated, negative for bleeding or bruising, platelets: 57    Oncology Treatment Plan:   [No treatment plan]    Medications:  Continuous Infusions:   fentanyl 20 mL/hr at 04/11/18 1005    norepinephrine bitartrate-D5W 0.02 mcg/kg/min (04/11/18 0905)    vasopressin (PITRESSIN) infusion 0.04 Units/min (04/11/18 1005)     Scheduled Meds:    albumin human 25%  12.5 g Intravenous BID    bumetanide  3 mg Intravenous TID    chlorhexidine  15 mL Mouth/Throat BID    famotidine (PF)  20 mg Intravenous Daily    folic acid-vit B6-vit B12 2.5-25-2 mg  1 tablet Oral Daily    meropenem (MERREM) IVPB  500 mg Intravenous Q6H    metOLazone  5 mg Oral Daily    metronidazole  500 mg Intravenous Q8H    multivitamin  1 tablet Oral Daily    rifAXIMin  550 mg Oral BID    sodium phosphate IVPB  30 mmol Intravenous Once    thiamine  100 mg Oral Daily    vancomycin (VANCOCIN) IVPB  1,000 mg Intravenous Once    [START ON 4/13/2018] vancomycin (VANCOCIN) IVPB  1,000 mg Intravenous Q48H    vancomycin  250 mg Oral Q6H    vitamin D  5,000 Units Oral Daily     PRN Meds:acetaminophen, albuterol-ipratropium 2.5mg-0.5mg/3mL, bisacodyl, dextrose 50%, glucagon (human recombinant), insulin aspart U-100, lorazepam, ondansetron, pneumoc 13-bobby conj-dip cr(PF), sodium chloride 0.9%     Review of Systems   Unable to perform ROS: Intubated     Objective:     Vital Signs (Most Recent):  Temp: 99.4 °F (37.4 °C) (04/11/18 0305)  Pulse: 94 (04/11/18 1122)  Resp: 20 (04/11/18 1122)  BP: 109/60 (04/11/18 0905)  SpO2: 96 % (04/11/18 1122) Vital Signs (24h Range):  Temp:  [99.4 °F (37.4 °C)-101.6 °F (38.7 °C)] 99.4 °F (37.4 °C)  Pulse:  [] 94  Resp:  [4-29] 20  SpO2:  [94 %-99 %] 96 %  BP: ()/(40-71) 109/60     Weight: (!) 137 kg (302 lb 0.5 oz)  Body mass index is 48.75 kg/m².  Body surface area is 2.53 meters squared.      Intake/Output Summary (Last 24 hours) at 04/11/18 1134  Last data filed at 04/11/18 1005   Gross per 24 hour   Intake           2584.9 ml   Output             4785 ml   Net          -2200.1 ml       Physical Exam   Constitutional: She appears well-developed and well-nourished. She appears ill.   HENT:   Head: Normocephalic and atraumatic.   Right Ear: Hearing and external ear normal.   Left Ear: Hearing and external ear normal.   Nose: No rhinorrhea or  sinus tenderness. Right sinus exhibits no maxillary sinus tenderness and no frontal sinus tenderness. Left sinus exhibits no maxillary sinus tenderness and no frontal sinus tenderness.   Mouth/Throat: Uvula is midline, oropharynx is clear and moist and mucous membranes are normal. No oral lesions.   Eyes: Conjunctivae are normal. Right eye exhibits no discharge. Left eye exhibits no discharge.   Neck: Normal range of motion. Carotid bruit is not present. No tracheal deviation present. No thyromegaly present.   Cardiovascular: Normal rate, regular rhythm, S1 normal, S2 normal, normal heart sounds and intact distal pulses.    No murmur heard.  Pulses:       Dorsalis pedis pulses are 2+ on the right side, and 2+ on the left side.   Pulmonary/Chest: Effort normal and breath sounds normal. No respiratory distress.   Abdominal: Soft. Bowel sounds are normal. She exhibits no distension and no mass. There is no tenderness.   Musculoskeletal: Normal range of motion. She exhibits edema (Generalized edema).   Lymphadenopathy:     She has no cervical adenopathy.        Right: No supraclavicular adenopathy present.        Left: No supraclavicular adenopathy present.   Neurological: She is unresponsive. No sensory deficit.   Skin: Skin is warm and dry. Capillary refill takes less than 2 seconds. No rash noted. There is pallor.   Nursing note and vitals reviewed.      Significant Labs:   CBC:   Recent Labs  Lab 04/10/18  0452 04/11/18  0321   WBC 17.92* 19.04*   HGB 9.1* 9.3*   HCT 28.3* 29.0*   PLT 50* 57*    and CMP:   Recent Labs  Lab 04/10/18  0452 04/10/18  1422 04/10/18  2100 04/11/18  0321    139 140 139   K 3.5 3.4* 3.4* 3.1*    100 100 99   CO2 26 28 26 26   * 119* 120* 118*   BUN 4* 5* 4* 5*   CREATININE 1.8* 1.9* 1.9* 2.0*   CALCIUM 7.2* 7.5* 7.6* 7.8*   PROT 4.9*  --   --  5.3*   ALBUMIN 1.5*  --   --  1.8*   BILITOT 8.7*  --   --  9.5*   ALKPHOS 132  --   --  142*   AST 86*  --   --  78*   ALT 36   --   --  31   ANIONGAP 12 11 14 14   EGFRNONAA 39* 37* 37* 34*       Diagnostic Results:  I have reviewed all pertinent imaging results/findings within the past 24 hours.    Assessment/Plan:     Thrombocytopenia    Likely related to hepatomegaly and Liver Shock.    --Daily CBC  --If platelets <15,000, will need to transfuse platelets, unless actively bleeding then transfuse to maintain platelets >50,000.         Acute blood loss anemia    Vaginal bleeding related to vaginal birth of non-viable infant with retained placenta, was followed with D&C.     --Monitor H/H if Hgb <7.0 will need to transfuse with PRBC.             Thank you for your consult. I will follow-up with patient. Please contact us if you have any additional questions.     Giulia Rutherford NP  Hematology/Oncology  [facility]

## 2018-04-11 NOTE — CONSULTS
Pharmacy Vancomycin Consult Note    WBC=19.04  Eexa=328.6  CrCl=62.4ml/min Scr=2.0(up from 1.8)    Cultures: respiratory-staph aureus-vanc sensitive, pseudomonas-zosyn sensitive  Dx. Septic shock  Patient is currently being pulse dosed.    Vancomycin random level=11.9 mcg/ml  Patient can receive dose today.  Vancomycin 1 gram one time dose.  Vancomycin 1 gram Q48 is a placeholder dose only.  Will redraw vancomycin random level 4/12 with AM labs.    Thank you for allowing us to participate in this patient's care.  Maryann Smalls, Pharm D 4/11/2018 11:18 AM

## 2018-04-11 NOTE — PROGRESS NOTES
1905 Assumed client care at this time.. Client resting comfortably on current vent setting and analgesia/sedation regimen.  Low dose levophed and vasopressin to maintain SBP of 95.  Tube feeding residuals slightly high during day shift and requiring frequent pausing of tube feeding to allow time for absorption, also no bowel movements in a couple days.  Current residual 320mL.  Tube feeding paused at this time and will continue to monitor residuals.    2105 Dr. Jeansonne spoke with ENT and they may take patient to surgery for trach and PEG in the AM.  Tube feeding to be held past midnight.    2355 tube feeding stopped at this time    0100 Previous concern for ileus considering pt diagnosed with Cdiff yet no BM in past couple days,  Client had a large bowel movement.      Chlorahexadine bath given, linens and gown changed in prep for possible surgery today.    0505 EICU notified of AM labs specifically mag and potassium.  Awaiting orders

## 2018-04-11 NOTE — CONSULTS
Referring Provider:    Mitzi Self  No address on file  Subjective:   Patient: Rafael Duron [id number], :1995   Visit date:4/3/2018 12:53 PM    Reason for consultation:  Respiratory Failure/Failure to wean from vent    HPI:      Ms Duron is a 23 yo obese BF with a PMH of HTN, gestational DM and HTN who presented to [facility] ED in [place], [place] on  with complaint of SOB and cough with known pregnancy.  OB US revealed no heart tones and she is also  with second trimester fetal demise estimated 22 week for which she passed with retained placenta.  After admission to ICU she had seizure activity with , K+ 1.9 and Bicarb 11.  She also had etoh level 268 and reportedly had been drinking etoh w/ honey heavily for several days.  She required intubation for airway protection per records and OB was unable to remove placenta manually and patient had to be taken to OR.   She as transferred to [facility] ICU for higher level of care.         He has remained on the ventilator without weaning for the past 9 day(s).  her cardiopulmonary comorbidities have been optimized as much as possible and this has not resulted in resolution of the need for ventilatory support.      Peak Pressures- 18  Vasopressors- Yes  Anticoagulants- No  BMI- Body mass index is 48.75 kg/m².      Review of Systems:  Unable to perform due to patient's current medical condition    Her meds, allergies, medical, surgical, social & family histories were reviewed & updated:  -     Jodi [unfilled]  -     She  has a past medical history of Gestational diabetes and Hypertension.   -     She  does not have any pertinent problems on file.   -     She  has no past surgical history on file.  -     She  reports that she has never smoked. She has never used smokeless tobacco. She reports that she drinks alcohol. She reports that she does not use drugs.  -     Her family history includes Diabetes in her father, mother, and sister;  "Heart disease in her mother.  -     She has No Known Allergies.    Objective:     Physical Exam:  Vitals:  BP (!) 101/42 (BP Location: Left arm, Patient Position: Lying)   Pulse 94   Temp 99.9 °F (37.7 °C) (Oral)   Resp 20   Ht 5' 6" (1.676 m)   Wt (!) 137 kg (302 lb 0.5 oz)   LMP  (LMP Unknown)   SpO2 97%   Breastfeeding? No   BMI 48.75 kg/m²   General appearance: Critically ill, intubated    Eyes:  Extraocular motions intact, PERRL    Communication:  Unable to communicate due to medical state    Ears:  Otoscopy of external auditory canals and tympanic membranes was normal, clinical speech reception thresholds grossly intact, no mass/lesion of auricle.  Nose:  No masses/lesions of external nose, nasal mucosa, septum, and turbinates were within normal limits.  Mouth:  No mass/lesion of lips, teeth, gums, hard/soft palate, tongue, tonsils, or oropharynx.    Cardiovascular:  No pedal edema; Radial Pulses +2     Neck & Lymphatics:  No cervical lymphadenopathy, no neck mass/crepitus/ asymmetry, trachea is midline, no thyroid enlargement/tenderness/mass.    Psych: Oriented x3,  Alert with normal mood and affect.     Respiration/Chest:  Symmetric expansion during respiration, normal respiratory effort.    Skin:  Warm and intact. No ulcerations of face, scalp, neck.    Laboratory:  CBC:   Recent Labs  Lab 04/11/18  0321   WBC 19.04*   RBC 3.05*   HGB 9.3*   HCT 29.0*   PLT 57*   MCV 95   MCH 30.5   MCHC 32.1         Assessment & Plan:   Chronic respiratory failure/Failure to wean from vent- [facility] severe respiratory disease and complex medical conditions.  she is not on anticoagulation or coagulopathic due to medical conditions.  she is morbidly obese.  The acute risk of tracheostomy placement is bleeding, pneumothorax, pneumomediastinum, subcutaneous emphysema, damage to the esophagus, injury to the vocal cords, tracheostomy blockage or dislodgment.  Long term risks are " erosion/thinning of the trachea, tracheosophageal fisutula, granulation tissue or scar tissue in the airway and persistent tracheocutaneous fistula after decannulation.  In children, smokers, alcohol abusers, diabetics, immunocompromised patients, patients with chronic diseases or respiratory infection or persons taking steroids the risks are considerably higher.  The health care proxy had the opportunity to ask several questions to their satisfaction and consent was documented to proceed with tracheostomy placement.

## 2018-04-11 NOTE — PROGRESS NOTES
[facility] -   Nephrology  Progress Note    Patient Name: Rafael Duron  MRN: [id number]  Admission Date: 4/3/2018  Hospital Length of Stay: 8 days  Attending Provider: Prabhu Lugo MD   Primary Care Physician: Herman Cowart MD  Principal Problem:Septic shock    Subjective:     HPI: 23 yo obese female with HTN, gestational DM  who presented to [facility] ED in [place], [place] on 4/2 with complaint of SOB and cough with known pregnancy. Workup revealed fetal demise (estimated at 22 weeks) and patient passed dead fetus but retained placenta. Placenta remnants were removed in OR vis D & C.  After admission to ICU she had seizure activity with , K+ 1.9 and Bicarb 11.  She also had EtOH level of 268.  She required intubation for airway protection per records. She as transferred to [facility] ICU on 4/3/18 for higher level of care.    Patient presented with septic shock at [facility] and was started on IV pressors and IV antibiotics. Patient was initially stabilized and extubated on 4/5/18. OB/GYN following. She developed syncopal episode on 4/5/18 associated with bradycardia. She was successfully resuscitated with IV fluids and levophed. She subsequently deteriorated and was re-intubated on 4/5/18. Abdominal US revealed massive hepatomegaly with liver span of 33 cm. Patient's condition improved and she was extubated again on 4/7/18. Patient coded on 4/8/18 and was successfully resuscitated and again intubated. Patient was also diagnosed with C. Diff colitis and MRSA bacteremia.   Nephrology was consulted to help with patient's electrolyte care, renal care and volume management. I saw and examined patient in her hospital room. Patient is intubated and not able to provide any additional history.   Patient remains on antibiotics and pressor support. Chart review revealed that hyponatremia and hypokalemia have now resolved. However, hypocalcemia has persisted. In addition, patient's  renal function has worsened during current admission and creatinine has increased from 0.8 on 4/6/18 to 1.6 on 4/9/18. Volume review showed positive I/O balance with + 24 liters since admission,. Patient's UOP has been fluctuating with 1 to 3 liters of urine per day. Current UOP about 100 cc/hour.       Interval History:     4/10/18: No changes as per nursing staff.     4/11/18: Patient remains intubated and on pressor support. Renal function slightly worse today. Good UOP of 4 liters overnight.         Review of patient's allergies indicates:  No Known Allergies  Current Facility-Administered Medications   Medication Frequency    acetaminophen oral solution 650 mg Q8H PRN    albumin human 25% bottle 12.5 g BID    albuterol-ipratropium 2.5mg-0.5mg/3mL nebulizer solution 3 mL Q4H PRN    bisacodyl suppository 10 mg Daily PRN    bumetanide injection 3 mg TID    chlorhexidine 0.12 % solution 15 mL BID    dextrose 50% injection 12.5 g PRN    famotidine (PF) injection 20 mg Daily    fentaNYL 2500 mcg in 0.9% sodium chloride 250 mL infusion premix (titrating) Continuous    folic acid-vit B6-vit B12 2.5-25-2 mg tablet 1 tablet Daily    glucagon (human recombinant) injection 1 mg PRN    insulin aspart U-100 pen 1-10 Units Q6H PRN    lorazepam (ATIVAN) injection 2 mg Q2H PRN    magnesium sulfate 2g in water 50mL IVPB (premix) Once    metOLazone tablet 5 mg Daily    metronidazole IVPB 500 mg Q8H    multivitamin tablet 1 tablet Daily    norepinephrine 32 mg in dextrose 5 % 250 mL infusion Continuous    ondansetron injection 4 mg Q8H PRN    piperacillin-tazobactam 4.5 g in dextrose 5 % 100 mL IVPB (ready to mix system) Q8H    pneumoc 13-bobby conj-dip cr(PF) 0.5 mL vaccine x 1 dose    rifAXIMin tablet 550 mg BID    sodium chloride 0.9% flush 5 mL PRN    thiamine tablet 100 mg Daily    [START ON 4/12/2018] vancomycin (VANCOCIN) 1,000 mg in sodium chloride 0.9% 250 mL IVPB Q48H    vancomycin 250mg / 10ml  oral suspension 250 mg Q6H    vasopressin (PITRESSIN) 0.2 Units/mL in dextrose 5 % 100 mL infusion Continuous       Objective:     Vital Signs (Most Recent):  Temp: 99.4 °F (37.4 °C) (04/11/18 0305)  Pulse: 99 (04/11/18 0800)  Resp: (!) 29 (04/11/18 0800)  BP: (!) 98/41 (04/11/18 0705)  SpO2: 97 % (04/11/18 0800)  O2 Device (Oxygen Therapy): ventilator (04/11/18 0800) Vital Signs (24h Range):  Temp:  [99.4 °F (37.4 °C)-101.6 °F (38.7 °C)] 99.4 °F (37.4 °C)  Pulse:  [] 99  Resp:  [4-29] 29  SpO2:  [94 %-99 %] 97 %  BP: ()/(40-71) 98/41     Weight: (!) 137 kg (302 lb 0.5 oz) (04/11/18 0305)  Body mass index is 48.75 kg/m².  Body surface area is 2.53 meters squared.    I/O last 3 completed shifts:  In: 4217.4 [I.V.:1417.4; Blood:50; NG/GT:1550; IV Piggyback:1200]  Out: 5210 [Urine:5210]    Physical Exam   Constitutional: She appears well-developed and well-nourished. She is intubated.   HENT:   Head: Normocephalic.   ET tube in place.    Eyes: Pupils are equal, round, and reactive to light.   Neck: No thyromegaly present.   Cardiovascular: Regular rhythm.  Tachycardia present.  Exam reveals no friction rub.    Pulmonary/Chest: Breath sounds normal. She is intubated. She has no wheezes. She exhibits no tenderness.   Abdominal: Soft. Bowel sounds are normal. She exhibits distension. There is no tenderness.   Musculoskeletal: She exhibits edema.   Bilateral pitting LE edema.    Lymphadenopathy:     She has no cervical adenopathy.   Neurological: She is alert.   Skin: Skin is warm and dry. No rash noted.       Significant Labs:  Lab Results   Component Value Date    CREATININE 2.0 (H) 04/11/2018    BUN 5 (L) 04/11/2018     04/11/2018    K 3.1 (L) 04/11/2018    CL 99 04/11/2018    CO2 26 04/11/2018     Lab Results   Component Value Date    .6 (H) 04/09/2018    CALCIUM 7.8 (L) 04/11/2018    CAION 0.86 (L) 04/10/2018    PHOS 2.0 (L) 04/11/2018     Lab Results   Component Value Date    ALBUMIN 1.8 (L)  04/11/2018     Lab Results   Component Value Date    WBC 19.04 (H) 04/11/2018    HGB 9.3 (L) 04/11/2018    HCT 29.0 (L) 04/11/2018    MCV 95 04/11/2018    PLT 57 (L) 04/11/2018       Recent Labs  Lab 04/11/18  0321   MG 1.7     Urinalysis  No results for input(s): COLORU, CLARITYU, SPECGRAV, PHUR, PROTEINUA, GLUCOSEU, BILIRUBINCON, BLOODU, WBCU, RBCU, BACTERIA, MUCUS, NITRITE, LEUKOCYTESUR, UROBILINOGEN, HYALINECASTS in the last 24 hours.  8 RBC, 5 WBC       Vitamin D: 5        Significant Imaging:  Imaging Results    None           Assessment/Plan:     * Septic shock    Continue antibiotics and pressor support (patient on levophed and vasopressin).  Patient was diagnosed with MRSA bacteremia.           Volume overload    Patient with positive fluid balance of + 24 liters since admission. I/O overnight was - 1 liter.   Will continue diuresis with Bumex 3 mg IV tid and metolazone 5 mg daily.   Decrease IV intake where appropriate.         VIKRAM (acute kidney injury)    Patient's renal function has worsened and creatinine has increased from 0.8 on 4/6/18 to 1.6 on 4/9/18 and 2 on 4/11/18. VIKRAM likely multifactorial and related to hypotension, anemia, previous cardiac arrest. Also consider compartment syndrome in patient with severe abdominal distention. Will also check Vanco level to rule out Vancomycin toxicity. Consider replacing Vancomycin with alternative.   Will monitor closely. No dialysis indicated at present.   Kidneys were normal on recent CT scan.         Thrombocytopenia    S/p platelet transfusion yesterday. Platelets have increased to 57.         Pneumonia of both lower lobes due to methicillin resistant Staphylococcus aureus (MRSA)    Continue antibiotics.         UTI due to Klebsiella species    Continue antibiotics.         Acute blood loss anemia    S/p blood transfusion. Hgb now stable around 9.         Electrolyte imbalance    Patient initially presented with hyponatremia and hypokalemia both of which  have now resolved. Hypokalemia has recurred and K will be repleted. Hypocalcemia has persisted (ionized Ca low). Calcium has been repleted.  Vitamin D level at 5, will start vitamin D supplements. Her hpophosphatemia is at least in part related to her vitamin D deficiency. Will also replete phos. Magnesium level is at goal.        Acute hypoxemic respiratory failure    Continue mechanical ventilation.           Total ICU time: 35 minutes. More than 50% of time was spent on chart review and discussing patient care with ICU staff and attending physician.      Thank you for your consult. I will follow-up with patient. Please contact us if you have any additional questions.    Lawrence Nguyen MD  Nephrology  [facility]

## 2018-04-11 NOTE — PROGRESS NOTES
The patient is in the OR getting tracheostomy. I have talked to her nurse and reviewed her labs and notes. No acute GI issues at this time. We will follow intermittently.     MELD-Na score: 27 at 4/11/2018  3:21 AM  MELD score: 27 at 4/11/2018  3:21 AM  Calculated from:  Serum Creatinine: 2.0 mg/dL at 4/11/2018  3:21 AM  Serum Sodium: 139 mmol/L (Rounded to 137) at 4/11/2018  3:21 AM  Total Bilirubin: 9.5 mg/dL at 4/11/2018  3:21 AM  INR(ratio): 1.6 at 4/11/2018  3:21 AM  Age: 22 years

## 2018-04-11 NOTE — PROGRESS NOTES
[facility]  General Surgery  Progress Note    Subjective:     History of Present Illness:  22 y.o. female transferred from [place] with sepsis, s/p miscarriage with retained placenta.  Re-intubated today.    Post-Op Info:  * No surgery found *         Interval History: PLT and bladder pressure have improved.  On low dose pressors.    Medications:  Continuous Infusions:   fentanyl 20 mL/hr at 04/10/18 1905    norepinephrine bitartrate-D5W 0.04 mcg/kg/min (04/10/18 1905)    vasopressin (PITRESSIN) infusion 0.04 Units/min (04/10/18 1905)     Scheduled Meds:   albumin human 25%  12.5 g Intravenous BID    bumetanide  3 mg Intravenous TID    chlorhexidine  15 mL Mouth/Throat BID    famotidine (PF)  20 mg Intravenous Daily    [START ON 4/11/2018] folic acid-vit B6-vit B12 2.5-25-2 mg  1 tablet Oral Daily    metOLazone  5 mg Oral Daily    metronidazole  500 mg Intravenous Q8H    multivitamin  1 tablet Oral Daily    piperacillin-tazobactam (ZOSYN) IVPB  4.5 g Intravenous Q8H    potassium chloride  40 mEq Intravenous Once    potassium chloride  40 mEq Intravenous Once    rifAXIMin  550 mg Oral BID    thiamine  100 mg Oral Daily    [START ON 4/12/2018] vancomycin (VANCOCIN) IVPB  1,000 mg Intravenous Q48H    vancomycin  250 mg Oral Q6H     PRN Meds:acetaminophen, albuterol-ipratropium 2.5mg-0.5mg/3mL, bisacodyl, dextrose 50%, glucagon (human recombinant), insulin aspart U-100, lorazepam, ondansetron, pneumoc 13-bobby conj-dip cr(PF), sodium chloride 0.9%     Review of patient's allergies indicates:  No Known Allergies  Objective:     Vital Signs (Most Recent):  Temp: 99.8 °F (37.7 °C) (04/10/18 1905)  Pulse: 102 (04/10/18 1941)  Resp: (!) 9 (04/10/18 1935)  BP: (!) 99/52 (04/10/18 1905)  SpO2: 97 % (04/10/18 1941) Vital Signs (24h Range):  Temp:  [98.6 °F (37 °C)-99.8 °F (37.7 °C)] 99.8 °F (37.7 °C)  Pulse:  [] 102  Resp:  [4-24] 9  SpO2:  [93 %-99 %] 97 %  BP: ()/(40-62) 99/52      Weight: (!) 138 kg (304 lb 3.8 oz)  Body mass index is 49.1 kg/m².    Intake/Output - Last 3 Shifts       04/08 0700 - 04/09 0659 04/09 0700 - 04/10 0659 04/10 0700 - 04/11 0659    I.V. (mL/kg) 1364.1 (9.7) 955.7 (6.9) 548.7 (4)    Blood  1476.8 50    NG/ 925 830    IV Piggyback 1300 800 700    Total Intake(mL/kg) 3514.1 (25.1) 4157.5 (30.1) 2128.7 (15.4)    Urine (mL/kg/hr) 1085 (0.3) 3520 (1.1) 1625 (0.9)    Drains 500 (0.1)      Stool 0 (0) 0 (0)     Total Output 1585 3520 1625    Net +1929.1 +637.5 +503.7           Stool Occurrence 2 x 1 x           Physical Exam   Constitutional: She is sedated and intubated.   Obese   Neck: Trachea normal. No tracheal deviation present.   Pulmonary/Chest: She is intubated.   Abdominal: She exhibits distension. There is tenderness in the epigastric area.       Significant Labs:  CBC:     Recent Labs  Lab 04/10/18  0452   WBC 17.92*   RBC 3.01*   HGB 9.1*   HCT 28.3*   PLT 50*   MCV 94   MCH 30.2   MCHC 32.2     CMP:     Recent Labs  Lab 04/10/18  0452 04/10/18  1422   * 119*   CALCIUM 7.2* 7.5*   ALBUMIN 1.5*  --    PROT 4.9*  --     139   K 3.5 3.4*   CO2 26 28    100   BUN 4* 5*   CREATININE 1.8* 1.9*   ALKPHOS 132  --    ALT 36  --    AST 86*  --    BILITOT 8.7*  --        Significant Diagnostics:  I have reviewed all pertinent imaging results/findings within the past 24 hours.    Assessment/Plan:     Acute hypoxemic respiratory failure    Trach and PEG in the next few days.  Will discuss with Dr. Lerma.            Louis O. Jeansonne, MD  General Surgery  [facility]

## 2018-04-11 NOTE — ASSESSMENT & PLAN NOTE
Currently intubated.  No acute infiltrates seen on CXR  Continue IV antibiotics empirically.  Pulmonary consult.    04/07/2018- will wean off ventilator as tolerated.  She is more awake and alert today.  Sputum cultures showed MRSA and pseudomonas-she is on vanco/meropenem-will deescalate soon.       04/08/2018- she is now intubated after asystolic cardiac arrest .Will wean off as tolerated.  04/09/2018- she remains intubated ,critical care follow up .wean off ventilator as tolerated.  4/10/2018 - likely secondary to MRSA and pseudamonal pneumonia. ID consulted, will consider double covering for pseudomonas.

## 2018-04-11 NOTE — ASSESSMENT & PLAN NOTE
Cont Levophed and Vasopressin infusions wean as tolerated  Cont IVAB  Volume replacement with blood products today  4/11 Stop vancomycin due to worsening renal function

## 2018-04-11 NOTE — ASSESSMENT & PLAN NOTE
Source likely fetal demise of unknown duration and retained products of concepttion placenta, removed surgically at outside hospital.  Continue IV fluids.  Follow up on blood and urine cultures.  Lactic acid improved to 5 from 7.  OB Gyn consult - Dr. Cardona.    04/08/2018- will continue vancomycin/zosyn ,follow repeat blood cultures .  Lactic acid is more than 12.  I called [facility] and discussed with the lab about her cultures-blood cultures done on 04/03-neg but urine culture -was positive for klebsiella -she is faxing the results. She will call [facility] [employer] to get the results.     04/09/2018-continue vasopressor support , on vanco,zosyn and  will deescalate tomorrow and follow [facility] closely.  4/10/2018 - possible cause of liver failure and kidney failure, GI and nephro following. Urine output increased, will monitor.

## 2018-04-11 NOTE — EICU
eICU Note :    Called by the [facility]:    Problem:K 3.1, Magnesium 1.8 with Creatinine 2.0    Pertinent History and labs reviewed :22-year-old obese black female with history of hypertension, DM (gestational) with Septic Shock , with Pneumonia bilateral LL       Treatment /Intervention given:Kcl 40 meq x2 and Magnesium sulfate 1 gm IVPB         Claudia Yates M.D  eICU Physician
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
## 2018-04-11 NOTE — SUBJECTIVE & OBJECTIVE
Interval History:     Review of Systems   Unable to perform ROS: Intubated     Objective:     Vital Signs (Most Recent):  Temp: 99.8 °F (37.7 °C) (04/10/18 1905)  Pulse: 101 (04/10/18 2058)  Resp: (!) 9 (04/10/18 1935)  BP: (!) 99/52 (04/10/18 1905)  SpO2: 96 % (04/10/18 2058) Vital Signs (24h Range):  Temp:  [98.6 °F (37 °C)-99.8 °F (37.7 °C)] 99.8 °F (37.7 °C)  Pulse:  [] 101  Resp:  [4-24] 9  SpO2:  [93 %-99 %] 96 %  BP: ()/(40-61) 99/52     Weight: (!) 138 kg (304 lb 3.8 oz)  Body mass index is 49.1 kg/m².    Intake/Output Summary (Last 24 hours) at 04/10/18 2128  Last data filed at 04/10/18 2105   Gross per 24 hour   Intake          3030.12 ml   Output             2735 ml   Net           295.12 ml      Physical Exam   Constitutional: She appears well-developed and well-nourished. No distress.   Obese    HENT:   Head: Normocephalic and atraumatic.   Mouth/Throat: Oropharynx is clear and moist.   Eyes: EOM are normal. Pupils are equal, round, and reactive to light.   Neck: Neck supple. No JVD present. No thyromegaly present.   Cardiovascular: Regular rhythm.  Exam reveals no gallop and no friction rub.    No murmur heard.  Pulmonary/Chest: Effort normal and breath sounds normal. She has no wheezes. She has no rales.   Abdominal: Soft. Bowel sounds are normal. She exhibits no distension. There is no tenderness. There is no rebound and no guarding.   Musculoskeletal: She exhibits edema. She exhibits no deformity.   Lymphadenopathy:     She has no cervical adenopathy.   Neurological: She has normal reflexes.   Intubated and sedated.   Skin: Skin is warm and dry. No rash noted.   Nursing note and vitals reviewed.      Significant Labs: All pertinent labs within the past 24 hours have been reviewed.    Significant Imaging: I have reviewed all pertinent imaging results/findings within the past 24 hours.

## 2018-04-11 NOTE — OP NOTE
Operative Note       Surgery Date: 2018     Surgeon: Ed Lerma MD    Pre-op Diagnosis:  Septic shock, respiratory failure    Post-op Diagnosis: same    Anesthesia: GETA    Technical Procedures Used:     Tracheostomy with size 7-0 Proximal XL cuffed Shiley tracheostomy    Complications: No    Estimated Blood Loss: minimal           Justification for the operation:     Ms Duron is a 21 yo obese BF with a PMH of HTN, gestational DM and HTN who presented to [facility] ED in [place], [place] on  with complaint of SOB and cough with known pregnancy.  OB US revealed no heart tones and she is also  with second trimester fetal demise estimated 22 week for which she passed with retained placenta.  After admission to ICU she had seizure activity with , K+ 1.9 and Bicarb 11.  She also had etoh level 268 and reportedly had been drinking etoh w/ honey heavily for several days.  She required intubation for airway protection per records and OB was unable to remove placenta manually and patient had to be taken to OR.   She as transferred to [facility] ICU for higher level of care.           He has remained on the ventilator without weaning for the past 9 day(s).  her cardiopulmonary comorbidities have been optimized as much as possible and this has not resulted in resolution of the need for ventilatory support.      Procedure in Detail:    Informed consent was reviewed. The patient was delivered to the OR and placed in the supine position. The table remained in place with the HOB elevated. Landmarks were palpated.  Planned incision site was marked and injected with 1 cc of 1% lidocaine with 1:100,000 epinephrine. A vertically oriented incision was made. Bovie dissection was employed to remove fat overlying the strap muscles. The strap muscles were pulled laterally to identify the midline with hemostat (with bovie) separation of the straps in the midline. The cricoid and above the thyroid isthmus was  identified by inspection and palpation and then opened with Bovie on cautery mode. With tips of a hemostat directed toward the trach, blunt dissection with the tips of the hemostat identifed the anterior tracheal wall. The hemostat was redirected inferiorly to separate the posterior aspect of the thyroid isthmus from the trachea. The thyroid isthmus was divided. The tracheal rings were identified. The anesthesiologist was requested to deflate the cuff of the ETT and advance the tip into the right mainstem. The second tracheal ring was then removed and the trach  used to dilate the opening. The ET tube was pulled back out of the mainstem and cuff inflated with ventilation restored through the ETT.  The tracheostomy tube with obturator was then placed. The inner canula was placed, and placement of the tube was confirmed with CO2 return on the anesthesia monitor.  The tracheostomy was then secured with 5 sutures and trach ties.  The patient was then turned over the the anesthesia team.             Disposition: PACU - hemodynamically stable.           Condition: Critical    Attestation:  I performed the procedure.

## 2018-04-11 NOTE — PLAN OF CARE
Problem: Patient Care Overview  Goal: Plan of Care Review  Outcome: Ongoing (interventions implemented as appropriate)  Trach placement discussed with mother. #7 XLT shiley placed in OR. Pt tolerated. Weaned off vasopressin gtt and weaned down levo gtt. R nare NGT placed. Verified with xray placement. Fentanyl gtt weaned down. Will continue to monitor.
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
## 2018-04-11 NOTE — SUBJECTIVE & OBJECTIVE
Review of Systems   Unable to perform ROS: Intubated   Constitutional: Positive for chills. Negative for fever.       Objective:     Vital Signs (Most Recent):  Temp: 99.9 °F (37.7 °C) (04/11/18 1105)  Pulse: 95 (04/11/18 1154)  Resp: 19 (04/11/18 1154)  BP: (!) 101/42 (04/11/18 1105)  SpO2: 97 % (04/11/18 1154) Vital Signs (24h Range):  Temp:  [99.4 °F (37.4 °C)-101.6 °F (38.7 °C)] 99.9 °F (37.7 °C)  Pulse:  [] 95  Resp:  [4-29] 19  SpO2:  [94 %-99 %] 97 %  BP: ()/(40-71) 101/42     Weight: (!) 137 kg (302 lb 0.5 oz)  Body mass index is 48.75 kg/m².      Intake/Output Summary (Last 24 hours) at 04/11/18 1200  Last data filed at 04/11/18 1144   Gross per 24 hour   Intake           2884.1 ml   Output             4785 ml   Net          -1900.9 ml       Physical Exam   Constitutional: She appears well-developed and well-nourished. She is easily aroused. She appears toxic. She has a sickly appearance. She appears ill. She is sedated, intubated and restrained.   HENT:   Head: Normocephalic and atraumatic.   Mouth/Throat: Oropharynx is clear and moist and mucous membranes are normal.   Eyes: EOM and lids are normal. Pupils are equal, round, and reactive to light.   jaundice   Neck: Trachea normal. Neck supple.   Obese    Cardiovascular: Regular rhythm and normal heart sounds.  Tachycardia present.    Pulses:       Radial pulses are 1+ on the right side, and 1+ on the left side.        Dorsalis pedis pulses are 0 on the right side, and 0 on the left side.   Pulmonary/Chest: No accessory muscle usage. She is intubated. No respiratory distress. She has decreased breath sounds in the right lower field and the left lower field. She has rales in the right lower field and the left lower field.   Coarse breath sounds   Abdominal: She exhibits distension. Bowel sounds are decreased. There is hepatosplenomegaly and hepatomegaly. There is generalized tenderness.   Firmness palpated above umbilicus.  Morbidly obese    Genitourinary:   Genitourinary Comments: Rodriguez in place   Musculoskeletal: Normal range of motion. She exhibits edema.        Right foot: There is no deformity.        Left foot: There is no deformity.   Diffuse anasarca   Lymphadenopathy:     She has no cervical adenopathy.   Neurological: She is easily aroused.   Easily awakens on sedation and follows commands   Skin: Skin is warm and dry. Capillary refill takes less than 2 seconds. No rash noted. No cyanosis.        Blistering noted to extrem       Vents:  Vent Mode: A/C (04/11/18 1122)  Ventilator Initiated: Yes (04/05/18 1930)  Set Rate: 18 bmp (04/11/18 1122)  Vt Set: 400 mL (04/11/18 1122)  Pressure Support: 0 cmH20 (04/11/18 1122)  PEEP/CPAP: 5 cmH20 (04/11/18 1122)  Oxygen Concentration (%): 35 (04/11/18 1154)  Peak Airway Pressure: 14 cmH2O (04/11/18 1122)  Plateau Pressure: 15 cmH20 (04/11/18 1122)  Total Ve: 8.03 mL (04/11/18 1122)  F/VT Ratio<105 (RSBI): (!) 53.33 (04/11/18 1122)    Lines/Drains/Airways     Central Venous Catheter Line                 Percutaneous Central Line Insertion/Assessment - triple lumen  04/03/18 2045 right internal jugular 7 days          Drain                 Urethral Catheter 04/03/18 16 Fr. 8 days         NG/OG Tube 04/08/18 0730 [place] sump Right mouth 3 days          Airway                 Airway - Non-Surgical 04/08/18 0715 Endotracheal Tube 3 days          Arterial Line                 Arterial Line 04/08/18 0720 Left Brachial 3 days                Significant Labs:    CBC/Anemia Profile:    Recent Labs  Lab 04/10/18  0452 04/11/18  0321   WBC 17.92* 19.04*   HGB 9.1* 9.3*   HCT 28.3* 29.0*   PLT 50* 57*   MCV 94 95   RDW 17.9* 18.0*        Chemistries:    Recent Labs  Lab 04/10/18  0452 04/10/18  1422 04/10/18  2100 04/11/18  0321    139 140 139   K 3.5 3.4* 3.4* 3.1*    100 100 99   CO2 26 28 26 26   BUN 4* 5* 4* 5*   CREATININE 1.8* 1.9* 1.9* 2.0*   CALCIUM 7.2* 7.5* 7.6* 7.8*   ALBUMIN 1.5*  --   --   1.8*   PROT 4.9*  --   --  5.3*   BILITOT 8.7*  --   --  9.5*   ALKPHOS 132  --   --  142*   ALT 36  --   --  31   AST 86*  --   --  78*   MG 1.8 1.7 1.8 1.7   PHOS 3.0 2.6* 2.2* 2.0*       ABGs:     Recent Labs  Lab 04/11/18  0506   PH 7.398   PCO2 39.8   HCO3 24.5   POCSATURATED 97   BE 0     Coagulation:     Recent Labs  Lab 04/11/18  0321   INR 1.6*     Respiratory Culture: No results for input(s): GSRESP, RESPIRATORYC in the last 48 hours.  All pertinent labs within the past 24 hours have been reviewed.

## 2018-04-11 NOTE — ASSESSMENT & PLAN NOTE
Re intubated for third time 4/8 s/p asystolic arrest.  Cont Mechanical ventilation.  Ventilator settings reviewed and adjusted to optimize gas exchange. Daily wake up and breathe trials once more stable.  Would benefit from Trach once more stable.  Titrate FIO2 to keep SAO2 > 92%.   4/11 tracheostomy today

## 2018-04-11 NOTE — TRANSFER OF CARE
"Anesthesia Transfer of Care Note    Patient: Rafael Duron    Procedure(s) Performed: Procedure(s) (LRB):  TRACHEOSTOMY (N/A)    Patient location: ICU    Anesthesia Type: general    Transport from OR: Upon arrival to PACU/ICU, patient attached to ventilator and auscultated to confirm bilateral breath sounds and adequate TV. Transported from OR intubated on 100% O2 by AMBU with adequate controlled ventilation. Continuous ECG monitoring in transport. Continuous SpO2 monitoring in transport. Continuos invasive BP monitoring in transport    Post pain: adequate analgesia    Post assessment: no apparent anesthetic complications and tolerated procedure well    Post vital signs: stable    Level of consciousness: sedated    Nausea/Vomiting: no nausea/vomiting    Complications: none    Transfer of care protocol was followed      Last vitals:   Visit Vitals  BP (!) 101/42 (BP Location: Left arm, Patient Position: Lying)   Pulse 110   Temp 37.7 °C (99.9 °F) (Oral)   Resp 19   Ht 5' 6" (1.676 m)   Wt (!) 137 kg (302 lb 0.5 oz)   LMP  (LMP Unknown)   SpO2 96%   Breastfeeding? No   BMI 48.75 kg/m²     "

## 2018-04-11 NOTE — ASSESSMENT & PLAN NOTE
Patient initially presented with hyponatremia and hypokalemia both of which have now resolved. Hypokalemia has recurred and K will be repleted. Hypocalcemia has persisted (ionized Ca low). Calcium has been repleted.  Vitamin D level at 5, will start vitamin D supplements. Her hpophosphatemia is at least in part related to her vitamin D deficiency. Will also replete phos. Magnesium level is at goal.

## 2018-04-11 NOTE — ASSESSMENT & PLAN NOTE
Patient's renal function has worsened and creatinine has increased from 0.8 on 4/6/18 to 1.6 on 4/9/18 and 2 on 4/11/18. VIKRAM likely multifactorial and related to hypotension, anemia, previous cardiac arrest. Also consider compartment syndrome in patient with severe abdominal distention. Will also check Vanco level to rule out Vancomycin toxicity. Consider replacing Vancomycin with alternative.   Will monitor closely. No dialysis indicated at present.   Kidneys were normal on recent CT scan.

## 2018-04-11 NOTE — PLAN OF CARE
Patient family agreed to trach and procedure today.     04/11/18 4155   Discharge Reassessment   Assessment Type Discharge Planning Reassessment   Provided patient/caregiver education on the expected discharge date and the discharge plan No   Do you have any problems affording any of your prescribed medications? No   Discharge Plan A Skilled Nursing Facility   Discharge Plan B Skilled Nursing Facility   Patient choice form signed by patient/caregiver N/A   Can the patient answer the patient profile reliably? No, cognitively impaired   How does the patient rate their overall health at the present time? Fair   Describe the patient's ability to walk at the present time. Major restrictions/daily assistance from another person   How often would a person be available to care for the patient? Whenever needed   Number of comorbid conditions (as recorded on the chart) One   During the past month, has the patient often been bothered by feeling down, depressed or hopeless? No   During the past month, has the patient often been bothered by little interest or pleasure in doing things? No

## 2018-04-11 NOTE — ANESTHESIA PREPROCEDURE EVALUATION
04/11/2018  Rafael Duron is a 22 y.o., female.    Anesthesia Evaluation    I have reviewed the Patient Summary Reports.    I have reviewed the Nursing Notes.   I have reviewed the Medications.     Review of Systems  Anesthesia Hx:  Denies Family Hx of Anesthesia complications.   Denies Personal Hx of Anesthesia complications.   Social:  Non-Smoker    Hematology/Oncology:         -- Anemia:   Cardiovascular:   Hypertension Septic shock requiring pressors   Pulmonary:   Pneumonia Hypoxic respiratory failure on vent   Renal/:   Chronic Renal Disease    Hepatic/GI:   Liver Disease, Hepatitis    Neurological:   sedated       Physical Exam  General:  Morbid Obesity    Airway/Jaw/Neck:  Airway Findings: Pre-Existing Airway Tube(s): Oral Endotracheal tube      Chest/Lungs:  Chest/Lungs Findings: Normal Respiratory Rate     Heart/Vascular:  Heart Findings: Rhythm: Regular Rhythm             Anesthesia Plan  Type of Anesthesia, risks & benefits discussed:  Anesthesia Type:  general  Patient's Preference:   Intra-op Monitoring Plan:   Intra-op Monitoring Plan Comments:   Post Op Pain Control Plan:   Post Op Pain Control Plan Comments:   Induction:   IV  Beta Blocker:  Patient is not currently on a Beta-Blocker (No further documentation required).       Informed Consent: Patient understands risks and agrees with Anesthesia plan.  Questions answered.   ASA Score: 4     Day of Surgery Review of History & Physical: I have interviewed and examined the patient. I have reviewed the patient's H&P dated:  There are no significant changes.  H&P update referred to the surgeon.         Ready For Surgery From Anesthesia Perspective.

## 2018-04-11 NOTE — ASSESSMENT & PLAN NOTE
Patient with positive fluid balance of + 24 liters since admission. I/O overnight was - 1 liter.   Will continue diuresis with Bumex 3 mg IV tid and metolazone 5 mg daily.   Decrease IV intake where appropriate.

## 2018-04-11 NOTE — PLAN OF CARE
Problem: Patient Care Overview  Goal: Plan of Care Review  Outcome: Ongoing (interventions implemented as appropriate)  NEURO: Pt will awaken and follow commands.  Current sedation / analgesia protocol is adequate.   CARDIAC  Pt is still requiring vasopressin, but levophed weaned to 0.04mcg/kg/min to maintain MAP of 95.  PULMONARY: O2 sats stable on minimal vent support, no acute resp issues overnight.   : Great response to Bumex, -1400mL for this shift.    ELECTROLYTES: Potassium replaced with 80mEq at start of shift and 1gm Mag given, Lytes low again this AM awaiting orders from EICU  GI: 1 large bowel movement this shift, Tube feeding stopped at midnight for possible surgery this AM, previously tolerating tube feeds poorly  ID:  Tmax overnight 101.3, tylenol given with good result     Pt given chlorahexadine bath and linens and gown changed for surgery prep

## 2018-04-11 NOTE — PROGRESS NOTES
[facility]  Critical Care Medicine  Progress Note    Patient Name: Rafael Duron  MRN: [id number]  Admission Date: 4/3/2018  Hospital Length of Stay: 8 days  Code Status: Full Code  Attending Provider: Prabhu Lugo MD  Primary Care Provider: Herman Cowart MD   Principal Problem: Septic shock    Subjective:     HPI:  Ms Duron is a 21 yo obese BF with a PMH of HTN, gestational DM and HTN who presented to [facility] ED in [place], [place] on  with complaint of SOB and cough with known pregnancy.  OB US revealed no heart tones and she is also  with second trimester fetal demise estimated 22 week for which she passed with retained placenta.  After admission to ICU she had seizure activity with , K+ 1.9 and Bicarb 11.  She also had etoh level 268 and reportedly had been drinking etoh w/ honey heavily for several days.  She required intubation for airway protection per records and OB was unable to remove placenta manually and patient had to be taken to OR.  She received 2 liters IVF and Hgb dropped to 7.4 and was transfused 2 units PRBCs.  Vaginal bleeding was scant per records.  Yesterday she had temp 101.5 Ax, .  She as transferred to [facility] ICU yesterday evening for higher level of care.        Hospital/ICU Course:   - This AM she is sedated on vent on Levophed infusion spiking temp 101.9 with WBC 20, LA 6.3, UA+, electrolyte imbalance and Glucose 268     - SAT and SBT done this am, pt awake and following commands. She was successfully extubated to nasal cannula. Remains tachycardic with HR 140s and febrile with temp 103 overnight. WBC 18 and lactic acidosis improving to 4.8. Continuing to aggressively replace electrolytes.     - Over night patient had episode with bradycardia and hypotension during which she became unresponsive and agonally breathing. She responded with IVF and bicarb and was restarted on pressors. She experienced a similar episode  again last night, during which she was intubated. Vent settings were reviewed and adjusted this am. No more bradycardic/hypotensive episodes since last night. Remains on pressors. Leukocystosis unimproved with worsening bandemia. Urine and blood cx NGTD. Sputum cx growing staph and pseudomonas however CXR this am is unremarkable.    04/7 - Tolerating SAT and SBT. Meets extubating criteria. Acidosis improving. Leukocytosis improving. Remains on bicarb gtt.     04/8 - Extubated successfully yesterday. Asystolic arrest this AM.  ACLS initiated. Re - intubated 2 rounds CPR with Iv epinephrine X 1  and IV CaCl X 1 given. ROSC attained.   A - line placed.      4/9 - now with mild vaginal bleeding and severe anasarca with blistering.  Still on vent with sedation and Levophed/Vasopressin infusing.  Worsening UOP and creatine.  Spoke with brother at bedside in detail and all questions answered.   4/10 - Opens eyes alert. Mild increase in Cr -. Intravascular volume depletion. Will add albumin. Still with vagnal bleeding  4/11 - low grade temp, Slight worsening of CR. Good urine output    Review of Systems   Unable to perform ROS: Intubated   Constitutional: Positive for chills. Negative for fever.       Objective:     Vital Signs (Most Recent):  Temp: 99.9 °F (37.7 °C) (04/11/18 1105)  Pulse: 95 (04/11/18 1154)  Resp: 19 (04/11/18 1154)  BP: (!) 101/42 (04/11/18 1105)  SpO2: 97 % (04/11/18 1154) Vital Signs (24h Range):  Temp:  [99.4 °F (37.4 °C)-101.6 °F (38.7 °C)] 99.9 °F (37.7 °C)  Pulse:  [] 95  Resp:  [4-29] 19  SpO2:  [94 %-99 %] 97 %  BP: ()/(40-71) 101/42     Weight: (!) 137 kg (302 lb 0.5 oz)  Body mass index is 48.75 kg/m².      Intake/Output Summary (Last 24 hours) at 04/11/18 1200  Last data filed at 04/11/18 1144   Gross per 24 hour   Intake           2884.1 ml   Output             4785 ml   Net          -1900.9 ml       Physical Exam   Constitutional: She appears well-developed and well-nourished.  She is easily aroused. She appears toxic. She has a sickly appearance. She appears ill. She is sedated, intubated and restrained.   HENT:   Head: Normocephalic and atraumatic.   Mouth/Throat: Oropharynx is clear and moist and mucous membranes are normal.   Eyes: EOM and lids are normal. Pupils are equal, round, and reactive to light.   jaundice   Neck: Trachea normal. Neck supple.   Obese    Cardiovascular: Regular rhythm and normal heart sounds.  Tachycardia present.    Pulses:       Radial pulses are 1+ on the right side, and 1+ on the left side.        Dorsalis pedis pulses are 0 on the right side, and 0 on the left side.   Pulmonary/Chest: No accessory muscle usage. She is intubated. No respiratory distress. She has decreased breath sounds in the right lower field and the left lower field. She has rales in the right lower field and the left lower field.   Coarse breath sounds   Abdominal: She exhibits distension. Bowel sounds are decreased. There is hepatosplenomegaly and hepatomegaly. There is generalized tenderness.   Firmness palpated above umbilicus.  Morbidly obese   Genitourinary:   Genitourinary Comments: Rodriguez in place   Musculoskeletal: Normal range of motion. She exhibits edema.        Right foot: There is no deformity.        Left foot: There is no deformity.   Diffuse anasarca   Lymphadenopathy:     She has no cervical adenopathy.   Neurological: She is easily aroused.   Easily awakens on sedation and follows commands   Skin: Skin is warm and dry. Capillary refill takes less than 2 seconds. No rash noted. No cyanosis.        Blistering noted to extrem       Vents:  Vent Mode: A/C (04/11/18 1122)  Ventilator Initiated: Yes (04/05/18 1930)  Set Rate: 18 bmp (04/11/18 1122)  Vt Set: 400 mL (04/11/18 1122)  Pressure Support: 0 cmH20 (04/11/18 1122)  PEEP/CPAP: 5 cmH20 (04/11/18 1122)  Oxygen Concentration (%): 35 (04/11/18 1154)  Peak Airway Pressure: 14 cmH2O (04/11/18 1122)  Plateau Pressure: 15 cmH20  (04/11/18 1122)  Total Ve: 8.03 mL (04/11/18 1122)  F/VT Ratio<105 (RSBI): (!) 53.33 (04/11/18 1122)    Lines/Drains/Airways     Central Venous Catheter Line                 Percutaneous Central Line Insertion/Assessment - triple lumen  04/03/18 2045 right internal jugular 7 days          Drain                 Urethral Catheter 04/03/18 16 Fr. 8 days         NG/OG Tube 04/08/18 0730 [place] sump Right mouth 3 days          Airway                 Airway - Non-Surgical 04/08/18 0715 Endotracheal Tube 3 days          Arterial Line                 Arterial Line 04/08/18 0720 Left Brachial 3 days                Significant Labs:    CBC/Anemia Profile:    Recent Labs  Lab 04/10/18  0452 04/11/18  0321   WBC 17.92* 19.04*   HGB 9.1* 9.3*   HCT 28.3* 29.0*   PLT 50* 57*   MCV 94 95   RDW 17.9* 18.0*        Chemistries:    Recent Labs  Lab 04/10/18  0452 04/10/18  1422 04/10/18  2100 04/11/18  0321    139 140 139   K 3.5 3.4* 3.4* 3.1*    100 100 99   CO2 26 28 26 26   BUN 4* 5* 4* 5*   CREATININE 1.8* 1.9* 1.9* 2.0*   CALCIUM 7.2* 7.5* 7.6* 7.8*   ALBUMIN 1.5*  --   --  1.8*   PROT 4.9*  --   --  5.3*   BILITOT 8.7*  --   --  9.5*   ALKPHOS 132  --   --  142*   ALT 36  --   --  31   AST 86*  --   --  78*   MG 1.8 1.7 1.8 1.7   PHOS 3.0 2.6* 2.2* 2.0*       ABGs:     Recent Labs  Lab 04/11/18  0506   PH 7.398   PCO2 39.8   HCO3 24.5   POCSATURATED 97   BE 0     Coagulation:     Recent Labs  Lab 04/11/18  0321   INR 1.6*     Respiratory Culture: No results for input(s): GSRESP, RESPIRATORYC in the last 48 hours.  All pertinent labs within the past 24 hours have been reviewed.          Assessment/Plan:     Psychiatric   ETOH abuse     Monitor for ETOH withdrawal. Electrolyte replacement. Thiamine, Folate, MVI.              Pulmonary   Respiratory failure    4/10 Needs tracheostomy for long term care - discussed with family        Pneumonia of both lower lobes due to methicillin resistant Staphylococcus aureus (MRSA)     Sputum cultures positive for MRSA and Pseudomonas. IV Vancomycin. Continue IV Zosyn.        Acute hypoxemic respiratory failure    Re intubated for third time 4/8 s/p asystolic arrest.  Cont Mechanical ventilation.  Ventilator settings reviewed and adjusted to optimize gas exchange. Daily wake up and breathe trials once more stable.  Would benefit from Trach once more stable.  Titrate FIO2 to keep SAO2 > 92%.   4/11 tracheostomy today            Cardiac/Vascular    Asystolic arrest this AM. ROSC after ACLS.  IV Norepinephrine. Monitor hemodynamics. MAP goal of 60 mmHg.        Cardiopulmonary arrest    Cardiology consulted. No PE on CTA chest  Cont supportive care and ICU cardiac monitoring          Renal/    Oliguric. Nephrology consult. Monitor BUN / Cr. Montor urine output.        Volume overload    4/10 Intravascular volume depletion - add albumin  4/11 improved        VIKRAM (acute kidney injury)    4/11 Stop IV  vancomycin        UTI due to Klebsiella species    ZOSYN + VANCOMYCIN.        Electrolyte imbalance    Monitor and replete electrolytes as needed.         ID   * Septic shock    Cont Levophed and Vasopressin infusions wean as tolerated  Cont IVAB  Volume replacement with blood products today  4/11 Stop vancomycin due to worsening renal function          Clostridium difficile infection    4/11 continue oral vancomycin and IV metronidazole        Hematology    Monitor hemogram. Transfuse as needed.        Thrombocytopenia    Transfuse Plts given active bleeding        Oncology   Acute blood loss anemia    Total 4 units transfused. Monitor hemogram.   Transfuse 2 more units PRBCs with FFP and Cryo given total now 6 units PRBCs          Endocrine   Hyperglycemia, unspecified    Stable Hyperglycemia: INSULIN ASPART. Moderate correction dose.  Blood glucose target 100 - 180 mg/dl  Cont SSI          GI   Hepatomegaly    ? Alcoholic liver disease / toxic hepatitis ( DILI). Mainly cholestatic picture.   Monitoring liver enzymes. LFTs down trending.  Discriminant function = 7. Hepatitis acute panel unremarkable. Screen for autoimmune hepatitis. Avoid the use of hepatotoxic meds. GI / Hepatology consult pending            Shock liver    Daily CMP to monitor liver enzymes  Hep panel negative  Vasopressors to maintain perfusion        Other   Hyperbilirubinemia    Etioloy unclear.  Likely related to alcoholic liver disease. Monitor and trend.   GI consulted        History of IUFD    S/P D & C. No retained products of conception on ultrasound.            Critical Care Daily Checklist:    A: Awake: RASS Goal/Actual Goal: RASS Goal: -1-->drowsy  Actual: Lim Agitation Sedation Scale (RASS): Light sedation   B: Spontaneous Breathing Trial Performed? Spon. Breathing Trial Initiated?: Not initiated (held per NP order) (04/09/18 0705)   C: SAT & SBT Coordinated?  non                      D: Delirium: CAM-ICU     E: Early Mobility Performed? No   F: Feeding Goal: Goals: Meet > 85 % EEN/EPN   Status: Nutrition Goal Status: progressing towards goal   Current Diet Order   Procedures    Diet NPO      AS: Analgesia/Sedation adequate   T: Thromboembolic Prophylaxis yes   H: HOB > 300 Yes   U: Stress Ulcer Prophylaxis (if needed) yes   G: Glucose Control fair   B: Bowel Function Stool Occurrence: 1   I: Indwelling Catheter (Lines & Rodriguez) Necessity needed   D: De-escalation of Antimicrobials/Pharmacotherapies Change to meropenem and Zosyn.  Stop IV vancomycin  Continue metronidazole    Plan for the day/ETD tracheostomy and peg    Code Status:  Family/Goals of Care: Full Code  discussed     Critical Care Time: 40 minutes  Critical secondary to Patient has a condition that poses threat to life and bodily function: Severe Respiratory Distress and Acute Renal Failure      Critical care was time spent personally by me on the following activities: development of treatment plan with patient or surrogate and bedside caregivers,  discussions with consultants, evaluation of patient's response to treatment, examination of patient, ordering and performing treatments and interventions, ordering and review of laboratory studies, ordering and review of radiographic studies, pulse oximetry, re-evaluation of patient's condition. This critical care time did not overlap with that of any other provider or involve time for any procedures.     Angel Quijano MD  Critical Care Medicine  [facility]

## 2018-04-11 NOTE — SUBJECTIVE & OBJECTIVE
Interval History:     4/10/18: No changes as per nursing staff.     4/11/18: Patient remains intubated and on pressor support. Renal function slightly worse today. Good UOP of 4 liters overnight.         Review of patient's allergies indicates:  No Known Allergies  Current Facility-Administered Medications   Medication Frequency    acetaminophen oral solution 650 mg Q8H PRN    albumin human 25% bottle 12.5 g BID    albuterol-ipratropium 2.5mg-0.5mg/3mL nebulizer solution 3 mL Q4H PRN    bisacodyl suppository 10 mg Daily PRN    bumetanide injection 3 mg TID    chlorhexidine 0.12 % solution 15 mL BID    dextrose 50% injection 12.5 g PRN    famotidine (PF) injection 20 mg Daily    fentaNYL 2500 mcg in 0.9% sodium chloride 250 mL infusion premix (titrating) Continuous    folic acid-vit B6-vit B12 2.5-25-2 mg tablet 1 tablet Daily    glucagon (human recombinant) injection 1 mg PRN    insulin aspart U-100 pen 1-10 Units Q6H PRN    lorazepam (ATIVAN) injection 2 mg Q2H PRN    magnesium sulfate 2g in water 50mL IVPB (premix) Once    metOLazone tablet 5 mg Daily    metronidazole IVPB 500 mg Q8H    multivitamin tablet 1 tablet Daily    norepinephrine 32 mg in dextrose 5 % 250 mL infusion Continuous    ondansetron injection 4 mg Q8H PRN    piperacillin-tazobactam 4.5 g in dextrose 5 % 100 mL IVPB (ready to mix system) Q8H    pneumoc 13-bobby conj-dip cr(PF) 0.5 mL vaccine x 1 dose    rifAXIMin tablet 550 mg BID    sodium chloride 0.9% flush 5 mL PRN    thiamine tablet 100 mg Daily    [START ON 4/12/2018] vancomycin (VANCOCIN) 1,000 mg in sodium chloride 0.9% 250 mL IVPB Q48H    vancomycin 250mg / 10ml oral suspension 250 mg Q6H    vasopressin (PITRESSIN) 0.2 Units/mL in dextrose 5 % 100 mL infusion Continuous       Objective:     Vital Signs (Most Recent):  Temp: 99.4 °F (37.4 °C) (04/11/18 0305)  Pulse: 99 (04/11/18 0800)  Resp: (!) 29 (04/11/18 0800)  BP: (!) 98/41 (04/11/18 0705)  SpO2: 97 %  (04/11/18 0800)  O2 Device (Oxygen Therapy): ventilator (04/11/18 0800) Vital Signs (24h Range):  Temp:  [99.4 °F (37.4 °C)-101.6 °F (38.7 °C)] 99.4 °F (37.4 °C)  Pulse:  [] 99  Resp:  [4-29] 29  SpO2:  [94 %-99 %] 97 %  BP: ()/(40-71) 98/41     Weight: (!) 137 kg (302 lb 0.5 oz) (04/11/18 0305)  Body mass index is 48.75 kg/m².  Body surface area is 2.53 meters squared.    I/O last 3 completed shifts:  In: 4217.4 [I.V.:1417.4; Blood:50; NG/GT:1550; IV Piggyback:1200]  Out: 5210 [Urine:5210]    Physical Exam   Constitutional: She appears well-developed and well-nourished. She is intubated.   HENT:   Head: Normocephalic.   ET tube in place.    Eyes: Pupils are equal, round, and reactive to light.   Neck: No thyromegaly present.   Cardiovascular: Regular rhythm.  Tachycardia present.  Exam reveals no friction rub.    Pulmonary/Chest: Breath sounds normal. She is intubated. She has no wheezes. She exhibits no tenderness.   Abdominal: Soft. Bowel sounds are normal. She exhibits distension. There is no tenderness.   Musculoskeletal: She exhibits edema.   Bilateral pitting LE edema.    Lymphadenopathy:     She has no cervical adenopathy.   Neurological: She is alert.   Skin: Skin is warm and dry. No rash noted.       Significant Labs:  Lab Results   Component Value Date    CREATININE 2.0 (H) 04/11/2018    BUN 5 (L) 04/11/2018     04/11/2018    K 3.1 (L) 04/11/2018    CL 99 04/11/2018    CO2 26 04/11/2018     Lab Results   Component Value Date    .6 (H) 04/09/2018    CALCIUM 7.8 (L) 04/11/2018    CAION 0.86 (L) 04/10/2018    PHOS 2.0 (L) 04/11/2018     Lab Results   Component Value Date    ALBUMIN 1.8 (L) 04/11/2018     Lab Results   Component Value Date    WBC 19.04 (H) 04/11/2018    HGB 9.3 (L) 04/11/2018    HCT 29.0 (L) 04/11/2018    MCV 95 04/11/2018    PLT 57 (L) 04/11/2018       Recent Labs  Lab 04/11/18  0321   MG 1.7     Urinalysis  No results for input(s): COLORU, CLARITYU, SPECGRAV, PHUR,  PROTEINUA, GLUCOSEU, BILIRUBINCON, BLOODU, WBCU, RBCU, BACTERIA, MUCUS, NITRITE, LEUKOCYTESUR, UROBILINOGEN, HYALINECASTS in the last 24 hours.  8 RBC, 5 WBC       Vitamin D: 5        Significant Imaging:  Imaging Results    None

## 2018-04-11 NOTE — SUBJECTIVE & OBJECTIVE
Interval History: PLT and bladder pressure have improved.  On low dose pressors.    Medications:  Continuous Infusions:   fentanyl 20 mL/hr at 04/10/18 1905    norepinephrine bitartrate-D5W 0.04 mcg/kg/min (04/10/18 1905)    vasopressin (PITRESSIN) infusion 0.04 Units/min (04/10/18 1905)     Scheduled Meds:   albumin human 25%  12.5 g Intravenous BID    bumetanide  3 mg Intravenous TID    chlorhexidine  15 mL Mouth/Throat BID    famotidine (PF)  20 mg Intravenous Daily    [START ON 4/11/2018] folic acid-vit B6-vit B12 2.5-25-2 mg  1 tablet Oral Daily    metOLazone  5 mg Oral Daily    metronidazole  500 mg Intravenous Q8H    multivitamin  1 tablet Oral Daily    piperacillin-tazobactam (ZOSYN) IVPB  4.5 g Intravenous Q8H    potassium chloride  40 mEq Intravenous Once    potassium chloride  40 mEq Intravenous Once    rifAXIMin  550 mg Oral BID    thiamine  100 mg Oral Daily    [START ON 4/12/2018] vancomycin (VANCOCIN) IVPB  1,000 mg Intravenous Q48H    vancomycin  250 mg Oral Q6H     PRN Meds:acetaminophen, albuterol-ipratropium 2.5mg-0.5mg/3mL, bisacodyl, dextrose 50%, glucagon (human recombinant), insulin aspart U-100, lorazepam, ondansetron, pneumoc 13-bobby conj-dip cr(PF), sodium chloride 0.9%     Review of patient's allergies indicates:  No Known Allergies  Objective:     Vital Signs (Most Recent):  Temp: 99.8 °F (37.7 °C) (04/10/18 1905)  Pulse: 102 (04/10/18 1941)  Resp: (!) 9 (04/10/18 1935)  BP: (!) 99/52 (04/10/18 1905)  SpO2: 97 % (04/10/18 1941) Vital Signs (24h Range):  Temp:  [98.6 °F (37 °C)-99.8 °F (37.7 °C)] 99.8 °F (37.7 °C)  Pulse:  [] 102  Resp:  [4-24] 9  SpO2:  [93 %-99 %] 97 %  BP: ()/(40-62) 99/52     Weight: (!) 138 kg (304 lb 3.8 oz)  Body mass index is 49.1 kg/m².    Intake/Output - Last 3 Shifts       04/08 0700 - 04/09 0659 04/09 0700 - 04/10 0659 04/10 0700 - 04/11 0659    I.V. (mL/kg) 1364.1 (9.7) 955.7 (6.9) 548.7 (4)    Blood  1476.8 50    NG/ 925 830     IV Piggyback 1300 800 700    Total Intake(mL/kg) 3514.1 (25.1) 4157.5 (30.1) 2128.7 (15.4)    Urine (mL/kg/hr) 1085 (0.3) 3520 (1.1) 1625 (0.9)    Drains 500 (0.1)      Stool 0 (0) 0 (0)     Total Output 1585 3520 1625    Net +1929.1 +637.5 +503.7           Stool Occurrence 2 x 1 x           Physical Exam   Constitutional: She is sedated and intubated.   Obese   Neck: Trachea normal. No tracheal deviation present.   Pulmonary/Chest: She is intubated.   Abdominal: She exhibits distension. There is tenderness in the epigastric area.       Significant Labs:  CBC:     Recent Labs  Lab 04/10/18  0452   WBC 17.92*   RBC 3.01*   HGB 9.1*   HCT 28.3*   PLT 50*   MCV 94   MCH 30.2   MCHC 32.2     CMP:     Recent Labs  Lab 04/10/18  0452 04/10/18  1422   * 119*   CALCIUM 7.2* 7.5*   ALBUMIN 1.5*  --    PROT 4.9*  --     139   K 3.5 3.4*   CO2 26 28    100   BUN 4* 5*   CREATININE 1.8* 1.9*   ALKPHOS 132  --    ALT 36  --    AST 86*  --    BILITOT 8.7*  --        Significant Diagnostics:  I have reviewed all pertinent imaging results/findings within the past 24 hours.

## 2018-04-11 NOTE — ANESTHESIA POSTPROCEDURE EVALUATION
"Anesthesia Post Evaluation    Patient: Rafael Duron    Procedure(s) Performed: Procedure(s) (LRB):  TRACHEOSTOMY (N/A)    Final Anesthesia Type: general  Patient location during evaluation: ICU  Patient participation: No - Unable to Participate, Sedation  Level of consciousness: sedated  Post-procedure vital signs: reviewed and stable  Pain management: adequate  Airway patency: patent  PONV status at discharge: No PONV  Anesthetic complications: no      Cardiovascular status: blood pressure returned to baseline  Respiratory status: unassisted and spontaneous ventilation  Hydration status: euvolemic  Follow-up not needed.        Visit Vitals  BP (!) 101/42 (BP Location: Left arm, Patient Position: Lying)   Pulse 110   Temp 37.7 °C (99.9 °F) (Oral)   Resp 18   Ht 5' 6" (1.676 m)   Wt (!) 137 kg (302 lb 0.5 oz)   LMP  (LMP Unknown)   SpO2 (!) 91%   Breastfeeding? No   BMI 48.75 kg/m²       Pain/Karolina Score: Pain Assessment Performed: Yes (4/11/2018 11:05 AM)  Presence of Pain: non-verbal indicators absent (4/11/2018 11:05 AM)  Pain Rating Prior to Med Admin: 0 (4/11/2018 10:39 AM)  Pain Rating Post Med Admin: 0 (4/11/2018  8:34 AM)      "

## 2018-04-12 NOTE — SUBJECTIVE & OBJECTIVE
Interval History:     4/10/18: No changes as per nursing staff.     4/11/18: Patient remains intubated and on pressor support. Renal function slightly worse today. Good UOP of 4 liters overnight.     4/12/18: Patient intubated and on pressors. HIDA scheduled for today.             Review of patient's allergies indicates:  No Known Allergies  Current Facility-Administered Medications   Medication Frequency    acetaminophen oral solution 650 mg Q8H PRN    albumin human 25% bottle 12.5 g BID    albuterol-ipratropium 2.5mg-0.5mg/3mL nebulizer solution 3 mL Q4H PRN    bisacodyl suppository 10 mg Daily PRN    bumetanide injection 3 mg TID    chlorhexidine 0.12 % solution 15 mL BID    dextrose 50% injection 12.5 g PRN    famotidine (PF) injection 20 mg Daily    fentaNYL 2500 mcg in 0.9% sodium chloride 250 mL infusion premix (titrating) Continuous    folic acid-vit B6-vit B12 2.5-25-2 mg tablet 1 tablet Daily    glucagon (human recombinant) injection 1 mg PRN    insulin aspart U-100 pen 1-10 Units Q6H PRN    lactulose 20 gram/30 mL solution Soln 20 g Once    lorazepam (ATIVAN) injection 2 mg Q2H PRN    meropenem injection 500 mg Q6H    metOLazone tablet 5 mg Daily    metronidazole IVPB 500 mg Q8H    multivitamin tablet 1 tablet Daily    norepinephrine 32 mg in dextrose 5 % 250 mL infusion Continuous    ondansetron injection 4 mg Q8H PRN    pneumoc 13-bobby conj-dip cr(PF) 0.5 mL vaccine x 1 dose    potassium chloride 40 mEq in 100 mL IVPB (FOR CENTRAL LINE ADMINISTRATION ONLY) Once    rifAXIMin tablet 550 mg BID    sodium chloride 0.9% flush 5 mL PRN    thiamine tablet 100 mg Daily    [START ON 4/13/2018] vancomycin 1 gram/250 mL in sodium chloride 0.9% IVPB 1 g Q48H    vancomycin 250mg / 10ml oral suspension 250 mg Q6H    vitamin D 1000 units tablet 5,000 Units Daily       Objective:     Vital Signs (Most Recent):  Temp: (!) 101.5 °F (38.6 °C) (04/12/18 0730)  Pulse: (!) 116 (04/12/18  0853)  Resp: (!) 26 (04/12/18 0853)  BP: 115/67 (04/12/18 0730)  SpO2: 98 % (04/12/18 0853)  O2 Device (Oxygen Therapy): ventilator (04/12/18 0853) Vital Signs (24h Range):  Temp:  [98.8 °F (37.1 °C)-101.5 °F (38.6 °C)] 101.5 °F (38.6 °C)  Pulse:  [] 116  Resp:  [0-32] 26  SpO2:  [90 %-100 %] 98 %  BP: ()/() 115/67     Weight: 131 kg (288 lb 12.8 oz) (04/12/18 0505)  Body mass index is 46.61 kg/m².  Body surface area is 2.47 meters squared.    I/O last 3 completed shifts:  In: 3917.4 [I.V.:1567.4; Blood:100; NG/GT:650; IV Piggyback:1600]  Out: 9760 [Urine:9760]    Physical Exam   Constitutional: She appears well-developed and well-nourished. She is intubated.   HENT:   Head: Normocephalic.   ET tube in place.    Eyes: Pupils are equal, round, and reactive to light.   Neck: No thyromegaly present.   Cardiovascular: Regular rhythm.  Tachycardia present.  Exam reveals no friction rub.    Pulmonary/Chest: Breath sounds normal. She is intubated. She has no wheezes. She exhibits no tenderness.   Abdominal: Soft. Bowel sounds are normal. She exhibits distension. There is no tenderness.   Musculoskeletal: She exhibits edema.   Bilateral pitting LE edema.    Lymphadenopathy:     She has no cervical adenopathy.   Neurological: She is alert.   Skin: Skin is warm and dry. No rash noted.       Significant Labs:  Lab Results   Component Value Date    CREATININE 1.8 (H) 04/12/2018    CREATININE 1.8 (H) 04/12/2018    BUN 5 (L) 04/12/2018    BUN 5 (L) 04/12/2018     04/12/2018     04/12/2018    K 2.9 (L) 04/12/2018    K 2.9 (L) 04/12/2018    CL 99 04/12/2018    CL 99 04/12/2018    CO2 28 04/12/2018    CO2 28 04/12/2018     Lab Results   Component Value Date    .6 (H) 04/09/2018    CALCIUM 8.0 (L) 04/12/2018    CALCIUM 8.0 (L) 04/12/2018    CAION 0.86 (L) 04/10/2018    PHOS 2.1 (L) 04/12/2018     Lab Results   Component Value Date    ALBUMIN 1.9 (L) 04/12/2018     Lab Results   Component Value  Date    WBC 20.72 (H) 04/12/2018    HGB 10.1 (L) 04/12/2018    HCT 31.2 (L) 04/12/2018    MCV 94 04/12/2018    PLT 97 (L) 04/12/2018       Recent Labs  Lab 04/12/18  0353   MG 1.5*     Urinalysis  No results for input(s): COLORU, CLARITYU, SPECGRAV, PHUR, PROTEINUA, GLUCOSEU, BILIRUBINCON, BLOODU, WBCU, RBCU, BACTERIA, MUCUS, NITRITE, LEUKOCYTESUR, UROBILINOGEN, HYALINECASTS in the last 24 hours.  8 RBC, 5 WBC       Vitamin D: 5        Significant Imaging:  Imaging Results    None

## 2018-04-12 NOTE — PLAN OF CARE
Problem: Patient Care Overview  Goal: Plan of Care Review  Outcome: Ongoing (interventions implemented as appropriate)  Pt remains on ventilator through the day, taken for HIDA scan (nurse at bedside) from [id number] tolerated well.  Levophed titrated through the day.  Pt having constant liquid stools with breakdown on bottom- Flexiseal placed this am.  Pt has denied pain throughout the day.  Family given update on the phone- no family at bedside today.

## 2018-04-12 NOTE — PROGRESS NOTES
Patient transported to Radiology for scan at this time. Patient on portable ventilator at bedside with O2 source connected to wall. Ambu also at beside.

## 2018-04-12 NOTE — CONSULTS
Pharmacy Vancomycin Consult Note    WBC=20.72  Wrer=838.5  CrCl=67.5ml/min Scr=1.8    Cultures: respiratory-staph aureus-vanc sensitive, pseudomonas-zosyn sensitive  Dx. Septic shock  Patient is currently being pulse dosed.    Vancomycin random level=16.5mcg/ml   Will give patient Vancomycin 750mg one time dose.  Vancomycin 1 gram Q48 is a placeholder dose only.   Will redraw vancomycin random level 4/13 with AM labs.  Patient can receive next dose once Vancomycin random level <18mcg/ml    Thank you for allowing us to participate in this patient's care.  Maryann Smalls, Pharm D 4/12/2018 10:25 AM

## 2018-04-12 NOTE — PROGRESS NOTES
[facility] -   Hematology/Oncology  Progress Note    Patient Name: Rafael Duron  Admission Date: 4/3/2018  Hospital Length of Stay: 9 days  Code Status: Full Code     Subjective:     HPI:  23 yo female with morbid obesity,  HTN, gestational DM; presented to [facility] ED in [place], [place] on 4/2 with complaint of SOB and cough with known pregnancy. Workup revealed fetal demise (estimated at 22 weeks) and patient passed dead fetus but retained placenta. Placenta remnants were removed in OR vis D & C.  After admission to ICU she had seizure activity with , K+ 1.9 and Bicarb 11.  She also had EtOH level of 268.  She required intubation for airway protection per records. She as transferred to [facility] ICU on 4/3/18 for higher level of care.    Patient presented with septic shock at [facility] and was started on IV pressors and IV antibiotics. Patient was initially stabilized and extubated on 4/5/18. OB/GYN following. She developed syncopal episode on 4/5/18 associated with bradycardia. She was successfully resuscitated with IV fluids and levophed. She subsequently deteriorated and was re-intubated on 4/5/18. Abdominal US revealed massive hepatomegaly with liver span of 33 cm. Patient's condition improved and she was extubated again on 4/7/18. Patient coded on 4/8/18 and was successfully resuscitated and again intubated. Patient was also diagnosed with C. Diff colitis and MRSA bacteremia.   Hem/Onc consulted to manage thrombocytopenia and anemia.  Patient is intubated and not able to provide any additional history.       Interval History:  No acute events overnight      Oncology Treatment Plan:   [No treatment plan]    Medications:  Continuous Infusions:   norepinephrine bitartrate-D5W 0.06 mcg/kg/min (04/12/18 1600)     Scheduled Meds:   albumin human 25%  12.5 g Intravenous BID    bumetanide  3 mg Intravenous TID    chlorhexidine  15 mL Mouth/Throat BID    famotidine (PF)  20 mg  Intravenous Daily    folic acid-vit B6-vit B12 2.5-25-2 mg  1 tablet Oral Daily    meropenem (MERREM) IVPB  500 mg Intravenous Q6H    metOLazone  5 mg Oral Daily    metronidazole  500 mg Intravenous Q8H    multivitamin  1 tablet Oral Daily    [START ON 4/13/2018] potassium chloride  20 mEq Intravenous Daily    potassium chloride  40 mEq Intravenous Q4H    rifAXIMin  550 mg Oral BID    thiamine  100 mg Oral Daily    [START ON 4/14/2018] vancomycin (VANCOCIN) IVPB  1,000 mg Intravenous Q48H    vancomycin  250 mg Oral Q6H    vitamin D  5,000 Units Oral Daily     PRN Meds:acetaminophen, albuterol-ipratropium 2.5mg-0.5mg/3mL, bisacodyl, dextrose 50%, fentaNYL, glucagon (human recombinant), insulin aspart U-100, lorazepam, ondansetron, pneumoc 13-bobby conj-dip cr(PF), sodium chloride 0.9%     Review of Systems   Unable to perform ROS: Intubated     Objective:     Vital Signs (Most Recent):  Temp: (!) 100.5 °F (38.1 °C) (04/12/18 1515)  Pulse: 105 (04/12/18 1616)  Resp: (!) 24 (04/12/18 1616)  BP: (!) 110/58 (04/12/18 1500)  SpO2: 98 % (04/12/18 1616) Vital Signs (24h Range):  Temp:  [99.2 °F (37.3 °C)-101.5 °F (38.6 °C)] 100.5 °F (38.1 °C)  Pulse:  [] 105  Resp:  [0-32] 24  SpO2:  [90 %-100 %] 98 %  BP: ()/() 110/58     Weight: 131 kg (288 lb 12.8 oz)  Body mass index is 46.61 kg/m².  Body surface area is 2.47 meters squared.      Intake/Output Summary (Last 24 hours) at 04/12/18 1700  Last data filed at 04/12/18 1600   Gross per 24 hour   Intake          1973.18 ml   Output             6430 ml   Net         -4456.82 ml       Physical Exam   Constitutional: She is oriented to person, place, and time. She appears well-developed and well-nourished. She appears ill.   HENT:   Head: Normocephalic and atraumatic.   Right Ear: Hearing and external ear normal.   Left Ear: Hearing and external ear normal.   Nose: No rhinorrhea or sinus tenderness. Right sinus exhibits no maxillary sinus tenderness and  no frontal sinus tenderness. Left sinus exhibits no maxillary sinus tenderness and no frontal sinus tenderness.   Mouth/Throat: Uvula is midline, oropharynx is clear and moist and mucous membranes are normal. No oral lesions.   Eyes: Conjunctivae are normal. Right eye exhibits no discharge. Left eye exhibits no discharge. No scleral icterus.   Neck: Normal range of motion. Carotid bruit is not present. No tracheal deviation present. No thyromegaly present.   Cardiovascular: Normal rate, regular rhythm, S1 normal, S2 normal, normal heart sounds and intact distal pulses.    No murmur heard.  Pulses:       Dorsalis pedis pulses are 2+ on the right side, and 2+ on the left side.   Pulmonary/Chest: Effort normal and breath sounds normal. No respiratory distress.   Abdominal: Soft. Bowel sounds are normal. She exhibits no distension and no mass. There is no tenderness. There is no guarding.   Musculoskeletal: Normal range of motion. She exhibits edema (Generalized edema). She exhibits no tenderness or deformity.   Lymphadenopathy:        Right: No supraclavicular adenopathy present.        Left: No supraclavicular adenopathy present.   Neurological: She is alert and oriented to person, place, and time. No cranial nerve deficit or sensory deficit. Coordination normal.   Skin: Skin is warm and dry. Capillary refill takes less than 2 seconds. No rash noted. No erythema. There is pallor.   Nursing note and vitals reviewed.      Significant Labs:   CBC:   Recent Labs  Lab 04/11/18  0321 04/12/18  0353   WBC 19.04* 20.72*   HGB 9.3* 10.1*   HCT 29.0* 31.2*   PLT 57* 97*   , CMP:   Recent Labs  Lab 04/11/18  0321  04/11/18  2226 04/12/18  0353 04/12/18  1411     < > 141 140  140 143   K 3.1*  < > 3.3* 2.9*  2.9* 3.0*   CL 99  < > 97 99  99 98   CO2 26  < > 28 28  28 30*   *  < > 124* 120*  120* 121*   BUN 5*  < > 5* 5*  5* 6   CREATININE 2.0*  < > 1.9* 1.8*  1.8* 2.0*   CALCIUM 7.8*  < > 8.4* 8.0*  8.0* 8.7    PROT 5.3*  --   --  5.3*  --    ALBUMIN 1.8*  --   --  1.9*  --    BILITOT 9.5*  --   --  9.1*  --    ALKPHOS 142*  --   --  149*  --    AST 78*  --   --  74*  --    ALT 31  --   --  28  --    ANIONGAP 14  < > 16 13  13 15   EGFRNONAA 34*  < > 37* 39*  39* 34*   < > = values in this interval not displayed., LDH: No results for input(s): LDHCSF, BFSOURCE in the last 48 hours., LFTs:   Recent Labs  Lab 04/11/18  0321 04/12/18  0353   ALT 31 28   AST 78* 74*   ALKPHOS 142* 149*   BILITOT 9.5* 9.1*   PROT 5.3* 5.3*   ALBUMIN 1.8* 1.9*   , Reticulocytes: No results for input(s): RETIC in the last 48 hours. and Tumor Markers: No results for input(s): PSA, CEA, , AFPTM, QY0371,  in the last 48 hours.    Invalid input(s): ALGTM    Diagnostic Results:  I have reviewed all pertinent imaging results/findings within the past 24 hours.    Assessment/Plan:     Thrombocytopenia    Likely related to hepatomegaly and Liver Shock.  Platelet count has significant only improved and noted to be 97,000 today      --Continue to monitor CBC and INR daily  --Supportive transfusions as needed        Acute blood loss anemia    Vaginal bleeding related to vaginal birth of non-viable infant with retained placenta, was followed with D&C.     --Monitor H/H if Hgb <7.0 will need to transfuse with PRBC.             Thank you for your consult. I will follow-up with patient. Please contact us if you have any additional questions.     Lawrence Moser Jr, MD  Hematology/Oncology  [facility]

## 2018-04-12 NOTE — ASSESSMENT & PLAN NOTE
Currently intubated.  No acute infiltrates seen on CXR  Continue IV antibiotics empirically.  Pulmonary consult.    04/07/2018- will wean off ventilator as tolerated.  She is more awake and alert today.  Sputum cultures showed MRSA and pseudomonas-she is on vanco/meropenem-will deescalate soon.       04/08/2018- she is now intubated after asystolic cardiac arrest .Will wean off as tolerated.  04/09/2018- she remains intubated ,critical care follow up .wean off ventilator as tolerated.  4/10/2018 - likely secondary to MRSA and pseudamonal pneumonia. ID consulted, will consider double covering for pseudomonas.  04/12/18- continue Vent support, await trach

## 2018-04-12 NOTE — PROGRESS NOTES
[facility]  Critical Care Medicine  Progress Note    Patient Name: Rafael Duron  MRN: [id number]  Admission Date: 4/3/2018  Hospital Length of Stay: 9 days  Code Status: Full Code  Attending Provider: Elvira Ariza MD  Primary Care Provider: Herman Cowart MD   Principal Problem: Septic shock    Subjective:     HPI:  Ms Duron is a 21 yo obese BF with a PMH of HTN, gestational DM and HTN who presented to [facility] ED in [place], [place] on  with complaint of SOB and cough with known pregnancy.  OB US revealed no heart tones and she is also  with second trimester fetal demise estimated 22 week for which she passed with retained placenta.  After admission to ICU she had seizure activity with , K+ 1.9 and Bicarb 11.  She also had etoh level 268 and reportedly had been drinking etoh w/ honey heavily for several days.  She required intubation for airway protection per records and OB was unable to remove placenta manually and patient had to be taken to OR.  She received 2 liters IVF and Hgb dropped to 7.4 and was transfused 2 units PRBCs.  Vaginal bleeding was scant per records.  Yesterday she had temp 101.5 Ax, .  She as transferred to [facility] yesterday evening for higher level of care.        Hospital/ICU Course:   - This AM she is sedated on vent on Levophed infusion spiking temp 101.9 with WBC 20, LA 6.3, UA+, electrolyte imbalance and Glucose 268     - SAT and SBT done this am, pt awake and following commands. She was successfully extubated to nasal cannula. Remains tachycardic with HR 140s and febrile with temp 103 overnight. WBC 18 and lactic acidosis improving to 4.8. Continuing to aggressively replace electrolytes.     - Over night patient had episode with bradycardia and hypotension during which she became unresponsive and agonally breathing. She responded with IVF and bicarb and was restarted on pressors. She experienced a similar episode  again last night, during which she was intubated. Vent settings were reviewed and adjusted this am. No more bradycardic/hypotensive episodes since last night. Remains on pressors. Leukocystosis unimproved with worsening bandemia. Urine and blood cx NGTD. Sputum cx growing staph and pseudomonas however CXR this am is unremarkable.    04/7 - Tolerating SAT and SBT. Meets extubating criteria. Acidosis improving. Leukocytosis improving. Remains on bicarb gtt.     04/8 - Extubated successfully yesterday. Asystolic arrest this AM.  ACLS initiated. Re - intubated 2 rounds CPR with Iv epinephrine X 1  and IV CaCl X 1 given. ROSC attained.   A - line placed.      4/9 - now with mild vaginal bleeding and severe anasarca with blistering.  Still on vent with sedation and Levophed/Vasopressin infusing.  Worsening UOP and creatine.  Spoke with brother at bedside in detail and all questions answered.   4/10 - Opens eyes alert. Mild increase in Cr -. Intravascular volume depletion. Will add albumin. Still with vagnal bleeding  4/11 - low grade temp, Slight worsening of CR. Good urine output  4/12 - For PEG and trach today    Review of Systems   Unable to perform ROS: Intubated   Constitutional: Positive for chills. Negative for fever.       Objective:     Vital Signs (Most Recent):  Temp: (!) 101.5 °F (38.6 °C) (04/12/18 0730)  Pulse: (!) 117 (04/12/18 1030)  Resp: 17 (04/12/18 1030)  BP: 136/83 (04/12/18 1030)  SpO2: 98 % (04/12/18 1030) Vital Signs (24h Range):  Temp:  [98.8 °F (37.1 °C)-101.5 °F (38.6 °C)] 101.5 °F (38.6 °C)  Pulse:  [] 117  Resp:  [0-32] 17  SpO2:  [90 %-100 %] 98 %  BP: ()/() 136/83     Weight: 131 kg (288 lb 12.8 oz)  Body mass index is 46.61 kg/m².      Intake/Output Summary (Last 24 hours) at 04/12/18 1141  Last data filed at 04/12/18 0856   Gross per 24 hour   Intake          2251.77 ml   Output             6025 ml   Net         -3773.23 ml       Physical Exam   Constitutional: She  appears well-developed and well-nourished. She is easily aroused. She appears toxic. She has a sickly appearance. She appears ill. She is sedated, intubated and restrained.   HENT:   Head: Normocephalic and atraumatic.   Mouth/Throat: Oropharynx is clear and moist and mucous membranes are normal.   Eyes: EOM and lids are normal. Pupils are equal, round, and reactive to light.   jaundice   Neck: Trachea normal. Neck supple.   Obese    Cardiovascular: Regular rhythm and normal heart sounds.  Tachycardia present.    Pulses:       Radial pulses are 1+ on the right side, and 1+ on the left side.        Dorsalis pedis pulses are 0 on the right side, and 0 on the left side.   Pulmonary/Chest: No accessory muscle usage. She is intubated. No respiratory distress. She has decreased breath sounds in the right lower field and the left lower field. She has rales in the right lower field and the left lower field.   Coarse breath sounds   Abdominal: She exhibits distension. Bowel sounds are decreased. There is hepatosplenomegaly and hepatomegaly. There is generalized tenderness.   Firmness palpated above umbilicus.  Morbidly obese   Genitourinary:   Genitourinary Comments: Rodriguez in place   Musculoskeletal: Normal range of motion. She exhibits edema.        Right foot: There is no deformity.        Left foot: There is no deformity.   Diffuse anasarca   Lymphadenopathy:     She has no cervical adenopathy.   Neurological: She is easily aroused.   Easily awakens on sedation and follows commands   Skin: Skin is warm and dry. Capillary refill takes less than 2 seconds. No rash noted. No cyanosis.        Blistering noted to extrem       Vents:  Vent Mode: A/C (04/12/18 0853)  Ventilator Initiated: Yes (04/05/18 1930)  Set Rate: 18 bmp (04/12/18 0853)  Vt Set: 400 mL (04/12/18 0853)  Pressure Support: 0 cmH20 (04/12/18 0853)  PEEP/CPAP: 5 cmH20 (04/12/18 0853)  Oxygen Concentration (%): 45 (04/12/18 1115)  Peak Airway Pressure: 18 cmH2O  (04/12/18 0853)  Plateau Pressure: 0 cmH20 (04/12/18 0853)  Total Ve: 10.4 mL (04/12/18 0853)  F/VT Ratio<105 (RSBI): (!) 48.15 (04/12/18 0853)    Lines/Drains/Airways     Central Venous Catheter Line                 Percutaneous Central Line Insertion/Assessment - triple lumen  04/03/18 2045 right internal jugular 8 days          Drain                 Urethral Catheter 04/03/18 16 Fr. 9 days         NG/OG Tube 04/11/18 1500 Neihart sump Right nostril less than 1 day          Airway                 Surgical Airway 04/11/18 1403 Shiley Cuffed;Long;Proximal less than 1 day          Arterial Line                 Arterial Line 04/08/18 0720 Left Brachial 4 days                Significant Labs:    CBC/Anemia Profile:    Recent Labs  Lab 04/11/18  0321 04/12/18  0353   WBC 19.04* 20.72*   HGB 9.3* 10.1*   HCT 29.0* 31.2*   PLT 57* 97*   MCV 95 94   RDW 18.0* 18.2*        Chemistries:    Recent Labs  Lab 04/11/18  0321 04/11/18  1600 04/11/18  2226 04/12/18  0353    140 141 140  140   K 3.1* 3.1* 3.3* 2.9*  2.9*   CL 99 98 97 99  99   CO2 26 28 28 28  28   BUN 5* 4* 5* 5*  5*   CREATININE 2.0* 1.9* 1.9* 1.8*  1.8*   CALCIUM 7.8* 8.0* 8.4* 8.0*  8.0*   ALBUMIN 1.8*  --   --  1.9*   PROT 5.3*  --   --  5.3*   BILITOT 9.5*  --   --  9.1*   ALKPHOS 142*  --   --  149*   ALT 31  --   --  28   AST 78*  --   --  74*   MG 1.7 1.9 1.7 1.5*   PHOS 2.0* 4.2 3.0 2.1*       ABGs:     Recent Labs  Lab 04/11/18  0506   PH 7.398   PCO2 39.8   HCO3 24.5   POCSATURATED 97   BE 0     Coagulation:     Recent Labs  Lab 04/12/18  0353   INR 1.8*     Respiratory Culture: No results for input(s): GSRESP, RESPIRATORYC in the last 48 hours.  All pertinent labs within the past 24 hours have been reviewed.          Assessment/Plan:     Psychiatric   ETOH abuse     Monitor for ETOH withdrawal. Electrolyte replacement. Thiamine, Folate, MVI.              Pulmonary   Respiratory failure    4/10 Needs tracheostomy for long term care -  discussed with family        Pneumonia of both lower lobes due to methicillin resistant Staphylococcus aureus (MRSA)    Sputum cultures positive for MRSA and Pseudomonas. IV Vancomycin. Continue IV Zosyn.        Acute hypoxemic respiratory failure    Re intubated for third time 4/8 s/p asystolic arrest.  Cont Mechanical ventilation.  Ventilator settings reviewed and adjusted to optimize gas exchange. Daily wake up and breathe trials once more stable.  Would benefit from Trach once more stable.  Titrate FIO2 to keep SAO2 > 92%.   4/11 tracheostomy planned  4/12 Continue ventilator support, trach today            Cardiac/Vascular    Asystolic arrest this AM. ROSC after ACLS.  IV Norepinephrine. Monitor hemodynamics. MAP goal of 60 mmHg.        Cardiopulmonary arrest    Cardiology consulted. No PE on CTA chest  Cont supportive care and ICU cardiac monitoring          Renal/    Oliguric. Nephrology consult. Monitor BUN / Cr. Montor urine output.        Volume overload    4/10 Intravascular volume depletion - add albumin  4/11 improved        VIKRAM (acute kidney injury)    4/11 Stop IV  vancomycin        UTI due to Klebsiella species    ZOSYN + VANCOMYCIN.        Electrolyte imbalance    Monitor and replete electrolytes as needed.         ID   * Septic shock    Cont Levophed and Vasopressin infusions wean as tolerated  Cont IVAB  Volume replacement with blood products today  4/11 Stop vancomycin due to worsening renal function          Clostridium difficile infection    4/11 continue oral vancomycin and IV metronidazole        Hematology    Monitor hemogram. Transfuse as needed.        Thrombocytopenia    Transfuse Plts given active bleeding        Oncology   Acute blood loss anemia    Total 4 units transfused. Monitor hemogram.   Transfuse 2 more units PRBCs with FFP and Cryo given total now 6 units PRBCs          Endocrine   Hyperglycemia, unspecified    Stable Hyperglycemia: INSULIN ASPART. Moderate correction dose.   Blood glucose target 100 - 180 mg/dl  Cont SSI          GI   Hepatomegaly    ? Alcoholic liver disease / toxic hepatitis ( DILI). Mainly cholestatic picture.  Monitoring liver enzymes. LFTs down trending.  Discriminant function = 7. Hepatitis acute panel unremarkable. Screen for autoimmune hepatitis. Avoid the use of hepatotoxic meds. GI / Hepatology consult pending            Shock liver    Daily CMP to monitor liver enzymes  Hep panel negative  Vasopressors to maintain perfusion        Other   Hyperbilirubinemia    Etioloy unclear.  Likely related to alcoholic liver disease. Monitor and trend.   GI consulted        History of IUFD    S/P D & C. No retained products of conception on ultrasound.            Critical Care Daily Checklist:    A: Awake: RASS Goal/Actual Goal: RASS Goal: -1-->drowsy  Actual: Lim Agitation Sedation Scale (RASS): Drowsy   B: Spontaneous Breathing Trial Performed? Spon. Breathing Trial Initiated?: Not initiated (held per NP order) (04/09/18 0705)   C: SAT & SBT Coordinated?  na                      D: Delirium: CAM-ICU Overall CAM-ICU: Positive   E: Early Mobility Performed? No   F: Feeding Goal: Goals: Meet > 85 % EEN/EPN   Status: Nutrition Goal Status: progressing towards goal   Current Diet Order   Procedures    Diet NPO      AS: Analgesia/Sedation adequate   T: Thromboembolic Prophylaxis yes   H: HOB > 300 Yes   U: Stress Ulcer Prophylaxis (if needed) yes   G: Glucose Control adequate   B: Bowel Function Stool Occurrence: 1   I: Indwelling Catheter (Lines & Rodriguez) Necessity needed   D: De-escalation of Antimicrobials/Pharmacotherapies na    Plan for the day/ETD trach    Code Status:  Family/Goals of Care: Full Code  discussed     Critical Care Time: 40 minutes  Critical secondary to Patient has a condition that poses threat to life and bodily function: Severe Respiratory Distress      Critical care was time spent personally by me on the following activities: development of  treatment plan with patient or surrogate and bedside caregivers, discussions with consultants, evaluation of patient's response to treatment, examination of patient, ordering and performing treatments and interventions, ordering and review of laboratory studies, ordering and review of radiographic studies, pulse oximetry, re-evaluation of patient's condition. This critical care time did not overlap with that of any other provider or involve time for any procedures.     Angel Quijano MD  Critical Care Medicine  [facility]
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
## 2018-04-12 NOTE — ASSESSMENT & PLAN NOTE
Source likely fetal demise of unknown duration and retained products of concepttion placenta, removed surgically at outside hospital.  Continue IV fluids.  Follow up on blood and urine cultures.  Lactic acid improved to 5 from 7.  OB Gyn consult - Dr. Cardona.    04/08/2018- will continue vancomycin/zosyn ,follow repeat blood cultures .  Lactic acid is more than 12.  I called [facility] and discussed with the lab about her cultures-blood cultures done on 04/03-neg but urine culture -was positive for klebsiella -she is faxing the results. She will call [phone] to get the results.     04/09/2018-continue vasopressor support , on vanco,zosyn and  will deescalate tomorrow and follow [facility] closely.  4/10/2018 - possible cause of liver failure and kidney failure, GI and nephro following. Urine output increased, will monitor.

## 2018-04-12 NOTE — ASSESSMENT & PLAN NOTE
Likely related to hepatomegaly and Liver Shock.  Platelet count has significant only improved and noted to be 97,000 today      --Continue to monitor CBC and INR daily

## 2018-04-12 NOTE — ASSESSMENT & PLAN NOTE
Patient with positive fluid balance of + 19 liters since admission. UOP over past 24 h was was 7 liters. Volume overload is slowly resolving.   Will continue diuresis with Bumex 3 mg IV tid and metolazone 5 mg daily.   Decrease IV intake where appropriate.

## 2018-04-12 NOTE — ASSESSMENT & PLAN NOTE
Re intubated for third time 4/8 s/p asystolic arrest.  Cont Mechanical ventilation.  Ventilator settings reviewed and adjusted to optimize gas exchange. Daily wake up and breathe trials once more stable.  Would benefit from Trach once more stable.  Titrate FIO2 to keep SAO2 > 92%.   4/11 tracheostomy planned  4/12 Continue ventilator support, trach today

## 2018-04-12 NOTE — ASSESSMENT & PLAN NOTE
Patient initially presented with hyponatremia and hypokalemia, hyponatremia has now resolved. Hypokalemia has persisted, likely related to diuresis. Will make adjustments.  Hypocalcemia has now largely resolved. Vitamin D level was 5, and vitamin D supplements were started. Her hypophosphatemia is at least in part related to her vitamin D deficiency. Will replete phos. Magnesium borderline today. Will replete Magnesium.

## 2018-04-12 NOTE — SUBJECTIVE & OBJECTIVE
Interval History:     Review of Systems   Unable to perform ROS: Intubated     Objective:     Vital Signs (Most Recent):  Temp: 99.7 °F (37.6 °C) (04/11/18 1905)  Pulse: 97 (04/11/18 2024)  Resp: (!) 22 (04/11/18 2024)  BP: (!) 99/51 (04/11/18 1915)  SpO2: 98 % (04/11/18 2024) Vital Signs (24h Range):  Temp:  [98.8 °F (37.1 °C)-101.6 °F (38.7 °C)] 99.7 °F (37.6 °C)  Pulse:  [] 97  Resp:  [4-29] 22  SpO2:  [90 %-99 %] 98 %  BP: ()/(40-74) 99/51     Weight: (!) 137 kg (302 lb 0.5 oz)  Body mass index is 48.75 kg/m².    Intake/Output Summary (Last 24 hours) at 04/11/18 2156  Last data filed at 04/11/18 2002   Gross per 24 hour   Intake          2970.63 ml   Output             6025 ml   Net         -3054.37 ml      Physical Exam   Constitutional: She appears well-developed and well-nourished. No distress.   Obese    HENT:   Head: Normocephalic and atraumatic.   Mouth/Throat: Oropharynx is clear and moist.   Eyes: EOM are normal. Pupils are equal, round, and reactive to light.   Neck: Neck supple. No JVD present. No thyromegaly present.   Cardiovascular: Regular rhythm.  Exam reveals no gallop and no friction rub.    No murmur heard.  Pulmonary/Chest: Effort normal and breath sounds normal. She has no wheezes. She has no rales.   Abdominal: Soft. Bowel sounds are normal. She exhibits no distension. There is no tenderness. There is no rebound and no guarding.   Musculoskeletal: She exhibits edema. She exhibits no deformity.   Lymphadenopathy:     She has no cervical adenopathy.   Neurological: She has normal reflexes.   Intubated and sedated.   Skin: Skin is warm and dry. No rash noted.   Nursing note and vitals reviewed.      Significant Labs: All pertinent labs within the past 24 hours have been reviewed.    Significant Imaging: I have reviewed all pertinent imaging results/findings within the past 24 hours.

## 2018-04-12 NOTE — PLAN OF CARE
Problem: Patient Care Overview  Goal: Plan of Care Review  Outcome: Ongoing (interventions implemented as appropriate)  Trach care done. Site cleaned. Inner cannula changed. Drain sponges changed. Stitches remain at trach site. Trach secured midline with ties. RT notes blood around site. RT suctioned small amount white. No signs of distress at this time. Patient remains on vent support. Will continue to monitor.

## 2018-04-12 NOTE — ASSESSMENT & PLAN NOTE
Will wean off vasopressors as tolerated.  Will plan for HIDA scan as abdominal CT scan showed  marked gallbladder wall thickening noted.    Continue present antimicrobial therapy .

## 2018-04-12 NOTE — PROGRESS NOTES
[facility] -   Adult Nutrition  Progress Note    SUMMARY     Recommendations    Recommendation/Intervention:  1. Resume tube feed when medically able. 2. Continue current TF regimen. 3. Will continue to monitor.   Goals: Meet > 85 % EEN/EPN while admitted   Nutrition Goal Status: progressing towards goal  Communication of RD Recs:  POC, sticky note     Reason for Assessment    Reason for Assessment: RD follow-up  Dx:  1. Respiratory failure    2. Septic shock    3. Acute blood loss anemia    4. Acute hypoxemic respiratory failure    5. Acute urinary tract infection    6. Electrolyte imbalance    7. ETOH abuse    8. Hyperglycemia, unspecified    9. Hyponatremia    10. Metabolic acidosis    11. Retained products of conception with hemorrhage    12. Seizure    13. Hypertriglyceridemia    14. Shock liver    15. Fever, unspecified fever cause    16. S/P dilatation and curettage    17. History of IUFD    18. Hepatomegaly    19. Pneumonia of both lower lobes due to methicillin resistant Staphylococcus aureus (MRSA)    20. Asystole    21. Spontaneous  with cardiac arrest or failure    22. Hyperbilirubinemia    23. Cardiopulmonary arrest    24. Clostridium difficile infection    25. Pneumonia of right lower lobe due to methicillin resistant Staphylococcus aureus (MRSA)    26. Thrombocytopenia    27. UTI due to Klebsiella species    28. Respiratory failure, unspecified chronicity, unspecified whether with hypoxia or hypercapnia    29. VIKRAM (acute kidney injury)    30. Hypocalcemia    31. Hypervolemia, unspecified hypervolemia type    32. Elevated LFTs    33. Acute respiratory failure with hypoxia and hypercapnia      Past Medical History:   Diagnosis Date    Gestational diabetes     Hypertension        Interdisciplinary Rounds: attended  General Information Comments: S/p tracheostomy. Remains on mechanical ventilation. Pt's tube feeding stopped today for HIDA Scan.   Nutrition Discharge Planning: too  "soon to determine    Nutrition Risk Screen    Nutrition Risk Screen: no indicators present    Nutrition/Diet History    Do you have any cultural, spiritual, Confucianism conflicts, given your current situation?: None      Anthropometrics    Temp: (!) 101.5 °F (38.6 °C)  Height Method: Estimated  Height: 5' 6" (167.6 cm)  Height (inches): 66 in  Weight Method: Bed Scale  Weight: 131 kg (288 lb 12.8 oz)  Weight (lb): 288.81 lb  Ideal Body Weight (IBW), Female: 130 lb  % Ideal Body Weight, Female (lb): 222.16 lb  BMI (Calculated): 46.7  BMI Grade: greater than 40 - morbid obesity       Lab/Procedures/Meds    Pertinent Labs Reviewed: reviewed    BMP  Lab Results   Component Value Date     04/12/2018     04/12/2018    K 2.9 (L) 04/12/2018    K 2.9 (L) 04/12/2018    CL 99 04/12/2018    CL 99 04/12/2018    CO2 28 04/12/2018    CO2 28 04/12/2018    BUN 5 (L) 04/12/2018    BUN 5 (L) 04/12/2018    CREATININE 1.8 (H) 04/12/2018    CREATININE 1.8 (H) 04/12/2018    CALCIUM 8.0 (L) 04/12/2018    CALCIUM 8.0 (L) 04/12/2018    ANIONGAP 13 04/12/2018    ANIONGAP 13 04/12/2018    ESTGFRAFRICA 45 (A) 04/12/2018    ESTGFRAFRICA 45 (A) 04/12/2018    EGFRNONAA 39 (A) 04/12/2018    EGFRNONAA 39 (A) 04/12/2018     Lab Results   Component Value Date    CALCIUM 8.0 (L) 04/12/2018    CALCIUM 8.0 (L) 04/12/2018    PHOS 2.1 (L) 04/12/2018     Lab Results   Component Value Date    ALBUMIN 1.9 (L) 04/12/2018       Pertinent Medications Reviewed: reviewed    Physical Findings/Assessment    Overall Physical Appearance: on ventilator support (obese)  Tubes:  (OG)  Oral/Mouth Cavity:  (WDL)  Skin:  (Daniel Score 13)    Estimated/Assessed Needs    Weight Used For Calorie Calculations: 131 kg (288 lb 12.8 oz)  Energy Calorie Requirements (kcal): 2293  Energy Need Method: Axel State (modified)  Protein Requirements: 148 - 190 g  Weight Used For Protein Calculations: 59 kg (130 lb) (IBW - Obese ICU)     Fluid Need Method: RDA Method (or per " MD)  RDA Method (mL): 2293         Nutrition Prescription Ordered    Current Diet Order: NPO  Current Nutrition Support Formula Ordered: Peptamen Intense VHP (not infusing at this time )  Current Nutrition Support Rate Ordered: 85 (ml)  Current Nutrition Support Frequency Ordered: ml/hr    Evaluation of Received Nutrient/Fluid Intake      % Intake of Estimated Energy Needs: 0 - 25 %  % Meal Intake: NPO    Intake/Output Summary (Last 24 hours) at 04/12/18 1058  Last data filed at 04/12/18 0856   Gross per 24 hour   Intake          2300.97 ml   Output             6200 ml   Net         -3899.03 ml       Nutrition Risk          Assessment and Plan    Acute hypoxemic respiratory failure    Contributing Nutrition Diagnosis  Inadequate energy intake     Related to (etiology):   Inability to consume sufficient energy     Signs and Symptoms (as evidenced by):   Intubated, NPO, no alternative means of nutrition.      Interventions/Recommendations (treatment strategy):  Please see RD recs above.    Nutrition Diagnosis Status:   Improving. 4.12.18. Pt was on TF. TF stopped today for HIDA Scan.               Monitor and Evaluation    Food and Nutrient Intake: energy intake, enteral nutrition intake  Food and Nutrient Adminstration: enteral and parenteral nutrition administration  Anthropometric Measurements: weight  Biochemical Data, Medical Tests and Procedures: electrolyte and renal panel, glucose/endocrine profile  Nutrition-Focused Physical Findings: overall appearance     Nutrition Follow-Up    RD Follow-up?: Yes (2xweekly)

## 2018-04-12 NOTE — PLAN OF CARE
Problem: Patient Care Overview  Goal: Plan of Care Review  Outcome: Ongoing (interventions implemented as appropriate)  #7 XLT shiley placed yesterday evening. Moderate bleeding at site noted. Pt remains on mechanical ventilation. Suctioned prn. Remains on levophed gtt to keep sbp >90. Fentanyl gtt and tube feedings shut off at 0000 for HIDA scan in Am. Ativan given prn to maintain RASS of -1. Low grade temps noted. Diuresing with IVP bumex. Urine output averages about 300-350cc hourly. Rodriguez catheter intact. Specialty bed in use. SCDs on. Potassium replacement given. Place of care reviewed with patient and sister via telephone. Large BM overnight. Left brachial arterial line in place with good waveform. VSS. NAD. Will continue to monitor.
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
## 2018-04-12 NOTE — SUBJECTIVE & OBJECTIVE
Interval History:     Review of Systems   Unable to perform ROS: Intubated     Objective:     Vital Signs (Most Recent):  Temp: (!) 100.5 °F (38.1 °C) (04/12/18 1515)  Pulse: 105 (04/12/18 1616)  Resp: (!) 24 (04/12/18 1616)  BP: (!) 110/58 (04/12/18 1500)  SpO2: 98 % (04/12/18 1616) Vital Signs (24h Range):  Temp:  [99.2 °F (37.3 °C)-101.5 °F (38.6 °C)] 100.5 °F (38.1 °C)  Pulse:  [] 105  Resp:  [0-32] 24  SpO2:  [90 %-100 %] 98 %  BP: ()/() 110/58     Weight: 131 kg (288 lb 12.8 oz)  Body mass index is 46.61 kg/m².    Intake/Output Summary (Last 24 hours) at 04/12/18 1636  Last data filed at 04/12/18 1600   Gross per 24 hour   Intake          1999.07 ml   Output             6430 ml   Net         -4430.93 ml      Physical Exam   Constitutional: She appears well-developed and well-nourished. She appears distressed.   HENT:   Head: Normocephalic and atraumatic.   Eyes: EOM are normal. Pupils are equal, round, and reactive to light.   Neck: Normal range of motion. Neck supple. No JVD present.   Cardiovascular: Regular rhythm and normal heart sounds.    Pulmonary/Chest: Breath sounds normal.   Abdominal: Soft. Bowel sounds are normal.   Musculoskeletal: She exhibits edema.   Neurological: No cranial nerve deficit. Coordination normal.   sedated   Skin: Skin is warm and dry.   Psychiatric:   sedated       Significant Labs:   BMP:   Recent Labs  Lab 04/12/18  1411   *      K 3.0*   CL 98   CO2 30*   BUN 6   CREATININE 2.0*   CALCIUM 8.7   MG 1.8     CBC:   Recent Labs  Lab 04/11/18  0321 04/12/18  0353   WBC 19.04* 20.72*   HGB 9.3* 10.1*   HCT 29.0* 31.2*   PLT 57* 97*       Significant Imaging:
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
## 2018-04-12 NOTE — ASSESSMENT & PLAN NOTE
4/11/2018 - abdominal xray shows prominent bowel loops, neld score 27, GI following, liver enzymes initially trending up then improving, now alk phos trending up and should plateau soon

## 2018-04-12 NOTE — PROGRESS NOTES
[facility] -   Nephrology  Progress Note    Patient Name: Rafael Duron  MRN: [id number]  Admission Date: 4/3/2018  Hospital Length of Stay: 9 days  Attending Provider: Elvira Ariza MD   Primary Care Physician: Herman Cowart MD  Principal Problem:Septic shock    Subjective:     HPI: 21 yo obese female with HTN, gestational DM  who presented to [facility] ED in [place], [place] on 4/2 with complaint of SOB and cough with known pregnancy. Workup revealed fetal demise (estimated at 22 weeks) and patient passed dead fetus but retained placenta. Placenta remnants were removed in OR vis D & C.  After admission to ICU she had seizure activity with , K+ 1.9 and Bicarb 11.  She also had EtOH level of 268.  She required intubation for airway protection per records. She as transferred to [facility] on 4/3/18 for higher level of care.    Patient presented with septic shock at [facility] and was started on IV pressors and IV antibiotics. Patient was initially stabilized and extubated on 4/5/18. OB/GYN following. She developed syncopal episode on 4/5/18 associated with bradycardia. She was successfully resuscitated with IV fluids and levophed. She subsequently deteriorated and was re-intubated on 4/5/18. Abdominal US revealed massive hepatomegaly with liver span of 33 cm. Patient's condition improved and she was extubated again on 4/7/18. Patient coded on 4/8/18 and was successfully resuscitated and again intubated. Patient was also diagnosed with C. Diff colitis and MRSA bacteremia.   Nephrology was consulted to help with patient's electrolyte care, renal care and volume management. I saw and examined patient in her hospital room. Patient is intubated and not able to provide any additional history.   Patient remains on antibiotics and pressor support. Chart review revealed that hyponatremia and hypokalemia have now resolved. However, hypocalcemia has persisted. In addition, patient's  renal function has worsened during current admission and creatinine has increased from 0.8 on 4/6/18 to 1.6 on 4/9/18. Volume review showed positive I/O balance with + 24 liters since admission,. Patient's UOP has been fluctuating with 1 to 3 liters of urine per day. Current UOP about 100 cc/hour.       Interval History:     4/10/18: No changes as per nursing staff.     4/11/18: Patient remains intubated and on pressor support. Renal function slightly worse today. Good UOP of 4 liters overnight.     4/12/18: Patient intubated and on pressors. HIDA scheduled for today.             Review of patient's allergies indicates:  No Known Allergies  Current Facility-Administered Medications   Medication Frequency    acetaminophen oral solution 650 mg Q8H PRN    albumin human 25% bottle 12.5 g BID    albuterol-ipratropium 2.5mg-0.5mg/3mL nebulizer solution 3 mL Q4H PRN    bisacodyl suppository 10 mg Daily PRN    bumetanide injection 3 mg TID    chlorhexidine 0.12 % solution 15 mL BID    dextrose 50% injection 12.5 g PRN    famotidine (PF) injection 20 mg Daily    fentaNYL 2500 mcg in 0.9% sodium chloride 250 mL infusion premix (titrating) Continuous    folic acid-vit B6-vit B12 2.5-25-2 mg tablet 1 tablet Daily    glucagon (human recombinant) injection 1 mg PRN    insulin aspart U-100 pen 1-10 Units Q6H PRN    lactulose 20 gram/30 mL solution Soln 20 g Once    lorazepam (ATIVAN) injection 2 mg Q2H PRN    meropenem injection 500 mg Q6H    metOLazone tablet 5 mg Daily    metronidazole IVPB 500 mg Q8H    multivitamin tablet 1 tablet Daily    norepinephrine 32 mg in dextrose 5 % 250 mL infusion Continuous    ondansetron injection 4 mg Q8H PRN    pneumoc 13-bobby conj-dip cr(PF) 0.5 mL vaccine x 1 dose    potassium chloride 40 mEq in 100 mL IVPB (FOR CENTRAL LINE ADMINISTRATION ONLY) Once    rifAXIMin tablet 550 mg BID    sodium chloride 0.9% flush 5 mL PRN    thiamine tablet 100 mg Daily    [START ON  4/13/2018] vancomycin 1 gram/250 mL in sodium chloride 0.9% IVPB 1 g Q48H    vancomycin 250mg / 10ml oral suspension 250 mg Q6H    vitamin D 1000 units tablet 5,000 Units Daily       Objective:     Vital Signs (Most Recent):  Temp: (!) 101.5 °F (38.6 °C) (04/12/18 0730)  Pulse: (!) 116 (04/12/18 0853)  Resp: (!) 26 (04/12/18 0853)  BP: 115/67 (04/12/18 0730)  SpO2: 98 % (04/12/18 0853)  O2 Device (Oxygen Therapy): ventilator (04/12/18 0853) Vital Signs (24h Range):  Temp:  [98.8 °F (37.1 °C)-101.5 °F (38.6 °C)] 101.5 °F (38.6 °C)  Pulse:  [] 116  Resp:  [0-32] 26  SpO2:  [90 %-100 %] 98 %  BP: ()/() 115/67     Weight: 131 kg (288 lb 12.8 oz) (04/12/18 0505)  Body mass index is 46.61 kg/m².  Body surface area is 2.47 meters squared.    I/O last 3 completed shifts:  In: 3917.4 [I.V.:1567.4; Blood:100; NG/GT:650; IV Piggyback:1600]  Out: 9760 [Urine:9760]    Physical Exam   Constitutional: She appears well-developed and well-nourished. She is intubated.   HENT:   Head: Normocephalic.   ET tube in place.    Eyes: Pupils are equal, round, and reactive to light.   Neck: No thyromegaly present.   Cardiovascular: Regular rhythm.  Tachycardia present.  Exam reveals no friction rub.    Pulmonary/Chest: Breath sounds normal. She is intubated. She has no wheezes. She exhibits no tenderness.   Abdominal: Soft. Bowel sounds are normal. She exhibits distension. There is no tenderness.   Musculoskeletal: She exhibits edema.   Bilateral pitting LE edema.    Lymphadenopathy:     She has no cervical adenopathy.   Neurological: She is alert.   Skin: Skin is warm and dry. No rash noted.       Significant Labs:  Lab Results   Component Value Date    CREATININE 1.8 (H) 04/12/2018    CREATININE 1.8 (H) 04/12/2018    BUN 5 (L) 04/12/2018    BUN 5 (L) 04/12/2018     04/12/2018     04/12/2018    K 2.9 (L) 04/12/2018    K 2.9 (L) 04/12/2018    CL 99 04/12/2018    CL 99 04/12/2018    CO2 28 04/12/2018    CO2 28  04/12/2018     Lab Results   Component Value Date    .6 (H) 04/09/2018    CALCIUM 8.0 (L) 04/12/2018    CALCIUM 8.0 (L) 04/12/2018    CAION 0.86 (L) 04/10/2018    PHOS 2.1 (L) 04/12/2018     Lab Results   Component Value Date    ALBUMIN 1.9 (L) 04/12/2018     Lab Results   Component Value Date    WBC 20.72 (H) 04/12/2018    HGB 10.1 (L) 04/12/2018    HCT 31.2 (L) 04/12/2018    MCV 94 04/12/2018    PLT 97 (L) 04/12/2018       Recent Labs  Lab 04/12/18  0353   MG 1.5*     Urinalysis  No results for input(s): COLORU, CLARITYU, SPECGRAV, PHUR, PROTEINUA, GLUCOSEU, BILIRUBINCON, BLOODU, WBCU, RBCU, BACTERIA, MUCUS, NITRITE, LEUKOCYTESUR, UROBILINOGEN, HYALINECASTS in the last 24 hours.  8 RBC, 5 WBC       Vitamin D: 5        Significant Imaging:  Imaging Results    None           Assessment/Plan:     * Septic shock    Continue antibiotics and pressor support (patient on levophed and vasopressin).  Patient was diagnosed with MRSA bacteremia.           Volume overload    Patient with positive fluid balance of + 19 liters since admission. UOP over past 24 h was was 7 liters. Volume overload is slowly resolving.   Will continue diuresis with Bumex 3 mg IV tid and metolazone 5 mg daily.   Decrease IV intake where appropriate.         VIKRAM (acute kidney injury)    Patient's renal function has worsened and creatinine has increased from 0.8 on 4/6/18 to 1.6 on 4/9/18 and 2 on 4/11/18. Creatinine has declined to 1.8 today. VIKRAM likely multifactorial and related to hypotension, anemia, previous cardiac arrest. Also consider compartment syndrome in patient with severe abdominal distention. Dose Vancomycin carefully to avoid Vancomycin toxicity. This was discussed in detail with ID service.   Will monitor closely. No dialysis indicated at present.   Kidneys were normal on recent CT scan.         Clostridium difficile infection    Continue Flagyl.         Thrombocytopenia    S/p platelet transfusion yesterday. Platelets have  increased to 97.         Pneumonia of both lower lobes due to methicillin resistant Staphylococcus aureus (MRSA)    Continue antibiotics.         UTI due to Klebsiella species    Continue antibiotics.         Acute blood loss anemia    S/p blood transfusion. Hgb now stable at 10.         Electrolyte imbalance    Patient initially presented with hyponatremia and hypokalemia, hyponatremia has now resolved. Hypokalemia has persisted, likely related to diuresis. Will make adjustments.  Hypocalcemia has now largely resolved. Vitamin D level was 5, and vitamin D supplements were started. Her hypophosphatemia is at least in part related to her vitamin D deficiency. Will replete phos. Magnesium borderline today. Will replete Magnesium.         Acute hypoxemic respiratory failure    Continue mechanical ventilation.         Total ICU time: 40 minutes. More than 50% of time was spent on chart review and discussing case with ICU team.       Thank you for your consult. I will follow-up with patient. Please contact us if you have any additional questions.    Lawrence Nguyen MD  Nephrology  [facility] -

## 2018-04-12 NOTE — ASSESSMENT & PLAN NOTE
Patient's renal function has worsened and creatinine has increased from 0.8 on 4/6/18 to 1.6 on 4/9/18 and 2 on 4/11/18. Creatinine has declined to 1.8 today. VIKRAM likely multifactorial and related to hypotension, anemia, previous cardiac arrest. Also consider compartment syndrome in patient with severe abdominal distention. Dose Vancomycin carefully to avoid Vancomycin toxicity. This was discussed in detail with ID service.   Will monitor closely. No dialysis indicated at present.   Kidneys were normal on recent CT scan.

## 2018-04-12 NOTE — PLAN OF CARE
Problem: Patient Care Overview  Goal: Plan of Care Review  Outcome: Ongoing (interventions implemented as appropriate)  Recommendations     Recommendation/Intervention:  1. Resume tube feed when medically able. 2. Continue current TF regimen. 3. Will continue to monitor.   Goals: Meet > 85 % EEN/EPN while admitted   Nutrition Goal Status: progressing towards goal  Communication of RD Recs:  POC, sticky note
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
## 2018-04-12 NOTE — SUBJECTIVE & OBJECTIVE
Review of Systems   Unable to perform ROS: Intubated   Constitutional: Positive for chills. Negative for fever.       Objective:     Vital Signs (Most Recent):  Temp: (!) 101.5 °F (38.6 °C) (04/12/18 0730)  Pulse: (!) 117 (04/12/18 1030)  Resp: 17 (04/12/18 1030)  BP: 136/83 (04/12/18 1030)  SpO2: 98 % (04/12/18 1030) Vital Signs (24h Range):  Temp:  [98.8 °F (37.1 °C)-101.5 °F (38.6 °C)] 101.5 °F (38.6 °C)  Pulse:  [] 117  Resp:  [0-32] 17  SpO2:  [90 %-100 %] 98 %  BP: ()/() 136/83     Weight: 131 kg (288 lb 12.8 oz)  Body mass index is 46.61 kg/m².      Intake/Output Summary (Last 24 hours) at 04/12/18 1141  Last data filed at 04/12/18 0856   Gross per 24 hour   Intake          2251.77 ml   Output             6025 ml   Net         -3773.23 ml       Physical Exam   Constitutional: She appears well-developed and well-nourished. She is easily aroused. She appears toxic. She has a sickly appearance. She appears ill. She is sedated, intubated and restrained.   HENT:   Head: Normocephalic and atraumatic.   Mouth/Throat: Oropharynx is clear and moist and mucous membranes are normal.   Eyes: EOM and lids are normal. Pupils are equal, round, and reactive to light.   jaundice   Neck: Trachea normal. Neck supple.   Obese    Cardiovascular: Regular rhythm and normal heart sounds.  Tachycardia present.    Pulses:       Radial pulses are 1+ on the right side, and 1+ on the left side.        Dorsalis pedis pulses are 0 on the right side, and 0 on the left side.   Pulmonary/Chest: No accessory muscle usage. She is intubated. No respiratory distress. She has decreased breath sounds in the right lower field and the left lower field. She has rales in the right lower field and the left lower field.   Coarse breath sounds   Abdominal: She exhibits distension. Bowel sounds are decreased. There is hepatosplenomegaly and hepatomegaly. There is generalized tenderness.   Firmness palpated above umbilicus.  Morbidly obese    Genitourinary:   Genitourinary Comments: Rodriguez in place   Musculoskeletal: Normal range of motion. She exhibits edema.        Right foot: There is no deformity.        Left foot: There is no deformity.   Diffuse anasarca   Lymphadenopathy:     She has no cervical adenopathy.   Neurological: She is easily aroused.   Easily awakens on sedation and follows commands   Skin: Skin is warm and dry. Capillary refill takes less than 2 seconds. No rash noted. No cyanosis.        Blistering noted to extrem       Vents:  Vent Mode: A/C (04/12/18 0853)  Ventilator Initiated: Yes (04/05/18 1930)  Set Rate: 18 bmp (04/12/18 0853)  Vt Set: 400 mL (04/12/18 0853)  Pressure Support: 0 cmH20 (04/12/18 0853)  PEEP/CPAP: 5 cmH20 (04/12/18 0853)  Oxygen Concentration (%): 45 (04/12/18 1115)  Peak Airway Pressure: 18 cmH2O (04/12/18 0853)  Plateau Pressure: 0 cmH20 (04/12/18 0853)  Total Ve: 10.4 mL (04/12/18 0853)  F/VT Ratio<105 (RSBI): (!) 48.15 (04/12/18 0853)    Lines/Drains/Airways     Central Venous Catheter Line                 Percutaneous Central Line Insertion/Assessment - triple lumen  04/03/18 2045 right internal jugular 8 days          Drain                 Urethral Catheter 04/03/18 16 Fr. 9 days         NG/OG Tube 04/11/18 1500 [place] sump Right nostril less than 1 day          Airway                 Surgical Airway 04/11/18 1403 Shiley Cuffed;Long;Proximal less than 1 day          Arterial Line                 Arterial Line 04/08/18 0720 Left Brachial 4 days                Significant Labs:    CBC/Anemia Profile:    Recent Labs  Lab 04/11/18  0321 04/12/18  0353   WBC 19.04* 20.72*   HGB 9.3* 10.1*   HCT 29.0* 31.2*   PLT 57* 97*   MCV 95 94   RDW 18.0* 18.2*        Chemistries:    Recent Labs  Lab 04/11/18  0321 04/11/18  1600 04/11/18  2226 04/12/18  0353    140 141 140  140   K 3.1* 3.1* 3.3* 2.9*  2.9*   CL 99 98 97 99  99   CO2 26 28 28 28  28   BUN 5* 4* 5* 5*  5*   CREATININE 2.0* 1.9* 1.9* 1.8*  1.8*    CALCIUM 7.8* 8.0* 8.4* 8.0*  8.0*   ALBUMIN 1.8*  --   --  1.9*   PROT 5.3*  --   --  5.3*   BILITOT 9.5*  --   --  9.1*   ALKPHOS 142*  --   --  149*   ALT 31  --   --  28   AST 78*  --   --  74*   MG 1.7 1.9 1.7 1.5*   PHOS 2.0* 4.2 3.0 2.1*       ABGs:     Recent Labs  Lab 04/11/18  0506   PH 7.398   PCO2 39.8   HCO3 24.5   POCSATURATED 97   BE 0     Coagulation:     Recent Labs  Lab 04/12/18  0353   INR 1.8*     Respiratory Culture: No results for input(s): GSRESP, RESPIRATORYC in the last 48 hours.  All pertinent labs within the past 24 hours have been reviewed.

## 2018-04-12 NOTE — SUBJECTIVE & OBJECTIVE
Interval History:  No acute events overnight      Oncology Treatment Plan:   [No treatment plan]    Medications:  Continuous Infusions:   norepinephrine bitartrate-D5W 0.06 mcg/kg/min (04/12/18 1600)     Scheduled Meds:   albumin human 25%  12.5 g Intravenous BID    bumetanide  3 mg Intravenous TID    chlorhexidine  15 mL Mouth/Throat BID    famotidine (PF)  20 mg Intravenous Daily    folic acid-vit B6-vit B12 2.5-25-2 mg  1 tablet Oral Daily    meropenem (MERREM) IVPB  500 mg Intravenous Q6H    metOLazone  5 mg Oral Daily    metronidazole  500 mg Intravenous Q8H    multivitamin  1 tablet Oral Daily    [START ON 4/13/2018] potassium chloride  20 mEq Intravenous Daily    potassium chloride  40 mEq Intravenous Q4H    rifAXIMin  550 mg Oral BID    thiamine  100 mg Oral Daily    [START ON 4/14/2018] vancomycin (VANCOCIN) IVPB  1,000 mg Intravenous Q48H    vancomycin  250 mg Oral Q6H    vitamin D  5,000 Units Oral Daily     PRN Meds:acetaminophen, albuterol-ipratropium 2.5mg-0.5mg/3mL, bisacodyl, dextrose 50%, fentaNYL, glucagon (human recombinant), insulin aspart U-100, lorazepam, ondansetron, pneumoc 13-bobby conj-dip cr(PF), sodium chloride 0.9%     Review of Systems   Unable to perform ROS: Intubated     Objective:     Vital Signs (Most Recent):  Temp: (!) 100.5 °F (38.1 °C) (04/12/18 1515)  Pulse: 105 (04/12/18 1616)  Resp: (!) 24 (04/12/18 1616)  BP: (!) 110/58 (04/12/18 1500)  SpO2: 98 % (04/12/18 1616) Vital Signs (24h Range):  Temp:  [99.2 °F (37.3 °C)-101.5 °F (38.6 °C)] 100.5 °F (38.1 °C)  Pulse:  [] 105  Resp:  [0-32] 24  SpO2:  [90 %-100 %] 98 %  BP: ()/() 110/58     Weight: 131 kg (288 lb 12.8 oz)  Body mass index is 46.61 kg/m².  Body surface area is 2.47 meters squared.      Intake/Output Summary (Last 24 hours) at 04/12/18 1700  Last data filed at 04/12/18 1600   Gross per 24 hour   Intake          1973.18 ml   Output             6430 ml   Net         -4456.82 ml        Physical Exam   Constitutional: She is oriented to person, place, and time. She appears well-developed and well-nourished. She appears ill.   HENT:   Head: Normocephalic and atraumatic.   Right Ear: Hearing and external ear normal.   Left Ear: Hearing and external ear normal.   Nose: No rhinorrhea or sinus tenderness. Right sinus exhibits no maxillary sinus tenderness and no frontal sinus tenderness. Left sinus exhibits no maxillary sinus tenderness and no frontal sinus tenderness.   Mouth/Throat: Uvula is midline, oropharynx is clear and moist and mucous membranes are normal. No oral lesions.   Eyes: Conjunctivae are normal. Right eye exhibits no discharge. Left eye exhibits no discharge. No scleral icterus.   Neck: Normal range of motion. Carotid bruit is not present. No tracheal deviation present. No thyromegaly present.   Cardiovascular: Normal rate, regular rhythm, S1 normal, S2 normal, normal heart sounds and intact distal pulses.    No murmur heard.  Pulses:       Dorsalis pedis pulses are 2+ on the right side, and 2+ on the left side.   Pulmonary/Chest: Effort normal and breath sounds normal. No respiratory distress.   Abdominal: Soft. Bowel sounds are normal. She exhibits no distension and no mass. There is no tenderness. There is no guarding.   Musculoskeletal: Normal range of motion. She exhibits edema (Generalized edema). She exhibits no tenderness or deformity.   Lymphadenopathy:        Right: No supraclavicular adenopathy present.        Left: No supraclavicular adenopathy present.   Neurological: She is alert and oriented to person, place, and time. No cranial nerve deficit or sensory deficit. Coordination normal.   Skin: Skin is warm and dry. Capillary refill takes less than 2 seconds. No rash noted. No erythema. There is pallor.   Nursing note and vitals reviewed.      Significant Labs:   CBC:   Recent Labs  Lab 04/11/18  0321 04/12/18  0353   WBC 19.04* 20.72*   HGB 9.3* 10.1*   HCT 29.0*  31.2*   PLT 57* 97*   , CMP:   Recent Labs  Lab 04/11/18  0321  04/11/18  2226 04/12/18  0353 04/12/18  1411     < > 141 140  140 143   K 3.1*  < > 3.3* 2.9*  2.9* 3.0*   CL 99  < > 97 99  99 98   CO2 26  < > 28 28  28 30*   *  < > 124* 120*  120* 121*   BUN 5*  < > 5* 5*  5* 6   CREATININE 2.0*  < > 1.9* 1.8*  1.8* 2.0*   CALCIUM 7.8*  < > 8.4* 8.0*  8.0* 8.7   PROT 5.3*  --   --  5.3*  --    ALBUMIN 1.8*  --   --  1.9*  --    BILITOT 9.5*  --   --  9.1*  --    ALKPHOS 142*  --   --  149*  --    AST 78*  --   --  74*  --    ALT 31  --   --  28  --    ANIONGAP 14  < > 16 13  13 15   EGFRNONAA 34*  < > 37* 39*  39* 34*   < > = values in this interval not displayed., LDH: No results for input(s): LDHCSF, BFSOURCE in the last 48 hours., LFTs:   Recent Labs  Lab 04/11/18 0321 04/12/18  0353   ALT 31 28   AST 78* 74*   ALKPHOS 142* 149*   BILITOT 9.5* 9.1*   PROT 5.3* 5.3*   ALBUMIN 1.8* 1.9*   , Reticulocytes: No results for input(s): RETIC in the last 48 hours. and Tumor Markers: No results for input(s): PSA, CEA, , AFPTM, [id number],  in the last 48 hours.    Invalid input(s): ALGTM    Diagnostic Results:  I have reviewed all pertinent imaging results/findings within the past 24 hours.

## 2018-04-12 NOTE — ASSESSMENT & PLAN NOTE
04/07/2018- will closely monitor fever curve, will stop flagyl, change meropenem to zosyn,continue vancomycin for now.  Will deescalate soon.  Blood cultures -neg, sputum cultures-MRSA and pseudomonas   4/11/2018 - antibx adjusted today, per icu, ID consulted

## 2018-04-13 PROBLEM — R73.9 HYPERGLYCEMIA, UNSPECIFIED: Status: RESOLVED | Noted: 2018-01-01 | Resolved: 2018-01-01

## 2018-04-13 PROBLEM — N39.0 UTI DUE TO KLEBSIELLA SPECIES: Status: RESOLVED | Noted: 2018-01-01 | Resolved: 2018-01-01

## 2018-04-13 PROBLEM — B96.89 UTI DUE TO KLEBSIELLA SPECIES: Status: RESOLVED | Noted: 2018-01-01 | Resolved: 2018-01-01

## 2018-04-13 PROBLEM — K81.0 CHOLECYSTITIS, ACUTE: Status: ACTIVE | Noted: 2018-01-01

## 2018-04-13 NOTE — CONSULTS
Pharmacy Vancomycin Consult Note    WBC=21.20  Dkft=339.2  CrCl=53.7ml/min Scr=2.2    Cultures: respiratory-staph aureus-vanc sensitive, pseudomonas-zosyn sensitive  Dx. Septic shock  Patient is currently being pulse dosed    Vancomycin random level=17.3mcg/ml   Will give patient Vancomycin 1000mg one time dose.  Vancomycin 1 gram Q48 is a placeholder dose only.  Will redraw vancomycin random level  4/14 With AM labs.    Thank you for allowing us to participate in this patient's care.  Maryann Smalls, Pharm D 4/13/2018 11:08 AM

## 2018-04-13 NOTE — PROGRESS NOTES
[facility] -   Gastroenterology  Progress Note    Patient Name: Rafael Duron  MRN: [id number]  Admission Date: 4/3/2018  Hospital Length of Stay: 10 days  Code Status: Full Code   Attending Provider: Elvira Ariza MD  Consulting Provider: Jay Pelayo PA-C  Primary Care Physician: Herman Cowart MD  Principal Problem: Septic shock      Subjective:     Interval History:   The patient now has a trach. PEG deferred to early next week. HIDA done and cystic duct not visualized. A percutaneous cholecystostomy tube was placed today. She has been off tube feeds a couple of days. Xray on the 10th suggested possible ileus. The patient is now having diarrhea and rectal tube placed. AST trending down. Tbili remains elevated. INR gradually increasing. Patient having fevers. Antibiotics have been changed by primary teams.     Review of Systems   Unable to perform ROS: Intubated   Constitutional:        See Interval History for daily ROS.      Objective:     Vital Signs (Most Recent):  Temp: 100.1 °F (37.8 °C) (04/13/18 1230)  Pulse: 106 (04/13/18 1300)  Resp: 16 (04/13/18 1300)  BP: (!) 118/58 (04/13/18 1300)  SpO2: 100 % (04/13/18 1300) Vital Signs (24h Range):  Temp:  [99.8 °F (37.7 °C)-101.2 °F (38.4 °C)] 100.1 °F (37.8 °C)  Pulse:  [] 106  Resp:  [11-31] 16  SpO2:  [94 %-100 %] 100 %  BP: ()/(47-82) 118/58     Weight: 125 kg (275 lb 9.2 oz) (04/13/18 0635)  Body mass index is 44.48 kg/m².      Intake/Output Summary (Last 24 hours) at 04/13/18 1413  Last data filed at 04/13/18 1200   Gross per 24 hour   Intake          1742.06 ml   Output             5110 ml   Net         -3367.94 ml       Lines/Drains/Airways     Central Venous Catheter Line                 Percutaneous Central Line Insertion/Assessment - triple lumen  04/03/18 2045 right internal jugular 9 days          Drain                 Urethral Catheter 04/03/18 16 Fr. 10 days         Rectal Tube 04/12/18 0710 rectal tube w/ balloon  (indicate number of mLs) 1 day         NG/OG Tube 04/12/18 1820 14 Fr. Right nostril less than 1 day          Airway                 Surgical Airway 04/11/18 1403 Shiley Cuffed;Long;Proximal 2 days          Arterial Line                 Arterial Line 04/08/18 0720 Left Brachial 5 days                Physical Exam   Constitutional: She appears ill. She is intubated.   Obese   HENT:   Head: Normocephalic and atraumatic.   Cardiovascular: Regular rhythm.  Tachycardia present.    Pulmonary/Chest: Breath sounds normal. She is intubated.   Abdominal: She exhibits distension. Bowel sounds are decreased.   Less distended in the lower abdomen. Still some in the upper abdomen. Tenderness suspected because she looks uncomfortable with palpation.    Musculoskeletal: She exhibits no edema.   Neurological:   Sedated/intubated   Skin: Skin is warm and dry.       Significant Labs:  CBC:   Recent Labs  Lab 04/12/18  0353 04/13/18  0440   WBC 20.72* 21.20*   HGB 10.1* 9.8*   HCT 31.2* 30.2*   PLT 97* 148*     CMP:   Recent Labs  Lab 04/13/18  0440 04/13/18  1244   GLU 96  96 72   CALCIUM 8.9  8.9 9.0   ALBUMIN 2.0* 2.1*   PROT 5.7*  --      144 144   K 3.2*  3.2* 3.4*   CO2 32*  32* 31*   CL 97  97 97   BUN 6  6 7   CREATININE 2.2*  2.2* 2.2*   ALKPHOS 144*  --    ALT 29  --    AST 77*  --    BILITOT 9.8*  --      Coagulation:   Recent Labs  Lab 04/13/18  0440   INR 1.8*         Significant Imaging:  Imaging results within the past 24 hours have been reviewed.    Assessment/Plan:     Elevated LFTs    21 yo admitted for septic shock after retained products of conception.    Elevated LFTs likely multifactorial - septic shock, metabolic related cause and alcoholic hepatitis  INR and MELD continue to gradually increase.     Continue Rifaximin. May want to hold off on Lactulose since patient is already having diarrhea, distention and ?ileus.  Recommend treating underlying shock and continue supportive care. Antibiotics per  ID and ICU teams.      MELD-Na score: 29 at 4/13/2018 12:44 PM  MELD score: 29 at 4/13/2018 12:44 PM  Calculated from:  Serum Creatinine: 2.2 mg/dL at 4/13/2018 12:44 PM  Serum Sodium: 144 mmol/L (Rounded to 137) at 4/13/2018 12:44 PM  Total Bilirubin: 9.8 mg/dL at 4/13/2018  4:40 AM  INR(ratio): 1.8 at 4/13/2018  4:40 AM  Age: 22 years               Thrombocytopenia    Secondary to sepsis rather than liver disease.   Continue to monitor and replace as needed.         Clostridium difficile infection    Diarrhea now. Rectal tube in place.   The patient is on Flagyl and vancomycin.   ID is following.         Hepatomegaly    Secondary to fatty liver disease.         Pneumonia of both lower lobes due to methicillin resistant Staphylococcus aureus (MRSA)    On antibiotic therapy        Acute blood loss anemia    No overt GI bleed.  Continue to monitor and transfuse as indicated.         ETOH abuse    Supportive care.             Thank you for your consult. We will continue to follow intermittently.     Jay Pelayo PA-C  Gastroenterology  [facility]

## 2018-04-13 NOTE — PLAN OF CARE
Per MDR, the patient will have a cholecystectomy tube placed to gallbladder. NGT tube in place due to unable place a PEG tube secondary to the enlarged liver.   WICHO spoke with Carmelita at [facility] regarding LTAC. She did state that she was appropriate according to the clinical notes. Patient has medicaid . As soon as she is stable to transition to the next level of care, .A. will start a single case agreement contract. WICHO to f/u for safe transition

## 2018-04-13 NOTE — ASSESSMENT & PLAN NOTE
1. VIKRAM : Patient's renal function has worsened and creatinine has increased from 0.8 on 4/6/18 to 2.2 , VIKRAM from ATN from low BP few days ago and also due to aggressive diuresis , will closely watch renal fn, if Cr worsens may have to back off on diuretic dose, check renal panel twice a day , mainly to monitor renal fn and lytes,     Edema improving with aggressive diuresis, cont for now,     2. Electrolyte abnormalities, hypokalemia, Hypophos, HypoMag , check and replete as indicated,     3. Hypotension : cont pressor support     4. Abnormal LFTs , s/p Cholecystostomy placement , '    5. Meds reviewed, adjusted Meropenem dose to bid,     6. Anemia : multifactorial, heme following

## 2018-04-13 NOTE — ASSESSMENT & PLAN NOTE
She is now on meropenem .  HID scan was discussed with Dr Jeansonne-for cholecystostomy tube.  Will follow repeat cultures.
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
## 2018-04-13 NOTE — SUBJECTIVE & OBJECTIVE
Review of Systems   Unable to perform ROS: Intubated       Objective:     Vital Signs (Most Recent):  Temp: 100.1 °F (37.8 °C) (04/13/18 1230)  Pulse: 106 (04/13/18 1300)  Resp: 16 (04/13/18 1300)  BP: (!) 118/58 (04/13/18 1300)  SpO2: 100 % (04/13/18 1300) Vital Signs (24h Range):  Temp:  [99.8 °F (37.7 °C)-101.2 °F (38.4 °C)] 100.1 °F (37.8 °C)  Pulse:  [] 106  Resp:  [11-31] 16  SpO2:  [94 %-100 %] 100 %  BP: ()/(47-82) 118/58     Weight: 125 kg (275 lb 9.2 oz)  Body mass index is 44.48 kg/m².      Intake/Output Summary (Last 24 hours) at 04/13/18 1434  Last data filed at 04/13/18 1200   Gross per 24 hour   Intake          1742.06 ml   Output             5110 ml   Net         -3367.94 ml       Physical Exam   Constitutional: She is easily aroused. She appears ill. She is restrained.   Obese young female on mechanical ventilation via trach   HENT:   Head: Atraumatic.   Eyes: Conjunctivae are normal.   Neck: No JVD present.   Cardiovascular: Regular rhythm.  Tachycardia present.    Pulses:       Radial pulses are 2+ on the right side, and 2+ on the left side.        Dorsalis pedis pulses are 1+ on the right side, and 1+ on the left side.   Pulmonary/Chest: She has rhonchi.   Vent controlled resp effort   Abdominal: Soft. She exhibits no distension. Bowel sounds are decreased.       Musculoskeletal: She exhibits edema (trace edema BLE).   Neurological: She is easily aroused.   Skin: Skin is warm and dry. Capillary refill takes 2 to 3 seconds.            Vents:  Vent Mode: A/C (04/13/18 1245)  Ventilator Initiated: Yes (04/05/18 1930)  Set Rate: 18 bmp (04/13/18 1245)  Vt Set: 400 mL (04/13/18 1245)  Pressure Support: 0 cmH20 (04/13/18 1245)  PEEP/CPAP: 5 cmH20 (04/13/18 1245)  Oxygen Concentration (%): 45 (04/13/18 1300)  Peak Airway Pressure: 30 cmH2O (04/13/18 1245)  Plateau Pressure: 0 cmH20 (04/13/18 1245)  Total Ve: 12 mL (04/13/18 1245)  F/VT Ratio<105 (RSBI): (!) 29.1 (04/13/18  1245)    Lines/Drains/Airways     Central Venous Catheter Line                 Percutaneous Central Line Insertion/Assessment - triple lumen  04/03/18 2045 right internal jugular 9 days          Drain                 Urethral Catheter 04/03/18 16 Fr. 10 days         Rectal Tube 04/12/18 0710 rectal tube w/ balloon (indicate number of mLs) 1 day         NG/OG Tube 04/12/18 1820 14 Fr. Right nostril less than 1 day          Airway                 Surgical Airway 04/11/18 1403 Shiley Cuffed;Long;Proximal 2 days          Arterial Line                 Arterial Line 04/08/18 0720 Left Brachial 5 days                Significant Labs:    CBC/Anemia Profile:    Recent Labs  Lab 04/12/18  0353 04/13/18  0440   WBC 20.72* 21.20*   HGB 10.1* 9.8*   HCT 31.2* 30.2*   PLT 97* 148*   MCV 94 94   RDW 18.2* 18.4*        Chemistries:    Recent Labs  Lab 04/12/18  0353  04/12/18  2250 04/13/18  0440 04/13/18  1244     140  < > 145 144  144 144   K 2.9*  2.9*  < > 3.3* 3.2*  3.2* 3.4*   CL 99  99  < > 97 97  97 97   CO2 28  28  < > 31* 32*  32* 31*   BUN 5*  5*  < > 6 6  6 7   CREATININE 1.8*  1.8*  < > 2.0* 2.2*  2.2* 2.2*   CALCIUM 8.0*  8.0*  < > 8.8 8.9  8.9 9.0   ALBUMIN 1.9*  --   --  2.0* 2.1*   PROT 5.3*  --   --  5.7*  --    BILITOT 9.1*  --   --  9.8*  --    ALKPHOS 149*  --   --  144*  --    ALT 28  --   --  29  --    AST 74*  --   --  77*  --    MG 1.5*  < > 1.5* 1.8 1.9   PHOS 2.1*  < > 2.3* 2.0* 2.0*   < > = values in this interval not displayed.    All pertinent labs within the past 24 hours have been reviewed.  ABG    Recent Labs  Lab 04/11/18  0506   PH 7.398   PO2 94   PCO2 39.8   HCO3 24.5   BE 0         Significant Imaging:  I have reviewed all pertinent imaging results/findings within the past 24 hours.

## 2018-04-13 NOTE — PLAN OF CARE
Problem: Patient Care Overview  Goal: Plan of Care Review  Outcome: Ongoing (interventions implemented as appropriate)  Pt remains on ventilator, levophed titrated off this morning.  Taken to CT for IR cholecystostomy- had to be given ativan to be still for procedure, but tolerated well after this.  Large amount of green sludge returned from tube.  pts family updated via phone today.

## 2018-04-13 NOTE — SUBJECTIVE & OBJECTIVE
Interval History: No acute events overnight, patient has tracheostomy with continued ventilator support.    Oncology Treatment Plan:   [No treatment plan]    Medications:  Continuous Infusions:   norepinephrine bitartrate-D5W Stopped (04/13/18 0700)     Scheduled Meds:   albumin human 25%  12.5 g Intravenous BID    bumetanide  3 mg Intravenous TID    chlorhexidine  15 mL Mouth/Throat BID    famotidine (PF)  20 mg Intravenous Daily    folic acid-vit B6-vit B12 2.5-25-2 mg  1 tablet Oral Daily    meropenem (MERREM) IVPB  500 mg Intravenous Q6H    metOLazone  5 mg Oral Daily    metronidazole  500 mg Intravenous Q8H    multivitamin  1 tablet Oral Daily    potassium chloride  20 mEq Intravenous Daily    rifAXIMin  550 mg Oral BID    thiamine  100 mg Oral Daily    [START ON 4/14/2018] vancomycin (VANCOCIN) IVPB  1,000 mg Intravenous Q48H    vancomycin  250 mg Oral Q6H    vitamin D  5,000 Units Oral Daily     PRN Meds:sodium chloride, acetaminophen, albuterol-ipratropium 2.5mg-0.5mg/3mL, bisacodyl, dextrose 50%, fentaNYL, glucagon (human recombinant), insulin aspart U-100, lorazepam, ondansetron, pneumoc 13-bobby conj-dip cr(PF), sodium chloride 0.9%     Review of Systems   Unable to perform ROS: Intubated     Objective:     Vital Signs (Most Recent):  Temp: 99.8 °F (37.7 °C) (04/13/18 1010)  Pulse: 108 (04/13/18 1010)  Resp: (!) 24 (04/13/18 1010)  BP: 108/69 (04/13/18 1010)  SpO2: 100 % (04/13/18 1010) Vital Signs (24h Range):  Temp:  [99.8 °F (37.7 °C)-101.2 °F (38.4 °C)] 99.8 °F (37.7 °C)  Pulse:  [] 108  Resp:  [16-31] 24  SpO2:  [94 %-100 %] 100 %  BP: ()/() 108/69     Weight: 125 kg (275 lb 9.2 oz)  Body mass index is 44.48 kg/m².  Body surface area is 2.41 meters squared.      Intake/Output Summary (Last 24 hours) at 04/13/18 1056  Last data filed at 04/13/18 1010   Gross per 24 hour   Intake          2502.06 ml   Output             5535 ml   Net         -3032.94 ml       Physical  Exam   Constitutional: She appears well-developed and well-nourished. She appears lethargic. No distress. She is intubated.   HENT:   Head: Normocephalic and atraumatic.   Right Ear: Hearing and external ear normal.   Left Ear: Hearing and external ear normal.   Nose: No rhinorrhea or sinus tenderness. Right sinus exhibits no maxillary sinus tenderness and no frontal sinus tenderness. Left sinus exhibits no maxillary sinus tenderness and no frontal sinus tenderness.   Mouth/Throat: Uvula is midline, oropharynx is clear and moist and mucous membranes are normal. No oral lesions.   Eyes: Conjunctivae are normal. Pupils are equal, round, and reactive to light. Right eye exhibits no discharge. Left eye exhibits no discharge.   Neck: Normal range of motion. Carotid bruit is not present. No tracheal deviation present. No thyromegaly present.   Cardiovascular: Normal rate, regular rhythm, S1 normal, S2 normal, normal heart sounds and intact distal pulses.    No murmur heard.  Pulses:       Dorsalis pedis pulses are 2+ on the right side, and 2+ on the left side.   Pulmonary/Chest: Breath sounds normal. She is intubated.   Abdominal: Soft. Bowel sounds are normal. She exhibits distension. She exhibits no fluid wave and no mass. There is no tenderness.   Musculoskeletal: Normal range of motion.   Lymphadenopathy:     She has no cervical adenopathy.        Right: No supraclavicular adenopathy present.        Left: No supraclavicular adenopathy present.   Neurological: She appears lethargic.   Skin: Skin is warm and dry. Capillary refill takes less than 2 seconds. Bruising noted. No ecchymosis, no petechiae and no rash noted. No erythema. There is pallor.   Psychiatric:   Intubated   Nursing note and vitals reviewed.      Significant Labs:   CBC:   Recent Labs  Lab 04/12/18  0353 04/13/18  0440   WBC 20.72* 21.20*   HGB 10.1* 9.8*   HCT 31.2* 30.2*   PLT 97* 148*    and CMP:   Recent Labs  Lab 04/12/18  0353 04/12/18  1411  04/12/18  2250 04/13/18  0440     140 143 145 144  144   K 2.9*  2.9* 3.0* 3.3* 3.2*  3.2*   CL 99  99 98 97 97  97   CO2 28  28 30* 31* 32*  32*   *  120* 121* 96 96  96   BUN 5*  5* 6 6 6  6   CREATININE 1.8*  1.8* 2.0* 2.0* 2.2*  2.2*   CALCIUM 8.0*  8.0* 8.7 8.8 8.9  8.9   PROT 5.3*  --   --  5.7*   ALBUMIN 1.9*  --   --  2.0*   BILITOT 9.1*  --   --  9.8*   ALKPHOS 149*  --   --  144*   AST 74*  --   --  77*   ALT 28  --   --  29   ANIONGAP 13  13 15 17* 15  15   EGFRNONAA 39*  39* 34* 34* 31*  31*       Diagnostic Results:  I have reviewed all pertinent imaging results/findings within the past 24 hours.
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
## 2018-04-13 NOTE — SUBJECTIVE & OBJECTIVE
Interval History:  No acute events, remains critically ill, on pressor support ,     Review of patient's allergies indicates:  No Known Allergies  Current Facility-Administered Medications   Medication Frequency    0.9%  NaCl infusion (for blood administration) Q24H PRN    acetaminophen oral solution 650 mg Q8H PRN    albumin human 25% bottle 12.5 g BID    albuterol-ipratropium 2.5mg-0.5mg/3mL nebulizer solution 3 mL Q4H PRN    bisacodyl suppository 10 mg Daily PRN    bumetanide injection 3 mg TID    chlorhexidine 0.12 % solution 15 mL BID    dextrose 50% injection 12.5 g PRN    famotidine (PF) injection 20 mg Daily    fentaNYL injection 50 mcg Q2H PRN    folic acid-vit B6-vit B12 2.5-25-2 mg tablet 1 tablet Daily    glucagon (human recombinant) injection 1 mg PRN    insulin aspart U-100 pen 1-10 Units Q6H PRN    lorazepam (ATIVAN) injection 2 mg Q2H PRN    meropenem injection 500 mg Q12H    metOLazone tablet 5 mg Daily    metronidazole IVPB 500 mg Q8H    multivitamin tablet 1 tablet Daily    norepinephrine 32 mg in dextrose 5 % 250 mL infusion Continuous    ondansetron injection 4 mg Q8H PRN    pneumoc 13-bobby conj-dip cr(PF) 0.5 mL vaccine x 1 dose    potassium chloride 20 mEq in 100 mL IVPB (FOR CENTRAL LINE ADMINISTRATION ONLY) Daily    rifAXIMin tablet 550 mg BID    sodium chloride 0.9% flush 5 mL PRN    thiamine tablet 100 mg Daily    vancomycin 1 g in 0.9% sodium chloride 250 mL IVPB (ready to mix system) Once    [START ON 4/15/2018] vancomycin 1 gram/250 mL in sodium chloride 0.9% IVPB 1 g Q48H    vancomycin 250mg / 10ml oral suspension 250 mg Q6H    vitamin D 1000 units tablet 5,000 Units Daily       Objective:     Vital Signs (Most Recent):  Temp: 99.8 °F (37.7 °C) (04/13/18 1010)  Pulse: 108 (04/13/18 1010)  Resp: (!) 24 (04/13/18 1010)  BP: 108/69 (04/13/18 1010)  SpO2: 100 % (04/13/18 1010)  O2 Device (Oxygen Therapy): ventilator (04/13/18 1010) Vital Signs (24h  Range):  Temp:  [99.8 °F (37.7 °C)-101.2 °F (38.4 °C)] 99.8 °F (37.7 °C)  Pulse:  [] 108  Resp:  [16-31] 24  SpO2:  [94 %-100 %] 100 %  BP: ()/(39-82) 108/69     Weight: 125 kg (275 lb 9.2 oz) (04/13/18 0635)  Body mass index is 44.48 kg/m².  Body surface area is 2.41 meters squared.    I/O last 3 completed shifts:  In: 2690.6 [I.V.:290.6; Blood:100; NG/GT:800; IV Piggyback:1500]  Out: 8520 [Urine:7920; Stool:600]    Physical Exam   Constitutional: She appears well-developed. No distress.   Morbidly obese    HENT:   Head: Normocephalic and atraumatic.   Mouth/Throat: Oropharynx is clear and moist. No oropharyngeal exudate.   Eyes: Conjunctivae and EOM are normal. Pupils are equal, round, and reactive to light.   Neck: Neck supple. No JVD present. Carotid bruit is not present. No tracheal deviation present. No thyroid mass and no thyromegaly present.   Trach in place    Cardiovascular: Normal rate, regular rhythm, normal heart sounds and intact distal pulses.  Exam reveals no gallop and no friction rub.    No murmur heard.  Pulmonary/Chest: No respiratory distress. She has no wheezes. She has rales. She exhibits no tenderness.   Abdominal: Soft. Bowel sounds are normal. She exhibits no distension, no abdominal bruit, no ascites and no mass. There is no hepatosplenomegaly. There is no tenderness. There is no rebound, no guarding and no CVA tenderness.   Cholecystostomy in place    Musculoskeletal: She exhibits edema. She exhibits no tenderness.   Dependant edema    Lymphadenopathy:     She has no cervical adenopathy.   Neurological: She has normal reflexes. She displays normal reflexes. No cranial nerve deficit. She exhibits normal muscle tone. Coordination normal.   Skin: Skin is warm and intact. No rash noted. No erythema. No pallor.       Significant Labs:  CBC:   Recent Labs  Lab 04/13/18  0440   WBC 21.20*   RBC 3.20*   HGB 9.8*   HCT 30.2*   *   MCV 94   MCH 30.6   MCHC 32.5     CMP:   Recent  Labs  Lab 04/13/18  0440   GLU 96  96   CALCIUM 8.9  8.9   ALBUMIN 2.0*   PROT 5.7*     144   K 3.2*  3.2*   CO2 32*  32*   CL 97  97   BUN 6  6   CREATININE 2.2*  2.2*   ALKPHOS 144*   ALT 29   AST 77*   BILITOT 9.8*     Coagulation:   Recent Labs  Lab 04/08/18  0712  04/13/18 0440   INR 1.2  < > 1.8*   APTT 39.9*  --   --    < > = values in this interval not displayed.  LFTs:   Recent Labs  Lab 04/13/18 0440   ALT 29   AST 77*   ALKPHOS 144*   BILITOT 9.8*   PROT 5.7*   ALBUMIN 2.0*     All labs within the past 24 hours have been reviewed.     Lab Results   Component Value Date    .6 (H) 04/09/2018    CALCIUM 8.9 04/13/2018    CALCIUM 8.9 04/13/2018    CAION 0.86 (L) 04/10/2018    PHOS 2.0 (L) 04/13/2018           Significant Imaging: reviewed

## 2018-04-13 NOTE — CARE UPDATE
WICHO, being proactive,  spoke with Grant at [facility] to see if this Medicaid patient would qualify for LTAC. She will be here this am for face to face eval.

## 2018-04-13 NOTE — PLAN OF CARE
Problem: Patient Care Overview  Goal: Plan of Care Review  Outcome: Ongoing (interventions implemented as appropriate)  No acute changes over night, VSS, levophed weaned down, TMAX 101.2, UOP adequate, and potassium replaced throughout the night. Pt remains NPO for possible procedure today. BSWR's in place w/ no injuries noted. POC discussed w/ pt.

## 2018-04-13 NOTE — ASSESSMENT & PLAN NOTE
Likely related to hepatomegaly and Liver Shock.  Platelet count has significantly improved and noted to be 148,000 today      --Continue to monitor CBC and INR daily

## 2018-04-13 NOTE — ASSESSMENT & PLAN NOTE
21 yo admitted for septic shock after retained products of conception.    Elevated LFTs likely multifactorial - septic shock, metabolic related cause and alcoholic hepatitis  INR and MELD continue to gradually increase.     Continue Rifaximin. May want to hold off on Lactulose since patient is already having diarrhea, distention and ?ileus.  Recommend treating underlying shock and continue supportive care. Antibiotics per ID and ICU teams.      MELD-Na score: 29 at 4/13/2018 12:44 PM  MELD score: 29 at 4/13/2018 12:44 PM  Calculated from:  Serum Creatinine: 2.2 mg/dL at 4/13/2018 12:44 PM  Serum Sodium: 144 mmol/L (Rounded to 137) at 4/13/2018 12:44 PM  Total Bilirubin: 9.8 mg/dL at 4/13/2018  4:40 AM  INR(ratio): 1.8 at 4/13/2018  4:40 AM  Age: 22 years

## 2018-04-13 NOTE — ASSESSMENT & PLAN NOTE
? Alcoholic liver disease / toxic hepatitis ( DILI). Mainly cholestatic picture.  Monitoring liver enzymes.   4/13 - Gallbladder drain placed today

## 2018-04-13 NOTE — ASSESSMENT & PLAN NOTE
Nephrology following; good diuresis with albumin, bumex combo but creatinine worsening, consider de-escalation of diuresis

## 2018-04-13 NOTE — ASSESSMENT & PLAN NOTE
Re intubated for third time 4/8 s/p asystolic arrest.  Cont Mechanical ventilation.  Ventilator settings reviewed and adjusted to optimize gas exchange. Daily wake up and breathe trials once more stable.  Would benefit from Trach once more stable.  Titrate FIO2 to keep SAO2 > 92%.   4/11 tracheostomy placed  4/12 Continue ventilator support  4/13 - failed SBT, vent settings reviewed and appropriate

## 2018-04-13 NOTE — ASSESSMENT & PLAN NOTE
04/07/2018- will closely monitor fever curve, will stop flagyl, change meropenem to zosyn,continue vancomycin for now.  Will deescalate soon.  Blood cultures -neg, sputum cultures-MRSA and pseudomonas   4/11/2018 - antibx adjusted today, per icu, ID consulted  04/13/18- possible due to acute cholecystectomy -s/p cholestomy tube placement today

## 2018-04-13 NOTE — SUBJECTIVE & OBJECTIVE
Subjective:     Interval History:   The patient now has a trach. PEG deferred to early next week. HIDA done and cystic duct not visualized. A percutaneous cholecystostomy tube was placed today. She has been off tube feeds a couple of days. Xray on the 10th suggested possible ileus. The patient is now having diarrhea and rectal tube placed. AST trending down. Tbili remains elevated. INR gradually increasing. Patient having fevers. Antibiotics have been changed by primary teams.     Review of Systems   Unable to perform ROS: Intubated   Constitutional:        See Interval History for daily ROS.      Objective:     Vital Signs (Most Recent):  Temp: 100.1 °F (37.8 °C) (04/13/18 1230)  Pulse: 106 (04/13/18 1300)  Resp: 16 (04/13/18 1300)  BP: (!) 118/58 (04/13/18 1300)  SpO2: 100 % (04/13/18 1300) Vital Signs (24h Range):  Temp:  [99.8 °F (37.7 °C)-101.2 °F (38.4 °C)] 100.1 °F (37.8 °C)  Pulse:  [] 106  Resp:  [11-31] 16  SpO2:  [94 %-100 %] 100 %  BP: ()/(47-82) 118/58     Weight: 125 kg (275 lb 9.2 oz) (04/13/18 0635)  Body mass index is 44.48 kg/m².      Intake/Output Summary (Last 24 hours) at 04/13/18 1413  Last data filed at 04/13/18 1200   Gross per 24 hour   Intake          1742.06 ml   Output             5110 ml   Net         -3367.94 ml       Lines/Drains/Airways     Central Venous Catheter Line                 Percutaneous Central Line Insertion/Assessment - triple lumen  04/03/18 2045 right internal jugular 9 days          Drain                 Urethral Catheter 04/03/18 16 Fr. 10 days         Rectal Tube 04/12/18 0710 rectal tube w/ balloon (indicate number of mLs) 1 day         NG/OG Tube 04/12/18 1820 14 Fr. Right nostril less than 1 day          Airway                 Surgical Airway 04/11/18 1403 Shiley Cuffed;Long;Proximal 2 days          Arterial Line                 Arterial Line 04/08/18 0720 Left Brachial 5 days                Physical Exam   Constitutional: She appears ill. She is  intubated.   Obese   HENT:   Head: Normocephalic and atraumatic.   Cardiovascular: Regular rhythm.  Tachycardia present.    Pulmonary/Chest: Breath sounds normal. She is intubated.   Abdominal: She exhibits distension. Bowel sounds are decreased.   Less distended in the lower abdomen. Still some in the upper abdomen. Tenderness suspected because she looks uncomfortable with palpation.    Musculoskeletal: She exhibits no edema.   Neurological:   Sedated/intubated   Skin: Skin is warm and dry.       Significant Labs:  CBC:   Recent Labs  Lab 04/12/18  0353 04/13/18  0440   WBC 20.72* 21.20*   HGB 10.1* 9.8*   HCT 31.2* 30.2*   PLT 97* 148*     CMP:   Recent Labs  Lab 04/13/18  0440 04/13/18  1244   GLU 96  96 72   CALCIUM 8.9  8.9 9.0   ALBUMIN 2.0* 2.1*   PROT 5.7*  --      144 144   K 3.2*  3.2* 3.4*   CO2 32*  32* 31*   CL 97  97 97   BUN 6  6 7   CREATININE 2.2*  2.2* 2.2*   ALKPHOS 144*  --    ALT 29  --    AST 77*  --    BILITOT 9.8*  --      Coagulation:   Recent Labs  Lab 04/13/18  0440   INR 1.8*         Significant Imaging:  Imaging results within the past 24 hours have been reviewed.

## 2018-04-13 NOTE — PROGRESS NOTES
[facility] -   Nephrology  Progress Note    Patient Name: Rafael Duron  MRN: [id number]  Admission Date: 4/3/2018  Hospital Length of Stay: 10 days  Attending Provider: Elvira Ariza MD   Primary Care Physician: Herman Cowart MD  Principal Problem:Septic shock    Subjective:     HPI: 21 yo obese female with HTN, gestational DM  who presented to [facility] ED in [place], [place] on 4/2 with complaint of SOB and cough with known pregnancy. Workup revealed fetal demise (estimated at 22 weeks) and patient passed dead fetus but retained placenta. Placenta remnants were removed in OR vis D & C.  After admission to ICU she had seizure activity with , K+ 1.9 and Bicarb 11.  She also had EtOH level of 268.  She required intubation for airway protection per records. She as transferred to [facility] ICU on 4/3/18 for higher level of care.    Patient presented with septic shock at [facility] and was started on IV pressors and IV antibiotics. Patient was initially stabilized and extubated on 4/5/18. OB/GYN following. She developed syncopal episode on 4/5/18 associated with bradycardia. She was successfully resuscitated with IV fluids and levophed. She subsequently deteriorated and was re-intubated on 4/5/18. Abdominal US revealed massive hepatomegaly with liver span of 33 cm. Patient's condition improved and she was extubated again on 4/7/18. Patient coded on 4/8/18 and was successfully resuscitated and again intubated. Patient was also diagnosed with C. Diff colitis and MRSA bacteremia.   Nephrology was consulted to help with patient's electrolyte care, renal care and volume management. I saw and examined patient in her hospital room. Patient is intubated and not able to provide any additional history.   Patient remains on antibiotics and pressor support. Chart review revealed that hyponatremia and hypokalemia have now resolved. However, hypocalcemia has persisted. In addition, patient's  renal function has worsened during current admission and creatinine has increased from 0.8 on 4/6/18 to 1.6 on 4/9/18. Volume review showed positive I/O balance with + 24 liters since admission,. Patient's UOP has been fluctuating with 1 to 3 liters of urine per day. Current UOP about 100 cc/hour.       Interval History:  No acute events, remains critically ill, on pressor support ,     Review of patient's allergies indicates:  No Known Allergies  Current Facility-Administered Medications   Medication Frequency    0.9%  NaCl infusion (for blood administration) Q24H PRN    acetaminophen oral solution 650 mg Q8H PRN    albumin human 25% bottle 12.5 g BID    albuterol-ipratropium 2.5mg-0.5mg/3mL nebulizer solution 3 mL Q4H PRN    bisacodyl suppository 10 mg Daily PRN    bumetanide injection 3 mg TID    chlorhexidine 0.12 % solution 15 mL BID    dextrose 50% injection 12.5 g PRN    famotidine (PF) injection 20 mg Daily    fentaNYL injection 50 mcg Q2H PRN    folic acid-vit B6-vit B12 2.5-25-2 mg tablet 1 tablet Daily    glucagon (human recombinant) injection 1 mg PRN    insulin aspart U-100 pen 1-10 Units Q6H PRN    lorazepam (ATIVAN) injection 2 mg Q2H PRN    meropenem injection 500 mg Q12H    metOLazone tablet 5 mg Daily    metronidazole IVPB 500 mg Q8H    multivitamin tablet 1 tablet Daily    norepinephrine 32 mg in dextrose 5 % 250 mL infusion Continuous    ondansetron injection 4 mg Q8H PRN    pneumoc 13-bobby conj-dip cr(PF) 0.5 mL vaccine x 1 dose    potassium chloride 20 mEq in 100 mL IVPB (FOR CENTRAL LINE ADMINISTRATION ONLY) Daily    rifAXIMin tablet 550 mg BID    sodium chloride 0.9% flush 5 mL PRN    thiamine tablet 100 mg Daily    vancomycin 1 g in 0.9% sodium chloride 250 mL IVPB (ready to mix system) Once    [START ON 4/15/2018] vancomycin 1 gram/250 mL in sodium chloride 0.9% IVPB 1 g Q48H    vancomycin 250mg / 10ml oral suspension 250 mg Q6H    vitamin D 1000 units tablet  5,000 Units Daily       Objective:     Vital Signs (Most Recent):  Temp: 99.8 °F (37.7 °C) (04/13/18 1010)  Pulse: 108 (04/13/18 1010)  Resp: (!) 24 (04/13/18 1010)  BP: 108/69 (04/13/18 1010)  SpO2: 100 % (04/13/18 1010)  O2 Device (Oxygen Therapy): ventilator (04/13/18 1010) Vital Signs (24h Range):  Temp:  [99.8 °F (37.7 °C)-101.2 °F (38.4 °C)] 99.8 °F (37.7 °C)  Pulse:  [] 108  Resp:  [16-31] 24  SpO2:  [94 %-100 %] 100 %  BP: ()/(39-82) 108/69     Weight: 125 kg (275 lb 9.2 oz) (04/13/18 0635)  Body mass index is 44.48 kg/m².  Body surface area is 2.41 meters squared.    I/O last 3 completed shifts:  In: 2690.6 [I.V.:290.6; Blood:100; NG/GT:800; IV Piggyback:1500]  Out: 8520 [Urine:7920; Stool:600]    Physical Exam   Constitutional: She appears well-developed. No distress.   Morbidly obese    HENT:   Head: Normocephalic and atraumatic.   Mouth/Throat: Oropharynx is clear and moist. No oropharyngeal exudate.   Eyes: Conjunctivae and EOM are normal. Pupils are equal, round, and reactive to light.   Neck: Neck supple. No JVD present. Carotid bruit is not present. No tracheal deviation present. No thyroid mass and no thyromegaly present.   Trach in place    Cardiovascular: Normal rate, regular rhythm, normal heart sounds and intact distal pulses.  Exam reveals no gallop and no friction rub.    No murmur heard.  Pulmonary/Chest: No respiratory distress. She has no wheezes. She has rales. She exhibits no tenderness.   Abdominal: Soft. Bowel sounds are normal. She exhibits no distension, no abdominal bruit, no ascites and no mass. There is no hepatosplenomegaly. There is no tenderness. There is no rebound, no guarding and no CVA tenderness.   Cholecystostomy in place    Musculoskeletal: She exhibits edema. She exhibits no tenderness.   Dependant edema    Lymphadenopathy:     She has no cervical adenopathy.   Neurological: She has normal reflexes. She displays normal reflexes. No cranial nerve deficit. She  exhibits normal muscle tone. Coordination normal.   Skin: Skin is warm and intact. No rash noted. No erythema. No pallor.       Significant Labs:  CBC:   Recent Labs  Lab 04/13/18 0440   WBC 21.20*   RBC 3.20*   HGB 9.8*   HCT 30.2*   *   MCV 94   MCH 30.6   MCHC 32.5     CMP:   Recent Labs  Lab 04/13/18 0440   GLU 96  96   CALCIUM 8.9  8.9   ALBUMIN 2.0*   PROT 5.7*     144   K 3.2*  3.2*   CO2 32*  32*   CL 97  97   BUN 6  6   CREATININE 2.2*  2.2*   ALKPHOS 144*   ALT 29   AST 77*   BILITOT 9.8*     Coagulation:   Recent Labs  Lab 04/08/18  0712  04/13/18 0440   INR 1.2  < > 1.8*   APTT 39.9*  --   --    < > = values in this interval not displayed.  LFTs:   Recent Labs  Lab 04/13/18 0440   ALT 29   AST 77*   ALKPHOS 144*   BILITOT 9.8*   PROT 5.7*   ALBUMIN 2.0*     All labs within the past 24 hours have been reviewed.     Lab Results   Component Value Date    .6 (H) 04/09/2018    CALCIUM 8.9 04/13/2018    CALCIUM 8.9 04/13/2018    CAION 0.86 (L) 04/10/2018    PHOS 2.0 (L) 04/13/2018           Significant Imaging: reviewed     Assessment/Plan:     VIKRAM (acute kidney injury)      1. VIKRAM : Patient's renal function has worsened and creatinine has increased from 0.8 on 4/6/18 to 2.2 , VIKRAM from ATN from low BP few days ago and also due to aggressive diuresis , will closely watch renal fn, if Cr worsens may have to back off on diuretic dose, check renal panel twice a day , mainly to monitor renal fn and lytes,     Edema improving with aggressive diuresis, cont for now,     2. Electrolyte abnormalities, hypokalemia, Hypophos, HypoMag , check and replete as indicated,     3. Hypotension : cont pressor support     4. Abnormal LFTs , s/p Cholecystostomy placement , '    5. Meds reviewed, adjusted Meropenem dose to bid,     6. Anemia : multifactorial, heme following                  I will follow-up with patient. Please contact us if you have any additional questions.     Total critical care  time spent 50 minutes including time needed to review the records,  patient  evaluation, documentation, face-to-face discussion with the ICU and Primary teams, more than 50% of the time was spent on coordination of care and counseling.       Rafael Ferreira MD  Nephrology  [facility]

## 2018-04-13 NOTE — PROGRESS NOTES
[facility]  Critical Care Medicine  Progress Note    Patient Name: Rafael Duron  MRN: [id number]  Admission Date: 4/3/2018  Hospital Length of Stay: 10 days  Code Status: Full Code  Attending Provider: Elvira Ariza MD  Primary Care Provider: Herman Cowart MD   Principal Problem: Septic shock    Subjective:     HPI:  Ms Duorn is a 23 yo obese BF with a PMH of HTN, gestational DM and HTN who presented to [facility] ED in [place], [place] on  with complaint of SOB and cough with known pregnancy.  OB US revealed no heart tones and she is also  with second trimester fetal demise estimated 22 week for which she passed with retained placenta.  After admission to ICU she had seizure activity with , K+ 1.9 and Bicarb 11.  She also had etoh level 268 and reportedly had been drinking etoh w/ honey heavily for several days.  She required intubation for airway protection per records and OB was unable to remove placenta manually and patient had to be taken to OR.  She received 2 liters IVF and Hgb dropped to 7.4 and was transfused 2 units PRBCs.  Vaginal bleeding was scant per records.  Yesterday she had temp 101.5 Ax, .  She as transferred to [facility] ICU yesterday evening for higher level of care.        Hospital/ICU Course:   - This AM she is sedated on vent on Levophed infusion spiking temp 101.9 with WBC 20, LA 6.3, UA+, electrolyte imbalance and Glucose 268     - SAT and SBT done this am, pt awake and following commands. She was successfully extubated to nasal cannula. Remains tachycardic with HR 140s and febrile with temp 103 overnight. WBC 18 and lactic acidosis improving to 4.8. Continuing to aggressively replace electrolytes.     - Over night patient had episode with bradycardia and hypotension during which she became unresponsive and agonally breathing. She responded with IVF and bicarb and was restarted on pressors. She experienced a similar episode  again last night, during which she was intubated. Vent settings were reviewed and adjusted this am. No more bradycardic/hypotensive episodes since last night. Remains on pressors. Leukocystosis unimproved with worsening bandemia. Urine and blood cx NGTD. Sputum cx growing staph and pseudomonas however CXR this am is unremarkable.    04/7 - Tolerating SAT and SBT. Meets extubating criteria. Acidosis improving. Leukocytosis improving. Remains on bicarb gtt.     04/8 - Extubated successfully yesterday. Asystolic arrest this AM.  ACLS initiated. Re - intubated 2 rounds CPR with Iv epinephrine X 1  and IV CaCl X 1 given. ROSC attained.   A - line placed.      4/9 - now with mild vaginal bleeding and severe anasarca with blistering.  Still on vent with sedation and Levophed/Vasopressin infusing.  Worsening UOP and creatine.  Spoke with brother at bedside in detail and all questions answered.   4/10 - Opens eyes alert. Mild increase in Cr -. Intravascular volume depletion. Will add albumin. Still with vagnal bleeding  4/11 - low grade temp, Slight worsening of CR. Good urine output; trach placed today, remains on mechanical vent support and low dose pressor  4/12 - remains on mechanical ventilation via trach; HIDA scan abnormal with no gall bladder filling  4/13 - external gall bladder drain placed by IR; remains on mechanical ventilation via Trach    Review of Systems   Unable to perform ROS: Intubated       Objective:     Vital Signs (Most Recent):  Temp: 100.1 °F (37.8 °C) (04/13/18 1230)  Pulse: 106 (04/13/18 1300)  Resp: 16 (04/13/18 1300)  BP: (!) 118/58 (04/13/18 1300)  SpO2: 100 % (04/13/18 1300) Vital Signs (24h Range):  Temp:  [99.8 °F (37.7 °C)-101.2 °F (38.4 °C)] 100.1 °F (37.8 °C)  Pulse:  [] 106  Resp:  [11-31] 16  SpO2:  [94 %-100 %] 100 %  BP: ()/(47-82) 118/58     Weight: 125 kg (275 lb 9.2 oz)  Body mass index is 44.48 kg/m².      Intake/Output Summary (Last 24 hours) at 04/13/18 0797  Last  data filed at 04/13/18 1200   Gross per 24 hour   Intake          1742.06 ml   Output             5110 ml   Net         -3367.94 ml       Physical Exam   Constitutional: She is easily aroused. She appears ill. She is restrained.   Obese young female on mechanical ventilation via trach   HENT:   Head: Atraumatic.   Eyes: Conjunctivae are normal.   Neck: No JVD present.   Cardiovascular: Regular rhythm.  Tachycardia present.    Pulses:       Radial pulses are 2+ on the right side, and 2+ on the left side.        Dorsalis pedis pulses are 1+ on the right side, and 1+ on the left side.   Pulmonary/Chest: She has rhonchi.   Vent controlled resp effort   Abdominal: Soft. She exhibits no distension. Bowel sounds are decreased.       Musculoskeletal: She exhibits edema (trace edema BLE).   Neurological: She is easily aroused.   Skin: Skin is warm and dry. Capillary refill takes 2 to 3 seconds.            Vents:  Vent Mode: A/C (04/13/18 1245)  Ventilator Initiated: Yes (04/05/18 1930)  Set Rate: 18 bmp (04/13/18 1245)  Vt Set: 400 mL (04/13/18 1245)  Pressure Support: 0 cmH20 (04/13/18 1245)  PEEP/CPAP: 5 cmH20 (04/13/18 1245)  Oxygen Concentration (%): 45 (04/13/18 1300)  Peak Airway Pressure: 30 cmH2O (04/13/18 1245)  Plateau Pressure: 0 cmH20 (04/13/18 1245)  Total Ve: 12 mL (04/13/18 1245)  F/VT Ratio<105 (RSBI): (!) 29.1 (04/13/18 1245)    Lines/Drains/Airways     Central Venous Catheter Line                 Percutaneous Central Line Insertion/Assessment - triple lumen  04/03/18 2045 right internal jugular 9 days          Drain                 Urethral Catheter 04/03/18 16 Fr. 10 days         Rectal Tube 04/12/18 0710 rectal tube w/ balloon (indicate number of mLs) 1 day         NG/OG Tube 04/12/18 1820 14 Fr. Right nostril less than 1 day          Airway                 Surgical Airway 04/11/18 1403 Kvng Cuffed;Long;Proximal 2 days          Arterial Line                 Arterial Line 04/08/18 0720 Left Brachial 5  days                Significant Labs:    CBC/Anemia Profile:    Recent Labs  Lab 04/12/18  0353 04/13/18  0440   WBC 20.72* 21.20*   HGB 10.1* 9.8*   HCT 31.2* 30.2*   PLT 97* 148*   MCV 94 94   RDW 18.2* 18.4*        Chemistries:    Recent Labs  Lab 04/12/18  0353  04/12/18  2250 04/13/18  0440 04/13/18  1244     140  < > 145 144  144 144   K 2.9*  2.9*  < > 3.3* 3.2*  3.2* 3.4*   CL 99  99  < > 97 97  97 97   CO2 28  28  < > 31* 32*  32* 31*   BUN 5*  5*  < > 6 6  6 7   CREATININE 1.8*  1.8*  < > 2.0* 2.2*  2.2* 2.2*   CALCIUM 8.0*  8.0*  < > 8.8 8.9  8.9 9.0   ALBUMIN 1.9*  --   --  2.0* 2.1*   PROT 5.3*  --   --  5.7*  --    BILITOT 9.1*  --   --  9.8*  --    ALKPHOS 149*  --   --  144*  --    ALT 28  --   --  29  --    AST 74*  --   --  77*  --    MG 1.5*  < > 1.5* 1.8 1.9   PHOS 2.1*  < > 2.3* 2.0* 2.0*   < > = values in this interval not displayed.    All pertinent labs within the past 24 hours have been reviewed.  ABG    Recent Labs  Lab 04/11/18  0506   PH 7.398   PO2 94   PCO2 39.8   HCO3 24.5   BE 0         Significant Imaging:  I have reviewed all pertinent imaging results/findings within the past 24 hours.      Assessment/Plan:     Psychiatric   ETOH abuse    Continue electrolyte replacement, Thiamine, Folate, MVI.        Pulmonary   Pneumonia of both lower lobes due to methicillin resistant Staphylococcus aureus (MRSA)    Sputum cultures positive for MRSA and Pseudomonas. Continue IV vanc - day 11  Continue temp spikes, repeat cultures with next temp        Acute hypoxemic respiratory failure    Re intubated for third time 4/8 s/p asystolic arrest.  Cont Mechanical ventilation.  Ventilator settings reviewed and adjusted to optimize gas exchange. Daily wake up and breathe trials once more stable.  Would benefit from Trach once more stable.  Titrate FIO2 to keep SAO2 > 92%.   4/11 tracheostomy placed  4/12 Continue ventilator support  4/13 - failed SBT, vent settings reviewed and  appropriate          Cardiac/Vascular    Asystolic arrest this AM. ROSC after ACLS.  IV Norepinephrine. Monitor hemodynamics. MAP goal of 60 mmHg.        Cardiopulmonary arrest    Cont supportive care and ICU cardiac monitoring        Renal/    .        Volume overload    Diuresing well, monitor creatinine        VIKRAM (acute kidney injury)    Nephrology following; good diuresis with albumin, bumex combo but creatinine worsening, consider de-escalation of diuresis        Electrolyte imbalance    Monitor and replete electrolytes as needed.         ID   * Septic shock    Off pressor  Febrile last 24 hours  Culture with next temp spjike          Clostridium difficile infection    continue oral vancomycin and IV metronidazole        Hematology    .        Thrombocytopenia    Transfused FFP this am for procedure  Platelets trending up        Oncology   Acute blood loss anemia    Monitor h/h        GI   Hepatomegaly    ? Alcoholic liver disease / toxic hepatitis ( DILI). Mainly cholestatic picture.  Monitoring liver enzymes.   4/13 - Gallbladder drain placed today        Shock liver    Bili rising and transaminases + ammonia holding  Monitor trends  GI following        Other   Hyperbilirubinemia    GI following  Bili continues upward trend        History of IUFD    S/P D & C. No retained products of conception on ultrasound.            Critical Care Daily Checklist:    A: Awake: RASS Goal/Actual Goal: RASS Goal: -1-->drowsy  Actual: Lim Agitation Sedation Scale (RASS): Alert and calm   B: Spontaneous Breathing Trial Performed? Spon. Breathing Trial Initiated?: Not initiated (held per NP order) (04/09/18 0705)   C: SAT & SBT Coordinated?  yes                      D: Delirium: CAM-ICU Overall CAM-ICU: Negative   E: Early Mobility Performed? yes   F: Feeding Goal: Goals: Meet > 85 % EEN/EPN   Status: Nutrition Goal Status: progressing towards goal   Current Diet Order   Procedures    Diet NPO      AS:  Analgesia/Sedation prn   T: Thromboembolic Prophylaxis Platelets up, add heparin   H: HOB > 300 Yes   U: Stress Ulcer Prophylaxis (if needed) pepcid   G: Glucose Control monitoring   B: Bowel Function Stool Occurrence: 1 (per flexi seal)   I: Indwelling Catheter (Lines & Rodriguez) Necessity reviewed   D: De-escalation of Antimicrobials/Pharmacotherapies reviewed    Plan for the day/ETD Supportive care    Code Status:  Family/Goals of Care: Full Code  Pending hospital course, anticipate rehab need   I have discussed case and plan of care in detail with Dr Quijano and Dr Ferreira; Status and plan of care were discussed with team on multidisciplinary rounds.    Critical Care Time: 50 minutes  Critical care was time spent personally by me on the following activities: development of treatment plan with patient or surrogate and bedside caregivers, discussions with consultants, evaluation of patient's response to treatment, examination of patient, ordering and performing treatments and interventions, ordering and review of laboratory studies, ordering and review of radiographic studies, pulse oximetry, re-evaluation of patient's condition. This critical care time did not overlap with that of any other provider or involve time for any procedures.     Aracely Pal NP  Critical Care Medicine  [facility]
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
## 2018-04-13 NOTE — ASSESSMENT & PLAN NOTE
Sputum cultures positive for MRSA and Pseudomonas. Continue IV vanc - day 11  Continue temp spikes, repeat cultures with next temp

## 2018-04-13 NOTE — ASSESSMENT & PLAN NOTE
Diarrhea now. Rectal tube in place.   The patient is on Flagyl and vancomycin.   ID is following.

## 2018-04-13 NOTE — PROGRESS NOTES
[facility]   Hematology/Oncology  Progress Note    Patient Name: Rafael Duron  Admission Date: 4/3/2018  Hospital Length of Stay: 10 days  Code Status: Full Code     Subjective:     HPI:  23 yo female with morbid obesity,  HTN, gestational DM; presented to [facility] ED in [place], [place] on 4/2 with complaint of SOB and cough with known pregnancy. Workup revealed fetal demise (estimated at 22 weeks) and patient passed dead fetus but retained placenta. Placenta remnants were removed in OR vis D & C.  After admission to ICU she had seizure activity with , K+ 1.9 and Bicarb 11.  She also had EtOH level of 268.  She required intubation for airway protection per records. She as transferred to [facility] on 4/3/18 for higher level of care.    Patient presented with septic shock at [facility] and was started on IV pressors and IV antibiotics. Patient was initially stabilized and extubated on 4/5/18. OB/GYN following. She developed syncopal episode on 4/5/18 associated with bradycardia. She was successfully resuscitated with IV fluids and levophed. She subsequently deteriorated and was re-intubated on 4/5/18. Abdominal US revealed massive hepatomegaly with liver span of 33 cm. Patient's condition improved and she was extubated again on 4/7/18. Patient coded on 4/8/18 and was successfully resuscitated and again intubated. Patient was also diagnosed with C. Diff colitis and MRSA bacteremia.   Hem/Onc consulted to manage thrombocytopenia and anemia.  Patient is intubated and not able to provide any additional history.       Interval History: No acute events overnight, patient has tracheostomy with continued ventilator support.    Oncology Treatment Plan:   [No treatment plan]    Medications:  Continuous Infusions:   norepinephrine bitartrate-D5W Stopped (04/13/18 0700)     Scheduled Meds:   albumin human 25%  12.5 g Intravenous BID    bumetanide  3 mg Intravenous TID    chlorhexidine   15 mL Mouth/Throat BID    famotidine (PF)  20 mg Intravenous Daily    folic acid-vit B6-vit B12 2.5-25-2 mg  1 tablet Oral Daily    meropenem (MERREM) IVPB  500 mg Intravenous Q6H    metOLazone  5 mg Oral Daily    metronidazole  500 mg Intravenous Q8H    multivitamin  1 tablet Oral Daily    potassium chloride  20 mEq Intravenous Daily    rifAXIMin  550 mg Oral BID    thiamine  100 mg Oral Daily    [START ON 4/14/2018] vancomycin (VANCOCIN) IVPB  1,000 mg Intravenous Q48H    vancomycin  250 mg Oral Q6H    vitamin D  5,000 Units Oral Daily     PRN Meds:sodium chloride, acetaminophen, albuterol-ipratropium 2.5mg-0.5mg/3mL, bisacodyl, dextrose 50%, fentaNYL, glucagon (human recombinant), insulin aspart U-100, lorazepam, ondansetron, pneumoc 13-bobby conj-dip cr(PF), sodium chloride 0.9%     Review of Systems   Unable to perform ROS: Intubated     Objective:     Vital Signs (Most Recent):  Temp: 99.8 °F (37.7 °C) (04/13/18 1010)  Pulse: 108 (04/13/18 1010)  Resp: (!) 24 (04/13/18 1010)  BP: 108/69 (04/13/18 1010)  SpO2: 100 % (04/13/18 1010) Vital Signs (24h Range):  Temp:  [99.8 °F (37.7 °C)-101.2 °F (38.4 °C)] 99.8 °F (37.7 °C)  Pulse:  [] 108  Resp:  [16-31] 24  SpO2:  [94 %-100 %] 100 %  BP: ()/() 108/69     Weight: 125 kg (275 lb 9.2 oz)  Body mass index is 44.48 kg/m².  Body surface area is 2.41 meters squared.      Intake/Output Summary (Last 24 hours) at 04/13/18 1056  Last data filed at 04/13/18 1010   Gross per 24 hour   Intake          2502.06 ml   Output             5535 ml   Net         -3032.94 ml       Physical Exam   Constitutional: She appears well-developed and well-nourished. She appears lethargic. No distress. She is intubated.   HENT:   Head: Normocephalic and atraumatic.   Right Ear: Hearing and external ear normal.   Left Ear: Hearing and external ear normal.   Nose: No rhinorrhea or sinus tenderness. Right sinus exhibits no maxillary sinus tenderness and no frontal  sinus tenderness. Left sinus exhibits no maxillary sinus tenderness and no frontal sinus tenderness.   Mouth/Throat: Uvula is midline, oropharynx is clear and moist and mucous membranes are normal. No oral lesions.   Eyes: Conjunctivae are normal. Pupils are equal, round, and reactive to light. Right eye exhibits no discharge. Left eye exhibits no discharge.   Neck: Normal range of motion. Carotid bruit is not present. No tracheal deviation present. No thyromegaly present.   Cardiovascular: Normal rate, regular rhythm, S1 normal, S2 normal, normal heart sounds and intact distal pulses.    No murmur heard.  Pulses:       Dorsalis pedis pulses are 2+ on the right side, and 2+ on the left side.   Pulmonary/Chest: Breath sounds normal. She is intubated.   Abdominal: Soft. Bowel sounds are normal. She exhibits distension. She exhibits no fluid wave and no mass. There is no tenderness.   Musculoskeletal: Normal range of motion.   Lymphadenopathy:     She has no cervical adenopathy.        Right: No supraclavicular adenopathy present.        Left: No supraclavicular adenopathy present.   Neurological: She appears lethargic.   Skin: Skin is warm and dry. Capillary refill takes less than 2 seconds. Bruising noted. No ecchymosis, no petechiae and no rash noted. No erythema. There is pallor.   Psychiatric:   Intubated   Nursing note and vitals reviewed.      Significant Labs:   CBC:   Recent Labs  Lab 04/12/18  0353 04/13/18  0440   WBC 20.72* 21.20*   HGB 10.1* 9.8*   HCT 31.2* 30.2*   PLT 97* 148*    and CMP:   Recent Labs  Lab 04/12/18  0353 04/12/18  1411 04/12/18  2250 04/13/18  0440     140 143 145 144  144   K 2.9*  2.9* 3.0* 3.3* 3.2*  3.2*   CL 99  99 98 97 97  97   CO2 28  28 30* 31* 32*  32*   *  120* 121* 96 96  96   BUN 5*  5* 6 6 6  6   CREATININE 1.8*  1.8* 2.0* 2.0* 2.2*  2.2*   CALCIUM 8.0*  8.0* 8.7 8.8 8.9  8.9   PROT 5.3*  --   --  5.7*   ALBUMIN 1.9*  --   --  2.0*   BILITOT  9.1*  --   --  9.8*   ALKPHOS 149*  --   --  144*   AST 74*  --   --  77*   ALT 28  --   --  29   ANIONGAP 13  13 15 17* 15  15   EGFRNONAA 39*  39* 34* 34* 31*  31*       Diagnostic Results:  I have reviewed all pertinent imaging results/findings within the past 24 hours.    Assessment/Plan:     Thrombocytopenia    Likely related to hepatomegaly and Liver Shock.  Platelet count has significantly improved and noted to be 148,000 today      --Continue to monitor CBC and INR daily        Acute blood loss anemia    Vaginal bleeding related to vaginal birth of non-viable infant with retained placenta, was followed with D&C.     --Monitor H/H if Hgb <7.0 will need to transfuse with PRBC.             Thank you for your consult. I will follow-up with patient. Please contact us if you have any additional questions.     Giulia Rutherford NP  Hematology/Oncology  [facility]

## 2018-04-13 NOTE — PROGRESS NOTES
Case discussed with Dr. Diallo.  He will attempt cholecystostomy tube.    Will tentatively plan for PEG tube next week.

## 2018-04-13 NOTE — SUBJECTIVE & OBJECTIVE
Interval History:     Review of Systems   Unable to perform ROS: Intubated   Objective:     Vital Signs (Most Recent):  Temp: 100.1 °F (37.8 °C) (04/13/18 1230)  Pulse: 106 (04/13/18 1300)  Resp: 16 (04/13/18 1300)  BP: (!) 118/58 (04/13/18 1300)  SpO2: 100 % (04/13/18 1300) Vital Signs (24h Range):  Temp:  [99.8 °F (37.7 °C)-101.2 °F (38.4 °C)] 100.1 °F (37.8 °C)  Pulse:  [] 106  Resp:  [11-31] 16  SpO2:  [94 %-100 %] 100 %  BP: ()/(47-82) 118/58     Weight: 125 kg (275 lb 9.2 oz)  Body mass index is 44.48 kg/m².    Intake/Output Summary (Last 24 hours) at 04/13/18 1339  Last data filed at 04/13/18 1200   Gross per 24 hour   Intake          1742.06 ml   Output             5360 ml   Net         -3617.94 ml      Physical Exam   Constitutional: She appears well-developed and well-nourished. She appears distressed.   HENT:   Head: Normocephalic and atraumatic.   Eyes: EOM are normal. Pupils are equal, round, and reactive to light.   Neck: Normal range of motion. Neck supple. No JVD present.   Cardiovascular: Regular rhythm and normal heart sounds.    Pulmonary/Chest: Breath sounds normal.   Abdominal: Soft. Bowel sounds are normal.   Musculoskeletal: She exhibits edema.   Neurological: No cranial nerve deficit. Coordination normal.   sedated   Skin: Skin is warm and dry.   Psychiatric:   sedated       Significant Labs:   BMP:     Recent Labs  Lab 04/13/18  1244   GLU 72      K 3.4*   CL 97   CO2 31*   BUN 7   CREATININE 2.2*   CALCIUM 9.0   MG 1.9     CBC:     Recent Labs  Lab 04/12/18  0353 04/13/18  0440   WBC 20.72* 21.20*   HGB 10.1* 9.8*   HCT 31.2* 30.2*   PLT 97* 148*       Significant Imaging:

## 2018-04-14 PROBLEM — D69.6 THROMBOCYTOPENIA: Status: RESOLVED | Noted: 2018-01-01 | Resolved: 2018-01-01

## 2018-04-14 PROBLEM — R13.19 OTHER DYSPHAGIA: Status: ACTIVE | Noted: 2018-01-01

## 2018-04-14 PROBLEM — E78.1 PURE HYPERGLYCERIDEMIA: Chronic | Status: ACTIVE | Noted: 2018-01-01

## 2018-04-14 NOTE — ASSESSMENT & PLAN NOTE
? Alcoholic liver disease / toxic hepatitis ( DILI). Mainly cholestatic picture.  Monitoring liver enzymes.     Significant gall bladder drainage, monitor

## 2018-04-14 NOTE — SUBJECTIVE & OBJECTIVE
Review of Systems   Unable to perform ROS: Intubated       Objective:     Vital Signs (Most Recent):  Temp: (!) 101.4 °F (38.6 °C) (04/14/18 0925)  Pulse: 110 (04/14/18 0730)  Resp: 18 (04/14/18 0730)  BP: 107/63 (04/14/18 0730)  SpO2: 99 % (04/14/18 0730) Vital Signs (24h Range):  Temp:  [99.5 °F (37.5 °C)-102.5 °F (39.2 °C)] 101.4 °F (38.6 °C)  Pulse:  [] 110  Resp:  [9-30] 18  SpO2:  [58 %-100 %] 99 %  BP: ()/(37-82) 107/63     Weight: 126 kg (277 lb 12.5 oz)  Body mass index is 44.83 kg/m².      Intake/Output Summary (Last 24 hours) at 04/14/18 1002  Last data filed at 04/14/18 0930   Gross per 24 hour   Intake             1995 ml   Output             2735 ml   Net             -740 ml       Physical Exam   Constitutional: She is easily aroused. She appears ill. She is restrained.   Obese young female on mechanical ventilation via trach   HENT:   Head: Atraumatic.   Eyes: Conjunctivae are normal.   Neck: No JVD present.   Cardiovascular: Regular rhythm.  Tachycardia present.    Pulses:       Radial pulses are 2+ on the right side, and 2+ on the left side.        Dorsalis pedis pulses are 1+ on the right side, and 1+ on the left side.   Pulmonary/Chest: She has rhonchi.   Vent controlled resp effort   Abdominal: Soft. She exhibits no distension. Bowel sounds are decreased.       Musculoskeletal: She exhibits edema (trace edema BLE).   Neurological: She is easily aroused.   Skin: Skin is warm and dry. Capillary refill takes 2 to 3 seconds.            Vents:  Vent Mode: A/C (04/14/18 0730)  Ventilator Initiated: Yes (04/05/18 1930)  Set Rate: 18 bmp (04/14/18 0730)  Vt Set: 400 mL (04/14/18 0730)  Pressure Support: 0 cmH20 (04/14/18 0730)  PEEP/CPAP: 5 cmH20 (04/14/18 0730)  Oxygen Concentration (%): 45 (04/14/18 0730)  Peak Airway Pressure: 27 cmH2O (04/14/18 0730)  Plateau Pressure: 0 cmH20 (04/14/18 0730)  Total Ve: 12.3 mL (04/14/18 0730)  F/VT Ratio<105 (RSBI): (!) 47.12 (04/14/18  0730)    Lines/Drains/Airways     Drain                 Urethral Catheter 04/03/18 16 Fr. 11 days         Rectal Tube 04/12/18 0710 rectal tube w/ balloon (indicate number of mLs) 2 days         NG/OG Tube 04/12/18 1820 14 Fr. Right nostril 1 day         Biliary Tube 04/13/18 1200 RLQ less than 1 day          Airway                 Surgical Airway 04/11/18 1403 Shiley Cuffed;Long;Proximal 2 days          Peripheral Intravenous Line                 Peripheral IV - Single Lumen 04/13/18 1747 Left Forearm less than 1 day                Significant Labs:    CBC/Anemia Profile:    Recent Labs  Lab 04/13/18  0440 04/14/18  0414   WBC 21.20* 16.29*   HGB 9.8* 9.3*   HCT 30.2* 29.1*   * 162   MCV 94 96   RDW 18.4* 19.2*        Chemistries:    Recent Labs  Lab 04/13/18  0440 04/13/18  1244 04/13/18  1949 04/13/18  1950 04/14/18  0414 04/14/18  0852     144 144 144  --  145 147*   K 3.2*  3.2* 3.4* 3.1*  --  3.5 3.4*   CL 97  97 97 98  --  100 99   CO2 32*  32* 31* 30*  --  28 30*   BUN 6  6 7 7  --  8 8   CREATININE 2.2*  2.2* 2.2* 2.4*  --  2.9* 2.9*   CALCIUM 8.9  8.9 9.0 9.2  --  9.2 9.4   ALBUMIN 2.0* 2.1* 2.0*  --  2.0* 2.0*   PROT 5.7*  --   --   --  5.6*  --    BILITOT 9.8*  --   --   --  9.8*  --    ALKPHOS 144*  --   --   --  125  --    ALT 29  --   --   --  25  --    AST 77*  --   --   --  83*  --    MG 1.8 1.9  --  1.7 2.0  --    PHOS 2.0* 2.0* 1.7*  --   --  1.9*       All pertinent labs within the past 24 hours have been reviewed.  ABG    Recent Labs  Lab 04/14/18  0453   PH 7.518*   PO2 75*   PCO2 39.1   HCO3 31.8*   BE 9         Significant Imaging:  I have reviewed all pertinent imaging results/findings within the past 24 hours.

## 2018-04-14 NOTE — PLAN OF CARE
Problem: Patient Care Overview  Goal: Plan of Care Review  Outcome: Ongoing (interventions implemented as appropriate)  Pt remains on vent, BP on the lower end w/ SBP in the 90's, UOP decreased throughout the night and had little response to Bumex dose at HS, TMAX 102, and Choley drain w/ a total of 550 ml of output since placement. Remains in BSWR's w/ no injuries noted, POC discussed w/ pt.

## 2018-04-14 NOTE — ANESTHESIA PREPROCEDURE EVALUATION
04/14/2018  Rafael Duron is a 22 y.o., female.  Elevated LFTs   Thrombocytopenia   Clostridium difficile infection   Hepatomegaly   Pneumonia of both lower lobes due to methicillin resistant Staphylococcus aureus (MRSA)   Acute blood loss anemia   ETOH abuse             Pre-op Assessment    I have reviewed the Patient Summary Reports.     I have reviewed the Nursing Notes.   I have reviewed the Medications.     Review of Systems  Anesthesia Hx:  Denies Family Hx of Anesthesia complications.   Denies Personal Hx of Anesthesia complications.   Social:  Non-Smoker    Hematology/Oncology:         -- Anemia:   Cardiovascular:   Hypertension Septic shock    Pulmonary:   Pneumonia Hypoxic respiratory failure on vent   Renal/:   Chronic Renal Disease    Hepatic/GI:   Liver Disease, Hepatitis Alcohol abuse  Acute cholecystitis   OB/GYN/PEDS:  Retained products of conception   Neurological:   sedated          Anesthesia Plan  Type of Anesthesia, risks & benefits discussed:  Anesthesia Type:  general  Patient's Preference:   Intra-op Monitoring Plan:   Intra-op Monitoring Plan Comments:   Post Op Pain Control Plan: multimodal analgesia  Post Op Pain Control Plan Comments:   Induction:    Beta Blocker:  Patient is on a Beta-Blocker and has received one dose within the past 24 hours (No further documentation required).       Informed Consent:    ASA Score: 4     Day of Surgery Review of History & Physical:

## 2018-04-14 NOTE — ASSESSMENT & PLAN NOTE
Currently intubated.  No acute infiltrates seen on CXR  Continue IV antibiotics empirically.  Pulmonary consult.    04/07/2018- will wean off ventilator as tolerated.  She is more awake and alert today.  Sputum cultures showed MRSA and pseudomonas-she is on vanco/meropenem-will deescalate soon.       04/08/2018- she is now intubated after asystolic cardiac arrest .Will wean off as tolerated.  04/09/2018- she remains intubated ,critical care follow up .wean off ventilator as tolerated.  4/10/2018 - likely secondary to MRSA and pseudamonal pneumonia. ID consulted, will consider double covering for pseudomonas.  04/12/18- continue Vent support

## 2018-04-14 NOTE — PROGRESS NOTES
[facility] -   Hematology/Oncology  Progress Note    Patient Name: Rafael Duron  Admission Date: 4/3/2018  Hospital Length of Stay: 11 days  Code Status: Full Code     Subjective:     HPI:  21 yo female with morbid obesity,  HTN, gestational DM; presented to [facility] ED in [place], [place] on 4/2 with complaint of SOB and cough with known pregnancy. Workup revealed fetal demise (estimated at 22 weeks) and patient passed dead fetus but retained placenta. Placenta remnants were removed in OR vis D & C.  After admission to ICU she had seizure activity with , K+ 1.9 and Bicarb 11.  She also had EtOH level of 268.  She required intubation for airway protection per records. She as transferred to [facility] ICU on 4/3/18 for higher level of care.    Patient presented with septic shock at [facility] and was started on IV pressors and IV antibiotics. Patient was initially stabilized and extubated on 4/5/18. OB/GYN following. She developed syncopal episode on 4/5/18 associated with bradycardia. She was successfully resuscitated with IV fluids and levophed. She subsequently deteriorated and was re-intubated on 4/5/18. Abdominal US revealed massive hepatomegaly with liver span of 33 cm. Patient's condition improved and she was extubated again on 4/7/18. Patient coded on 4/8/18 and was successfully resuscitated and again intubated. Patient was also diagnosed with C. Diff colitis and MRSA bacteremia.   Hem/Onc consulted to manage thrombocytopenia and anemia.  Patient is intubated and not able to provide any additional history.       Interval History: Patient responded to verbal stimuli spoke with nursing staff at bedside    Oncology Treatment Plan:   [No treatment plan]    Medications:  Continuous Infusions:   norepinephrine bitartrate-D5W       Scheduled Meds:   acetaZOLAMIDE (DIAMOX) IVPB  250 mg Intravenous Once    albumin human 25%  12.5 g Intravenous BID    chlorhexidine  15 mL  Mouth/Throat BID    famotidine (PF)  20 mg Intravenous Daily    folic acid-vit B6-vit B12 2.5-25-2 mg  1 tablet Oral Daily    meropenem (MERREM) IVPB  500 mg Intravenous Q12H    metOLazone  5 mg Oral Daily    metronidazole  500 mg Intravenous Q8H    multivitamin  1 tablet Oral Daily    rifAXIMin  550 mg Oral BID    thiamine  100 mg Oral Daily    [START ON 4/15/2018] vancomycin (VANCOCIN) IVPB  1,000 mg Intravenous Q48H    vancomycin  250 mg Oral Q6H    vitamin D  5,000 Units Oral Daily     PRN Meds:sodium chloride, acetaminophen, albuterol-ipratropium 2.5mg-0.5mg/3mL, bisacodyl, dextrose 50%, fentaNYL, glucagon (human recombinant), insulin aspart U-100, lorazepam, ondansetron, pneumoc 13-bobby conj-dip cr(PF), sodium chloride 0.9%     Review of Systems   Unable to perform ROS: Intubated     Objective:     Vital Signs (Most Recent):  Temp: 100.2 °F (37.9 °C) (04/14/18 0305)  Pulse: 110 (04/14/18 0730)  Resp: 18 (04/14/18 0730)  BP: 107/63 (04/14/18 0730)  SpO2: 99 % (04/14/18 0730) Vital Signs (24h Range):  Temp:  [99.5 °F (37.5 °C)-102.5 °F (39.2 °C)] 100.2 °F (37.9 °C)  Pulse:  [] 110  Resp:  [9-30] 18  SpO2:  [58 %-100 %] 99 %  BP: ()/(37-82) 107/63     Weight: 126 kg (277 lb 12.5 oz)  Body mass index is 44.83 kg/m².  Body surface area is 2.42 meters squared.      Intake/Output Summary (Last 24 hours) at 04/14/18 0846  Last data filed at 04/14/18 0841   Gross per 24 hour   Intake             2137 ml   Output             3095 ml   Net             -958 ml       Physical Exam   Constitutional: She appears well-nourished.   HENT:   Head: Normocephalic.   Cardiovascular: Normal rate.    Pulmonary/Chest: She is in respiratory distress.       Significant Labs:   BMP:   Recent Labs  Lab 04/13/18  1244 04/13/18  1949 04/13/18  1950 04/14/18  0414   GLU 72 71  --  68*    144  --  145   K 3.4* 3.1*  --  3.5   CL 97 98  --  100   CO2 31* 30*  --  28   BUN 7 7  --  8   CREATININE 2.2* 2.4*  --  2.9*    CALCIUM 9.0 9.2  --  9.2   MG 1.9  --  1.7 2.0   , CBC:   Recent Labs  Lab 04/13/18  0440 04/14/18  0414   WBC 21.20* 16.29*   HGB 9.8* 9.3*   HCT 30.2* 29.1*   * 162    and CMP:   Recent Labs  Lab 04/13/18  0440 04/13/18  1244 04/13/18  1949 04/14/18  0414     144 144 144 145   K 3.2*  3.2* 3.4* 3.1* 3.5   CL 97  97 97 98 100   CO2 32*  32* 31* 30* 28   GLU 96  96 72 71 68*   BUN 6  6 7 7 8   CREATININE 2.2*  2.2* 2.2* 2.4* 2.9*   CALCIUM 8.9  8.9 9.0 9.2 9.2   PROT 5.7*  --   --  5.6*   ALBUMIN 2.0* 2.1* 2.0* 2.0*   BILITOT 9.8*  --   --  9.8*   ALKPHOS 144*  --   --  125   AST 77*  --   --  83*   ALT 29  --   --  25   ANIONGAP 15  15 16 16 17*   EGFRNONAA 31*  31* 31* 28* 22*       Diagnostic Results:  I have reviewed and interpreted all pertinent imaging results/findings within the past 24 hours.    Assessment/Plan:     Thrombocytopenia    Likely related to hepatomegaly and Liver Shock.  Platelet count has significantly improved and noted to be 148,000 today      --Continue to monitor CBC and INR daily.  4/14/18 patient CBC is improving INR stable at this point from hematoma service will sign off if is any further consultation please let us know feel that most likely this is all related to shock and sepsis and has responded        Acute blood loss anemia    Vaginal bleeding related to vaginal birth of non-viable infant with retained placenta, was followed with D&C.     --Monitor H/H if Hgb <7.0 will need to transfuse with PRBC.             Thank you for your consult. I will follow-up with patient. Please contact us if you have any additional questions.     Neftali Hurley MD  Hematology/Oncology  [facility] -

## 2018-04-14 NOTE — PLAN OF CARE
Problem: Patient Care Overview  Goal: Plan of Care Review  Outcome: Ongoing (interventions implemented as appropriate)  XENA PICC line placed. Levophed gtt restarted. Pt tolerating. Mother updated via phone. Pt on specialty bed. Q30 min lateral rotation. T-piece trial completed. Pt tolerated for approx 3 hrs; placed back on vent to rest. Remains in [place] soft wrist restraints. Will continue to monitor.

## 2018-04-14 NOTE — PROGRESS NOTES
Pt failed trach collar trial. RR= 45. Tidal volume 250-300 when first placed back on vent. O2 sat 93%. KM=714's. NP Demarco updated. Returned to vent on previous A/C settings. Will continue to monitor.

## 2018-04-14 NOTE — ASSESSMENT & PLAN NOTE
Source likely fetal demise of unknown duration and retained products of concepttion placenta, removed surgically at outside hospital.  Continue IV fluids.  Follow up on blood and urine cultures.  Lactic acid improved to 5 from 7.  OB Gyn consult - Dr. Cardona.    04/08/2018- will continue vancomycin/zosyn ,follow repeat blood cultures .  Lactic acid is more than 12.  I called [facility] and discussed with the lab about her cultures-blood cultures done on 04/03-neg but urine culture -was positive for klebsiella -she is faxing the results. She will call [facility] to get the results.     04/09/2018-continue vasopressor support , on vanco,zosyn and  will deescalate tomorrow and follow [facility] closely.  4/10/2018 - possible cause of liver failure and kidney failure, GI and nephro following. Urine output increased, will monitor.

## 2018-04-14 NOTE — SUBJECTIVE & OBJECTIVE
Interval History:  No acute events,     Review of patient's allergies indicates:  No Known Allergies  Current Facility-Administered Medications   Medication Frequency    0.9%  NaCl infusion (for blood administration) Q24H PRN    acetaminophen oral solution 650 mg Q6H PRN    albumin human 25% bottle 12.5 g BID    albuterol-ipratropium 2.5mg-0.5mg/3mL nebulizer solution 3 mL Q4H PRN    bisacodyl suppository 10 mg Daily PRN    chlorhexidine 0.12 % solution 15 mL BID    dextrose 50% injection 12.5 g PRN    famotidine (PF) injection 20 mg Daily    fentaNYL injection 50 mcg Q2H PRN    folic acid-vit B6-vit B12 2.5-25-2 mg tablet 1 tablet Daily    glucagon (human recombinant) injection 1 mg PRN    heparin (porcine) injection 5,000 Units Q8H    insulin aspart U-100 pen 1-10 Units Q6H PRN    lorazepam (ATIVAN) injection 2 mg Q2H PRN    meropenem injection 500 mg Q12H    metronidazole IVPB 500 mg Q8H    multivitamin tablet 1 tablet Daily    norepinephrine 32 mg in dextrose 5 % 250 mL infusion Continuous    ondansetron injection 4 mg Q8H PRN    pneumoc 13-bobby conj-dip cr(PF) 0.5 mL vaccine x 1 dose    potassium, sodium phosphates 280-160-250 mg packet 1 packet TID    rifAXIMin tablet 550 mg BID    sodium chloride 0.9% flush 5 mL PRN    thiamine tablet 100 mg Daily    vancomycin 250mg / 10ml oral suspension 250 mg Q6H    vitamin D 1000 units tablet 5,000 Units Daily       Objective:     Vital Signs (Most Recent):  Temp: (!) 101.5 °F (38.6 °C) (fresh ice packs applied; NP updated; No new orders) (04/14/18 1505)  Pulse: (!) 113 (04/14/18 1505)  Resp: (!) 33 (04/14/18 1505)  BP: (!) 109/52 (04/14/18 1505)  SpO2: 99 % (04/14/18 1505)  O2 Device (Oxygen Therapy): ventilator (04/14/18 1445) Vital Signs (24h Range):  Temp:  [99.5 °F (37.5 °C)-102.5 °F (39.2 °C)] 101.5 °F (38.6 °C)  Pulse:  [102-137] 113  Resp:  [9-38] 33  SpO2:  [92 %-100 %] 99 %  BP: ()/(37-82) 109/52     Weight: 126 kg (277 lb 12.5  oz) (04/14/18 0535)  Body mass index is 44.83 kg/m².  Body surface area is 2.42 meters squared.    I/O last 3 completed shifts:  In: 2985.5 [I.V.:188.5; Blood:427; NG/GT:1120; IV Piggyback:1250]  Out: 5820 [Urine:4720; Drains:550; Stool:550]    Physical Exam   Constitutional: She appears well-developed. No distress.   Morbidly obese    HENT:   Head: Normocephalic and atraumatic.   Mouth/Throat: Oropharynx is clear and moist. No oropharyngeal exudate.   Eyes: Conjunctivae and EOM are normal. Pupils are equal, round, and reactive to light.   Neck: Neck supple. No JVD present. Carotid bruit is not present. No tracheal deviation present. No thyroid mass and no thyromegaly present.   Trach in place    Cardiovascular: Normal rate, regular rhythm, normal heart sounds and intact distal pulses.  Exam reveals no gallop and no friction rub.    No murmur heard.  Pulmonary/Chest: No respiratory distress. She has no wheezes. She has rales. She exhibits no tenderness.   Abdominal: Soft. Bowel sounds are normal. She exhibits no distension, no abdominal bruit, no ascites and no mass. There is no hepatosplenomegaly. There is no tenderness. There is no rebound, no guarding and no CVA tenderness.   Cholecystostomy in place    Musculoskeletal: She exhibits edema. She exhibits no tenderness.   Dependant edema    Lymphadenopathy:     She has no cervical adenopathy.   Neurological: She has normal reflexes. She displays normal reflexes. No cranial nerve deficit. She exhibits normal muscle tone. Coordination normal.   Skin: Skin is warm and intact. No rash noted. No erythema. No pallor.       Significant Labs:    CBC:   Recent Labs  Lab 04/14/18 0414   WBC 16.29*   RBC 3.03*   HGB 9.3*   HCT 29.1*      MCV 96   MCH 30.7   MCHC 32.0     CMP:   Recent Labs  Lab 04/14/18 0414 04/14/18  0852   GLU 68* 73   CALCIUM 9.2 9.4   ALBUMIN 2.0* 2.0*   PROT 5.6*  --     147*   K 3.5 3.4*   CO2 28 30*    99   BUN 8 8   CREATININE 2.9*  2.9*   ALKPHOS 125  --    ALT 25  --    AST 83*  --    BILITOT 9.8*  --      Coagulation:   Recent Labs  Lab 04/08/18  0712  04/14/18  0414   INR 1.2  < > 1.9*   APTT 39.9*  --   --    < > = values in this interval not displayed.  LFTs:   Recent Labs  Lab 04/14/18  0414 04/14/18  0852   ALT 25  --    AST 83*  --    ALKPHOS 125  --    BILITOT 9.8*  --    PROT 5.6*  --    ALBUMIN 2.0* 2.0*     All labs within the past 24 hours have been reviewed.     Lab Results   Component Value Date    ALT 25 04/14/2018    AST 83 (H) 04/14/2018    ALKPHOS 125 04/14/2018    BILITOT 9.8 (H) 04/14/2018       Lab Results   Component Value Date    .6 (H) 04/09/2018    CALCIUM 9.4 04/14/2018    CAION 0.86 (L) 04/10/2018    PHOS 1.9 (L) 04/14/2018         Significant Imaging: reviewed

## 2018-04-14 NOTE — PROGRESS NOTES
[facility]  Critical Care Medicine  Progress Note    Patient Name: Rafael Duron  MRN: [id number]  Admission Date: 4/3/2018  Hospital Length of Stay: 11 days  Code Status: Full Code  Attending Provider: Elvira Ariza MD  Primary Care Provider: Herman Cowart MD   Principal Problem: Septic shock    Subjective:     HPI:  Ms Duron is a 21 yo obese BF with a PMH of HTN, gestational DM and HTN who presented to [facility] in [place], [place] on  with complaint of SOB and cough with known pregnancy.  OB US revealed no heart tones and she is also  with second trimester fetal demise estimated 22 week for which she passed with retained placenta.  After admission to ICU she had seizure activity with , K+ 1.9 and Bicarb 11.  She also had etoh level 268 and reportedly had been drinking etoh w/ honey heavily for several days.  She required intubation for airway protection per records and OB was unable to remove placenta manually and patient had to be taken to OR.  She received 2 liters IVF and Hgb dropped to 7.4 and was transfused 2 units PRBCs.  Vaginal bleeding was scant per records.  Yesterday she had temp 101.5 Ax, .  She as transferred to [facility] ICU yesterday evening for higher level of care.        Hospital/ICU Course:   - This AM she is sedated on vent on Levophed infusion spiking temp 101.9 with WBC 20, LA 6.3, UA+, electrolyte imbalance and Glucose 268     - SAT and SBT done this am, pt awake and following commands. She was successfully extubated to nasal cannula. Remains tachycardic with HR 140s and febrile with temp 103 overnight. WBC 18 and lactic acidosis improving to 4.8. Continuing to aggressively replace electrolytes.     - Over night patient had episode with bradycardia and hypotension during which she became unresponsive and agonally breathing. She responded with IVF and bicarb and was restarted on pressors. She experienced a similar episode  again last night, during which she was intubated. Vent settings were reviewed and adjusted this am. No more bradycardic/hypotensive episodes since last night. Remains on pressors. Leukocystosis unimproved with worsening bandemia. Urine and blood cx NGTD. Sputum cx growing staph and pseudomonas however CXR this am is unremarkable.    04/7 - Tolerating SAT and SBT. Meets extubating criteria. Acidosis improving. Leukocytosis improving. Remains on bicarb gtt.     04/8 - Extubated successfully yesterday. Asystolic arrest this AM.  ACLS initiated. Re - intubated 2 rounds CPR with Iv epinephrine X 1  and IV CaCl X 1 given. ROSC attained.   A - line placed.      4/9 - now with mild vaginal bleeding and severe anasarca with blistering.  Still on vent with sedation and Levophed/Vasopressin infusing.  Worsening UOP and creatine.  Spoke with brother at bedside in detail and all questions answered.   4/10 - Opens eyes alert. Mild increase in Cr -. Intravascular volume depletion. Will add albumin. Still with vagnal bleeding  4/11 - low grade temp, Slight worsening of CR. Good urine output; trach placed today, remains on mechanical vent support and low dose pressor  4/12 - remains on mechanical ventilation via trach; HIDA scan abnormal with no gall bladder filling  4/13 - external gall bladder drain placed by IR; remains on mechanical ventilation via Trach  4/14 - significant draining from gall bladder along with significant decline in leukocytosis although continued fevers last 24 hours; remains on mechanical ventilation via trach; intermittent hypotension overnight and creatinine bump this am    Review of Systems   Unable to perform ROS: Intubated       Objective:     Vital Signs (Most Recent):  Temp: (!) 101.4 °F (38.6 °C) (04/14/18 0925)  Pulse: 110 (04/14/18 0730)  Resp: 18 (04/14/18 0730)  BP: 107/63 (04/14/18 0730)  SpO2: 99 % (04/14/18 0730) Vital Signs (24h Range):  Temp:  [99.5 °F (37.5 °C)-102.5 °F (39.2 °C)] 101.4 °F  (38.6 °C)  Pulse:  [] 110  Resp:  [9-30] 18  SpO2:  [58 %-100 %] 99 %  BP: ()/(37-82) 107/63     Weight: 126 kg (277 lb 12.5 oz)  Body mass index is 44.83 kg/m².      Intake/Output Summary (Last 24 hours) at 04/14/18 1002  Last data filed at 04/14/18 0930   Gross per 24 hour   Intake             1995 ml   Output             2735 ml   Net             -740 ml       Physical Exam   Constitutional: She is easily aroused. She appears ill. She is restrained.   Obese young female on mechanical ventilation via trach   HENT:   Head: Atraumatic.   Eyes: Conjunctivae are normal.   Neck: No JVD present.   Cardiovascular: Regular rhythm.  Tachycardia present.    Pulses:       Radial pulses are 2+ on the right side, and 2+ on the left side.        Dorsalis pedis pulses are 1+ on the right side, and 1+ on the left side.   Pulmonary/Chest: She has rhonchi.   Vent controlled resp effort   Abdominal: Soft. She exhibits no distension. Bowel sounds are decreased.       Musculoskeletal: She exhibits edema (trace edema BLE).   Neurological: She is easily aroused.   Skin: Skin is warm and dry. Capillary refill takes 2 to 3 seconds.            Vents:  Vent Mode: A/C (04/14/18 0730)  Ventilator Initiated: Yes (04/05/18 1930)  Set Rate: 18 bmp (04/14/18 0730)  Vt Set: 400 mL (04/14/18 0730)  Pressure Support: 0 cmH20 (04/14/18 0730)  PEEP/CPAP: 5 cmH20 (04/14/18 0730)  Oxygen Concentration (%): 45 (04/14/18 0730)  Peak Airway Pressure: 27 cmH2O (04/14/18 0730)  Plateau Pressure: 0 cmH20 (04/14/18 0730)  Total Ve: 12.3 mL (04/14/18 0730)  F/VT Ratio<105 (RSBI): (!) 47.12 (04/14/18 0730)    Lines/Drains/Airways     Drain                 Urethral Catheter 04/03/18 16 Fr. 11 days         Rectal Tube 04/12/18 0710 rectal tube w/ balloon (indicate number of mLs) 2 days         NG/OG Tube 04/12/18 1820 14 Fr. Right nostril 1 day         Biliary Tube 04/13/18 1200 RLQ less than 1 day          Airway                 Surgical Airway  04/11/18 1403 Shiley Cuffed;Long;Proximal 2 days          Peripheral Intravenous Line                 Peripheral IV - Single Lumen 04/13/18 1747 Left Forearm less than 1 day                Significant Labs:    CBC/Anemia Profile:    Recent Labs  Lab 04/13/18 0440 04/14/18  0414   WBC 21.20* 16.29*   HGB 9.8* 9.3*   HCT 30.2* 29.1*   * 162   MCV 94 96   RDW 18.4* 19.2*        Chemistries:    Recent Labs  Lab 04/13/18  0440 04/13/18  1244 04/13/18  1949 04/13/18  1950 04/14/18  0414 04/14/18  0852     144 144 144  --  145 147*   K 3.2*  3.2* 3.4* 3.1*  --  3.5 3.4*   CL 97  97 97 98  --  100 99   CO2 32*  32* 31* 30*  --  28 30*   BUN 6  6 7 7  --  8 8   CREATININE 2.2*  2.2* 2.2* 2.4*  --  2.9* 2.9*   CALCIUM 8.9  8.9 9.0 9.2  --  9.2 9.4   ALBUMIN 2.0* 2.1* 2.0*  --  2.0* 2.0*   PROT 5.7*  --   --   --  5.6*  --    BILITOT 9.8*  --   --   --  9.8*  --    ALKPHOS 144*  --   --   --  125  --    ALT 29  --   --   --  25  --    AST 77*  --   --   --  83*  --    MG 1.8 1.9  --  1.7 2.0  --    PHOS 2.0* 2.0* 1.7*  --   --  1.9*       All pertinent labs within the past 24 hours have been reviewed.  ABG    Recent Labs  Lab 04/14/18  0453   PH 7.518*   PO2 75*   PCO2 39.1   HCO3 31.8*   BE 9         Significant Imaging:  I have reviewed all pertinent imaging results/findings within the past 24 hours.      Assessment/Plan:     Psychiatric   ETOH abuse    Continue electrolyte replacement, Thiamine, Folate, MVI.        Pulmonary   Pneumonia of both lower lobes due to methicillin resistant Staphylococcus aureus (MRSA)    Sputum cultures positive for MRSA and Pseudomonas. Continue IV vanc - day 12  Continue temp spikes, cultures repeated        Acute hypoxemic respiratory failure    Re intubated for third time 4/8 s/p asystolic arrest.  Cont Mechanical ventilation.  Ventilator settings reviewed and adjusted to optimize gas exchange. Daily wake up and breathe trials once more stable.  Would benefit from Trach  once more stable.  Titrate FIO2 to keep SAO2 > 92%.   4/11 tracheostomy placed  4/12 Continue ventilator support  4/13 - failed SBT, vent settings reviewed and appropriate  4/14 tolerating SBT, trial t piece        Cardiac/Vascular    .        Cardiopulmonary arrest    Cont supportive care and ICU cardiac monitoring        Renal/    .        Volume overload    Now alkalosis with increased hypotension and creatinine elevation; nephrology following anticipate de-escalation of diuresis        VIKRAM (acute kidney injury)    Nephrology following; creatinine worsening, de-escalation diuresis        Electrolyte imbalance    Monitor and replete electrolytes as needed.         ID   * Septic shock    Pressor prn for MAP > 65        Clostridium difficile infection    continue oral vancomycin and IV metronidazole        Hematology    .        Oncology   Acute blood loss anemia    Monitor h/h        GI   Hepatomegaly    ? Alcoholic liver disease / toxic hepatitis ( DILI). Mainly cholestatic picture.  Monitoring liver enzymes.     Significant gall bladder drainage, monitor        Shock liver    Bili rising and transaminases + ammonia holding  Monitor trends  GI following        Other   Hyperbilirubinemia    GI following  Bili continues upward trend        History of IUFD    S/P D & C. No retained products of conception on ultrasound.            Critical Care Daily Checklist:    A: Awake: RASS Goal/Actual Goal: RASS Goal: -1-->drowsy  Actual: Lim Agitation Sedation Scale (RASS): Alert and calm   B: Spontaneous Breathing Trial Performed? Spon. Breathing Trial Initiated?: Not initiated (held per NP order) (04/09/18 0705)   C: SAT & SBT Coordinated?  yes                      D: Delirium: CAM-ICU Overall CAM-ICU: Positive   E: Early Mobility Performed? ROM   F: Feeding Goal: Goals: Meet > 85 % EEN/EPN   Status: Nutrition Goal Status: progressing towards goal   Current Diet Order   Procedures    Diet NPO      AS:  Analgesia/Sedation prn   T: Thromboembolic Prophylaxis heparin   H: HOB > 300 Yes   U: Stress Ulcer Prophylaxis (if needed) pepcid   G: Glucose Control monitoring   B: Bowel Function Stool Occurrence: 1 (per flexi seal)   I: Indwelling Catheter (Lines & Rodriguez) Necessity reviewed   D: De-escalation of Antimicrobials/Pharmacotherapies reviewed    Plan for the day/ETD t-piece, supportive care    Code Status:  Family/Goals of Care: Full Code  Home v rehab on discharge pending hospital course   I have discussed case and plan of care in detail with Dr Quijano and Dr Ariza; Status and plan of care were discussed with team on multidisciplinary rounds.    Critical Care Time: 45 minutes  Critical secondary to acute on chronic respiratory failure   Critical care was time spent personally by me on the following activities: development of treatment plan with patient or surrogate and bedside caregivers, discussions with consultants, evaluation of patient's response to treatment, examination of patient, ordering and performing treatments and interventions, ordering and review of laboratory studies, ordering and review of radiographic studies, pulse oximetry, re-evaluation of patient's condition. This critical care time did not overlap with that of any other provider or involve time for any procedures.     Aracely Pal NP  Critical Care Medicine  [facility]

## 2018-04-14 NOTE — PROCEDURES
"Rafael Duron is a 22 y.o. female patient.    Temp: (!) 101.4 °F (38.6 °C) (04/14/18 0925)  Pulse: 110 (04/14/18 0730)  Resp: 18 (04/14/18 0730)  BP: 107/63 (04/14/18 0730)  SpO2: 99 % (04/14/18 0730)  Weight: 126 kg (277 lb 12.5 oz) (04/14/18 0535)  Height: 5' 6" (167.6 cm) (04/12/18 0505)       PICC Line placement  Date/Time: 4/14/2018 9:35 AM  Location procedure was performed: [facility] INTENSIVE CARE UNIT  Performed by: JOVI OLMSTEAD  Pre-operative Diagnosis: acute respiratory failure  Post-operative diagnosis: acute respiratory failure  Consent Done: Yes  Time out: Immediately prior to procedure a "time out" was called to verify the correct patient, procedure, equipment, support staff and site/side marked as required.  Indications: med administration  Anesthesia: local infiltration    Anesthesia:  Local Anesthetic: lidocaine 1% without epinephrine  Anesthetic total: 3 mL  Preparation: skin prepped with ChloraPrep  Skin prep agent dried: skin prep agent completely dried prior to procedure  Sterile barriers: all five maximum sterile barriers used - cap, mask, sterile gown, sterile gloves, and large sterile sheet  Hand hygiene: hand hygiene performed prior to central venous catheter insertion  Location details: right basilic  Catheter type: double lumen  Catheter size: 5 Fr  Catheter Length: 35cm    Ultrasound guidance: yes  Vessel Caliber: medium, compressibility normal  Needle advanced into vessel with real time Ultrasound guidance.  Guidewire confirmed in vessel.  Sterile sheath used.  Manometry: No   Number of attempts: 1  Assessment: placement verified by x-ray,  no pneumothorax on x-ray and successful placement  Complications: none  Specimens: No  Implants: No  Post-procedure: chlorhexidine patch,  sterile dressing applied and blood return through all ports (secured with stat lock device)  Complications: No          Jovi Olmstead  4/14/2018  "

## 2018-04-14 NOTE — ASSESSMENT & PLAN NOTE
Sputum cultures positive for MRSA and Pseudomonas. Continue IV vanc - day 12  Continue temp spikes, cultures repeated

## 2018-04-14 NOTE — ASSESSMENT & PLAN NOTE
04/07/2018- will closely monitor fever curve, will stop flagyl, change meropenem to zosyn,continue vancomycin for now.  Will deescalate soon.  Blood cultures -neg, sputum cultures-MRSA and pseudomonas   4/11/2018 - antibx adjusted today, per icu, ID consulted  04/13/18- possible due to acute cholecystectomy -s/p cholestomy tube placement

## 2018-04-14 NOTE — PROGRESS NOTES
[facility] -   Nephrology  Progress Note    Patient Name: Rafael Duron  MRN: [id number]  Admission Date: 4/3/2018  Hospital Length of Stay: 11 days  Attending Provider: Elvira Ariza MD   Primary Care Physician: Herman Cowart MD  Principal Problem:Septic shock    Subjective:     HPI: 21 yo obese female with HTN, gestational DM  who presented to [facility] ED in [place], [place] on 4/2 with complaint of SOB and cough with known pregnancy. Workup revealed fetal demise (estimated at 22 weeks) and patient passed dead fetus but retained placenta. Placenta remnants were removed in OR vis D & C.  After admission to ICU she had seizure activity with , K+ 1.9 and Bicarb 11.  She also had EtOH level of 268.  She required intubation for airway protection per records. She as transferred to [facility] ICU on 4/3/18 for higher level of care.    Patient presented with septic shock at [facility] and was started on IV pressors and IV antibiotics. Patient was initially stabilized and extubated on 4/5/18. OB/GYN following. She developed syncopal episode on 4/5/18 associated with bradycardia. She was successfully resuscitated with IV fluids and levophed. She subsequently deteriorated and was re-intubated on 4/5/18. Abdominal US revealed massive hepatomegaly with liver span of 33 cm. Patient's condition improved and she was extubated again on 4/7/18. Patient coded on 4/8/18 and was successfully resuscitated and again intubated. Patient was also diagnosed with C. Diff colitis and MRSA bacteremia.   Nephrology was consulted to help with patient's electrolyte care, renal care and volume management. I saw and examined patient in her hospital room. Patient is intubated and not able to provide any additional history.   Patient remains on antibiotics and pressor support. Chart review revealed that hyponatremia and hypokalemia have now resolved. However, hypocalcemia has persisted. In addition, patient's  renal function has worsened during current admission and creatinine has increased from 0.8 on 4/6/18 to 1.6 on 4/9/18. Volume review showed positive I/O balance with + 24 liters since admission,. Patient's UOP has been fluctuating with 1 to 3 liters of urine per day. Current UOP about 100 cc/hour.       Interval History:  No acute events,     Review of patient's allergies indicates:  No Known Allergies  Current Facility-Administered Medications   Medication Frequency    0.9%  NaCl infusion (for blood administration) Q24H PRN    acetaminophen oral solution 650 mg Q6H PRN    albumin human 25% bottle 12.5 g BID    albuterol-ipratropium 2.5mg-0.5mg/3mL nebulizer solution 3 mL Q4H PRN    bisacodyl suppository 10 mg Daily PRN    chlorhexidine 0.12 % solution 15 mL BID    dextrose 50% injection 12.5 g PRN    famotidine (PF) injection 20 mg Daily    fentaNYL injection 50 mcg Q2H PRN    folic acid-vit B6-vit B12 2.5-25-2 mg tablet 1 tablet Daily    glucagon (human recombinant) injection 1 mg PRN    heparin (porcine) injection 5,000 Units Q8H    insulin aspart U-100 pen 1-10 Units Q6H PRN    lorazepam (ATIVAN) injection 2 mg Q2H PRN    meropenem injection 500 mg Q12H    metronidazole IVPB 500 mg Q8H    multivitamin tablet 1 tablet Daily    norepinephrine 32 mg in dextrose 5 % 250 mL infusion Continuous    ondansetron injection 4 mg Q8H PRN    pneumoc 13-bobby conj-dip cr(PF) 0.5 mL vaccine x 1 dose    potassium, sodium phosphates 280-160-250 mg packet 1 packet TID    rifAXIMin tablet 550 mg BID    sodium chloride 0.9% flush 5 mL PRN    thiamine tablet 100 mg Daily    vancomycin 250mg / 10ml oral suspension 250 mg Q6H    vitamin D 1000 units tablet 5,000 Units Daily       Objective:     Vital Signs (Most Recent):  Temp: (!) 101.5 °F (38.6 °C) (fresh ice packs applied; NP updated; No new orders) (04/14/18 1505)  Pulse: (!) 113 (04/14/18 1505)  Resp: (!) 33 (04/14/18 1505)  BP: (!) 109/52 (04/14/18  1505)  SpO2: 99 % (04/14/18 1505)  O2 Device (Oxygen Therapy): ventilator (04/14/18 1445) Vital Signs (24h Range):  Temp:  [99.5 °F (37.5 °C)-102.5 °F (39.2 °C)] 101.5 °F (38.6 °C)  Pulse:  [102-137] 113  Resp:  [9-38] 33  SpO2:  [92 %-100 %] 99 %  BP: ()/(37-82) 109/52     Weight: 126 kg (277 lb 12.5 oz) (04/14/18 0535)  Body mass index is 44.83 kg/m².  Body surface area is 2.42 meters squared.    I/O last 3 completed shifts:  In: 2985.5 [I.V.:188.5; Blood:427; NG/GT:1120; IV Piggyback:1250]  Out: 5820 [Urine:4720; Drains:550; Stool:550]    Physical Exam   Constitutional: She appears well-developed. No distress.   Morbidly obese    HENT:   Head: Normocephalic and atraumatic.   Mouth/Throat: Oropharynx is clear and moist. No oropharyngeal exudate.   Eyes: Conjunctivae and EOM are normal. Pupils are equal, round, and reactive to light.   Neck: Neck supple. No JVD present. Carotid bruit is not present. No tracheal deviation present. No thyroid mass and no thyromegaly present.   Trach in place    Cardiovascular: Normal rate, regular rhythm, normal heart sounds and intact distal pulses.  Exam reveals no gallop and no friction rub.    No murmur heard.  Pulmonary/Chest: No respiratory distress. She has no wheezes. She has rales. She exhibits no tenderness.   Abdominal: Soft. Bowel sounds are normal. She exhibits no distension, no abdominal bruit, no ascites and no mass. There is no hepatosplenomegaly. There is no tenderness. There is no rebound, no guarding and no CVA tenderness.   Cholecystostomy in place    Musculoskeletal: She exhibits edema. She exhibits no tenderness.   Dependant edema    Lymphadenopathy:     She has no cervical adenopathy.   Neurological: She has normal reflexes. She displays normal reflexes. No cranial nerve deficit. She exhibits normal muscle tone. Coordination normal.   Skin: Skin is warm and intact. No rash noted. No erythema. No pallor.       Significant Labs:    CBC:   Recent Labs  Lab  04/14/18 0414   WBC 16.29*   RBC 3.03*   HGB 9.3*   HCT 29.1*      MCV 96   MCH 30.7   MCHC 32.0     CMP:   Recent Labs  Lab 04/14/18  0414 04/14/18  0852   GLU 68* 73   CALCIUM 9.2 9.4   ALBUMIN 2.0* 2.0*   PROT 5.6*  --     147*   K 3.5 3.4*   CO2 28 30*    99   BUN 8 8   CREATININE 2.9* 2.9*   ALKPHOS 125  --    ALT 25  --    AST 83*  --    BILITOT 9.8*  --      Coagulation:   Recent Labs  Lab 04/08/18  0712  04/14/18 0414   INR 1.2  < > 1.9*   APTT 39.9*  --   --    < > = values in this interval not displayed.  LFTs:   Recent Labs  Lab 04/14/18 0414 04/14/18  0852   ALT 25  --    AST 83*  --    ALKPHOS 125  --    BILITOT 9.8*  --    PROT 5.6*  --    ALBUMIN 2.0* 2.0*     All labs within the past 24 hours have been reviewed.     Lab Results   Component Value Date    ALT 25 04/14/2018    AST 83 (H) 04/14/2018    ALKPHOS 125 04/14/2018    BILITOT 9.8 (H) 04/14/2018       Lab Results   Component Value Date    .6 (H) 04/09/2018    CALCIUM 9.4 04/14/2018    CAION 0.86 (L) 04/10/2018    PHOS 1.9 (L) 04/14/2018         Significant Imaging: reviewed     Assessment/Plan:     VIKRAM (acute kidney injury)      1. VIKRAM :  renal function has worsened and creatinine has increased from 0.8 on 4/6/18 to 2.9 mg/dl  ,     VIKRAM from ATN from low BP few days ago and also due to aggressive diuresis , will stop diuretics, edema improved,       2. Electrolyte abnormalities, hypokalemia, Hypophos, HypoMag , check and replete as indicated,     3. Hypotension : cont pressor support     4. Abnormal LFTs , s/p Cholecystostomy placement ,  ? shock liver ,     5. Meds reviewed, adjusted Meropenem dose to bid,     6. Anemia : multifactorial, heme following                  I will follow-up with patient. Please contact us if you have any additional questions.     Total time spent 40 minutes including time needed to review the records,  patient  evaluation, documentation, face-to-face discussion with the CC and primary  teams, more than 50% of the time was spent on coordination of care and counseling.       Rafael Ferreira MD  Nephrology  [facility]

## 2018-04-14 NOTE — HOSPITAL COURSE
4/14/18: Patient had a cholecystostomy tube placed yesterday.  PEG tube is planned for the upcoming week    4/15/15 Peg tube planned for tomorrow, will need to hold feeds

## 2018-04-14 NOTE — ASSESSMENT & PLAN NOTE
Re intubated for third time 4/8 s/p asystolic arrest.  Cont Mechanical ventilation.  Ventilator settings reviewed and adjusted to optimize gas exchange. Daily wake up and breathe trials once more stable.  Would benefit from Trach once more stable.  Titrate FIO2 to keep SAO2 > 92%.   4/11 tracheostomy placed  4/12 Continue ventilator support  4/13 - failed SBT, vent settings reviewed and appropriate  4/14 tolerating SBT, trial t piece

## 2018-04-14 NOTE — PROGRESS NOTES
Spoke w/ Dr. Avila, informed that pt's Temp was 102 despite ice packs on pt and ice water down MIAH BAÑUELOS to modify Tylenol order to q6 hrs instead of q8 hrs.
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
## 2018-04-14 NOTE — PROGRESS NOTES
Pt doing well on spontaneous vent settings. RR=30; O2 sat=100%. Minute vent=13. CU=121-717. NP Demarco updated. Rt notified and placed pt on T-piece trial. Will continue to monitor.

## 2018-04-14 NOTE — ASSESSMENT & PLAN NOTE
Likely related to hepatomegaly and Liver Shock.  Platelet count has significantly improved and noted to be 148,000 today      --Continue to monitor CBC and INR daily.  4/14/18 patient CBC is improving INR stable at this point from hematoma service will sign off if is any further consultation please let us know feel that most likely this is all related to shock and sepsis and has responded

## 2018-04-14 NOTE — SUBJECTIVE & OBJECTIVE
Interval History: Intubated and sedated, for percutaneous endoscopic gastrostomy tube on Monday    Medications:  Continuous Infusions:   norepinephrine bitartrate-D5W       Scheduled Meds:   albumin human 25%  12.5 g Intravenous BID    chlorhexidine  15 mL Mouth/Throat BID    famotidine (PF)  20 mg Intravenous Daily    folic acid-vit B6-vit B12 2.5-25-2 mg  1 tablet Oral Daily    meropenem (MERREM) IVPB  500 mg Intravenous Q12H    metOLazone  5 mg Oral Daily    metronidazole  500 mg Intravenous Q8H    multivitamin  1 tablet Oral Daily    rifAXIMin  550 mg Oral BID    thiamine  100 mg Oral Daily    [START ON 4/15/2018] vancomycin (VANCOCIN) IVPB  1,000 mg Intravenous Q48H    vancomycin  250 mg Oral Q6H    vitamin D  5,000 Units Oral Daily     PRN Meds:sodium chloride, acetaminophen, albuterol-ipratropium 2.5mg-0.5mg/3mL, bisacodyl, dextrose 50%, fentaNYL, glucagon (human recombinant), insulin aspart U-100, lorazepam, ondansetron, pneumoc 13-bobby conj-dip cr(PF), sodium chloride 0.9%     Review of patient's allergies indicates:  No Known Allergies  Objective:     Vital Signs (Most Recent):  Temp: (!) 101.4 °F (38.6 °C) (04/14/18 0925)  Pulse: 110 (04/14/18 0730)  Resp: 18 (04/14/18 0730)  BP: 107/63 (04/14/18 0730)  SpO2: 99 % (04/14/18 0730) Vital Signs (24h Range):  Temp:  [99.5 °F (37.5 °C)-102.5 °F (39.2 °C)] 101.4 °F (38.6 °C)  Pulse:  [] 110  Resp:  [9-30] 18  SpO2:  [58 %-100 %] 99 %  BP: ()/(37-82) 107/63     Weight: 126 kg (277 lb 12.5 oz)  Body mass index is 44.83 kg/m².    Intake/Output - Last 3 Shifts       04/12 0700 - 04/13 0659 04/13 0700 - 04/14 0659 04/14 0700 - 04/15 0659    I.V. (mL/kg) 180 (1.4) 101.3 (0.8)     Blood 50 377 50    NG/ 980 275    IV Piggyback 1300 850 150    Total Intake(mL/kg) 2010 (16.1) 2308.3 (18.3) 475 (3.8)    Urine (mL/kg/hr) 4925 (1.6) 2295 (0.8) 200 (0.5)    Drains  550 (0.2)     Stool 600 (0.2) 400 (0.1)     Total Output 5525 3245 200    Net  -6955 -936.7 +275           Stool Occurrence 3 x 2 x           Physical Exam   Constitutional:   Obese  Intubated   HENT:   Tracheostomy and nasogastric tube in place   Eyes: Scleral icterus is present.   Cardiovascular:   Tachycardia   Pulmonary/Chest: Effort normal and breath sounds normal.   Ventilated, course   Abdominal: Soft. Bowel sounds are normal. She exhibits no distension.   Obese.  Hepatomegaly   Nursing note and vitals reviewed.      Significant Labs:  CBC:   Recent Labs  Lab 04/14/18 0414   WBC 16.29*   RBC 3.03*   HGB 9.3*   HCT 29.1*      MCV 96   MCH 30.7   MCHC 32.0     BMP:   Recent Labs  Lab 04/14/18 0414 04/14/18  0852   GLU 68* 73    147*   K 3.5 3.4*    99   CO2 28 30*   BUN 8 8   CREATININE 2.9* 2.9*   CALCIUM 9.2 9.4   MG 2.0  --      CMP:   Recent Labs  Lab 04/14/18 0414 04/14/18  0852   GLU 68* 73   CALCIUM 9.2 9.4   ALBUMIN 2.0* 2.0*   PROT 5.6*  --     147*   K 3.5 3.4*   CO2 28 30*    99   BUN 8 8   CREATININE 2.9* 2.9*   ALKPHOS 125  --    ALT 25  --    AST 83*  --    BILITOT 9.8*  --      LFTs:   Recent Labs  Lab 04/14/18 0414 04/14/18  0852   ALT 25  --    AST 83*  --    ALKPHOS 125  --    BILITOT 9.8*  --    PROT 5.6*  --    ALBUMIN 2.0* 2.0*     Coagulation:   Recent Labs  Lab 04/08/18  0712  04/14/18 0414   LABPROT 12.7*  < > 19.4*   INR 1.2  < > 1.9*   APTT 39.9*  --   --    < > = values in this interval not displayed.    Significant Diagnostics:  CXR: I have reviewed all pertinent results/findings within the past 24 hours and my personal findings are:       One view chest x-ray.    Clinical indication: PICC line placement. Vascular catheter placement and adjustment    Heart size is normal. Tracheostomy tube again noted in position. Persistent right upper lobe infiltrate, unchanged since earlier examination the same day. NG tube again noted.  Interval placement of a right-sided PICC line with tip extending to the region of the SVC. Poor  inspiratory effort with crowding of the lung markings.   Impression      As above. Persistent right upper lobe infiltrate. NG tube, tracheostomy tube, and PICC line in position.      Electronically signed by: LOAN EDWARDS MD  Date: 04/14/18  Time: 10:13

## 2018-04-14 NOTE — ASSESSMENT & PLAN NOTE
1. VIKRAM :  renal function has worsened and creatinine has increased from 0.8 on 4/6/18 to 2.9 mg/dl  ,     VIKRAM from ATN from low BP few days ago and also due to aggressive diuresis , will stop diuretics, edema improved,       2. Electrolyte abnormalities, hypokalemia, Hypophos, HypoMag , check and replete as indicated,     3. Hypotension : cont pressor support     4. Abnormal LFTs , s/p Cholecystostomy placement ,  ? shock liver ,     5. Meds reviewed, adjusted Meropenem dose to bid,     6. Anemia : multifactorial, heme following

## 2018-04-14 NOTE — PROGRESS NOTES
[facility]  General Surgery  Progress Note    Subjective:     History of Present Illness:  22 y.o. female transferred from [place] with sepsis, s/p miscarriage with retained placenta.  Re-intubated today.    Post-Op Info:  Procedure(s) (LRB):  TRACHEOSTOMY (N/A)   3 Days Post-Op     Interval History: Intubated and sedated, for percutaneous endoscopic gastrostomy tube on Monday    Medications:  Continuous Infusions:   norepinephrine bitartrate-D5W       Scheduled Meds:   albumin human 25%  12.5 g Intravenous BID    chlorhexidine  15 mL Mouth/Throat BID    famotidine (PF)  20 mg Intravenous Daily    folic acid-vit B6-vit B12 2.5-25-2 mg  1 tablet Oral Daily    meropenem (MERREM) IVPB  500 mg Intravenous Q12H    metOLazone  5 mg Oral Daily    metronidazole  500 mg Intravenous Q8H    multivitamin  1 tablet Oral Daily    rifAXIMin  550 mg Oral BID    thiamine  100 mg Oral Daily    [START ON 4/15/2018] vancomycin (VANCOCIN) IVPB  1,000 mg Intravenous Q48H    vancomycin  250 mg Oral Q6H    vitamin D  5,000 Units Oral Daily     PRN Meds:sodium chloride, acetaminophen, albuterol-ipratropium 2.5mg-0.5mg/3mL, bisacodyl, dextrose 50%, fentaNYL, glucagon (human recombinant), insulin aspart U-100, lorazepam, ondansetron, pneumoc 13-bobby conj-dip cr(PF), sodium chloride 0.9%     Review of patient's allergies indicates:  No Known Allergies  Objective:     Vital Signs (Most Recent):  Temp: (!) 101.4 °F (38.6 °C) (04/14/18 0925)  Pulse: 110 (04/14/18 0730)  Resp: 18 (04/14/18 0730)  BP: 107/63 (04/14/18 0730)  SpO2: 99 % (04/14/18 0730) Vital Signs (24h Range):  Temp:  [99.5 °F (37.5 °C)-102.5 °F (39.2 °C)] 101.4 °F (38.6 °C)  Pulse:  [] 110  Resp:  [9-30] 18  SpO2:  [58 %-100 %] 99 %  BP: ()/(37-82) 107/63     Weight: 126 kg (277 lb 12.5 oz)  Body mass index is 44.83 kg/m².    Intake/Output - Last 3 Shifts       04/12 0700 - 04/13 0659 04/13 0700 - 04/14 0659 04/14 0700 - 04/15 0659    I.V.  (mL/kg) 180 (1.4) 101.3 (0.8)     Blood 50 377 50    NG/ 980 275    IV Piggyback 1300 850 150    Total Intake(mL/kg) 2010 (16.1) 2308.3 (18.3) 475 (3.8)    Urine (mL/kg/hr) 4925 (1.6) 2295 (0.8) 200 (0.5)    Drains  550 (0.2)     Stool 600 (0.2) 400 (0.1)     Total Output 5525 3245 200    Net -3515 -936.7 +275           Stool Occurrence 3 x 2 x           Physical Exam   Constitutional:   Obese  Intubated   HENT:   Tracheostomy and nasogastric tube in place   Eyes: Scleral icterus is present.   Cardiovascular:   Tachycardia   Pulmonary/Chest: Effort normal and breath sounds normal.   Ventilated, course   Abdominal: Soft. Bowel sounds are normal. She exhibits no distension.   Obese.  Hepatomegaly   Nursing note and vitals reviewed.      Significant Labs:  CBC:   Recent Labs  Lab 04/14/18 0414   WBC 16.29*   RBC 3.03*   HGB 9.3*   HCT 29.1*      MCV 96   MCH 30.7   MCHC 32.0     BMP:   Recent Labs  Lab 04/14/18  0414 04/14/18  0852   GLU 68* 73    147*   K 3.5 3.4*    99   CO2 28 30*   BUN 8 8   CREATININE 2.9* 2.9*   CALCIUM 9.2 9.4   MG 2.0  --      CMP:   Recent Labs  Lab 04/14/18 0414 04/14/18  0852   GLU 68* 73   CALCIUM 9.2 9.4   ALBUMIN 2.0* 2.0*   PROT 5.6*  --     147*   K 3.5 3.4*   CO2 28 30*    99   BUN 8 8   CREATININE 2.9* 2.9*   ALKPHOS 125  --    ALT 25  --    AST 83*  --    BILITOT 9.8*  --      LFTs:   Recent Labs  Lab 04/14/18  0414 04/14/18  0852   ALT 25  --    AST 83*  --    ALKPHOS 125  --    BILITOT 9.8*  --    PROT 5.6*  --    ALBUMIN 2.0* 2.0*     Coagulation:   Recent Labs  Lab 04/08/18  0712  04/14/18  0414   LABPROT 12.7*  < > 19.4*   INR 1.2  < > 1.9*   APTT 39.9*  --   --    < > = values in this interval not displayed.    Significant Diagnostics:  CXR: I have reviewed all pertinent results/findings within the past 24 hours and my personal findings are:       One view chest x-ray.    Clinical indication: PICC line placement. Vascular catheter  placement and adjustment    Heart size is normal. Tracheostomy tube again noted in position. Persistent right upper lobe infiltrate, unchanged since earlier examination the same day. NG tube again noted.  Interval placement of a right-sided PICC line with tip extending to the region of the SVC. Poor inspiratory effort with crowding of the lung markings.   Impression      As above. Persistent right upper lobe infiltrate. NG tube, tracheostomy tube, and PICC line in position.      Electronically signed by: LOAN EDWARDS MD  Date: 04/14/18  Time: 10:13          Assessment/Plan:     Acute hypoxemic respiratory failure    Trach and PEG in the next few days.  Will discuss with Dr. Lerma.            Romario Ames MD  General Surgery  [facility]

## 2018-04-14 NOTE — ASSESSMENT & PLAN NOTE
Now alkalosis with increased hypotension and creatinine elevation; nephrology following anticipate de-escalation of diuresis

## 2018-04-14 NOTE — SUBJECTIVE & OBJECTIVE
Interval History:     Review of Systems   Unable to perform ROS: Other   Objective:     Vital Signs (Most Recent):  Temp: (!) 101.4 °F (38.6 °C) (04/14/18 0925)  Pulse: 107 (04/14/18 0925)  Resp: (!) 25 (04/14/18 0925)  BP: 107/63 (04/14/18 0730)  SpO2: 100 % (04/14/18 0925) Vital Signs (24h Range):  Temp:  [99.5 °F (37.5 °C)-102.5 °F (39.2 °C)] 101.4 °F (38.6 °C)  Pulse:  [] 107  Resp:  [9-30] 25  SpO2:  [58 %-100 %] 100 %  BP: ()/(37-82) 107/63     Weight: 126 kg (277 lb 12.5 oz)  Body mass index is 44.83 kg/m².    Intake/Output Summary (Last 24 hours) at 04/14/18 1110  Last data filed at 04/14/18 0930   Gross per 24 hour   Intake             1995 ml   Output             2735 ml   Net             -740 ml      Physical Exam   Constitutional: She appears well-developed and well-nourished. She appears distressed.   HENT:   Head: Normocephalic and atraumatic.   Eyes: EOM are normal. Pupils are equal, round, and reactive to light.   Neck: Normal range of motion. Neck supple. No JVD present.   Trach in place   Cardiovascular: Regular rhythm and normal heart sounds.    Pulmonary/Chest: Breath sounds normal.   Abdominal: Bowel sounds are normal. She exhibits mass. There is tenderness.   Musculoskeletal: She exhibits edema.   Neurological: No cranial nerve deficit. Coordination normal.   sedated   Skin: Skin is warm and dry.   Psychiatric:   sedated       Significant Labs:   BMP:     Recent Labs  Lab 04/14/18 0414 04/14/18  0852   GLU 68* 73    147*   K 3.5 3.4*    99   CO2 28 30*   BUN 8 8   CREATININE 2.9* 2.9*   CALCIUM 9.2 9.4   MG 2.0  --      CBC:     Recent Labs  Lab 04/13/18  0440 04/14/18  0414   WBC 21.20* 16.29*   HGB 9.8* 9.3*   HCT 30.2* 29.1*   * 162       Significant Imaging:

## 2018-04-15 NOTE — PROGRESS NOTES
[facility] informed that pt's Potassium level was 3.2, and that pt's temp is 101.9 post Tylenol administration, and ice packs.
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
## 2018-04-15 NOTE — ASSESSMENT & PLAN NOTE
1. VIKRAM :  renal function has worsened and creatinine has increased from 0.8 on 4/6/18 to 2.9 mg/dl  ,     VIKRAM from ATN from low BP few days ago and also due to aggressive diuresis , follow renal fn, on tube feeds, will start IV fluids when on hold , possible PEG tomorrow     2. Electrolyte abnormalities,  check and replete as indicated,     3. Hypotension : cont pressor support     4. Abnormal LFTs , s/p Cholecystostomy placement ,  ? shock liver ,     5. Meds reviewed, adjusted Meropenem dose to bid,     6. Anemia : multifactorial, heme following     7. Candida UTI : on abx

## 2018-04-15 NOTE — PLAN OF CARE
Problem: Patient Care Overview  Goal: Plan of Care Review  Outcome: Ongoing (interventions implemented as appropriate)  Pt continues to be on mechanical ventilation with weaning as tolerated.

## 2018-04-15 NOTE — PROGRESS NOTES
At the request of the ICU team I have reviewed the chart of Mrs. Duron.     Patient presented with alcoholic hepatitis, sepsis leading to multiorgan failure.     Common signs and symptoms of alcoholic hepatitis include:  ?Jaundice that developed within three months prior to presentation.  ?Anorexia.  ?Fever. A fever should only be ascribed to alcoholic hepatitis once other sources are excluded, including spontaneous bacterial peritonitis, pneumonia, and urinary tract infections.   ?Right upper-quadrant/epigastric abdominal pain, which may be severe enough to mimic an acute abdomen.  ?Abdominal distension due to ascites.  ?Proximal muscle weakness due to muscle wasting.    Laboratory findings include elevated WBC's.     Her Maddrey DF is 56; Glasow alcoholic score is 9 and her MELD  MELD-Na score: 35 at 4/15/2018  5:45 AM  MELD score: 35 at 4/15/2018  5:45 AM  Calculated from:  Serum Creatinine: 3.1 mg/dL at 4/15/2018  5:45 AM  Serum Sodium: 146 mmol/L (Rounded to 137) at 4/15/2018  5:45 AM  Total Bilirubin: 11.1 mg/dL at 4/15/2018  5:45 AM  INR(ratio): 2.1 at 4/15/2018  5:45 AM  Age: 22 years    All these findings are consistent with a very poor prognosis and her short term mortality is greater than 70%.    She also probably presented with acalculous cholecystitis and had a drain placed externally/percutaneos. She does not have evidence of biliary obstruction internal and no stones.     Questions: Is the fever caused by GI source?   Probably is related to alcoholic hepatitis and SIRS/Sepsis.     From our perspective, the only option other than steroids for alcoholic hepatitis, would be   Pentoxifylline, knowing that her overall mortality may not improve.     Steroids in her case are controversial, especially since we may have a concern for an infection process and the fact that overall mortality does not improve.     Recommend to continue medical support.         I will discuss with ICU team.       Romie GARNER  [place]

## 2018-04-15 NOTE — ASSESSMENT & PLAN NOTE
Sputum cultures positive for MRSA and Pseudomonas. Continue IV vanc - day 13  Continue temp spikes, cultures repeated

## 2018-04-15 NOTE — PROGRESS NOTES
[facility]  Critical Care Medicine  Progress Note    Patient Name: Rafael Duron  MRN: [id number]  Admission Date: 4/3/2018  Hospital Length of Stay: 12 days  Code Status: Full Code  Attending Provider: Elvira Ariza MD  Primary Care Provider: Herman Cowart MD   Principal Problem: Septic shock    Subjective:     HPI:  Ms Duron is a 23 yo obese BF with a PMH of HTN, gestational DM and HTN who presented to [facility] ED in [place], [place] on  with complaint of SOB and cough with known pregnancy.  OB US revealed no heart tones and she is also  with second trimester fetal demise estimated 22 week for which she passed with retained placenta.  After admission to ICU she had seizure activity with , K+ 1.9 and Bicarb 11.  She also had etoh level 268 and reportedly had been drinking etoh w/ honey heavily for several days.  She required intubation for airway protection per records and OB was unable to remove placenta manually and patient had to be taken to OR.  She received 2 liters IVF and Hgb dropped to 7.4 and was transfused 2 units PRBCs.  Vaginal bleeding was scant per records.  Yesterday she had temp 101.5 Ax, .  She as transferred to [facility] ICU yesterday evening for higher level of care.        Hospital/ICU Course:   - This AM she is sedated on vent on Levophed infusion spiking temp 101.9 with WBC 20, LA 6.3, UA+, electrolyte imbalance and Glucose 268     - SAT and SBT done this am, pt awake and following commands. She was successfully extubated to nasal cannula. Remains tachycardic with HR 140s and febrile with temp 103 overnight. WBC 18 and lactic acidosis improving to 4.8. Continuing to aggressively replace electrolytes.     - Over night patient had episode with bradycardia and hypotension during which she became unresponsive and agonally breathing. She responded with IVF and bicarb and was restarted on pressors. She experienced a similar episode  again last night, during which she was intubated. Vent settings were reviewed and adjusted this am. No more bradycardic/hypotensive episodes since last night. Remains on pressors. Leukocystosis unimproved with worsening bandemia. Urine and blood cx NGTD. Sputum cx growing staph and pseudomonas however CXR this am is unremarkable.    04/7 - Tolerating SAT and SBT. Meets extubating criteria. Acidosis improving. Leukocytosis improving. Remains on bicarb gtt.     04/8 - Extubated successfully yesterday. Asystolic arrest this AM.  ACLS initiated. Re - intubated 2 rounds CPR with Iv epinephrine X 1  and IV CaCl X 1 given. ROSC attained.   A - line placed.      4/9 - now with mild vaginal bleeding and severe anasarca with blistering.  Still on vent with sedation and Levophed/Vasopressin infusing.  Worsening UOP and creatine.  Spoke with brother at bedside in detail and all questions answered.   4/10 - Opens eyes alert. Mild increase in Cr -. Intravascular volume depletion. Will add albumin. Still with vagnal bleeding  4/11 - low grade temp, Slight worsening of CR. Good urine output; trach placed today, remains on mechanical vent support and low dose pressor  4/12 - remains on mechanical ventilation via trach; HIDA scan abnormal with no gall bladder filling  4/13 - external gall bladder drain placed by IR; remains on mechanical ventilation via Trach  4/14 - significant draining from gall bladder along with significant decline in leukocytosis although continued fevers last 24 hours; remains on mechanical ventilation via trach; intermittent hypotension overnight and creatinine bump this am  4/15 - continued high drainage from gall bladder; continued fevers 102+; remains on mechanical ventilation via trach, failed SBT with tachypnea, hypoventilation    Review of Systems   Unable to perform ROS: Intubated       Objective:     Vital Signs (Most Recent):  Temp: (!) 103 °F (39.4 °C) (04/15/18 1245)  Pulse: 100 (04/15/18  1250)  Resp: 11 (04/15/18 1250)  BP: (!) 105/45 (04/15/18 1250)  SpO2: 100 % (04/15/18 1250) Vital Signs (24h Range):  Temp:  [101 °F (38.3 °C)-103.2 °F (39.6 °C)] 103 °F (39.4 °C)  Pulse:  [] 100  Resp:  [0-38] 11  SpO2:  [96 %-100 %] 100 %  BP: ()/(35-74) 105/45     Weight: 124 kg (273 lb 5.9 oz)  Body mass index is 44.12 kg/m².      Intake/Output Summary (Last 24 hours) at 04/15/18 1301  Last data filed at 04/15/18 1245   Gross per 24 hour   Intake          2667.74 ml   Output             4700 ml   Net         -2032.26 ml       Physical Exam   Constitutional: She is easily aroused. She appears ill. She is restrained.   Obese young female on mechanical ventilation via trach   HENT:   Head: Atraumatic.   Eyes: Conjunctivae are normal.   Neck: No JVD present.   Cardiovascular: Regular rhythm.  Tachycardia present.    Pulses:       Radial pulses are 2+ on the right side, and 2+ on the left side.        Dorsalis pedis pulses are 1+ on the right side, and 1+ on the left side.   Pulmonary/Chest: She has rhonchi.   Vent controlled resp effort   Abdominal: Soft. She exhibits no distension. Bowel sounds are decreased.   Musculoskeletal: She exhibits edema (trace edema BLE).   Neurological: She is easily aroused.   Skin: Skin is warm and dry. Capillary refill takes 2 to 3 seconds.            Vents:  Vent Mode: A/C (04/15/18 1142)  Ventilator Initiated: Yes (04/05/18 1930)  Set Rate: 18 bmp (04/15/18 1142)  Vt Set: 400 mL (04/15/18 1142)  Pressure Support: 0 cmH20 (04/15/18 1142)  PEEP/CPAP: 5 cmH20 (04/15/18 1142)  Oxygen Concentration (%): 40 (04/15/18 1250)  Peak Airway Pressure: 31 cmH2O (04/15/18 1142)  Plateau Pressure: 0 cmH20 (04/15/18 1142)  Total Ve: 13.8 mL (04/15/18 1142)  F/VT Ratio<105 (RSBI): (!) 56.87 (04/15/18 1142)    Lines/Drains/Airways     Peripherally Inserted Central Catheter Line                 PICC Double Lumen 04/14/18 1000 right brachial 1 day          Drain                 Rectal Tube  04/12/18 0710 rectal tube w/ balloon (indicate number of mLs) 3 days         Biliary Tube 04/13/18 1200 RLQ 2 days         NG/OG Tube 04/12/18 1820 14 Fr. Right nostril 2 days         Urethral Catheter 04/15/18 1250 Temperature probe less than 1 day          Airway                 Surgical Airway 04/11/18 1403 Shiley Cuffed;Long;Proximal 3 days          Peripheral Intravenous Line                 Peripheral IV - Single Lumen 04/13/18 1747 Left Forearm 1 day                Significant Labs:    CBC/Anemia Profile:    Recent Labs  Lab 04/14/18  0414 04/15/18  0545   WBC 16.29* 20.80*   HGB 9.3* 9.0*   HCT 29.1* 28.1*    234   MCV 96 97   RDW 19.2* 19.5*        Chemistries:    Recent Labs  Lab 04/13/18  1949  04/14/18  0414 04/14/18  0852 04/14/18  1406 04/14/18  1935 04/15/18  0030 04/15/18  0545     --  145 147*  --  148* 148* 146*   K 3.1*  --  3.5 3.4*  --  3.2* 3.1* 3.2*   CL 98  --  100 99  --  99 98 97   CO2 30*  --  28 30*  --  29 32* 29   BUN 7  --  8 8  --  9 8 8   CREATININE 2.4*  --  2.9* 2.9*  --  3.2* 3.1* 3.1*   CALCIUM 9.2  --  9.2 9.4  --  9.7 10.0 10.1   ALBUMIN 2.0*  --  2.0* 2.0*  --  2.1*  --  2.1*   PROT  --   --  5.6*  --   --   --   --  6.1   BILITOT  --   --  9.8*  --   --   --   --  11.1*   ALKPHOS  --   --  125  --   --   --   --  129   ALT  --   --  25  --   --   --   --  20   AST  --   --  83*  --   --   --   --  93*   MG  --   < > 2.0  --  2.1  --   --  1.6   PHOS 1.7*  --   --  1.9*  --  1.7*  --   --    < > = values in this interval not displayed.    All pertinent labs within the past 24 hours have been reviewed.  [facility]    Recent Labs  Lab 04/15/18  0436   PH 7.524*   PO2 116*   PCO2 36.7   HCO3 30.2*   BE 7         Significant Imaging:  I have reviewed all pertinent imaging results/findings within the past 24 hours.    Assessment/Plan:     Psychiatric   ETOH abuse    Continue electrolyte replacement, Thiamine, Folate, MVI.        Pulmonary   Pneumonia of both lower lobes due  to methicillin resistant Staphylococcus aureus (MRSA)    Sputum cultures positive for MRSA and Pseudomonas. Continue IV vanc - day 13  Continue temp spikes, cultures repeated        Acute hypoxemic respiratory failure    Re intubated for third time 4/8 s/p asystolic arrest.  Cont Mechanical ventilation.  Ventilator settings reviewed and adjusted to optimize gas exchange. Daily wake up and breathe trials once more stable.  Would benefit from Trach once more stable.  Titrate FIO2 to keep SAO2 > 92%.   4/11 tracheostomy placed  4/12 Continue ventilator support  4/13 - failed SBT, vent settings reviewed and appropriate  4/14 tolerating SBT, trial t piece failed with fatigue, hypoventilation, tachypnea  4/15 - failed SBT with hypoventilation, tachypnea        Cardiac/Vascular    .        Cardiopulmonary arrest    Cont supportive care and ICU cardiac monitoring        Renal/    .        Volume overload    Holding diuresis s/t creatinine elevation  Minimize volume administration        VIKRAM (acute kidney injury)    Nephrology following; diuresis stopped        Electrolyte imbalance    Monitor and replete electrolytes as needed.         ID   * Septic shock    ID following  Continued fevers  Wbc worsening  On abx day 13 and c diff management day 7  New cultures 4/13 with only candida (sputum and urine) add diflucan  Pressor prn for MAP > 65        Clostridium difficile infection    continue oral vancomycin and IV metronidazole        Hematology    .        Oncology   Acute blood loss anemia    Monitor h/h        GI   Hepatomegaly    ? Alcoholic liver disease / toxic hepatitis ( DILI). Mainly cholestatic picture.  Monitoring liver enzymes.     Significant gall bladder drainage, monitor        Shock liver    Bili rising and transaminases + ammonia holding  Monitor trends  GI following        Other   Hyperbilirubinemia    GI following  Bili continues upward trend        History of IUFD    S/P D & C. No retained products of  conception on ultrasound.            Critical Care Daily Checklist:    A: Awake: RASS Goal/Actual Goal: RASS Goal: -1-->drowsy  Actual: Lim Agitation Sedation Scale (RASS): Alert and calm   B: Spontaneous Breathing Trial Performed? Spon. Breathing Trial Initiated?: Not initiated (held per NP order) (04/09/18 0705)   C: SAT & SBT Coordinated?  yes                      D: Delirium: CAM-ICU Overall CAM-ICU: Positive   E: Early Mobility Performed? Yes   F: Feeding Goal: Goals: Meet > 85 % EEN/EPN   Status: Nutrition Goal Status: progressing towards goal   Current Diet Order   Procedures    Diet NPO      AS: Analgesia/Sedation prn   T: Thromboembolic Prophylaxis heparin   H: HOB > 300 Yes   U: Stress Ulcer Prophylaxis (if needed) pepcid   G: Glucose Control monitoring   B: Bowel Function Stool Occurrence: 1   I: Indwelling Catheter (Lines & Rodriguez) Necessity reviewed   D: De-escalation of Antimicrobials/Pharmacotherapies reviewed    Plan for the day/ETD Initiate antifungal coverage; supportive care    Code Status:  Family/Goals of Care: Full Code  Will need LTAC/rehab on discharge pending hospital course   I have discussed case and plan of care in detail with Dr Quijano; Status and plan of care were discussed with team on multidisciplinary rounds.    Critical Care Time: 68 minutes  Critical care was time spent personally by me on the following activities: development of treatment plan with patient or surrogate and bedside caregivers, discussions with consultants, evaluation of patient's response to treatment, examination of patient, ordering and performing treatments and interventions, ordering and review of laboratory studies, ordering and review of radiographic studies, pulse oximetry, re-evaluation of patient's condition. This critical care time did not overlap with that of any other provider or involve time for any procedures.     Aracely Pal NP  Critical Care Medicine  [facility]

## 2018-04-15 NOTE — SUBJECTIVE & OBJECTIVE
Interval History: plan for a peg tube tomorrow. febrile    Medications:  Continuous Infusions:   norepinephrine bitartrate-D5W 0.12 mcg/kg/min (04/15/18 1245)     Scheduled Meds:   chlorhexidine  15 mL Mouth/Throat BID    famotidine (PF)  20 mg Intravenous Daily    folic acid-vit B6-vit B12 2.5-25-2 mg  1 tablet Oral Daily    heparin (porcine)  5,000 Units Subcutaneous Q8H    meropenem (MERREM) IVPB  500 mg Intravenous Q12H    metronidazole  500 mg Intravenous Q8H    multivitamin  1 tablet Oral Daily    potassium, sodium phosphates  1 packet Oral TID    rifAXIMin  550 mg Oral BID    thiamine  100 mg Oral Daily    vancomycin  250 mg Oral Q6H    vitamin D  5,000 Units Oral Daily     PRN Meds:sodium chloride, acetaminophen, albuterol-ipratropium 2.5mg-0.5mg/3mL, bisacodyl, dextrose 50%, fentaNYL, glucagon (human recombinant), insulin aspart U-100, lorazepam, ondansetron, pneumoc 13-bobby conj-dip cr(PF), sodium chloride 0.9%     Review of patient's allergies indicates:  No Known Allergies  Objective:     Vital Signs (Most Recent):  Temp: (!) 103 °F (39.4 °C) (04/15/18 1245)  Pulse: 100 (04/15/18 1250)  Resp: 11 (04/15/18 1250)  BP: (!) 105/45 (04/15/18 1250)  SpO2: 100 % (04/15/18 1250) Vital Signs (24h Range):  Temp:  [101 °F (38.3 °C)-103.2 °F (39.6 °C)] 103 °F (39.4 °C)  Pulse:  [] 100  Resp:  [0-38] 11  SpO2:  [96 %-100 %] 100 %  BP: ()/(35-74) 105/45     Weight: 124 kg (273 lb 5.9 oz)  Body mass index is 44.12 kg/m².    Intake/Output - Last 3 Shifts       04/13 0700 - 04/14 0659 04/14 0700 - 04/15 0659 04/15 0700 - 04/16 0659    I.V. (mL/kg) 101.3 (0.8) 71 (0.6) 91.8 (0.7)    Blood 377 100     NG/ 1910 565    IV Piggyback 850 450 100    Total Intake(mL/kg) 2308.3 (18.3) 2531 (20.4) 756.8 (6.1)    Urine (mL/kg/hr) 2295 (0.8) 3730 (1.3) 850 (1.1)    Drains 550 (0.2) 500 (0.2)     Stool 400 (0.1) 400 (0.1) 0 (0)    Total Output 3245 4630 850    Net -936.7 -2099.1 -93.2           Stool  Occurrence 2 x 2 x 1 x          Physical Exam   Constitutional:   Obese, febrile  Minimally responsive to me   HENT:   Trach tube and ngt in place   Neck: Normal range of motion. Neck supple.   Cardiovascular: Regular rhythm, normal heart sounds and intact distal pulses.    Pulmonary/Chest:   Ventilated, course   Abdominal:   Obese. Liver palpable in the right abdomins   Musculoskeletal: She exhibits edema (mild).   Skin: Skin is warm and dry.   Nursing note and vitals reviewed.      Significant Labs:  CBC:   Recent Labs  Lab 04/15/18  0545   WBC 20.80*   RBC 2.91*   HGB 9.0*   HCT 28.1*      MCV 97   MCH 30.9   MCHC 32.0     BMP:   Recent Labs  Lab 04/15/18  0545   *   *   K 3.2*   CL 97   CO2 29   BUN 8   CREATININE 3.1*   CALCIUM 10.1   MG 1.6     LFTs:   Recent Labs  Lab 04/15/18  0545   ALT 20   AST 93*   ALKPHOS 129   BILITOT 11.1*   PROT 6.1   ALBUMIN 2.1*     Coagulation:   Recent Labs  Lab 04/15/18  0545   LABPROT 21.7*   INR 2.1*     Cardiac markers: No results for input(s): CKMB, CPKMB, TROPONINT, TROPONINI, MYOGLOBIN in the last 168 hours.    Significant Diagnosticno newno new

## 2018-04-15 NOTE — PLAN OF CARE
Problem: Patient Care Overview  Goal: Plan of Care Review  Outcome: Ongoing (interventions implemented as appropriate)  Pt continued to have elevated temp throughout shift. Placed on cooling blanket with rectal probe. Aiken exchanged for critacore aiken. Prn tylenol dc'd due to elevated bilirubin. abx added. Pt remains on specialty bed with Q30 min lateral rotation. Flexiseal and chol drain remain. Pt remains on levo gtt. PEG tube planned for 4/16 per sx. Mother updated via phone. All questions answered. Will continue to monitor.

## 2018-04-15 NOTE — ASSESSMENT & PLAN NOTE
ID following  Continued fevers  Wbc worsening  On abx day 13 and c diff management day 7  New cultures 4/13 with only candida (sputum and urine) add diflucan  Pressor prn for MAP > 65

## 2018-04-15 NOTE — PROGRESS NOTES
Pt continues to have trach site drainage oozing. Green/yellow with some nathan blood streaks. Sutures remain in place x 5. Bleeding and oozing not noted from suture sites but potentially behind trach site.

## 2018-04-15 NOTE — PROGRESS NOTES
[facility]  General Surgery  Progress Note    Subjective:     History of Present Illness:  22 y.o. female transferred from [place] with sepsis, s/p miscarriage with retained placenta.  Re-intubated today.    Post-Op Info:  Procedure(s) (LRB):  TRACHEOSTOMY (N/A)   4 Days Post-Op     Interval History: plan for a peg tube tomorrow. febrile    Medications:  Continuous Infusions:   norepinephrine bitartrate-D5W 0.12 mcg/kg/min (04/15/18 1245)     Scheduled Meds:   chlorhexidine  15 mL Mouth/Throat BID    famotidine (PF)  20 mg Intravenous Daily    folic acid-vit B6-vit B12 2.5-25-2 mg  1 tablet Oral Daily    heparin (porcine)  5,000 Units Subcutaneous Q8H    meropenem (MERREM) IVPB  500 mg Intravenous Q12H    metronidazole  500 mg Intravenous Q8H    multivitamin  1 tablet Oral Daily    potassium, sodium phosphates  1 packet Oral TID    rifAXIMin  550 mg Oral BID    thiamine  100 mg Oral Daily    vancomycin  250 mg Oral Q6H    vitamin D  5,000 Units Oral Daily     PRN Meds:sodium chloride, acetaminophen, albuterol-ipratropium 2.5mg-0.5mg/3mL, bisacodyl, dextrose 50%, fentaNYL, glucagon (human recombinant), insulin aspart U-100, lorazepam, ondansetron, pneumoc 13-bobby conj-dip cr(PF), sodium chloride 0.9%     Review of patient's allergies indicates:  No Known Allergies  Objective:     Vital Signs (Most Recent):  Temp: (!) 103 °F (39.4 °C) (04/15/18 1245)  Pulse: 100 (04/15/18 1250)  Resp: 11 (04/15/18 1250)  BP: (!) 105/45 (04/15/18 1250)  SpO2: 100 % (04/15/18 1250) Vital Signs (24h Range):  Temp:  [101 °F (38.3 °C)-103.2 °F (39.6 °C)] 103 °F (39.4 °C)  Pulse:  [] 100  Resp:  [0-38] 11  SpO2:  [96 %-100 %] 100 %  BP: ()/(35-74) 105/45     Weight: 124 kg (273 lb 5.9 oz)  Body mass index is 44.12 kg/m².    Intake/Output - Last 3 Shifts       04/13 0700 - 04/14 0659 04/14 0700 - 04/15 0659 04/15 0700 - 04/16 0659    I.V. (mL/kg) 101.3 (0.8) 71 (0.6) 91.8 (0.7)    Blood 377 100      NG/ 1910 565    IV Piggyback 850 450 100    Total Intake(mL/kg) 2308.3 (18.3) 2531 (20.4) 756.8 (6.1)    Urine (mL/kg/hr) 2295 (0.8) 3730 (1.3) 850 (1.1)    Drains 550 (0.2) 500 (0.2)     Stool 400 (0.1) 400 (0.1) 0 (0)    Total Output 3245 4630 850    Net -936.7 -2099.1 -93.2           Stool Occurrence 2 x 2 x 1 x          Physical Exam   Constitutional:   Obese, febrile  Minimally responsive to me   HENT:   Trach tube and ngt in place   Neck: Normal range of motion. Neck supple.   Cardiovascular: Regular rhythm, normal heart sounds and intact distal pulses.    Pulmonary/Chest:   Ventilated, course   Abdominal:   Obese. Liver palpable in the right abdomins   Musculoskeletal: She exhibits edema (mild).   Skin: Skin is warm and dry.   Nursing note and vitals reviewed.      Significant Labs:  CBC:   Recent Labs  Lab 04/15/18  0545   WBC 20.80*   RBC 2.91*   HGB 9.0*   HCT 28.1*      MCV 97   MCH 30.9   MCHC 32.0     BMP:   Recent Labs  Lab 04/15/18  0545   *   *   K 3.2*   CL 97   CO2 29   BUN 8   CREATININE 3.1*   CALCIUM 10.1   MG 1.6     LFTs:   Recent Labs  Lab 04/15/18  0545   ALT 20   AST 93*   ALKPHOS 129   BILITOT 11.1*   PROT 6.1   ALBUMIN 2.1*     Coagulation:   Recent Labs  Lab 04/15/18  0545   LABPROT 21.7*   INR 2.1*     Cardiac markers: No results for input(s): CKMB, CPKMB, TROPONINT, TROPONINI, MYOGLOBIN in the last 168 hours.    Significant Diagnosticno zay new    Assessment/Plan:     Other dysphagia    For peg tube tomorrow  Hold feeds at midnight tonight  AM lab with PT/INR  Dr Jeansonne to decide on PEG placement risk        Shock liver    Bilirubin continue to rise, despite cholecystomy tube        Acute hypoxemic respiratory failure    Trach and PEG in the next few days.  Will discuss with Dr. Lerma.            Romario Ames MD  General Surgery  [facility]

## 2018-04-15 NOTE — PLAN OF CARE
Problem: Patient Care Overview  Goal: Plan of Care Review  Outcome: Ongoing (interventions implemented as appropriate)  Pt remains on vent, low dose levophed gtt, and continues to run a fever despite ice packs, cool cloths, and PRN Tylenol w/ a TMAX of 102.1. Pt's UOP adequate but creatinine remains elevated. Potassium replaced w/ 20 mEq for one dose, TF at 75 ml/hr and biliary drain w/ a total output of 150 ml/12 hrs. Pt remains on specialty bed w/ q30 min turns.

## 2018-04-15 NOTE — SUBJECTIVE & OBJECTIVE
Interval History:     Review of Systems   Unable to perform ROS: Other     Objective:     Vital Signs (Most Recent):  Temp: (!) 102.9 °F (39.4 °C) (04/15/18 1405)  Pulse: 108 (04/15/18 1435)  Resp: (!) 23 (04/15/18 1435)  BP: (!) 109/54 (04/15/18 1435)  SpO2: (!) 94 % (04/15/18 1435) Vital Signs (24h Range):  Temp:  [101 °F (38.3 °C)-103.2 °F (39.6 °C)] 102.9 °F (39.4 °C)  Pulse:  [] 108  Resp:  [0-37] 23  SpO2:  [94 %-100 %] 94 %  BP: ()/(35-74) 109/54     Weight: 124 kg (273 lb 5.9 oz)  Body mass index is 44.12 kg/m².    Intake/Output Summary (Last 24 hours) at 04/15/18 1454  Last data filed at 04/15/18 1400   Gross per 24 hour   Intake          2737.37 ml   Output             4450 ml   Net         -1712.63 ml      Physical Exam   Constitutional: She appears well-developed and well-nourished. She appears distressed.   HENT:   Head: Normocephalic and atraumatic.   Eyes: EOM are normal. Pupils are equal, round, and reactive to light.   Neck: Normal range of motion. Neck supple. No JVD present.   Trach in place   Cardiovascular: Regular rhythm and normal heart sounds.    Pulmonary/Chest: Breath sounds normal.   Abdominal: Bowel sounds are normal. She exhibits mass. There is tenderness.   Musculoskeletal: She exhibits edema.   Neurological: No cranial nerve deficit. Coordination normal.   sedated   Skin: Skin is warm and dry.   Psychiatric:   sedated     Interval History:     Review of Systems   Unable to perform ROS: Other   Objective:     Vital Signs (Most Recent):  Temp: (!) 102.9 °F (39.4 °C) (04/15/18 1405)  Pulse: 108 (04/15/18 1435)  Resp: (!) 23 (04/15/18 1435)  BP: (!) 109/54 (04/15/18 1435)  SpO2: (!) 94 % (04/15/18 1435) Vital Signs (24h Range):  Temp:  [101 °F (38.3 °C)-103.2 °F (39.6 °C)] 102.9 °F (39.4 °C)  Pulse:  [] 108  Resp:  [0-37] 23  SpO2:  [94 %-100 %] 94 %  BP: ()/(35-74) 109/54     Weight: 124 kg (273 lb 5.9 oz)  Body mass index is 44.12 kg/m².    Intake/Output Summary  (Last 24 hours) at 04/15/18 1455  Last data filed at 04/15/18 1400   Gross per 24 hour   Intake          2737.37 ml   Output             4450 ml   Net         -1712.63 ml      Physical Exam   Constitutional: She appears well-developed and well-nourished. She appears distressed.   HENT:   Head: Normocephalic and atraumatic.   Eyes: EOM are normal. Pupils are equal, round, and reactive to light.   Neck: Normal range of motion. Neck supple. No JVD present.   Trach in place   Cardiovascular: Regular rhythm and normal heart sounds.    Pulmonary/Chest: Breath sounds normal.   Abdominal: Bowel sounds are normal. She exhibits mass. There is tenderness.   Musculoskeletal: She exhibits edema.   Neurological: No cranial nerve deficit. Coordination normal.   sedated   Skin: Skin is warm and dry.   Psychiatric:   sedated       Significant Labs:   BMP:     Recent Labs  Lab 04/15/18  0545   *   *   K 3.2*   CL 97   CO2 29   BUN 8   CREATININE 3.1*   CALCIUM 10.1   MG 1.6     CBC:     Recent Labs  Lab 04/14/18  0414 04/15/18  0545   WBC 16.29* 20.80*   HGB 9.3* 9.0*   HCT 29.1* 28.1*    234       Significant Imaging:    Significant Labs:   BMP:     Recent Labs  Lab 04/15/18  0545   *   *   K 3.2*   CL 97   CO2 29   BUN 8   CREATININE 3.1*   CALCIUM 10.1   MG 1.6     CBC:     Recent Labs  Lab 04/14/18  0414 04/15/18  0545   WBC 16.29* 20.80*   HGB 9.3* 9.0*   HCT 29.1* 28.1*    234       Significant Imaging:

## 2018-04-15 NOTE — ASSESSMENT & PLAN NOTE
Source likely fetal demise of unknown duration and retained products of concepttion placenta, removed surgically at outside hospital.  Continue IV fluids.  Follow up on blood and urine cultures.  Lactic acid improved to 5 from 7.  OB Gyn consult - Dr. Cardona.    04/08/2018- will continue vancomycin/zosyn ,follow repeat blood cultures .  Lactic acid is more than 12.  I called [facility] and discussed with the lab about her cultures-blood cultures done on 04/03-neg but urine culture -was positive for klebsiella -she is faxing the results. She will call [employer] to get the results.     04/09/2018-continue vasopressor support , on vanco,zosyn and  will deescalate tomorrow and follow [facility] closely.  4/10/2018 - possible cause of liver failure and kidney failure, GI and nephro following. Urine output increased, will monitor.  04/15/18- persistent fever and leukocytosis , CDIFF positive   Repeat cultures -NGTD continue abx per ID

## 2018-04-15 NOTE — ASSESSMENT & PLAN NOTE
Currently intubated.  No acute infiltrates seen on CXR  Continue IV antibiotics empirically.  Pulmonary consult.    04/07/2018- will wean off ventilator as tolerated.  She is more awake and alert today.  Sputum cultures showed MRSA and pseudomonas-she is on vanco/meropenem-will deescalate soon.       04/08/2018- she is now intubated after asystolic cardiac arrest .Will wean off as tolerated.  04/09/2018- she remains intubated ,critical care follow up .wean off ventilator as tolerated.  4/10/2018 - likely secondary to MRSA and pseudamonal pneumonia. ID consulted, will consider double covering for pseudomonas.  04/12/18- continue Vent support  04/15/18- Trach collar in place

## 2018-04-15 NOTE — PROGRESS NOTES
LYNSEY pickering updated on pt current temp. Pt placed on cooling blanket with rectal probe. Rectal temp=103.2. Aiken cathter changed to critacore aiken. Temperatures are correlating. NP update. Will continue to monitor. Fresh ice packs applied

## 2018-04-15 NOTE — ASSESSMENT & PLAN NOTE
Re intubated for third time 4/8 s/p asystolic arrest.  Cont Mechanical ventilation.  Ventilator settings reviewed and adjusted to optimize gas exchange. Daily wake up and breathe trials once more stable.  Would benefit from Trach once more stable.  Titrate FIO2 to keep SAO2 > 92%.   4/11 tracheostomy placed  4/12 Continue ventilator support  4/13 - failed SBT, vent settings reviewed and appropriate  4/14 tolerating SBT, trial t piece failed with fatigue, hypoventilation, tachypnea  4/15 - failed SBT with hypoventilation, tachypnea

## 2018-04-15 NOTE — PROGRESS NOTES
Pt RR= 45-50; JK=[phone]. HR= 128. O@ sat 95%. NP Demarco updated. Vent settings returned to A/C. Will continue to monitor.

## 2018-04-15 NOTE — PROGRESS NOTES
[facility] -   Nephrology  Progress Note    Patient Name: Rafael Duron  MRN: [id number]  Admission Date: 4/3/2018  Hospital Length of Stay: 12 days  Attending Provider: Elvira Ariza MD   Primary Care Physician: Herman Cowart MD  Principal Problem:Septic shock    Subjective:     HPI: 23 yo obese female with HTN, gestational DM  who presented to [facility] ED in [place], [place] on 4/2 with complaint of SOB and cough with known pregnancy. Workup revealed fetal demise (estimated at 22 weeks) and patient passed dead fetus but retained placenta. Placenta remnants were removed in OR vis D & C.  After admission to ICU she had seizure activity with , K+ 1.9 and Bicarb 11.  She also had EtOH level of 268.  She required intubation for airway protection per records. She as transferred to [facility] ICU on 4/3/18 for higher level of care.    Patient presented with septic shock at [facility] and was started on IV pressors and IV antibiotics. Patient was initially stabilized and extubated on 4/5/18. OB/GYN following. She developed syncopal episode on 4/5/18 associated with bradycardia. She was successfully resuscitated with IV fluids and levophed. She subsequently deteriorated and was re-intubated on 4/5/18. Abdominal US revealed massive hepatomegaly with liver span of 33 cm. Patient's condition improved and she was extubated again on 4/7/18. Patient coded on 4/8/18 and was successfully resuscitated and again intubated. Patient was also diagnosed with C. Diff colitis and MRSA bacteremia.   Nephrology was consulted to help with patient's electrolyte care, renal care and volume management. I saw and examined patient in her hospital room. Patient is intubated and not able to provide any additional history.   Patient remains on antibiotics and pressor support. Chart review revealed that hyponatremia and hypokalemia have now resolved. However, hypocalcemia has persisted. In addition, patient's  renal function has worsened during current admission and creatinine has increased from 0.8 on 4/6/18 to 1.6 on 4/9/18. Volume review showed positive I/O balance with + 24 liters since admission,. Patient's UOP has been fluctuating with 1 to 3 liters of urine per day. Current UOP about 100 cc/hour.       Interval History:  Pt seen and chart reviewed , remains critically ill,     Review of patient's allergies indicates:  No Known Allergies  Current Facility-Administered Medications   Medication Frequency    0.9%  NaCl infusion (for blood administration) Q24H PRN    albuterol-ipratropium 2.5mg-0.5mg/3mL nebulizer solution 3 mL Q4H PRN    bisacodyl suppository 10 mg Daily PRN    chlorhexidine 0.12 % solution 15 mL BID    dextrose 5 % and 0.45 % NaCl infusion Continuous    dextrose 50% injection 12.5 g PRN    famotidine (PF) injection 20 mg Daily    fentaNYL injection 50 mcg Q2H PRN    fluconazole (DIFLUCAN) IVPB 200 mg 100 mL Q24H    folic acid-vit B6-vit B12 2.5-25-2 mg tablet 1 tablet Daily    glucagon (human recombinant) injection 1 mg PRN    heparin (porcine) injection 5,000 Units Q8H    insulin aspart U-100 pen 1-10 Units Q6H PRN    lorazepam (ATIVAN) injection 2 mg Q2H PRN    meropenem injection 500 mg Q12H    metronidazole IVPB 500 mg Q8H    multivitamin tablet 1 tablet Daily    norepinephrine 32 mg in dextrose 5 % 250 mL infusion Continuous    ondansetron injection 4 mg Q8H PRN    pneumoc 13-bobby conj-dip cr(PF) 0.5 mL vaccine x 1 dose    potassium, sodium phosphates 280-160-250 mg packet 1 packet TID    rifAXIMin tablet 550 mg BID    sodium chloride 0.9% flush 5 mL PRN    thiamine tablet 100 mg Daily    vancomycin 250mg / 10ml oral suspension 250 mg Q6H    vitamin D 1000 units tablet 5,000 Units Daily       Objective:     Vital Signs (Most Recent):  Temp: (!) 102.9 °F (39.4 °C) (04/15/18 1405)  Pulse: 108 (04/15/18 1435)  Resp: (!) 23 (04/15/18 1435)  BP: (!) 109/54 (04/15/18  1435)  SpO2: (!) 94 % (04/15/18 1435)  O2 Device (Oxygen Therapy): ventilator (04/15/18 1142) Vital Signs (24h Range):  Temp:  [101 °F (38.3 °C)-103.2 °F (39.6 °C)] 102.9 °F (39.4 °C)  Pulse:  [] 108  Resp:  [0-37] 23  SpO2:  [94 %-100 %] 94 %  BP: ()/(35-74) 109/54     Weight: 124 kg (273 lb 5.9 oz) (04/15/18 0535)  Body mass index is 44.12 kg/m².  Body surface area is 2.4 meters squared.    I/O last 3 completed shifts:  In: 3631 [I.V.:171; Blood:150; NG/GT:2560; IV Piggyback:750]  Out: 5415 [Urine:4115; Drains:725; Stool:575]    Physical Exam   Constitutional: She appears well-developed. No distress.   Morbidly obese    HENT:   Head: Normocephalic and atraumatic.   Mouth/Throat: Oropharynx is clear and moist. No oropharyngeal exudate.   Eyes: Conjunctivae and EOM are normal. Pupils are equal, round, and reactive to light.   Neck: Neck supple. No JVD present. Carotid bruit is not present. No tracheal deviation present. No thyroid mass and no thyromegaly present.   Trach in place    Cardiovascular: Normal rate, regular rhythm, normal heart sounds and intact distal pulses.  Exam reveals no gallop and no friction rub.    No murmur heard.  Pulmonary/Chest: No respiratory distress. She has no wheezes. She has rales. She exhibits no tenderness.   Abdominal: Soft. Bowel sounds are normal. She exhibits no distension, no abdominal bruit, no ascites and no mass. There is no hepatosplenomegaly. There is no tenderness. There is no rebound, no guarding and no CVA tenderness.   Cholecystostomy in place    Musculoskeletal: She exhibits edema. She exhibits no tenderness.   Dependant edema    Lymphadenopathy:     She has no cervical adenopathy.   Neurological: She has normal reflexes. She displays normal reflexes. No cranial nerve deficit. She exhibits normal muscle tone. Coordination normal.   Skin: Skin is warm and intact. No rash noted. No erythema. No pallor.       Significant Labs:  CBC:   Recent Labs  Lab  04/15/18  0545   WBC 20.80*   RBC 2.91*   HGB 9.0*   HCT 28.1*      MCV 97   MCH 30.9   MCHC 32.0     CMP:   Recent Labs  Lab 04/15/18  0545   *   CALCIUM 10.1   ALBUMIN 2.1*   PROT 6.1   *   K 3.2*   CO2 29   CL 97   BUN 8   CREATININE 3.1*   ALKPHOS 129   ALT 20   AST 93*   BILITOT 11.1*     Coagulation:   Recent Labs  Lab 04/15/18  0545   INR 2.1*     LFTs:   Recent Labs  Lab 04/15/18  0545   ALT 20   AST 93*   ALKPHOS 129   BILITOT 11.1*   PROT 6.1   ALBUMIN 2.1*     All labs within the past 24 hours have been reviewed.     Lab Results   Component Value Date    .6 (H) 04/09/2018    CALCIUM 10.1 04/15/2018    CAION 0.86 (L) 04/10/2018    PHOS 1.7 (L) 04/14/2018         Significant Imaging: reviewed     Assessment/Plan:     VIKRAM (acute kidney injury)      1. VIKRAM :  renal function has worsened and creatinine has increased from 0.8 on 4/6/18 to 2.9 mg/dl  ,     VIKRAM from ATN from low BP few days ago and also due to aggressive diuresis , follow renal fn, on tube feeds, will start IV fluids when on hold , possible PEG tomorrow     2. Electrolyte abnormalities,  check and replete as indicated,     3. Hypotension : cont pressor support     4. Abnormal LFTs , s/p Cholecystostomy placement ,  ? shock liver ,     5. Meds reviewed, adjusted Meropenem dose to bid,     6. Anemia : multifactorial, heme following     7. Candida UTI : on abx                  I will follow-up with patient. Please contact us if you have any additional questions.     Total time spent  40 minutes including time needed to review the records,  patient  evaluation, documentation, face-to-face discussion with the critical and primary teams, more than 50% of the time was spent on coordination of care and counseling.       Rafael Ferreira MD  Nephrology  [facility] -

## 2018-04-15 NOTE — ASSESSMENT & PLAN NOTE
04/09/2018-case discussed with nephrology -she has positive fluid balance, will continue lasix 60mg every 8 hours .  04/15/18- trending up , nephrology following

## 2018-04-15 NOTE — SUBJECTIVE & OBJECTIVE
Interval History:  Pt seen and chart reviewed , remains critically ill,     Review of patient's allergies indicates:  No Known Allergies  Current Facility-Administered Medications   Medication Frequency    0.9%  NaCl infusion (for blood administration) Q24H PRN    albuterol-ipratropium 2.5mg-0.5mg/3mL nebulizer solution 3 mL Q4H PRN    bisacodyl suppository 10 mg Daily PRN    chlorhexidine 0.12 % solution 15 mL BID    dextrose 5 % and 0.45 % NaCl infusion Continuous    dextrose 50% injection 12.5 g PRN    famotidine (PF) injection 20 mg Daily    fentaNYL injection 50 mcg Q2H PRN    fluconazole (DIFLUCAN) IVPB 200 mg 100 mL Q24H    folic acid-vit B6-vit B12 2.5-25-2 mg tablet 1 tablet Daily    glucagon (human recombinant) injection 1 mg PRN    heparin (porcine) injection 5,000 Units Q8H    insulin aspart U-100 pen 1-10 Units Q6H PRN    lorazepam (ATIVAN) injection 2 mg Q2H PRN    meropenem injection 500 mg Q12H    metronidazole IVPB 500 mg Q8H    multivitamin tablet 1 tablet Daily    norepinephrine 32 mg in dextrose 5 % 250 mL infusion Continuous    ondansetron injection 4 mg Q8H PRN    pneumoc 13-bobby conj-dip cr(PF) 0.5 mL vaccine x 1 dose    potassium, sodium phosphates 280-160-250 mg packet 1 packet TID    rifAXIMin tablet 550 mg BID    sodium chloride 0.9% flush 5 mL PRN    thiamine tablet 100 mg Daily    vancomycin 250mg / 10ml oral suspension 250 mg Q6H    vitamin D 1000 units tablet 5,000 Units Daily       Objective:     Vital Signs (Most Recent):  Temp: (!) 102.9 °F (39.4 °C) (04/15/18 1405)  Pulse: 108 (04/15/18 1435)  Resp: (!) 23 (04/15/18 1435)  BP: (!) 109/54 (04/15/18 1435)  SpO2: (!) 94 % (04/15/18 1435)  O2 Device (Oxygen Therapy): ventilator (04/15/18 1142) Vital Signs (24h Range):  Temp:  [101 °F (38.3 °C)-103.2 °F (39.6 °C)] 102.9 °F (39.4 °C)  Pulse:  [] 108  Resp:  [0-37] 23  SpO2:  [94 %-100 %] 94 %  BP: ()/(35-74) 109/54     Weight: 124 kg (273 lb 5.9 oz)  (04/15/18 0535)  Body mass index is 44.12 kg/m².  Body surface area is 2.4 meters squared.    I/O last 3 completed shifts:  In: 3631 [I.V.:171; Blood:150; NG/GT:2560; IV Piggyback:750]  Out: 5415 [Urine:4115; Drains:725; Stool:575]    Physical Exam   Constitutional: She appears well-developed. No distress.   Morbidly obese    HENT:   Head: Normocephalic and atraumatic.   Mouth/Throat: Oropharynx is clear and moist. No oropharyngeal exudate.   Eyes: Conjunctivae and EOM are normal. Pupils are equal, round, and reactive to light.   Neck: Neck supple. No JVD present. Carotid bruit is not present. No tracheal deviation present. No thyroid mass and no thyromegaly present.   Trach in place    Cardiovascular: Normal rate, regular rhythm, normal heart sounds and intact distal pulses.  Exam reveals no gallop and no friction rub.    No murmur heard.  Pulmonary/Chest: No respiratory distress. She has no wheezes. She has rales. She exhibits no tenderness.   Abdominal: Soft. Bowel sounds are normal. She exhibits no distension, no abdominal bruit, no ascites and no mass. There is no hepatosplenomegaly. There is no tenderness. There is no rebound, no guarding and no CVA tenderness.   Cholecystostomy in place    Musculoskeletal: She exhibits edema. She exhibits no tenderness.   Dependant edema    Lymphadenopathy:     She has no cervical adenopathy.   Neurological: She has normal reflexes. She displays normal reflexes. No cranial nerve deficit. She exhibits normal muscle tone. Coordination normal.   Skin: Skin is warm and intact. No rash noted. No erythema. No pallor.       Significant Labs:  CBC:   Recent Labs  Lab 04/15/18  0545   WBC 20.80*   RBC 2.91*   HGB 9.0*   HCT 28.1*      MCV 97   MCH 30.9   MCHC 32.0     CMP:   Recent Labs  Lab 04/15/18  0545   *   CALCIUM 10.1   ALBUMIN 2.1*   PROT 6.1   *   K 3.2*   CO2 29   CL 97   BUN 8   CREATININE 3.1*   ALKPHOS 129   ALT 20   AST 93*   BILITOT 11.1*      Coagulation:   Recent Labs  Lab 04/15/18  0545   INR 2.1*     LFTs:   Recent Labs  Lab 04/15/18  0545   ALT 20   AST 93*   ALKPHOS 129   BILITOT 11.1*   PROT 6.1   ALBUMIN 2.1*     All labs within the past 24 hours have been reviewed.     Lab Results   Component Value Date    .6 (H) 04/09/2018    CALCIUM 10.1 04/15/2018    CAION 0.86 (L) 04/10/2018    PHOS 1.7 (L) 04/14/2018         Significant Imaging: reviewed

## 2018-04-15 NOTE — EICU
eICU Note :    Called by the [facility]:    Problem:Fever 101.4, K 3.2     Pertinent History and labs reviewed :21 y/o with HTN, Gestational DM and Septic shock     Treatment /Intervention given: Tylenol PO ordered, recheck K at midnight  Claudia Yates M.D  eICU Physician  K+ 3.1 ,KCL 20 meq x IVPB, Creat 3.2

## 2018-04-15 NOTE — SUBJECTIVE & OBJECTIVE
Review of Systems   Unable to perform ROS: Intubated       Objective:     Vital Signs (Most Recent):  Temp: (!) 103 °F (39.4 °C) (04/15/18 1245)  Pulse: 100 (04/15/18 1250)  Resp: 11 (04/15/18 1250)  BP: (!) 105/45 (04/15/18 1250)  SpO2: 100 % (04/15/18 1250) Vital Signs (24h Range):  Temp:  [101 °F (38.3 °C)-103.2 °F (39.6 °C)] 103 °F (39.4 °C)  Pulse:  [] 100  Resp:  [0-38] 11  SpO2:  [96 %-100 %] 100 %  BP: ()/(35-74) 105/45     Weight: 124 kg (273 lb 5.9 oz)  Body mass index is 44.12 kg/m².      Intake/Output Summary (Last 24 hours) at 04/15/18 1301  Last data filed at 04/15/18 1245   Gross per 24 hour   Intake          2667.74 ml   Output             4700 ml   Net         -2032.26 ml       Physical Exam   Constitutional: She is easily aroused. She appears ill. She is restrained.   Obese young female on mechanical ventilation via trach   HENT:   Head: Atraumatic.   Eyes: Conjunctivae are normal.   Neck: No JVD present.   Cardiovascular: Regular rhythm.  Tachycardia present.    Pulses:       Radial pulses are 2+ on the right side, and 2+ on the left side.        Dorsalis pedis pulses are 1+ on the right side, and 1+ on the left side.   Pulmonary/Chest: She has rhonchi.   Vent controlled resp effort   Abdominal: Soft. She exhibits no distension. Bowel sounds are decreased.   Musculoskeletal: She exhibits edema (trace edema BLE).   Neurological: She is easily aroused.   Skin: Skin is warm and dry. Capillary refill takes 2 to 3 seconds.            Vents:  Vent Mode: A/C (04/15/18 1142)  Ventilator Initiated: Yes (04/05/18 1930)  Set Rate: 18 bmp (04/15/18 1142)  Vt Set: 400 mL (04/15/18 1142)  Pressure Support: 0 cmH20 (04/15/18 1142)  PEEP/CPAP: 5 cmH20 (04/15/18 1142)  Oxygen Concentration (%): 40 (04/15/18 1250)  Peak Airway Pressure: 31 cmH2O (04/15/18 1142)  Plateau Pressure: 0 cmH20 (04/15/18 1142)  Total Ve: 13.8 mL (04/15/18 1142)  F/VT Ratio<105 (RSBI): (!) 56.87 (04/15/18  1142)    Lines/Drains/Airways     Peripherally Inserted Central Catheter Line                 PICC Double Lumen 04/14/18 1000 right brachial 1 day          Drain                 Rectal Tube 04/12/18 0710 rectal tube w/ balloon (indicate number of mLs) 3 days         Biliary Tube 04/13/18 1200 RLQ 2 days         NG/OG Tube 04/12/18 1820 14 Fr. Right nostril 2 days         Urethral Catheter 04/15/18 1250 Temperature probe less than 1 day          Airway                 Surgical Airway 04/11/18 1403 Shiley Cuffed;Long;Proximal 3 days          Peripheral Intravenous Line                 Peripheral IV - Single Lumen 04/13/18 1747 Left Forearm 1 day                Significant Labs:    CBC/Anemia Profile:    Recent Labs  Lab 04/14/18  0414 04/15/18  0545   WBC 16.29* 20.80*   HGB 9.3* 9.0*   HCT 29.1* 28.1*    234   MCV 96 97   RDW 19.2* 19.5*        Chemistries:    Recent Labs  Lab 04/13/18  1949  04/14/18  0414 04/14/18  0852 04/14/18  1406 04/14/18  1935 04/15/18  0030 04/15/18  0545     --  145 147*  --  148* 148* 146*   K 3.1*  --  3.5 3.4*  --  3.2* 3.1* 3.2*   CL 98  --  100 99  --  99 98 97   CO2 30*  --  28 30*  --  29 32* 29   BUN 7  --  8 8  --  9 8 8   CREATININE 2.4*  --  2.9* 2.9*  --  3.2* 3.1* 3.1*   CALCIUM 9.2  --  9.2 9.4  --  9.7 10.0 10.1   ALBUMIN 2.0*  --  2.0* 2.0*  --  2.1*  --  2.1*   PROT  --   --  5.6*  --   --   --   --  6.1   BILITOT  --   --  9.8*  --   --   --   --  11.1*   ALKPHOS  --   --  125  --   --   --   --  129   ALT  --   --  25  --   --   --   --  20   AST  --   --  83*  --   --   --   --  93*   MG  --   < > 2.0  --  2.1  --   --  1.6   PHOS 1.7*  --   --  1.9*  --  1.7*  --   --    < > = values in this interval not displayed.    All pertinent labs within the past 24 hours have been reviewed.  [facility]    Recent Labs  Lab 04/15/18  0436   PH 7.524*   PO2 116*   PCO2 36.7   HCO3 30.2*   BE 7         Significant Imaging:  I have reviewed all pertinent imaging results/findings  within the past 24 hours.

## 2018-04-15 NOTE — ASSESSMENT & PLAN NOTE
For peg tube tomorrow  Hold feeds at midnight tonight  AM lab with PT/INR  Dr Jeansonne to decide on PEG placement risk

## 2018-04-16 NOTE — EICU
Anthony Note :    Called by the [facility]:    Problem: K 3.1, Creat 2.9    Pertinent History and labs reviewed :21 y/o F with Septic Shock on mechanical vent       Treatment /Intervention given:Kcl 20 meq x2        Claudia Cruz Physician  5:16 AM  K 3.1, will order 20 meq x3

## 2018-04-16 NOTE — PROGRESS NOTES
[facility] -   Adult Nutrition  Progress Note    SUMMARY     Recommendations    Recommendation/Intervention:  1. Resume tube feed.  2. Continue current TF regimen. 3. Will continue to monitor.   Goals: Meet > 85 % EEN/EPN while admitted   Nutrition Goal Status: Continues  Communication of RD Recs:  Reviewed with RN    Reason for Assessment    Reason for Assessment: RD follow-up  Dx:  1. Respiratory failure    2. Septic shock    3. Acute blood loss anemia    4. Acute hypoxemic respiratory failure    5. Acute urinary tract infection    6. Electrolyte imbalance    7. ETOH abuse    8. Hyperglycemia, unspecified    9. Hyponatremia    10. Metabolic acidosis    11. Retained products of conception with hemorrhage    12. Seizure    13. Hypertriglyceridemia    14. Shock liver    15. Fever, unspecified fever cause    16. S/P dilatation and curettage    17. History of IUFD    18. Hepatomegaly    19. Pneumonia of both lower lobes due to methicillin resistant Staphylococcus aureus (MRSA)    20. Asystole    21. Spontaneous  with cardiac arrest or failure    22. Hyperbilirubinemia    23. Cardiopulmonary arrest    24. Clostridium difficile infection    25. Pneumonia of right lower lobe due to methicillin resistant Staphylococcus aureus (MRSA)    26. Thrombocytopenia    27. UTI due to Klebsiella species    28. Respiratory failure, unspecified chronicity, unspecified whether with hypoxia or hypercapnia    29. VIKRAM (acute kidney injury)    30. Hypocalcemia    31. Hypervolemia, unspecified hypervolemia type    32. Elevated LFTs    33. Acute respiratory failure with hypoxia and hypercapnia      Past Medical History:   Diagnosis Date    Gestational diabetes     Hypertension        Interdisciplinary Rounds: attended  General Information Comments: S/p tracheostomy. Remains on mechanical ventilation. Pt's tube feeding stopped today for HIDA Scan.   4.16.18. Pt remains intubated. Pt's TF stopped at midnight for PEG  "tube placement. PEG has been postponed. Surgery following. Per RN, TF will be restarted today.   Nutrition Discharge Planning: too soon to determine    Nutrition Risk Screen    Nutrition Risk Screen: no indicators present    Nutrition/Diet History    Do you have any cultural, spiritual, Worship conflicts, given your current situation?: None      Anthropometrics    Temp: 100.1 °F (37.8 °C)  Height Method: Estimated  Height: 5' 6" (167.6 cm)  Height (inches): 66 in  Weight Method: Bed Scale  Weight: 124 kg (273 lb 5.9 oz)  Weight (lb): 273.37 lb  Ideal Body Weight (IBW), Female: 130 lb  % Ideal Body Weight, Female (lb): 210.28 lb  BMI (Calculated): 44.2  BMI Grade: greater than 40 - morbid obesity       Lab/Procedures/Meds    Pertinent Labs Reviewed: reviewed    BMP  Lab Results   Component Value Date     04/16/2018     04/16/2018    K 3.1 (L) 04/16/2018    K 3.1 (L) 04/16/2018    CL 98 04/16/2018    CL 98 04/16/2018    CO2 28 04/16/2018    CO2 28 04/16/2018    BUN 10 04/16/2018    BUN 10 04/16/2018    CREATININE 2.6 (H) 04/16/2018    CREATININE 2.6 (H) 04/16/2018    CALCIUM 10.4 04/16/2018    CALCIUM 10.4 04/16/2018    ANIONGAP 19 (H) 04/16/2018    ANIONGAP 19 (H) 04/16/2018    ESTGFRAFRICA 29 (A) 04/16/2018    ESTGFRAFRICA 29 (A) 04/16/2018    EGFRNONAA 25 (A) 04/16/2018    EGFRNONAA 25 (A) 04/16/2018     Lab Results   Component Value Date    CALCIUM 10.4 04/16/2018    CALCIUM 10.4 04/16/2018    PHOS 2.8 04/16/2018     Lab Results   Component Value Date    ALBUMIN 2.0 (L) 04/16/2018    ALBUMIN 2.0 (L) 04/16/2018       Pertinent Medications Reviewed: reviewed    Physical Findings/Assessment    Overall Physical Appearance: on ventilator support (obese)  Tubes:  (OG)  Oral/Mouth Cavity:  (WDL)  Skin:  (Daniel Score 13)    Estimated/Assessed Needs    Weight Used For Calorie Calculations: 124 kg (273 lb 5.9 oz)  Energy Calorie Requirements (kcal): 2537.56  Energy Need Method: [place]  (modified)  Protein " Requirements: 148 - 190 g  Weight Used For Protein Calculations: 59 kg (130 lb) (IBW - Obese ICU)     Fluid Need Method: RDA Method (or per MD)  RDA Method (mL): 2537.56         Nutrition Prescription Ordered    Current Diet Order: NPO  Current Nutrition Support Formula Ordered: Peptamen Intense VHP (not infusing at this time )   Current Nutrition Support Rate Ordered: 85 (ml)  Current Nutrition Support Frequency Ordered: ml/hr    Evaluation of Received Nutrient/Fluid Intake    Other Calories: 240.72 kcals ~ (dextrose 5 % and 0.45 % NaCl infusion @ 50 ml/hr + norepinephrine 32 mg in dextrose 5 % 250 mL infusion @ 9 ml/hr)    % Intake of Estimated Energy Needs: 0 - 25 %  % Meal Intake: NPO    Intake/Output Summary (Last 24 hours) at 04/16/18 1051  Last data filed at 04/16/18 0722   Gross per 24 hour   Intake          2539.03 ml   Output             3496 ml   Net          -956.97 ml       Nutrition Risk          Assessment and Plan    Acute hypoxemic respiratory failure    Contributing Nutrition Diagnosis  Inadequate energy intake     Related to (etiology):   Inability to consume sufficient energy     Signs and Symptoms (as evidenced by):   Intubated, NPO, no alternative means of nutrition.      Interventions/Recommendations (treatment strategy):  Please see RD recs above.    Nutrition Diagnosis Status:   Improving. 4.16.18. Pt was on TF. TF stopped at midnight for PEG placement. TF will be restarted today.               Monitor and Evaluation    Food and Nutrient Intake: energy intake, enteral nutrition intake  Food and Nutrient Adminstration: enteral and parenteral nutrition administration  Anthropometric Measurements: weight  Biochemical Data, Medical Tests and Procedures: electrolyte and renal panel, glucose/endocrine profile  Nutrition-Focused Physical Findings: overall appearance     Nutrition Follow-Up    RD Follow-up?: Yes (2xweekly)

## 2018-04-16 NOTE — ASSESSMENT & PLAN NOTE
1. VIKRAM :  renal function has worsened and creatinine has increased from 0.8 on 4/6/18 to 2.9 mg/dl  , responding to IV fluids, cont same,     VIKRAM from ATN from low BP few days ago and also due to aggressive diuresis , follow renal fn, on tube feeds,      2. Electrolyte abnormalities,  K was repleted,     Corrected calcium about 12, CC to discuss with dietician to reduce calcium in tube feeds,     3. Hypotension : cont pressor support       4. Abnormal LFTs , s/p Cholecystostomy placement ,  ? shock liver , alcohic hepatitis, GI note reviewed,     5. Meds reviewed, adjusted Meropenem dose to bid,     6. Anemia : multifactorial, heme following     7. Candida UTI : on abx
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
## 2018-04-16 NOTE — SUBJECTIVE & OBJECTIVE
Review of Systems   Unable to perform ROS: Intubated       Objective:     Vital Signs (Most Recent):  Temp: 100.1 °F (37.8 °C) (04/16/18 0715)  Pulse: 95 (04/16/18 1112)  Resp: (!) 33 (04/16/18 1112)  BP: (!) 117/96 (04/16/18 0845)  SpO2: 96 % (04/16/18 0928) Vital Signs (24h Range):  Temp:  [99.4 °F (37.4 °C)-103 °F (39.4 °C)] 100.1 °F (37.8 °C)  Pulse:  [] 95  Resp:  [11-36] 33  SpO2:  [93 %-100 %] 96 %  BP: ()/() 117/96     Weight: 124 kg (273 lb 5.9 oz)  Body mass index is 44.12 kg/m².      Intake/Output Summary (Last 24 hours) at 04/16/18 1240  Last data filed at 04/16/18 0722   Gross per 24 hour   Intake          2369.03 ml   Output             3106 ml   Net          -736.97 ml       Physical Exam   Constitutional: She is easily aroused. She appears ill. She is restrained.   Obese young female on mechanical ventilation via trach   HENT:   Head: Atraumatic.   Eyes: Conjunctivae are normal.   Neck: No JVD present.   Cardiovascular: Normal rate and regular rhythm.    Pulses:       Radial pulses are 2+ on the right side, and 2+ on the left side.        Dorsalis pedis pulses are 1+ on the right side, and 1+ on the left side.   Pulmonary/Chest: She has decreased breath sounds. She has rhonchi.   Vent controlled resp effort   Abdominal: Soft. She exhibits no distension. Bowel sounds are decreased.   Musculoskeletal: She exhibits edema (trace edema BLE).   Neurological: She is easily aroused.   Skin: Skin is warm and dry. Capillary refill takes 2 to 3 seconds.            Vents:  Vent Mode: A/C (04/16/18 1112)  Ventilator Initiated: Yes (04/05/18 1930)  Set Rate: 18 bmp (04/16/18 1112)  Vt Set: 400 mL (04/16/18 1112)  Pressure Support: 0 cmH20 (04/16/18 1112)  PEEP/CPAP: 5 cmH20 (04/16/18 1112)  Oxygen Concentration (%): 40 (04/16/18 1112)  Peak Airway Pressure: 35 cmH2O (04/16/18 1112)  Plateau Pressure: 0 cmH20 (04/16/18 1112)  Total Ve: 13.7 mL (04/16/18 1112)  F/VT Ratio<105 (RSBI): (!) 89.92  (04/16/18 1112)    Lines/Drains/Airways     Peripherally Inserted Central Catheter Line                 PICC Double Lumen 04/14/18 1000 right brachial 2 days          Drain                 Rectal Tube 04/12/18 0710 rectal tube w/ balloon (indicate number of mLs) 4 days         Biliary Tube 04/13/18 1200 RLQ 3 days         NG/OG Tube 04/12/18 1820 14 Fr. Right nostril 3 days         Urethral Catheter 04/15/18 1250 Temperature probe less than 1 day          Airway                 Surgical Airway 04/11/18 1403 Shiley Cuffed;Long;Proximal 4 days          Peripheral Intravenous Line                 Peripheral IV - Single Lumen 04/13/18 1747 Left Forearm 2 days                Significant Labs:    CBC/Anemia Profile:    Recent Labs  Lab 04/15/18  0545 04/16/18  0350   WBC 20.80* 25.64*   HGB 9.0* 9.7*   HCT 28.1* 30.8*    217   MCV 97 98   RDW 19.5* 20.1*        Chemistries:    Recent Labs  Lab 04/14/18  1935  04/15/18  0545 04/15/18  1759 04/16/18  0350   *  < > 146* 146* 145  145   K 3.2*  < > 3.2* 3.1* 3.1*  3.1*   CL 99  < > 97 98 98  98   CO2 29  < > 29 31* 28  28   BUN 9  < > 8 9 10  10   CREATININE 3.2*  < > 3.1* 2.9* 2.6*  2.6*   CALCIUM 9.7  < > 10.1 10.5 10.4  10.4   ALBUMIN 2.1*  --  2.1*  --  2.0*  2.0*   PROT  --   --  6.1  --  6.3   BILITOT  --   --  11.1*  --  12.2*   ALKPHOS  --   --  129  --  129   ALT  --   --  20  --  20   AST  --   --  93*  --  106*   MG  --   --  1.6 1.8 1.5*   PHOS 1.7*  --   --   --  2.8   < > = values in this interval not displayed.    All pertinent labs within the past 24 hours have been reviewed.  ABG    Recent Labs  Lab 04/16/18  0532   PH 7.485*   PO2 85   PCO2 40.0   HCO3 30.1*   BE 7         Significant Imaging:  I have reviewed all pertinent imaging results/findings within the past 24 hours.

## 2018-04-16 NOTE — PLAN OF CARE
Per MDR, Patient had failed SBT yesterday and they will try today. Biliary tube draining . Remains intubated , cont pressor support .  CM called patient mother to offer supportive care and encourage her to visi with the patient. Mother does have transportation problems but she does call she stated qd. CM to f/u for safe transition     04/16/18 1126   Discharge Reassessment   Assessment Type Discharge Planning Reassessment   Provided patient/caregiver education on the expected discharge date and the discharge plan No   Do you have any problems affording any of your prescribed medications? No   Discharge Plan A Long-term acute care facility (LTAC)   Discharge Plan B Long-term acute care facility (LTAC)   Patient choice form signed by patient/caregiver N/A   Can the patient answer the patient profile reliably? No, cognitively impaired   How does the patient rate their overall health at the present time? Fair   Describe the patient's ability to walk at the present time. Major restrictions/daily assistance from another person   How often would a person be available to care for the patient? Whenever needed   Number of comorbid conditions (as recorded on the chart) One   During the past month, has the patient often been bothered by feeling down, depressed or hopeless? No   During the past month, has the patient often been bothered by little interest or pleasure in doing things? No

## 2018-04-16 NOTE — ASSESSMENT & PLAN NOTE
Source likely fetal demise of unknown duration and retained products of concepttion placenta, removed surgically at outside hospital.  Continue IV fluids.  Follow up on blood and urine cultures.  Lactic acid improved to 5 from 7.  OB Gyn consult - Dr. Cardona.    04/08/2018- will continue vancomycin/zosyn ,follow repeat blood cultures .  Lactic acid is more than 12.  I called [facility] and discussed with the lab about her cultures-blood cultures done on 04/03-neg but urine culture -was positive for klebsiella -she is faxing the results. She will call [facility] to get the results.     04/09/2018-continue vasopressor support , on vanco,zosyn and  will deescalate tomorrow and follow [facility] closely.  4/10/2018 - possible cause of liver failure and kidney failure, GI and nephro following. Urine output increased, will monitor.  04/15/18- persistent fever and leukocytosis , CDIFF positive   Repeat cultures -NGTD continue abx per ID  04/16/2018- will continue vasopressor support -will plan to image the chest if wbc is persistently increasing .Continue meropenem for now and will plan to adjust therapy in am

## 2018-04-16 NOTE — ASSESSMENT & PLAN NOTE
S/p percutaneous drain placed-04/13/18 04/16/18- s/p percutaneous drain -will continue drain and continue meropenem for now(day 13 of total antibiotic therapy )  Case discussed with Dr Jeansonne -any surgical procedure will not affect management as this time.

## 2018-04-16 NOTE — PROGRESS NOTES
Pharmacist Renal Dose Adjustment Note    Rafael Duorn is a 22 y.o. female being treated with the medication meropenem (Merrem).    Patient Data:    Vital Signs (Most Recent):  Temp: 99.5 °F (37.5 °C) (04/16/18 1115)  Pulse: 92 (04/16/18 1445)  Resp: (!) 30 (04/16/18 1445)  BP: 96/64 (04/16/18 1445)  SpO2: 100 % (04/16/18 1445)   Vital Signs (72h Range):  Temp:  [99.4 °F (37.4 °C)-103.2 °F (39.6 °C)]   Pulse:  []   Resp:  [0-38]   BP: ()/()   SpO2:  [92 %-100 %]        Recent Labs     Lab 04/15/18  0545 04/15/18  1759 04/16/18  0350   CREATININE 3.1* 2.9* 2.6*  2.6*     Serum creatinine: 2.6 mg/dL (H) 04/16/18 0350  Estimated creatinine clearance: 45.3 mL/min (A)    Merrem 500 mg IV every 12 hours will be increased to Merrem 500 mg IV every 8 hours per protocol for CrCl 25-49 ml/min.    Pharmacist's Name: Katherine E Mcardle  Pharmacist's Extension: [phone]

## 2018-04-16 NOTE — PROGRESS NOTES
Follow up visit with Ms. Duron.  She remains on low air loss bariatric bed. Bilateral heels intact with no breakdown. Perineum, bilateral medial thighs, bilateratal buttock MASD reassessed with good improvement noted.  Continue use of paste barrier cream. Will follow.

## 2018-04-16 NOTE — PLAN OF CARE
Problem: Patient Care Overview  Goal: Plan of Care Review  Outcome: Ongoing (interventions implemented as appropriate)  No acute changes over night, VS remain stable on levophed gtt, Temp controlled via cooling blanket, UOP adequate w/ creatinine trending down this AM. Replaced Potassium throughout shift and into this AM, Mg 1.5, Ammonia, total bilirubin, and pro calcitonin trending up this AM. Pt's TF's stopped at midnight and has been NPO for PEG tube placement today. Biliary drain w/ 250 ml of total output over 12 hrs. BSWR's remain in place, pt still tries to pull her lines out and no injuries noted. POC discussed w/ pt.

## 2018-04-16 NOTE — ASSESSMENT & PLAN NOTE
04/09/2018-will use PO vancomycin 250mg every 8 hours for 10 days .  Due to her multiple electrolyte abnormalities, there is concern for ileus ,will continue Flagyl for now and adjust therapy as needed   .  04/16/2018- will continue PO vanco/flagyl -wbc remains very high at 25, -   Will plan to repeat imaging if leucocytosis persists .

## 2018-04-16 NOTE — ASSESSMENT & PLAN NOTE
04/09/2018-case discussed with nephrology -she has positive fluid balance, will continue lasix 60mg every 8 hours .  04/15/18- trending up , nephrology following  04/16/18- serum creatinine is increasing to 2.6,will continue to follow nephrology , will avoid nephrotoxic medications.

## 2018-04-16 NOTE — SUBJECTIVE & OBJECTIVE
Interval History:  Pt seen and chart reviewed, remains critically ill,     Review of patient's allergies indicates:  No Known Allergies  Current Facility-Administered Medications   Medication Frequency    0.9%  NaCl infusion (for blood administration) Q24H PRN    albuterol-ipratropium 2.5mg-0.5mg/3mL nebulizer solution 3 mL Q4H PRN    bisacodyl suppository 10 mg Daily PRN    chlorhexidine 0.12 % solution 15 mL BID    dextrose 5 % and 0.45 % NaCl infusion Continuous    dextrose 50% injection 12.5 g PRN    famotidine (PF) injection 20 mg Daily    fentaNYL injection 50 mcg Q2H PRN    fluconazole (DIFLUCAN) IVPB 200 mg 100 mL Q24H    folic acid-vit B6-vit B12 2.5-25-2 mg tablet 1 tablet Daily    glucagon (human recombinant) injection 1 mg PRN    heparin (porcine) injection 5,000 Units Q8H    insulin aspart U-100 pen 1-10 Units Q6H PRN    lactulose 20 gram/30 mL solution Soln 30 g Daily    lorazepam (ATIVAN) injection 2 mg Q2H PRN    meropenem injection 500 mg Q8H    metronidazole IVPB 500 mg Q8H    multivitamin tablet 1 tablet Daily    norepinephrine 32 mg in dextrose 5 % 250 mL infusion Continuous    ondansetron injection 4 mg Q8H PRN    pneumoc 13-bobby conj-dip cr(PF) 0.5 mL vaccine x 1 dose    rifAXIMin tablet 550 mg BID    sodium chloride 0.9% flush 5 mL PRN    thiamine tablet 100 mg Daily    vancomycin 250mg / 10ml oral suspension 250 mg Q6H    vitamin D 1000 units tablet 5,000 Units Daily       Objective:     Vital Signs (Most Recent):  Temp: 99.5 °F (37.5 °C) (04/16/18 1115)  Pulse: 92 (04/16/18 1445)  Resp: (!) 30 (04/16/18 1445)  BP: 96/64 (04/16/18 1445)  SpO2: 100 % (04/16/18 1445)  O2 Device (Oxygen Therapy): (P) ventilator (04/16/18 0704) Vital Signs (24h Range):  Temp:  [99.4 °F (37.4 °C)-101 °F (38.3 °C)] 99.5 °F (37.5 °C)  Pulse:  [] 92  Resp:  [15-36] 30  SpO2:  [94 %-100 %] 100 %  BP: ()/() 96/64     Weight: 124 kg (273 lb 5.9 oz) (04/15/18 0535)  Body mass  index is 44.12 kg/m².  Body surface area is 2.4 meters squared.    I/O last 3 completed shifts:  In: 4282.9 [I.V.:612.9; Blood:50; NG/GT:2820; IV Piggyback:800]  Out: 6371 [Urine:4771; Drains:800; Stool:800]    Physical Exam   Constitutional: She appears well-developed. No distress.   Morbidly obese    HENT:   Head: Normocephalic and atraumatic.   Mouth/Throat: Oropharynx is clear and moist. No oropharyngeal exudate.   Eyes: Conjunctivae and EOM are normal. Pupils are equal, round, and reactive to light.   Neck: Neck supple. No JVD present. Carotid bruit is not present. No tracheal deviation present. No thyroid mass and no thyromegaly present.   Trach in place    Cardiovascular: Normal rate, regular rhythm, normal heart sounds and intact distal pulses.  Exam reveals no gallop and no friction rub.    No murmur heard.  Pulmonary/Chest: No respiratory distress. She has no wheezes. She has rales. She exhibits no tenderness.   Abdominal: Soft. Bowel sounds are normal. She exhibits no distension, no abdominal bruit, no ascites and no mass. There is no hepatosplenomegaly. There is no tenderness. There is no rebound, no guarding and no CVA tenderness.   Cholecystostomy in place    Musculoskeletal: She exhibits edema. She exhibits no tenderness.   Dependant edema    Lymphadenopathy:     She has no cervical adenopathy.   Neurological: She has normal reflexes. She displays normal reflexes. No cranial nerve deficit. She exhibits normal muscle tone. Coordination normal.   Skin: Skin is warm and intact. No rash noted. No erythema. No pallor.       Significant Labs:  CBC:   Recent Labs  Lab 04/16/18  1252   WBC 26.77*   RBC 3.08*   HGB 9.6*   HCT 29.9*      MCV 97   MCH 31.2*   MCHC 32.1     CMP:   Recent Labs  Lab 04/16/18  0350   *  118*   CALCIUM 10.4  10.4   ALBUMIN 2.0*  2.0*   PROT 6.3     145   K 3.1*  3.1*   CO2 28  28   CL 98  98   BUN 10  10   CREATININE 2.6*  2.6*   ALKPHOS 129   ALT 20    *   BILITOT 12.2*     Coagulation:   Recent Labs  Lab 04/16/18  0350   INR 2.2*     All labs within the past 24 hours have been reviewed.     Lab Results   Component Value Date    .6 (H) 04/09/2018    CALCIUM 10.4 04/16/2018    CALCIUM 10.4 04/16/2018    CAION 0.86 (L) 04/10/2018    PHOS 2.8 04/16/2018     Lab Results   Component Value Date    ALBUMIN 2.0 (L) 04/16/2018    ALBUMIN 2.0 (L) 04/16/2018         Significant Imaging: reviewed

## 2018-04-16 NOTE — ASSESSMENT & PLAN NOTE
ID following  Continued fevers, Wbc worsening  On abx day 14 and c diff management day 8  New cultures 4/13 with only candida (sputum and urine); diflucan day 2  Continues to require pressor  procalcitonin rising  Septic source unclear as abx course should have been sufficient for initial cultures -- consider sources ?liver, ?drug fever, incomplete pneumonia treatment, ?fungal, c diff-- discussed with ID; body habitus could impair findings but will attempt imdium scan

## 2018-04-16 NOTE — PROGRESS NOTES
Lab Results   Component Value Date    INR 2.2 (H) 04/16/2018    INR 2.1 (H) 04/15/2018    INR 1.9 (H) 04/14/2018      Patient is on pressors with worsening liver failure and high INR.  Will postpone PEG for now, until she is more stable.  The gallbladder is drained and I feel that it is not the source of her ongoing sepsis.

## 2018-04-16 NOTE — ASSESSMENT & PLAN NOTE
Currently intubated.  No acute infiltrates seen on CXR  Continue IV antibiotics empirically.  Pulmonary consult.    04/07/2018- will wean off ventilator as tolerated.  She is more awake and alert today.  Sputum cultures showed MRSA and pseudomonas-she is on vanco/meropenem-will deescalate soon.       04/08/2018- she is now intubated after asystolic cardiac arrest .Will wean off as tolerated.  04/09/2018- she remains intubated ,critical care follow up .wean off ventilator as tolerated.  4/10/2018 - likely secondary to MRSA and pseudamonal pneumonia. ID consulted, will consider double covering for pseudomonas.  04/12/18- continue Vent support  04/15/18- Trach collar in place  04/16- will continue trach collar and wean as tolerated.

## 2018-04-16 NOTE — PROGRESS NOTES
Case reviewed and discussed with hepatology/Dr. Gomes. Patient remains intubated with trach and on pressure support. She is still febrile with Max temp >102. There is some concern as to whether alcoholic hepatitis is contributing to her fever. Hepatology not convinced that fever is secondary to liver etiology. She is not a candidate for liver transplant given she is intubated, on pressure support and is febrile with an infectious source. Only input would be to transfuse with FFP and obtain a transjugular liver biopsy with pressure measurements. However, this is not really recommended.

## 2018-04-16 NOTE — ASSESSMENT & PLAN NOTE
33.8 cm span with reduced echogenicity on ultrasound.  Of uncertain etiology but suspect fatty liver disease related to alcohol abuse and obesity.    04/07/2018- related to alcohol abuse and obesity . Will need out patient follow up     04/09/2018-Hepatology consult in place-will follow recs,related to fatty liver and alcohol abuse  04/16/2018-  The worsening INR is of great concern for worsening hepatic function . Will follow hepatologist -Dr Gomes follow up .

## 2018-04-16 NOTE — PROGRESS NOTES
[facility]  Critical Care Medicine  Progress Note    Patient Name: Rafael Duron  MRN: [id number]  Admission Date: 4/3/2018  Hospital Length of Stay: 13 days  Code Status: Full Code  Attending Provider: Ed Ram MD  Primary Care Provider: Herman Cowart MD   Principal Problem: Septic shock    Subjective:     HPI:  Ms Duron is a 23 yo obese BF with a PMH of HTN, gestational DM and HTN who presented to [facility] ED in [place], [place] on  with complaint of SOB and cough with known pregnancy.  OB US revealed no heart tones and she is also  with second trimester fetal demise estimated 22 week for which she passed with retained placenta.  After admission to ICU she had seizure activity with , K+ 1.9 and Bicarb 11.  She also had etoh level 268 and reportedly had been drinking etoh w/ honey heavily for several days.  She required intubation for airway protection per records and OB was unable to remove placenta manually and patient had to be taken to OR.  She received 2 liters IVF and Hgb dropped to 7.4 and was transfused 2 units PRBCs.  Vaginal bleeding was scant per records.  Yesterday she had temp 101.5 Ax, .  She as transferred to [facility] ICU yesterday evening for higher level of care.        Hospital/ICU Course:   - This AM she is sedated on vent on Levophed infusion spiking temp 101.9 with WBC 20, LA 6.3, UA+, electrolyte imbalance and Glucose 268     - SAT and SBT done this am, pt awake and following commands. She was successfully extubated to nasal cannula. Remains tachycardic with HR 140s and febrile with temp 103 overnight. WBC 18 and lactic acidosis improving to 4.8. Continuing to aggressively replace electrolytes.     - Over night patient had episode with bradycardia and hypotension during which she became unresponsive and agonally breathing. She responded with IVF and bicarb and was restarted on pressors. She experienced a similar episode  again last night, during which she was intubated. Vent settings were reviewed and adjusted this am. No more bradycardic/hypotensive episodes since last night. Remains on pressors. Leukocystosis unimproved with worsening bandemia. Urine and blood cx NGTD. Sputum cx growing staph and pseudomonas however CXR this am is unremarkable.    04/7 - Tolerating SAT and SBT. Meets extubating criteria. Acidosis improving. Leukocytosis improving. Remains on bicarb gtt.     04/8 - Extubated successfully yesterday. Asystolic arrest this AM.  ACLS initiated. Re - intubated 2 rounds CPR with Iv epinephrine X 1  and IV CaCl X 1 given. ROSC attained.   A - line placed.      4/9 - now with mild vaginal bleeding and severe anasarca with blistering.  Still on vent with sedation and Levophed/Vasopressin infusing.  Worsening UOP and creatine.  Spoke with brother at bedside in detail and all questions answered.   4/10 - Opens eyes alert. Mild increase in Cr -. Intravascular volume depletion. Will add albumin. Still with vagnal bleeding  4/11 - low grade temp, Slight worsening of CR. Good urine output; trach placed today, remains on mechanical vent support and low dose pressor  4/12 - remains on mechanical ventilation via trach; HIDA scan abnormal with no gall bladder filling  4/13 - external gall bladder drain placed by IR; remains on mechanical ventilation via Trach  4/14 - significant draining from gall bladder along with significant decline in leukocytosis although continued fevers last 24 hours; remains on mechanical ventilation via trach; intermittent hypotension overnight and creatinine bump this am  4/15 - continued high drainage from gall bladder; continued fevers 102+; remains on mechanical ventilation via trach, failed SBT with tachypnea, hypoventilation  4/16 continued mechanical ventilation via trach; fail SBT with tachypnea, hypoventilation; continued fever, controlling with external cooling blanket (24hr max 103.2); increased  leukocytosis; procalcitonin continues rising (now 11.6); INR, bili continue rising; biliary drain with continued 500-600ml/day output    Review of Systems   Unable to perform ROS: Intubated       Objective:     Vital Signs (Most Recent):  Temp: 100.1 °F (37.8 °C) (04/16/18 0715)  Pulse: 95 (04/16/18 1112)  Resp: (!) 33 (04/16/18 1112)  BP: (!) 117/96 (04/16/18 0845)  SpO2: 96 % (04/16/18 0928) Vital Signs (24h Range):  Temp:  [99.4 °F (37.4 °C)-103 °F (39.4 °C)] 100.1 °F (37.8 °C)  Pulse:  [] 95  Resp:  [11-36] 33  SpO2:  [93 %-100 %] 96 %  BP: ()/() 117/96     Weight: 124 kg (273 lb 5.9 oz)  Body mass index is 44.12 kg/m².      Intake/Output Summary (Last 24 hours) at 04/16/18 1240  Last data filed at 04/16/18 0722   Gross per 24 hour   Intake          2369.03 ml   Output             3106 ml   Net          -736.97 ml       Physical Exam   Constitutional: She is easily aroused. She appears ill. She is restrained.   Obese young female on mechanical ventilation via trach   HENT:   Head: Atraumatic.   Eyes: Conjunctivae are normal.   Neck: No JVD present.   Cardiovascular: Normal rate and regular rhythm.    Pulses:       Radial pulses are 2+ on the right side, and 2+ on the left side.        Dorsalis pedis pulses are 1+ on the right side, and 1+ on the left side.   Pulmonary/Chest: She has decreased breath sounds. She has rhonchi.   Vent controlled resp effort   Abdominal: Soft. She exhibits no distension. Bowel sounds are decreased.   Musculoskeletal: She exhibits edema (trace edema BLE).   Neurological: She is easily aroused.   Skin: Skin is warm and dry. Capillary refill takes 2 to 3 seconds.            Vents:  Vent Mode: A/C (04/16/18 1112)  Ventilator Initiated: Yes (04/05/18 1930)  Set Rate: 18 bmp (04/16/18 1112)  Vt Set: 400 mL (04/16/18 1112)  Pressure Support: 0 cmH20 (04/16/18 1112)  PEEP/CPAP: 5 cmH20 (04/16/18 1112)  Oxygen Concentration (%): 40 (04/16/18 1112)  Peak Airway Pressure: 35  cmH2O (04/16/18 1112)  Plateau Pressure: 0 cmH20 (04/16/18 1112)  Total Ve: 13.7 mL (04/16/18 1112)  F/VT Ratio<105 (RSBI): (!) 89.92 (04/16/18 1112)    Lines/Drains/Airways     Peripherally Inserted Central Catheter Line                 PICC Double Lumen 04/14/18 1000 right brachial 2 days          Drain                 Rectal Tube 04/12/18 0710 rectal tube w/ balloon (indicate number of mLs) 4 days         Biliary Tube 04/13/18 1200 RLQ 3 days         NG/OG Tube 04/12/18 1820 14 Fr. Right nostril 3 days         Urethral Catheter 04/15/18 1250 Temperature probe less than 1 day          Airway                 Surgical Airway 04/11/18 1403 Shiley Cuffed;Long;Proximal 4 days          Peripheral Intravenous Line                 Peripheral IV - Single Lumen 04/13/18 1747 Left Forearm 2 days                Significant Labs:    CBC/Anemia Profile:    Recent Labs  Lab 04/15/18  0545 04/16/18  0350   WBC 20.80* 25.64*   HGB 9.0* 9.7*   HCT 28.1* 30.8*    217   MCV 97 98   RDW 19.5* 20.1*        Chemistries:    Recent Labs  Lab 04/14/18  1935  04/15/18  0545 04/15/18  1759 04/16/18  0350   *  < > 146* 146* 145  145   K 3.2*  < > 3.2* 3.1* 3.1*  3.1*   CL 99  < > 97 98 98  98   CO2 29  < > 29 31* 28  28   BUN 9  < > 8 9 10  10   CREATININE 3.2*  < > 3.1* 2.9* 2.6*  2.6*   CALCIUM 9.7  < > 10.1 10.5 10.4  10.4   ALBUMIN 2.1*  --  2.1*  --  2.0*  2.0*   PROT  --   --  6.1  --  6.3   BILITOT  --   --  11.1*  --  12.2*   ALKPHOS  --   --  129  --  129   ALT  --   --  20  --  20   AST  --   --  93*  --  106*   MG  --   --  1.6 1.8 1.5*   PHOS 1.7*  --   --   --  2.8   < > = values in this interval not displayed.    All pertinent labs within the past 24 hours have been reviewed.  [facility]    Recent Labs  Lab 04/16/18  0532   PH 7.485*   PO2 85   PCO2 40.0   HCO3 30.1*   BE 7         Significant Imaging:  I have reviewed all pertinent imaging results/findings within the past 24 hours.      Assessment/Plan:      Psychiatric   ETOH abuse    Continue electrolyte replacement, Thiamine, Folate, MVI.      Pulmonary   Pneumonia of both lower lobes due to methicillin resistant Staphylococcus aureus (MRSA)    Diflucan added for few candida albicans in setting of fevers, sepsis unclear etiology      Acute hypoxemic respiratory failure    Re intubated for third time 4/8 s/p asystolic arrest.    Vent settings reviewed and appropriate; FIO2 to keep SAO2 > 92%.   4/11 tracheostomy placed  Continues to fail daily SBT; continue support      Cardiopulmonary arrest    Cont supportive care and ICU cardiac monitoring      Electrolyte imbalance    Monitor and replete electrolytes as needed.       ID   * Septic shock    ID following  Continued fevers, Wbc worsening  On abx day 14 and c diff management day 8  New cultures 4/13 with only candida (sputum and urine); diflucan day 2  Continues to require pressor  procalcitonin rising  Septic source unclear as abx course should have been sufficient for initial cultures -- consider sources ?liver, ?drug fever, incomplete pneumonia treatment, ?fungal, c diff-- discussed with ID; body habitus could impair findings but will attempt imdium scan      Clostridium difficile infection    continue oral vancomycin and IV metronidazole      GI   Hepatomegaly    ? Alcoholic liver disease / toxic hepatitis ( DILI). Mainly cholestatic picture.  Monitoring liver enzymes.     Significant gall bladder drainage, monitor      Shock liver    Bili rising, INR, and ammonia rising  Monitor trends  GI following      Other   Hyperbilirubinemia    GI following  Bili continues upward trend      History of IUFD    S/P D & C. No retained products of conception on ultrasound.          Critical Care Daily Checklist:    A: Awake: RASS Goal/Actual Goal: RASS Goal: -1-->drowsy  Actual: Lim Agitation Sedation Scale (RASS): Alert and calm   B: Spontaneous Breathing Trial Performed? Spon. Breathing Trial Initiated?: Not  initiated (held per NP order) (04/09/18 0705)   C: SAT & SBT Coordinated?  yes                      D: Delirium: CAM-ICU Overall CAM-ICU: Negative   E: Early Mobility Performed? ROM   F: Feeding Goal: Goals: Meet > 85 % EEN/EPN   Status: Nutrition Goal Status: progressing towards goal   Current Diet Order   Procedures    TF per RD recs      AS: Analgesia/Sedation prn   T: Thromboembolic Prophylaxis heparin   H: HOB > 300 yes   U: Stress Ulcer Prophylaxis (if needed) pepcid   G: Glucose Control monitoring   B: Bowel Function Stool Occurrence: 1 (per flexiseal )   I: Indwelling Catheter (Lines & Rodriguez) Necessity reviewed   D: De-escalation of Antimicrobials/Pharmacotherapies reviewed    Plan for the day/ETD Initiate imdium scan; supportive care    Code Status:  Family/Goals of Care: Full Code  [facility] Hospital course   I have discussed case and plan of care in detail with Dr Moore and Dr Ram; Status and plan of care were discussed with team on multidisciplinary rounds.    Critical Care Time: 48 minutes  Critical care was time spent personally by me on the following activities: development of treatment plan with patient or surrogate and bedside caregivers, discussions with consultants, evaluation of patient's response to treatment, examination of patient, ordering and performing treatments and interventions, ordering and review of laboratory studies, ordering and review of radiographic studies, pulse oximetry, re-evaluation of patient's condition. This critical care time did not overlap with that of any other provider or involve time for any procedures.     Aracely Pal NP  Critical Care Medicine  [facility]

## 2018-04-16 NOTE — ASSESSMENT & PLAN NOTE
Re intubated for third time 4/8 s/p asystolic arrest.    Vent settings reviewed and appropriate; FIO2 to keep SAO2 > 92%.   4/11 tracheostomy placed  Continues to fail daily SBT; continue support

## 2018-04-16 NOTE — SUBJECTIVE & OBJECTIVE
Interval History:   22 year old woman with septic shock, worsening leucocytosis,s/p tracheostomy tube,She remains febrile with high output from the cholecystectomy drain  . T max is 102.9.  Today is day 13 of admission.  Chest x-ray showed persistent right upper lobe infiltrate.  Lab data showed worsening INR to 2.2, wbc is 25,serum procalcitonin is 11.Serum creatinine is 2.6.She is on vasopressor support .      Review of Systems   Unable to perform ROS: Acuity of condition     Objective:     Vital Signs (Most Recent):  Temp: 100.1 °F (37.8 °C) (04/16/18 0715)  Pulse: 95 (04/16/18 1112)  Resp: (!) 33 (04/16/18 1112)  BP: (!) 117/96 (04/16/18 0845)  SpO2: 96 % (04/16/18 0928) Vital Signs (24h Range):  Temp:  [99.4 °F (37.4 °C)-103.2 °F (39.6 °C)] 100.1 °F (37.8 °C)  Pulse:  [] 95  Resp:  [11-36] 33  SpO2:  [93 %-100 %] 96 %  BP: ()/() 117/96     Weight: 124 kg (273 lb 5.9 oz)  Body mass index is 44.12 kg/m².    Intake/Output Summary (Last 24 hours) at 04/16/18 1151  Last data filed at 04/16/18 0722   Gross per 24 hour   Intake          2454.03 ml   Output             3106 ml   Net          -651.97 ml      Physical Exam   Constitutional: She appears well-developed and well-nourished. She appears distressed.   HENT:   Head: Normocephalic and atraumatic.   Eyes: EOM are normal. Pupils are equal, round, and reactive to light.   Neck: Normal range of motion. Neck supple. No JVD present.   Trach in place   Cardiovascular: Regular rhythm and normal heart sounds.    Pulmonary/Chest: Breath sounds normal.   Abdominal: Bowel sounds are normal. She exhibits mass. There is tenderness.   Has PEG tube drain insitu    Musculoskeletal: She exhibits edema.   Neurological: No cranial nerve deficit. Coordination normal.   sedated   Skin: Skin is warm and dry.   Psychiatric:   sedated       Significant Labs:   Bilirubin:   Recent Labs  Lab 04/12/18  0353 04/13/18  0440 04/14/18  0414 04/15/18  0545 04/16/18  0350    BILITOT 9.1* 9.8* 9.8* 11.1* 12.2*     Blood Culture: No results for input(s): LABBLOO in the last 48 hours.  BMP:   Recent Labs  Lab 04/16/18  0350   *  118*     145   K 3.1*  3.1*   CL 98  98   CO2 28  28   BUN 10  10   CREATININE 2.6*  2.6*   CALCIUM 10.4  10.4   MG 1.5*     CBC:   Recent Labs  Lab 04/15/18  0545 04/16/18  0350   WBC 20.80* 25.64*   HGB 9.0* 9.7*   HCT 28.1* 30.8*    217     Lactic Acid: No results for input(s): LACTATE in the last 48 hours.  Urine Studies: No results for input(s): COLORU, APPEARANCEUA, PHUR, SPECGRAV, PROTEINUA, GLUCUA, KETONESU, BILIRUBINUA, OCCULTUA, NITRITE, UROBILINOGEN, LEUKOCYTESUR, RBCUA, WBCUA, BACTERIA, SQUAMEPITHEL, HYALINECASTS in the last 48 hours.    Invalid input(s): ZACARIASSUR  All pertinent labs within the past 24 hours have been reviewed.    Significant Imaging: I have reviewed and interpreted all pertinent imaging results/findings within the past 24 hours.

## 2018-04-16 NOTE — ASSESSMENT & PLAN NOTE
Continue Vancomycin  04/15/18-persistent right upper lobe infiltrate  04/16/18- she was treated with vancomycin .Will follow repeat cbc and cultures.

## 2018-04-16 NOTE — PROGRESS NOTES
Case discussed with LYNSEY Funk. Agree that at this time patient would not be a liver transplant candidate. One it is still unclear if her liver is significantly responsible for her presentation and second there is still concern for uncontroleld infection in a patient intubated on vasopressors. Given she has been critical but stable if we are going to better sort out her clinical situation giving FFP and proceeding with a transjugular liver biopsy is an option. Case discussed with Dr. Ram.

## 2018-04-16 NOTE — PROGRESS NOTES
[facility]   Nephrology  Progress Note    Patient Name: Rafael Duron  MRN: [id number]  Admission Date: 4/3/2018  Hospital Length of Stay: 13 days  Attending Provider: Ed Ram MD   Primary Care Physician: Herman Cowart MD  Principal Problem:Septic shock    Subjective:     HPI: 23 yo obese female with HTN, gestational DM  who presented to [facility] ED in [place], [place] on 4/2 with complaint of SOB and cough with known pregnancy. Workup revealed fetal demise (estimated at 22 weeks) and patient passed dead fetus but retained placenta. Placenta remnants were removed in OR vis D & C.  After admission to ICU she had seizure activity with , K+ 1.9 and Bicarb 11.  She also had EtOH level of 268.  She required intubation for airway protection per records. She as transferred to [facility] ICU on 4/3/18 for higher level of care.    Patient presented with septic shock at [facility] and was started on IV pressors and IV antibiotics. Patient was initially stabilized and extubated on 4/5/18. OB/GYN following. She developed syncopal episode on 4/5/18 associated with bradycardia. She was successfully resuscitated with IV fluids and levophed. She subsequently deteriorated and was re-intubated on 4/5/18. Abdominal US revealed massive hepatomegaly with liver span of 33 cm. Patient's condition improved and she was extubated again on 4/7/18. Patient coded on 4/8/18 and was successfully resuscitated and again intubated. Patient was also diagnosed with C. Diff colitis and MRSA bacteremia.   Nephrology was consulted to help with patient's electrolyte care, renal care and volume management. I saw and examined patient in her hospital room. Patient is intubated and not able to provide any additional history.   Patient remains on antibiotics and pressor support. Chart review revealed that hyponatremia and hypokalemia have now resolved. However, hypocalcemia has persisted. In addition, patient's renal  function has worsened during current admission and creatinine has increased from 0.8 on 4/6/18 to 1.6 on 4/9/18. Volume review showed positive I/O balance with + 24 liters since admission,. Patient's UOP has been fluctuating with 1 to 3 liters of urine per day. Current UOP about 100 cc/hour.       Interval History:  Pt seen and chart reviewed, remains critically ill,     Review of patient's allergies indicates:  No Known Allergies  Current Facility-Administered Medications   Medication Frequency    0.9%  NaCl infusion (for blood administration) Q24H PRN    albuterol-ipratropium 2.5mg-0.5mg/3mL nebulizer solution 3 mL Q4H PRN    bisacodyl suppository 10 mg Daily PRN    chlorhexidine 0.12 % solution 15 mL BID    dextrose 5 % and 0.45 % NaCl infusion Continuous    dextrose 50% injection 12.5 g PRN    famotidine (PF) injection 20 mg Daily    fentaNYL injection 50 mcg Q2H PRN    fluconazole (DIFLUCAN) IVPB 200 mg 100 mL Q24H    folic acid-vit B6-vit B12 2.5-25-2 mg tablet 1 tablet Daily    glucagon (human recombinant) injection 1 mg PRN    heparin (porcine) injection 5,000 Units Q8H    insulin aspart U-100 pen 1-10 Units Q6H PRN    lactulose 20 gram/30 mL solution Soln 30 g Daily    lorazepam (ATIVAN) injection 2 mg Q2H PRN    meropenem injection 500 mg Q8H    metronidazole IVPB 500 mg Q8H    multivitamin tablet 1 tablet Daily    norepinephrine 32 mg in dextrose 5 % 250 mL infusion Continuous    ondansetron injection 4 mg Q8H PRN    pneumoc 13-bobby conj-dip cr(PF) 0.5 mL vaccine x 1 dose    rifAXIMin tablet 550 mg BID    sodium chloride 0.9% flush 5 mL PRN    thiamine tablet 100 mg Daily    vancomycin 250mg / 10ml oral suspension 250 mg Q6H    vitamin D 1000 units tablet 5,000 Units Daily       Objective:     Vital Signs (Most Recent):  Temp: 99.5 °F (37.5 °C) (04/16/18 1115)  Pulse: 92 (04/16/18 1445)  Resp: (!) 30 (04/16/18 1445)  BP: 96/64 (04/16/18 1445)  SpO2: 100 % (04/16/18 1445)  O2  Device (Oxygen Therapy): (P) ventilator (04/16/18 0704) Vital Signs (24h Range):  Temp:  [99.4 °F (37.4 °C)-101 °F (38.3 °C)] 99.5 °F (37.5 °C)  Pulse:  [] 92  Resp:  [15-36] 30  SpO2:  [94 %-100 %] 100 %  BP: ()/() 96/64     Weight: 124 kg (273 lb 5.9 oz) (04/15/18 0535)  Body mass index is 44.12 kg/m².  Body surface area is 2.4 meters squared.    I/O last 3 completed shifts:  In: 4282.9 [I.V.:612.9; Blood:50; NG/GT:2820; IV Piggyback:800]  Out: 6371 [Urine:4771; Drains:800; Stool:800]    Physical Exam   Constitutional: She appears well-developed. No distress.   Morbidly obese    HENT:   Head: Normocephalic and atraumatic.   Mouth/Throat: Oropharynx is clear and moist. No oropharyngeal exudate.   Eyes: Conjunctivae and EOM are normal. Pupils are equal, round, and reactive to light.   Neck: Neck supple. No JVD present. Carotid bruit is not present. No tracheal deviation present. No thyroid mass and no thyromegaly present.   Trach in place    Cardiovascular: Normal rate, regular rhythm, normal heart sounds and intact distal pulses.  Exam reveals no gallop and no friction rub.    No murmur heard.  Pulmonary/Chest: No respiratory distress. She has no wheezes. She has rales. She exhibits no tenderness.   Abdominal: Soft. Bowel sounds are normal. She exhibits no distension, no abdominal bruit, no ascites and no mass. There is no hepatosplenomegaly. There is no tenderness. There is no rebound, no guarding and no CVA tenderness.   Cholecystostomy in place    Musculoskeletal: She exhibits edema. She exhibits no tenderness.   Dependant edema    Lymphadenopathy:     She has no cervical adenopathy.   Neurological: She has normal reflexes. She displays normal reflexes. No cranial nerve deficit. She exhibits normal muscle tone. Coordination normal.   Skin: Skin is warm and intact. No rash noted. No erythema. No pallor.       Significant Labs:  CBC:   Recent Labs  Lab 04/16/18  1252   WBC 26.77*   RBC 3.08*    HGB 9.6*   HCT 29.9*      MCV 97   MCH 31.2*   MCHC 32.1     CMP:   Recent Labs  Lab 04/16/18  0350   *  118*   CALCIUM 10.4  10.4   ALBUMIN 2.0*  2.0*   PROT 6.3     145   K 3.1*  3.1*   CO2 28  28   CL 98  98   BUN 10  10   CREATININE 2.6*  2.6*   ALKPHOS 129   ALT 20   *   BILITOT 12.2*     Coagulation:   Recent Labs  Lab 04/16/18  0350   INR 2.2*     All labs within the past 24 hours have been reviewed.     Lab Results   Component Value Date    .6 (H) 04/09/2018    CALCIUM 10.4 04/16/2018    CALCIUM 10.4 04/16/2018    CAION 0.86 (L) 04/10/2018    PHOS 2.8 04/16/2018     Lab Results   Component Value Date    ALBUMIN 2.0 (L) 04/16/2018    ALBUMIN 2.0 (L) 04/16/2018         Significant Imaging: reviewed     Assessment/Plan:     VIKRAM (acute kidney injury)      1. VIKRAM :  renal function has worsened and creatinine has increased from 0.8 on 4/6/18 to 2.9 mg/dl  , responding to IV fluids, cont same,     VIKRAM from ATN from low BP few days ago and also due to aggressive diuresis , follow renal fn, on tube feeds,      2. Electrolyte abnormalities,  K was repleted,     Corrected calcium about 12, CC to discuss with dietician to reduce calcium in tube feeds,     3. Hypotension : cont pressor support       4. Abnormal LFTs , s/p Cholecystostomy placement ,  ? shock liver , alcohic hepatitis, GI note reviewed,     5. Meds reviewed, adjusted Meropenem dose to bid,     6. Anemia : multifactorial, heme following     7. Candida UTI : on abx                 I will follow-up with patient. Please contact us if you have any additional questions.     Total critical care time spent 40 minutes including time needed to review the records,  patient  evaluation, documentation, face-to-face discussion with the primary and critical care teams. more than 50% of the time was spent on coordination of care and counseling.       Rafael Ferreira MD  Nephrology  [facility]

## 2018-04-16 NOTE — ASSESSMENT & PLAN NOTE
04/07/2018- will closely monitor fever curve, will stop flagyl, change meropenem to zosyn,continue vancomycin for now.  Will deescalate soon.  Blood cultures -neg, sputum cultures-MRSA and pseudomonas   4/11/2018 - antibx adjusted today, per icu, ID consulted  04/13/18- possible due to acute cholecystectomy -s/p cholestomy tube placement   04/16/18- etiology of fever is unclear at this time, she was treated with vancomycin initially and was stopped due to worsening renal failure.  She remains on meropenem ,flagyl, diflucan.  With persistent fever , will follow repeat blood cultures and will discuss with critical care team about changing her lines.  Will do indium scan .  Her prognosis is guarded , will add 7 days of empiric zyvox if wbc continues to increase and fever persists .

## 2018-04-17 NOTE — SUBJECTIVE & OBJECTIVE
Interval History:  No acute events     Review of patient's allergies indicates:  No Known Allergies  Current Facility-Administered Medications   Medication Frequency    0.9%  NaCl infusion (for blood administration) Q24H PRN    albuterol-ipratropium 2.5mg-0.5mg/3mL nebulizer solution 3 mL Q4H PRN    bisacodyl suppository 10 mg Daily PRN    chlorhexidine 0.12 % solution 15 mL BID    dextrose 5 % and 0.45 % NaCl infusion Continuous    dextrose 50% injection 12.5 g PRN    famotidine (PF) injection 20 mg Daily    fentaNYL injection 50 mcg Q2H PRN    fluconazole (DIFLUCAN) IVPB 200 mg 100 mL Q24H    folic acid-vit B6-vit B12 2.5-25-2 mg tablet 1 tablet Daily    glucagon (human recombinant) injection 1 mg PRN    heparin (porcine) injection 5,000 Units Q8H    insulin aspart U-100 pen 1-10 Units Q6H PRN    lactulose 20 gram/30 mL solution Soln 30 g Daily    lorazepam (ATIVAN) injection 2 mg Q2H PRN    metronidazole IVPB 500 mg Q8H    multivitamin tablet 1 tablet Daily    norepinephrine 32 mg in dextrose 5 % 250 mL infusion Continuous    ondansetron injection 4 mg Q8H PRN    pneumoc 13-bobby conj-dip cr(PF) 0.5 mL vaccine x 1 dose    rifAXIMin tablet 550 mg BID    sodium chloride 0.9% flush 5 mL PRN    thiamine tablet 100 mg Daily    vancomycin 250mg / 10ml oral suspension 250 mg Q6H    vitamin D 1000 units tablet 5,000 Units Daily       Objective:     Vital Signs (Most Recent):  Temp: (!) 100.8 °F (38.2 °C) (04/17/18 1115)  Pulse: (!) 119 (04/17/18 1300)  Resp: (!) 33 (04/17/18 0715)  BP: 121/75 (04/17/18 1300)  SpO2: 99 % (04/17/18 1300)  O2 Device (Oxygen Therapy): ventilator (04/17/18 1115) Vital Signs (24h Range):  Temp:  [98.9 °F (37.2 °C)-100.8 °F (38.2 °C)] 100.8 °F (38.2 °C)  Pulse:  [] 119  Resp:  [15-33] 33  SpO2:  [98 %-100 %] 99 %  BP: ()/(36-98) 121/75     Weight: 124 kg (273 lb 5.9 oz) (04/15/18 0535)  Body mass index is 44.12 kg/m².  Body surface area is 2.4 meters  squared.    I/O last 3 completed shifts:  In: 2255.7 [I.V.:515.7; NG/GT:940; IV Piggyback:800]  Out: 4125 [Urine:2825; Drains:450; Stool:850]    Physical Exam   Constitutional: She appears well-developed. No distress.   Morbidly obese    HENT:   Head: Normocephalic and atraumatic.   Mouth/Throat: Oropharynx is clear and moist. No oropharyngeal exudate.   Eyes: Conjunctivae and EOM are normal. Pupils are equal, round, and reactive to light.   Neck: Neck supple. No JVD present. Carotid bruit is not present. No tracheal deviation present. No thyroid mass and no thyromegaly present.   Trach in place    Cardiovascular: Normal rate, regular rhythm, normal heart sounds and intact distal pulses.  Exam reveals no gallop and no friction rub.    No murmur heard.  Pulmonary/Chest: No respiratory distress. She has no wheezes. She has rales. She exhibits no tenderness.   Abdominal: Soft. Bowel sounds are normal. She exhibits no distension, no abdominal bruit, no ascites and no mass. There is no hepatosplenomegaly. There is no tenderness. There is no rebound, no guarding and no CVA tenderness.   Cholecystostomy in place    Musculoskeletal: She exhibits edema. She exhibits no tenderness.   Dependant edema    Lymphadenopathy:     She has no cervical adenopathy.   Neurological: She has normal reflexes. She displays normal reflexes. No cranial nerve deficit. She exhibits normal muscle tone. Coordination normal.   Skin: Skin is warm and intact. No rash noted. No erythema. No pallor.       Significant Labs:  Cardiac Markers: No results for input(s): CKMB, TROPONINT, MYOGLOBIN in the last 168 hours.  CBC:   Recent Labs  Lab 04/17/18 0415   WBC 26.27*   RBC 2.92*   HGB 8.9*   HCT 28.1*      MCV 96   MCH 30.5   MCHC 31.7*     CMP:   Recent Labs  Lab 04/17/18 0415   *   CALCIUM 10.0   ALBUMIN 1.8*   PROT 6.0      K 2.6*   CO2 28   CL 96   BUN 12   CREATININE 2.1*   ALKPHOS 127   ALT 17   *   BILITOT 11.7*      Coagulation:   Recent Labs  Lab 04/17/18 0415   INR 1.8*     LFTs:   Recent Labs  Lab 04/17/18 0415   ALT 17   *   ALKPHOS 127   BILITOT 11.7*   PROT 6.0   ALBUMIN 1.8*     All labs within the past 24 hours have been reviewed.     Lab Results   Component Value Date    ALT 17 04/17/2018     (H) 04/17/2018    ALKPHOS 127 04/17/2018    BILITOT 11.7 (H) 04/17/2018         Significant Imaging: reviewed

## 2018-04-17 NOTE — PROGRESS NOTES
Patient to be transferred to radiology for CT scan after receiving contrast at this time. Charge nurse and RRT at bedside assisting with transfer. No distress noted at this time. Will continue to monitor.

## 2018-04-17 NOTE — PLAN OF CARE
Problem: Patient Care Overview  Goal: Plan of Care Review  Outcome: Ongoing (interventions implemented as appropriate)  Patient currently resting quietly in bed. VS currently stable. Patient NSR on monitor at this time. Patient currently receiving oxygen by ventilator via tracheostomy. Patient on levophed drip at this time. Patient also receiving D5W in 1/2 NS @ 75mL/hr. Patient turned by speciality bed every 30 minutes to avoid skin breakdown to back side and heels. No sign of wound development noted. Patient brought to radiology for abdominal CT scan earlier in shift. Patient had no complications during CT. Plan of care updated with family. There are no further questions after updated on plan of care at this time. Will continue to monitor.

## 2018-04-17 NOTE — NURSING
Pt back to ICU room. Pt tolerated scan, no complications noted. Pt VSS at this time, no distress noted. Tube feedings restarted per order. Will continue to monitor.

## 2018-04-17 NOTE — ASSESSMENT & PLAN NOTE
Currently 8.9 after 2 units PRBC transfusion at outside facility.  No active bleeding at this time.  Labs in AM.      04/07/2018- hemoglobin is stable at 8.7(was 8.9) two days ago.  Will continue to monitor closely  4/10/2018 - Pt transfused 4 units prbcs, plat, ffp and cryo. Hem/Onc following, will transfuse PRN.  04/17/18- hemoglobin is 8.9, no evidence of bleeding -will continue to monitor .

## 2018-04-17 NOTE — ASSESSMENT & PLAN NOTE
1. VIKRAM :  VIKRAM from ATN from low BP few days ago and also due to aggressive diuresis , follow renal fn, on tube feeds,  Recovering now,     2. Electrolyte abnormalities,  K was repleted,     Corrected calcium about 12, reduced in tube feeds      3. Hypotension : cont pressor support       4. Abnormal LFTs , s/p Cholecystostomy placement ,  ? shock liver , alcohic hepatitis, GI note reviewed,     5. Meds reviewed, adjusted Meropenem dose to bid,     6. Anemia : multifactorial, heme following     7. Candida UTI : on abx     8. Poor prognosis

## 2018-04-17 NOTE — ASSESSMENT & PLAN NOTE
Currently intubated.  No acute infiltrates seen on CXR  Continue IV antibiotics empirically.  Pulmonary consult.    04/07/2018- will wean off ventilator as tolerated.  She is more awake and alert today.  Sputum cultures showed MRSA and pseudomonas-she is on vanco/meropenem-will deescalate soon.       04/08/2018- she is now intubated after asystolic cardiac arrest .Will wean off as tolerated.  04/09/2018- she remains intubated ,critical care follow up .wean off ventilator as tolerated.  4/10/2018 - likely secondary to MRSA and pseudamonal pneumonia. ID consulted, will consider double covering for pseudomonas.  04/12/18- continue Vent support  04/15/18- Trach collar in place  04/16- will continue trach collar and wean as tolerated.  04/17/18- continue trachea care/weaning

## 2018-04-17 NOTE — ASSESSMENT & PLAN NOTE
04/09/2018-will use PO vancomycin 250mg every 8 hours for 10 days .  Due to her multiple electrolyte abnormalities, there is concern for ileus ,will continue Flagyl for now and adjust therapy as needed   .  04/16/2018- will continue PO vanco/flagyl -wbc remains very high at 25, -   Will plan to repeat imaging if leucocytosis persists .    04/17/18- the CT scan of the abdomen showed pancolitis likely related to C difficle -will continue po vancomycin and flagyl ,.

## 2018-04-17 NOTE — ASSESSMENT & PLAN NOTE
04/09/2018-case discussed with nephrology -she has positive fluid balance, will continue lasix 60mg every 8 hours .  04/15/18- trending up , nephrology following  04/16/18- serum creatinine is increasing to 2.6,will continue to follow nephrology , will avoid nephrotoxic medications.   04/17-now off vancomycin, nephrology on board, will continue to closely monitor serum creatinine.

## 2018-04-17 NOTE — SUBJECTIVE & OBJECTIVE
Review of Systems   Unable to perform ROS: Intubated       Objective:     Vital Signs (Most Recent):  Temp: 100.1 °F (37.8 °C) (04/17/18 0715)  Pulse: 98 (04/17/18 1000)  Resp: (!) 33 (04/17/18 0715)  BP: 105/71 (04/17/18 1000)  SpO2: 100 % (04/17/18 1000) Vital Signs (24h Range):  Temp:  [98.9 °F (37.2 °C)-100.1 °F (37.8 °C)] 100.1 °F (37.8 °C)  Pulse:  [] 98  Resp:  [15-35] 33  SpO2:  [97 %-100 %] 100 %  BP: ()/() 105/71     Weight: 124 kg (273 lb 5.9 oz)  Body mass index is 44.12 kg/m².      Intake/Output Summary (Last 24 hours) at 04/17/18 1104  Last data filed at 04/17/18 1000   Gross per 24 hour   Intake          1085.65 ml   Output             2030 ml   Net          -944.35 ml       Physical Exam   Constitutional: She is easily aroused. She appears ill. She is restrained.   Obese young female on mechanical ventilation via trach   HENT:   Head: Atraumatic.   Eyes: Conjunctivae are normal.   Neck: No JVD present.   Cardiovascular: Normal rate and regular rhythm.    Pulses:       Radial pulses are 2+ on the right side, and 2+ on the left side.        Dorsalis pedis pulses are 1+ on the right side, and 1+ on the left side.   Pulmonary/Chest: She has decreased breath sounds. She has rhonchi.   Vent controlled resp effort   Abdominal: Soft. She exhibits no distension. Bowel sounds are decreased.   Musculoskeletal: She exhibits edema (trace edema BLE).   Neurological: She is easily aroused.   Skin: Skin is warm and dry. Capillary refill takes 2 to 3 seconds.            Vents:  Vent Mode: A/C (04/17/18 0923)  Ventilator Initiated: Yes (04/05/18 1930)  Set Rate: 18 bmp (04/17/18 0923)  Vt Set: 400 mL (04/17/18 0923)  Pressure Support: 0 cmH20 (04/17/18 0923)  PEEP/CPAP: 5 cmH20 (04/17/18 0923)  Oxygen Concentration (%): 40 (04/17/18 1000)  Peak Airway Pressure: 37 cmH2O (04/17/18 0923)  Plateau Pressure: 0 cmH20 (04/17/18 0923)  Total Ve: 14.3 mL (04/17/18 0923)  F/VT Ratio<105 (RSBI): (!) 76.09  (04/16/18 1735)    Lines/Drains/Airways     Peripherally Inserted Central Catheter Line                 PICC Double Lumen 04/14/18 1000 right brachial 3 days          Drain                 Rectal Tube 04/12/18 0710 rectal tube w/ balloon (indicate number of mLs) 5 days         Biliary Tube 04/13/18 1200 RLQ 3 days         Urethral Catheter 04/15/18 1250 Temperature probe 1 day         NG/OG Tube 04/16/18 1342 Other (comments) Right nostril less than 1 day          Airway                 Surgical Airway 04/11/18 1403 Shiley Cuffed;Long;Proximal 5 days          Peripheral Intravenous Line                 Peripheral IV - Single Lumen 04/13/18 1747 Left Forearm 3 days                Significant Labs:    CBC/Anemia Profile:    Recent Labs  Lab 04/16/18  0350 04/16/18  1252 04/17/18  0415   WBC 25.64* 26.77* 26.27*   HGB 9.7* 9.6* 8.9*   HCT 30.8* 29.9* 28.1*    202 193   MCV 98 97 96   RDW 20.1* 20.2* 20.1*        Chemistries:    Recent Labs  Lab 04/15/18  1759 04/16/18  0350 04/17/18  0415   * 145  145 141   K 3.1* 3.1*  3.1* 2.6*   CL 98 98  98 96   CO2 31* 28  28 28   BUN 9 10  10 12   CREATININE 2.9* 2.6*  2.6* 2.1*   CALCIUM 10.5 10.4  10.4 10.0   ALBUMIN  --  2.0*  2.0* 1.8*   PROT  --  6.3 6.0   BILITOT  --  12.2* 11.7*   ALKPHOS  --  129 127   ALT  --  20 17   AST  --  106* 120*   MG 1.8 1.5* 1.7   PHOS  --  2.8  --        All pertinent labs within the past 24 hours have been reviewed.  ABG    Recent Labs  Lab 04/17/18  0512   PH 7.470*   PO2 87   PCO2 43.2   HCO3 31.4*   BE 8         Significant Imaging:  I have reviewed all pertinent imaging results/findings within the past 24 hours.

## 2018-04-17 NOTE — PROGRESS NOTES
[facility]  Critical Care Medicine  Progress Note    Patient Name: Rafael Duron  MRN: [id number]  Admission Date: 4/3/2018  Hospital Length of Stay: 14 days  Code Status: Full Code  Attending Provider: Ed Ram MD  Primary Care Provider: Herman Cowart MD   Principal Problem: Septic shock    Subjective:     HPI:  Ms Duron is a 23 yo obese BF with a PMH of HTN, gestational DM and HTN who presented to [facility] ED in [place], [place] on  with complaint of SOB and cough with known pregnancy.  OB US revealed no heart tones and she is also  with second trimester fetal demise estimated 22 week for which she passed with retained placenta.  After admission to ICU she had seizure activity with , K+ 1.9 and Bicarb 11.  She also had etoh level 268 and reportedly had been drinking etoh w/ honey heavily for several days.  She required intubation for airway protection per records and OB was unable to remove placenta manually and patient had to be taken to OR.  She received 2 liters IVF and Hgb dropped to 7.4 and was transfused 2 units PRBCs.  Vaginal bleeding was scant per records.  Yesterday she had temp 101.5 Ax, .  She as transferred to [facility] ICU yesterday evening for higher level of care.        Hospital/ICU Course:   - This AM she is sedated on vent on Levophed infusion spiking temp 101.9 with WBC 20, LA 6.3, UA+, electrolyte imbalance and Glucose 268     - SAT and SBT done this am, pt awake and following commands. She was successfully extubated to nasal cannula. Remains tachycardic with HR 140s and febrile with temp 103 overnight. WBC 18 and lactic acidosis improving to 4.8. Continuing to aggressively replace electrolytes.     - Over night patient had episode with bradycardia and hypotension during which she became unresponsive and agonally breathing. She responded with IVF and bicarb and was restarted on pressors. She experienced a similar episode  again last night, during which she was intubated. Vent settings were reviewed and adjusted this am. No more bradycardic/hypotensive episodes since last night. Remains on pressors. Leukocystosis unimproved with worsening bandemia. Urine and blood cx NGTD. Sputum cx growing staph and pseudomonas however CXR this am is unremarkable.    04/7 - Tolerating SAT and SBT. Meets extubating criteria. Acidosis improving. Leukocytosis improving. Remains on bicarb gtt.     04/8 - Extubated successfully yesterday. Asystolic arrest this AM.  ACLS initiated. Re - intubated 2 rounds CPR with Iv epinephrine X 1  and IV CaCl X 1 given. ROSC attained.   A - line placed.      4/9 - now with mild vaginal bleeding and severe anasarca with blistering.  Still on vent with sedation and Levophed/Vasopressin infusing.  Worsening UOP and creatine.  Spoke with brother at bedside in detail and all questions answered.   4/10 - Opens eyes alert. Mild increase in Cr -. Intravascular volume depletion. Will add albumin. Still with vagnal bleeding  4/11 - low grade temp, Slight worsening of CR. Good urine output; trach placed today, remains on mechanical vent support and low dose pressor  4/12 - remains on mechanical ventilation via trach; HIDA scan abnormal with no gall bladder filling  4/13 - external gall bladder drain placed by IR; remains on mechanical ventilation via Trach  4/14 - significant draining from gall bladder along with significant decline in leukocytosis although continued fevers last 24 hours; remains on mechanical ventilation via trach; intermittent hypotension overnight and creatinine bump this am  4/15 - continued high drainage from gall bladder; continued fevers 102+; remains on mechanical ventilation via trach, failed SBT with tachypnea, hypoventilation  4/16 continued mechanical ventilation via trach; fail SBT with tachypnea, hypoventilation; continued fever, controlling with external cooling blanket (24hr max 103.2); increased  leukocytosis; procalcitonin continues rising (now 11.6); INR, bili continue rising; biliary drain with continued 500-600ml/day output  4/17 - remains vent dependent with continued pressor requirement, fevers, increasing leukocytosis; slight decline INR, Tbili, and procal (all remain elevated); CT abd last evening with mild pancolitis not consistent with severity of ongoing illness    Review of Systems   Unable to perform ROS: Intubated       Objective:     Vital Signs (Most Recent):  Temp: 100.1 °F (37.8 °C) (04/17/18 0715)  Pulse: 98 (04/17/18 1000)  Resp: (!) 33 (04/17/18 0715)  BP: 105/71 (04/17/18 1000)  SpO2: 100 % (04/17/18 1000) Vital Signs (24h Range):  Temp:  [98.9 °F (37.2 °C)-100.1 °F (37.8 °C)] 100.1 °F (37.8 °C)  Pulse:  [] 98  Resp:  [15-35] 33  SpO2:  [97 %-100 %] 100 %  BP: ()/() 105/71     Weight: 124 kg (273 lb 5.9 oz)  Body mass index is 44.12 kg/m².      Intake/Output Summary (Last 24 hours) at 04/17/18 1104  Last data filed at 04/17/18 1000   Gross per 24 hour   Intake          1085.65 ml   Output             2030 ml   Net          -944.35 ml       Physical Exam   Constitutional: She is easily aroused. She appears ill. She is restrained.   Obese young female on mechanical ventilation via trach   HENT:   Head: Atraumatic.   Eyes: Conjunctivae are normal.   Neck: No JVD present.   Cardiovascular: Normal rate and regular rhythm.    Pulses:       Radial pulses are 2+ on the right side, and 2+ on the left side.        Dorsalis pedis pulses are 1+ on the right side, and 1+ on the left side.   Pulmonary/Chest: She has decreased breath sounds. She has rhonchi.   Vent controlled resp effort   Abdominal: Soft. She exhibits no distension. Bowel sounds are decreased.   Musculoskeletal: She exhibits edema (trace edema BLE).   Neurological: She is easily aroused.   Skin: Skin is warm and dry. Capillary refill takes 2 to 3 seconds.            Vents:  Vent Mode: A/C (04/17/18 0923)  Ventilator  Initiated: Yes (04/05/18 1930)  Set Rate: 18 bmp (04/17/18 0923)  Vt Set: 400 mL (04/17/18 0923)  Pressure Support: 0 cmH20 (04/17/18 0923)  PEEP/CPAP: 5 cmH20 (04/17/18 0923)  Oxygen Concentration (%): 40 (04/17/18 1000)  Peak Airway Pressure: 37 cmH2O (04/17/18 0923)  Plateau Pressure: 0 cmH20 (04/17/18 0923)  Total Ve: 14.3 mL (04/17/18 0923)  F/VT Ratio<105 (RSBI): (!) 76.09 (04/16/18 1735)    Lines/Drains/Airways     Peripherally Inserted Central Catheter Line                 PICC Double Lumen 04/14/18 1000 right brachial 3 days          Drain                 Rectal Tube 04/12/18 0710 rectal tube w/ balloon (indicate number of mLs) 5 days         Biliary Tube 04/13/18 1200 RLQ 3 days         Urethral Catheter 04/15/18 1250 Temperature probe 1 day         NG/OG Tube 04/16/18 1342 Other (comments) Right nostril less than 1 day          Airway                 Surgical Airway 04/11/18 1403 Shiley Cuffed;Long;Proximal 5 days          Peripheral Intravenous Line                 Peripheral IV - Single Lumen 04/13/18 1747 Left Forearm 3 days                Significant Labs:    CBC/Anemia Profile:    Recent Labs  Lab 04/16/18  0350 04/16/18  1252 04/17/18  0415   WBC 25.64* 26.77* 26.27*   HGB 9.7* 9.6* 8.9*   HCT 30.8* 29.9* 28.1*    202 193   MCV 98 97 96   RDW 20.1* 20.2* 20.1*        Chemistries:    Recent Labs  Lab 04/15/18  1759 04/16/18  0350 04/17/18  0415   * 145  145 141   K 3.1* 3.1*  3.1* 2.6*   CL 98 98  98 96   CO2 31* 28  28 28   BUN 9 10  10 12   CREATININE 2.9* 2.6*  2.6* 2.1*   CALCIUM 10.5 10.4  10.4 10.0   ALBUMIN  --  2.0*  2.0* 1.8*   PROT  --  6.3 6.0   BILITOT  --  12.2* 11.7*   ALKPHOS  --  129 127   ALT  --  20 17   AST  --  106* 120*   MG 1.8 1.5* 1.7   PHOS  --  2.8  --        All pertinent labs within the past 24 hours have been reviewed.  AB    Recent Labs  Lab 04/17/18  0512   PH 7.470*   PO2 87   PCO2 43.2   HCO3 31.4*   BE 8         Significant Imaging:  I have  reviewed all pertinent imaging results/findings within the past 24 hours.      Assessment/Plan:     Psychiatric   ETOH abuse    Continue electrolyte replacement, Thiamine, Folate, MVI.        Pulmonary   Pneumonia of both lower lobes due to methicillin resistant Staphylococcus aureus (MRSA)    Initial rt infiltrate on admission improving; Diflucan added for few candida albicans in setting of fevers, sepsis unclear etiology        Acute hypoxemic respiratory failure    Re intubated for third time 4/8 s/p asystolic arrest.    Vent settings reviewed and appropriate; FIO2 to keep SAO2 > 92%.   4/11 tracheostomy placed  Daily SBT        Cardiac/Vascular    .        Cardiopulmonary arrest    Cont supportive care and ICU cardiac monitoring        Renal/    .        Volume overload    Holding diuresis s/t creatinine elevation  Keep intake minimal        VIKRAM (acute kidney injury)    Nephrology following; slowly improving        Electrolyte imbalance    Continues to require aggressive potassium replacement        ID   * Septic shock    ID following  Continued fevers, Wbc worsening  On abx day 15 and c diff management day 9  New cultures 4/13 with only candida (sputum and urine); diflucan day 3  Continues to require pressor  Slight procalcitonin decrease today  Septic source unclear as abx course should have been sufficient for initial cultures -- consider sources ?liver, ?drug fever, incomplete pneumonia treatment, ?fungal, c diff-- CT abd with colitis severity not consistent with degree of illness, await imdium scan today; consider liver biopsy (?autoimmune or fatty hepatitis of pregnancy)          Clostridium difficile infection    continue oral vancomycin and IV metronidazole        Hematology    .        Oncology   Acute blood loss anemia    Monitor h/h        GI   Hepatomegaly    ? Alcoholic liver disease / toxic hepatitis ( DILI). Mainly cholestatic picture.  Monitoring liver enzymes.     Significant gall bladder  drainage, monitor        Other   Hyperbilirubinemia    GI following  Bili continues upward trend        History of IUFD    S/P D & C. No retained products of conception on ultrasound.            Critical Care Daily Checklist:    A: Awake: RASS Goal/Actual Goal: RASS Goal: -1-->drowsy  Actual: Lim Agitation Sedation Scale (RASS): Alert and calm   B: Spontaneous Breathing Trial Performed? Spon. Breathing Trial Initiated?: Initiated (04/16/18 1100)   C: SAT & SBT Coordinated?  yes                      D: Delirium: CAM-ICU Overall CAM-ICU: Negative   E: Early Mobility Performed? ROM   F: Feeding Goal: Goals: Meet > 85 % EEN/EPN   Status: Nutrition Goal Status: progressing towards goal   Current Diet Order   Procedures    Diet NPO      AS: Analgesia/Sedation prn   T: Thromboembolic Prophylaxis heparin   H: HOB > 300 Yes   U: Stress Ulcer Prophylaxis (if needed) pepcid   G: Glucose Control monitoring   B: Bowel Function Stool Occurrence: 0   I: Indwelling Catheter (Lines & Rodriguez) Necessity reviewed   D: De-escalation of Antimicrobials/Pharmacotherapies reviewed    Plan for the day/ETD imdium scan; ?transjugular liver biopsy    Code Status:  Family/Goals of Care: Full Code  Pending hospital course   I have discussed case and plan of care in detail with Dr Moore and Dr Ram; Status and plan of care were discussed with team on multidisciplinary rounds.    Critical Care Time: 70 minutes  Critical care was time spent personally by me on the following activities: development of treatment plan with patient or surrogate and bedside caregivers, discussions with consultants, evaluation of patient's response to treatment, examination of patient, ordering and performing treatments and interventions, ordering and review of laboratory studies, ordering and review of radiographic studies, pulse oximetry, re-evaluation of patient's condition. This critical care time did not overlap with that of any other provider or involve  time for any procedures.     Aracely Pal NP  Critical Care Medicine  [facility]

## 2018-04-17 NOTE — EICU
eICU Note :    Called by the [facility]:    Problem: K 2.6,Creatinine is 2.1    Pertinent History and labs reviewed :22-year-old female with septic shock on  mechanical ventilator    Treatment /Intervention given: KCL 20 meq x 3 doses        Claudia Yates M.D  eICU Physician

## 2018-04-17 NOTE — PROGRESS NOTES
TF formula discussed with RD and changed per her recs to account for nephrology concerns of hypercalcemia  Will need 8pk beneprotein daily in addition to TF    Aracely Pal, ACNP-BC  [facility]

## 2018-04-17 NOTE — ASSESSMENT & PLAN NOTE
ID following  Continued fevers, Wbc worsening  On abx day 15 and c diff management day 9  New cultures 4/13 with only candida (sputum and urine); diflucan day 3  Continues to require pressor  Slight procalcitonin decrease today  Septic source unclear as abx course should have been sufficient for initial cultures -- consider sources ?liver, ?drug fever, incomplete pneumonia treatment, ?fungal, c diff-- CT abd with colitis severity not consistent with degree of illness, await imdium scan today; consider liver biopsy (?autoimmune or fatty hepatitis of pregnancy)

## 2018-04-17 NOTE — NURSING
Pt transported to nuclear medicine for ordered scan. Pt transported via stretcher with RNx2 and radiology tech. Will continue to monitor.

## 2018-04-17 NOTE — PROGRESS NOTES
Patient returned to room from scheduled CT scan of abdomen. Patient had no complications during scan. Patient vitas stable at this time. No distress noted. Will continue to monitor.

## 2018-04-17 NOTE — PROGRESS NOTES
[facility] -   Nephrology  Progress Note    Patient Name: Rafael Duron  MRN: [id number]  Admission Date: 4/3/2018  Hospital Length of Stay: 14 days  Attending Provider: Ed Ram MD   Primary Care Physician: Herman Cowart MD  Principal Problem:Septic shock    Subjective:     HPI: 21 yo obese female with HTN, gestational DM  who presented to [facility] ED in [place], [place] on 4/2 with complaint of SOB and cough with known pregnancy. Workup revealed fetal demise (estimated at 22 weeks) and patient passed dead fetus but retained placenta. Placenta remnants were removed in OR vis D & C.  After admission to ICU she had seizure activity with , K+ 1.9 and Bicarb 11.  She also had EtOH level of 268.  She required intubation for airway protection per records. She as transferred to [facility] on 4/3/18 for higher level of care.    Patient presented with septic shock at [facility] and was started on IV pressors and IV antibiotics. Patient was initially stabilized and extubated on 4/5/18. OB/GYN following. She developed syncopal episode on 4/5/18 associated with bradycardia. She was successfully resuscitated with IV fluids and levophed. She subsequently deteriorated and was re-intubated on 4/5/18. Abdominal US revealed massive hepatomegaly with liver span of 33 cm. Patient's condition improved and she was extubated again on 4/7/18. Patient coded on 4/8/18 and was successfully resuscitated and again intubated. Patient was also diagnosed with C. Diff colitis and MRSA bacteremia.   Nephrology was consulted to help with patient's electrolyte care, renal care and volume management. I saw and examined patient in her hospital room. Patient is intubated and not able to provide any additional history.   Patient remains on antibiotics and pressor support. Chart review revealed that hyponatremia and hypokalemia have now resolved. However, hypocalcemia has persisted. In addition, patient's renal  function has worsened during current admission and creatinine has increased from 0.8 on 4/6/18 to 1.6 on 4/9/18. Volume review showed positive I/O balance with + 24 liters since admission,. Patient's UOP has been fluctuating with 1 to 3 liters of urine per day. Current UOP about 100 cc/hour.       Interval History:  No acute events     Review of patient's allergies indicates:  No Known Allergies  Current Facility-Administered Medications   Medication Frequency    0.9%  NaCl infusion (for blood administration) Q24H PRN    albuterol-ipratropium 2.5mg-0.5mg/3mL nebulizer solution 3 mL Q4H PRN    bisacodyl suppository 10 mg Daily PRN    chlorhexidine 0.12 % solution 15 mL BID    dextrose 5 % and 0.45 % NaCl infusion Continuous    dextrose 50% injection 12.5 g PRN    famotidine (PF) injection 20 mg Daily    fentaNYL injection 50 mcg Q2H PRN    fluconazole (DIFLUCAN) IVPB 200 mg 100 mL Q24H    folic acid-vit B6-vit B12 2.5-25-2 mg tablet 1 tablet Daily    glucagon (human recombinant) injection 1 mg PRN    heparin (porcine) injection 5,000 Units Q8H    insulin aspart U-100 pen 1-10 Units Q6H PRN    lactulose 20 gram/30 mL solution Soln 30 g Daily    lorazepam (ATIVAN) injection 2 mg Q2H PRN    metronidazole IVPB 500 mg Q8H    multivitamin tablet 1 tablet Daily    norepinephrine 32 mg in dextrose 5 % 250 mL infusion Continuous    ondansetron injection 4 mg Q8H PRN    pneumoc 13-bobby conj-dip cr(PF) 0.5 mL vaccine x 1 dose    rifAXIMin tablet 550 mg BID    sodium chloride 0.9% flush 5 mL PRN    thiamine tablet 100 mg Daily    vancomycin 250mg / 10ml oral suspension 250 mg Q6H    vitamin D 1000 units tablet 5,000 Units Daily       Objective:     Vital Signs (Most Recent):  Temp: (!) 100.8 °F (38.2 °C) (04/17/18 1115)  Pulse: (!) 119 (04/17/18 1300)  Resp: (!) 33 (04/17/18 0715)  BP: 121/75 (04/17/18 1300)  SpO2: 99 % (04/17/18 1300)  O2 Device (Oxygen Therapy): ventilator (04/17/18 1115) Vital Signs  (24h Range):  Temp:  [98.9 °F (37.2 °C)-100.8 °F (38.2 °C)] 100.8 °F (38.2 °C)  Pulse:  [] 119  Resp:  [15-33] 33  SpO2:  [98 %-100 %] 99 %  BP: ()/(36-98) 121/75     Weight: 124 kg (273 lb 5.9 oz) (04/15/18 0535)  Body mass index is 44.12 kg/m².  Body surface area is 2.4 meters squared.    I/O last 3 completed shifts:  In: 2255.7 [I.V.:515.7; NG/GT:940; IV Piggyback:800]  Out: 4125 [Urine:2825; Drains:450; Stool:850]    Physical Exam   Constitutional: She appears well-developed. No distress.   Morbidly obese    HENT:   Head: Normocephalic and atraumatic.   Mouth/Throat: Oropharynx is clear and moist. No oropharyngeal exudate.   Eyes: Conjunctivae and EOM are normal. Pupils are equal, round, and reactive to light.   Neck: Neck supple. No JVD present. Carotid bruit is not present. No tracheal deviation present. No thyroid mass and no thyromegaly present.   Trach in place    Cardiovascular: Normal rate, regular rhythm, normal heart sounds and intact distal pulses.  Exam reveals no gallop and no friction rub.    No murmur heard.  Pulmonary/Chest: No respiratory distress. She has no wheezes. She has rales. She exhibits no tenderness.   Abdominal: Soft. Bowel sounds are normal. She exhibits no distension, no abdominal bruit, no ascites and no mass. There is no hepatosplenomegaly. There is no tenderness. There is no rebound, no guarding and no CVA tenderness.   Cholecystostomy in place    Musculoskeletal: She exhibits edema. She exhibits no tenderness.   Dependant edema    Lymphadenopathy:     She has no cervical adenopathy.   Neurological: She has normal reflexes. She displays normal reflexes. No cranial nerve deficit. She exhibits normal muscle tone. Coordination normal.   Skin: Skin is warm and intact. No rash noted. No erythema. No pallor.       Significant Labs:  Cardiac Markers: No results for input(s): CKMB, TROPONINT, MYOGLOBIN in the last 168 hours.  CBC:   Recent Labs  Lab 04/17/18  0415   WBC 26.27*    RBC 2.92*   HGB 8.9*   HCT 28.1*      MCV 96   MCH 30.5   MCHC 31.7*     CMP:   Recent Labs  Lab 04/17/18  0415   *   CALCIUM 10.0   ALBUMIN 1.8*   PROT 6.0      K 2.6*   CO2 28   CL 96   BUN 12   CREATININE 2.1*   ALKPHOS 127   ALT 17   *   BILITOT 11.7*     Coagulation:   Recent Labs  Lab 04/17/18  0415   INR 1.8*     LFTs:   Recent Labs  Lab 04/17/18  0415   ALT 17   *   ALKPHOS 127   BILITOT 11.7*   PROT 6.0   ALBUMIN 1.8*     All labs within the past 24 hours have been reviewed.     Lab Results   Component Value Date    ALT 17 04/17/2018     (H) 04/17/2018    ALKPHOS 127 04/17/2018    BILITOT 11.7 (H) 04/17/2018         Significant Imaging: reviewed     Assessment/Plan:     VIKRAM (acute kidney injury)      1. VIKRAM :  VIKRAM from ATN from low BP few days ago and also due to aggressive diuresis , follow renal fn, on tube feeds,  Recovering now,     2. Electrolyte abnormalities,  K was repleted,     Corrected calcium about 12, reduced in tube feeds      3. Hypotension : cont pressor support       4. Abnormal LFTs , s/p Cholecystostomy placement ,  ? shock liver , alcohic hepatitis, GI note reviewed,     5. Meds reviewed, adjusted Meropenem dose to bid,     6. Anemia : multifactorial, heme following     7. Candida UTI : on abx     8. Poor prognosis                  I will follow-up with patient. Please contact us if you have any additional questions.     Total time spent 40 minutes including time needed to review the records,  patient  evaluation, documentation, face-to-face discussion with the primary and CC teams, more than 50% of the time was spent on coordination of care and counseling.       Rafael Ferreira MD  Nephrology  [facility]
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
## 2018-04-17 NOTE — ASSESSMENT & PLAN NOTE
Continue Vancomycin  04/15/18-persistent right upper lobe infiltrate  04/16/18- she was treated with vancomycin .Will follow repeat cbc and cultures.    04/17- treated with vanco/meropenem -completed 13 days of therpay

## 2018-04-17 NOTE — PLAN OF CARE
Problem: Patient Care Overview  Goal: Plan of Care Review  Pt continues to run low grade fevers despite being on cooling blanket and ice packs.  Pt continues to require levophed to keep MAP WNL.  Pt continues to have moderate amount of orange/yellow diarrhea.  Pt continues to have moderate amount of green/yellow output from biliary tube.  pts labs continue to trend in the wrong direction.  Pts blood tagged with indium today for scan tomorrow.  Dr. Ram and Dr. Gomes at bedside late in shift, orders to send pt for CT of abdomen without IV contrast.  Placed a call to CT.  1st dose of omnipaque mixed with 400 cc water given.  GLORIA Delacruz to give second dose.  CT notified.  Plan is to perform CT tonight, indium scan tomorrow, and if both are negative, possible transjugular liver biopsy.

## 2018-04-17 NOTE — ASSESSMENT & PLAN NOTE
Initial rt infiltrate on admission improving; Diflucan added for few candida albicans in setting of fevers, sepsis unclear etiology

## 2018-04-17 NOTE — ASSESSMENT & PLAN NOTE
Re intubated for third time 4/8 s/p asystolic arrest.    Vent settings reviewed and appropriate; FIO2 to keep SAO2 > 92%.   4/11 tracheostomy placed  Daily SBT

## 2018-04-17 NOTE — SUBJECTIVE & OBJECTIVE
Interval History:  04/17 -Day 14.  She is s/p trach tube placement , remains on vasopressor support CT-scan of the abdomen scan showed Mild pancolitis.  No free air or abscess identified.   Labs showed wbc -26.       Review of Systems   Unable to perform ROS: Acuity of condition     Objective:     Vital Signs (Most Recent):  Temp: (!) 100.8 °F (38.2 °C) (04/17/18 1115)  Pulse: (!) 119 (04/17/18 1300)  Resp: (!) 33 (04/17/18 0715)  BP: 121/75 (04/17/18 1300)  SpO2: 99 % (04/17/18 1300) Vital Signs (24h Range):  Temp:  [98.9 °F (37.2 °C)-100.8 °F (38.2 °C)] 100.8 °F (38.2 °C)  Pulse:  [] 119  Resp:  [15-35] 33  SpO2:  [97 %-100 %] 99 %  BP: ()/(36-98) 121/75     Weight: 124 kg (273 lb 5.9 oz)  Body mass index is 44.12 kg/m².    Intake/Output Summary (Last 24 hours) at 04/17/18 1405  Last data filed at 04/17/18 1135   Gross per 24 hour   Intake          1185.65 ml   Output             2554 ml   Net         -1368.35 ml      Physical Exam   Constitutional: She appears well-developed. No distress.   Morbidly obese    HENT:   Head: Normocephalic and atraumatic.   Mouth/Throat: Oropharynx is clear and moist. No oropharyngeal exudate.   Eyes: EOM are normal. Pupils are equal, round, and reactive to light. Scleral icterus is present.   Neck: Neck supple. No JVD present. Carotid bruit is not present. No tracheal deviation present. No thyroid mass and no thyromegaly present.   Trach in place    Cardiovascular: Normal rate, regular rhythm, normal heart sounds and intact distal pulses.  Exam reveals no gallop and no friction rub.    No murmur heard.  Pulmonary/Chest: No respiratory distress. She has no wheezes. She has rales. She exhibits no tenderness.   Abdominal: Soft. Bowel sounds are normal. She exhibits no distension, no abdominal bruit, no ascites and no mass. There is no hepatosplenomegaly. There is no tenderness. There is no rebound, no guarding and no CVA tenderness.   Cholecystostomy in place     Musculoskeletal: She exhibits edema. She exhibits no tenderness.   Dependant edema    Lymphadenopathy:     She has no cervical adenopathy.   Neurological: She has normal reflexes. She displays normal reflexes. No cranial nerve deficit. She exhibits normal muscle tone. Coordination normal.   Skin: Skin is warm and intact. No rash noted. No erythema. No pallor.       Significant Labs:   Blood Culture: No results for input(s): LABBLOO in the last 48 hours.  BMP:   Recent Labs  Lab 04/17/18  0415   *      K 2.6*   CL 96   CO2 28   BUN 12   CREATININE 2.1*   CALCIUM 10.0   MG 1.7     All pertinent labs within the past 24 hours have been reviewed.    Significant Imaging: I have reviewed and interpreted all pertinent imaging results/findings within the past 24 hours.

## 2018-04-18 PROBLEM — E87.70 VOLUME OVERLOAD: Status: RESOLVED | Noted: 2018-01-01 | Resolved: 2018-01-01

## 2018-04-18 PROBLEM — E87.6 HYPOKALEMIA: Status: ACTIVE | Noted: 2018-01-01

## 2018-04-18 NOTE — ASSESSMENT & PLAN NOTE
04/07/2018- will closely monitor fever curve, will stop flagyl, change meropenem to zosyn,continue vancomycin for now.  Will deescalate soon.  Blood cultures -neg, sputum cultures-MRSA and pseudomonas   4/11/2018 - antibx adjusted today, per icu, ID consulted  04/13/18- possible due to acute cholecystectomy -s/p cholestomy tube placement   04/16/18- etiology of fever is unclear at this time, she was treated with vancomycin initially and was stopped due to worsening renal failure.  She remains on meropenem ,flagyl, diflucan.  With persistent fever , will follow repeat blood cultures and will discuss with critical care team about changing her lines.  Will do indium scan .  Her prognosis is guarded , will add 7 days of empiric zyvox if wbc continues to increase and fever persists .   04/18-wbc is 29, will add empiric zyvox, stop flagyl, add micafungin, follow sputum cultures   Prognosis guarded,

## 2018-04-18 NOTE — PROGRESS NOTES
[facility] -   Nephrology  Progress Note    Patient Name: Rafael Duron  MRN: [id number]  Admission Date: 4/3/2018  Hospital Length of Stay: 15 days  Attending Provider: Ed Ram MD   Primary Care Physician: Herman Cowart MD  Principal Problem:Septic shock    Subjective:     HPI: 21 yo obese female with HTN, gestational DM  who presented to [facility] ED in [place], [place] on 4/2 with complaint of SOB and cough with known pregnancy. Workup revealed fetal demise (estimated at 22 weeks) and patient passed dead fetus but retained placenta. Placenta remnants were removed in OR vis D & C.  After admission to ICU she had seizure activity with , K+ 1.9 and Bicarb 11.  She also had EtOH level of 268.  She required intubation for airway protection per records. She as transferred to [facility] ICU on 4/3/18 for higher level of care.    Patient presented with septic shock at [facility] and was started on IV pressors and IV antibiotics. Patient was initially stabilized and extubated on 4/5/18. OB/GYN following. She developed syncopal episode on 4/5/18 associated with bradycardia. She was successfully resuscitated with IV fluids and levophed. She subsequently deteriorated and was re-intubated on 4/5/18. Abdominal US revealed massive hepatomegaly with liver span of 33 cm. Patient's condition improved and she was extubated again on 4/7/18. Patient coded on 4/8/18 and was successfully resuscitated and again intubated. Patient was also diagnosed with C. Diff colitis and MRSA bacteremia.   Nephrology was consulted to help with patient's electrolyte care, renal care and volume management. I saw and examined patient in her hospital room. Patient is intubated and not able to provide any additional history.   Patient remains on antibiotics and pressor support. Chart review revealed that hyponatremia and hypokalemia have now resolved. However, hypocalcemia has persisted. In addition, patient's renal  function has worsened during current admission and creatinine has increased from 0.8 on 4/6/18 to 1.6 on 4/9/18. Volume review showed positive I/O balance with + 24 liters since admission,. Patient's UOP has been fluctuating with 1 to 3 liters of urine per day. Current UOP about 100 cc/hour.       Interval History:  No acute events     Review of patient's allergies indicates:  No Known Allergies  Current Facility-Administered Medications   Medication Frequency    albuterol-ipratropium 2.5mg-0.5mg/3mL nebulizer solution 3 mL Q4H PRN    bisacodyl suppository 10 mg Daily PRN    chlorhexidine 0.12 % solution 15 mL BID    cholestyramine 4 gram packet 4 g BID    dextrose 50% injection 12.5 g PRN    famotidine (PF) injection 20 mg Daily    fentaNYL injection 50 mcg Q2H PRN    folic acid-vit B6-vit B12 2.5-25-2 mg tablet 1 tablet Daily    glucagon (human recombinant) injection 1 mg PRN    heparin (porcine) injection 5,000 Units Q8H    insulin aspart U-100 pen 1-10 Units Q6H PRN    lactulose 20 gram/30 mL solution Soln 30 g Daily    linezolid 600mg/300ml IVPB 600 mg Q12H    lorazepam (ATIVAN) injection 2 mg Q2H PRN    magnesium sulfate 2g in water 50mL IVPB (premix) Q2H    metronidazole IVPB 500 mg Q8H    micafungin 100 mg in sodium chloride 0.9 % 100 mL IVPB (ready to mix system) Q24H    multivitamin tablet 1 tablet Daily    norepinephrine 32 mg in dextrose 5 % 250 mL infusion Continuous    ondansetron injection 4 mg Q8H PRN    pneumoc 13-bobby conj-dip cr(PF) 0.5 mL vaccine x 1 dose    rifAXIMin tablet 550 mg BID    sodium chloride 0.9% flush 5 mL PRN    thiamine tablet 100 mg Daily    vancomycin (VANCOCIN) 500 mg in sodium chloride 0.9% 100 mL enema Q6H    vancomycin 250mg / 10ml oral suspension 250 mg Q6H    vitamin D 1000 units tablet 5,000 Units Daily       Objective:     Vital Signs (Most Recent):  Temp: 100 °F (37.8 °C) (04/18/18 1115)  Pulse: 99 (04/18/18 1145)  Resp: (!) 33 (04/18/18  1115)  BP: 110/72 (04/18/18 1145)  SpO2: 99 % (04/18/18 1145)  O2 Device (Oxygen Therapy): ventilator (04/18/18 0505) Vital Signs (24h Range):  Temp:  [98.6 °F (37 °C)-101.1 °F (38.4 °C)] 100 °F (37.8 °C)  Pulse:  [] 99  Resp:  [24-33] 33  SpO2:  [76 %-100 %] 99 %  BP: ()/() 110/72     Weight: 122 kg (268 lb 15.4 oz) (04/18/18 0615)  Body mass index is 43.41 kg/m².  Body surface area is 2.38 meters squared.    I/O last 3 completed shifts:  In: 4961 [I.V.:2186; NG/GT:1875; IV Piggyback:900]  Out: 3882 [Urine:2382; Drains:300; Stool:1200]    Physical Exam   Constitutional: She appears well-developed. No distress.   Morbidly obese    HENT:   Head: Normocephalic and atraumatic.   Mouth/Throat: Oropharynx is clear and moist. No oropharyngeal exudate.   Eyes: Conjunctivae and EOM are normal. Pupils are equal, round, and reactive to light.   Neck: Neck supple. No JVD present. Carotid bruit is not present. No tracheal deviation present. No thyroid mass and no thyromegaly present.   Trach in place    Cardiovascular: Normal rate, regular rhythm, normal heart sounds and intact distal pulses.  Exam reveals no gallop and no friction rub.    No murmur heard.  Pulmonary/Chest: No respiratory distress. She has no wheezes. She has rales. She exhibits no tenderness.   Abdominal: Soft. Bowel sounds are normal. She exhibits no distension, no abdominal bruit, no ascites and no mass. There is no hepatosplenomegaly. There is no tenderness. There is no rebound, no guarding and no CVA tenderness.   Cholecystostomy in place    Musculoskeletal: She exhibits edema. She exhibits no tenderness.   Dependant edema    Lymphadenopathy:     She has no cervical adenopathy.   Neurological: She has normal reflexes. She displays normal reflexes. No cranial nerve deficit. She exhibits normal muscle tone. Coordination normal.   Skin: Skin is warm and intact. No rash noted. No erythema. No pallor.       Significant Labs:  Cardiac Markers:  No results for input(s): CKMB, TROPONINT, MYOGLOBIN in the last 168 hours.  CBC:     Recent Labs  Lab 04/18/18  0355   WBC 29.54*   RBC 2.92*   HGB 9.0*   HCT 28.0*      MCV 96   MCH 30.8   MCHC 32.1     CMP:     Recent Labs  Lab 04/18/18  0355   *   CALCIUM 10.2   ALBUMIN 1.8*   PROT 6.2      K 3.1*   CO2 27   CL 98   BUN 12   CREATININE 1.9*   ALKPHOS 126   ALT 16   *   BILITOT 13.2*     Coagulation:     Recent Labs  Lab 04/18/18  0355   INR 1.6*     LFTs:     Recent Labs  Lab 04/18/18  0355   ALT 16   *   ALKPHOS 126   BILITOT 13.2*   PROT 6.2   ALBUMIN 1.8*     All labs within the past 24 hours have been reviewed.     Lab Results   Component Value Date    ALT 16 04/18/2018     (H) 04/18/2018    ALKPHOS 126 04/18/2018    BILITOT 13.2 (H) 04/18/2018         Significant Imaging: reviewed     Assessment/Plan:     VIKRAM (acute kidney injury)      1. VIKRAM :  VIKRAM from ATN from low BP few days ago ,  follow renal fn, on tube feeds,  Recovering now, UO good,     2. Electrolyte abnormalities,  K, Mg was repleted,     Corrected calcium about 12, reduced in tube feeds      3. Hypotension : cont pressor support .     4. Abnormal LFTs , s/p Cholecystostomy placement ,  ? shock liver , alcohic hepatitis, GI following ,      5. Meds reviewed,     6. Anemia : multifactorial, pRBC tx when indicated     7. Candida UTI : on abx     8. C diff colitis , on abx                  I will follow-up with patient. Please contact us if you have any additional questions.     Total time spent 40 minutes including time needed to review the records,  patient  evaluation, documentation, face-to-face discussion with the primary and CC teams, more than 50% of the time was spent on coordination of care and counseling.       Rafael Ferreira MD  Nephrology  [facility] -

## 2018-04-18 NOTE — ASSESSMENT & PLAN NOTE
Initial rt infiltrate from admission improving on chest film however imdium scan indicates continued high pulmonary activity  Last culture only positive for c albicans; change diflucan to mycamine for improved pulmonary coverage  Repeat cultures

## 2018-04-18 NOTE — ASSESSMENT & PLAN NOTE
? Alcoholic liver disease / toxic hepatitis ( DILI). Mainly cholestatic picture.  Monitoring liver enzymes, INR, and bili.     Significant gall bladder drainage, monitor

## 2018-04-18 NOTE — ASSESSMENT & PLAN NOTE
1. VIKRAM :  VIKRAM from ATN from low BP few days ago ,  follow renal fn, on tube feeds,  Recovering now, UO good,     2. Electrolyte abnormalities,  K, Mg was repleted,     Corrected calcium about 12, reduced in tube feeds      3. Hypotension : cont pressor support .     4. Abnormal LFTs , s/p Cholecystostomy placement ,  ? shock liver , alcohic hepatitis, GI following ,      5. Meds reviewed,     6. Anemia : multifactorial, pRBC tx when indicated     7. Candida UTI : on abx     8. C diff colitis , on abx

## 2018-04-18 NOTE — ASSESSMENT & PLAN NOTE
33.8 cm span with reduced echogenicity on ultrasound.  Of uncertain etiology but suspect fatty liver disease related to alcohol abuse and obesity.    04/07/2018- related to alcohol abuse and obesity . Will need out patient follow up     04/09/2018-Hepatology consult in place-will follow recs,related to fatty liver and alcohol abuse  04/16/2018-  The worsening INR is of great concern for worsening hepatic function . Will follow hepatologist -Dr Gomes follow up .  04/18-INR is on a downward trend, will follow

## 2018-04-18 NOTE — ASSESSMENT & PLAN NOTE
ID following  Continued fevers, Wbc worsening  On abx day 15 and c diff management day 9  New cultures 4/13 with only candida (sputum and urine)  Continues to require pressor  procalcitonin decrease today    Septic source not clear -- discussed again with ID -- zyvox added s/t skin/soft tissue erythema to bilat flank; change diflucan to mycamine for improved fungal pulmonary coverage  Monitor temp, wbc, culture data

## 2018-04-18 NOTE — ASSESSMENT & PLAN NOTE
Re intubated for third time 4/8 s/p asystolic arrest.    Vent settings reviewed and appropriate; FIO2 to keep SAO2 > 92%.   4/11 tracheostomy placed  Again failed SBT with hypoventilation, tachypnea & distress

## 2018-04-18 NOTE — SUBJECTIVE & OBJECTIVE
Interval History:  No acute events     Review of patient's allergies indicates:  No Known Allergies  Current Facility-Administered Medications   Medication Frequency    albuterol-ipratropium 2.5mg-0.5mg/3mL nebulizer solution 3 mL Q4H PRN    bisacodyl suppository 10 mg Daily PRN    chlorhexidine 0.12 % solution 15 mL BID    cholestyramine 4 gram packet 4 g BID    dextrose 50% injection 12.5 g PRN    famotidine (PF) injection 20 mg Daily    fentaNYL injection 50 mcg Q2H PRN    folic acid-vit B6-vit B12 2.5-25-2 mg tablet 1 tablet Daily    glucagon (human recombinant) injection 1 mg PRN    heparin (porcine) injection 5,000 Units Q8H    insulin aspart U-100 pen 1-10 Units Q6H PRN    lactulose 20 gram/30 mL solution Soln 30 g Daily    linezolid 600mg/300ml IVPB 600 mg Q12H    lorazepam (ATIVAN) injection 2 mg Q2H PRN    magnesium sulfate 2g in water 50mL IVPB (premix) Q2H    metronidazole IVPB 500 mg Q8H    micafungin 100 mg in sodium chloride 0.9 % 100 mL IVPB (ready to mix system) Q24H    multivitamin tablet 1 tablet Daily    norepinephrine 32 mg in dextrose 5 % 250 mL infusion Continuous    ondansetron injection 4 mg Q8H PRN    pneumoc 13-bobby conj-dip cr(PF) 0.5 mL vaccine x 1 dose    rifAXIMin tablet 550 mg BID    sodium chloride 0.9% flush 5 mL PRN    thiamine tablet 100 mg Daily    vancomycin (VANCOCIN) 500 mg in sodium chloride 0.9% 100 mL enema Q6H    vancomycin 250mg / 10ml oral suspension 250 mg Q6H    vitamin D 1000 units tablet 5,000 Units Daily       Objective:     Vital Signs (Most Recent):  Temp: 100 °F (37.8 °C) (04/18/18 1115)  Pulse: 99 (04/18/18 1145)  Resp: (!) 33 (04/18/18 1115)  BP: 110/72 (04/18/18 1145)  SpO2: 99 % (04/18/18 1145)  O2 Device (Oxygen Therapy): ventilator (04/18/18 0505) Vital Signs (24h Range):  Temp:  [98.6 °F (37 °C)-101.1 °F (38.4 °C)] 100 °F (37.8 °C)  Pulse:  [] 99  Resp:  [24-33] 33  SpO2:  [76 %-100 %] 99 %  BP: ()/() 110/72      Weight: 122 kg (268 lb 15.4 oz) (04/18/18 0615)  Body mass index is 43.41 kg/m².  Body surface area is 2.38 meters squared.    I/O last 3 completed shifts:  In: 4961 [I.V.:2186; NG/GT:1875; IV Piggyback:900]  Out: 3882 [Urine:2382; Drains:300; Stool:1200]    Physical Exam   Constitutional: She appears well-developed. No distress.   Morbidly obese    HENT:   Head: Normocephalic and atraumatic.   Mouth/Throat: Oropharynx is clear and moist. No oropharyngeal exudate.   Eyes: Conjunctivae and EOM are normal. Pupils are equal, round, and reactive to light.   Neck: Neck supple. No JVD present. Carotid bruit is not present. No tracheal deviation present. No thyroid mass and no thyromegaly present.   Trach in place    Cardiovascular: Normal rate, regular rhythm, normal heart sounds and intact distal pulses.  Exam reveals no gallop and no friction rub.    No murmur heard.  Pulmonary/Chest: No respiratory distress. She has no wheezes. She has rales. She exhibits no tenderness.   Abdominal: Soft. Bowel sounds are normal. She exhibits no distension, no abdominal bruit, no ascites and no mass. There is no hepatosplenomegaly. There is no tenderness. There is no rebound, no guarding and no CVA tenderness.   Cholecystostomy in place    Musculoskeletal: She exhibits edema. She exhibits no tenderness.   Dependant edema    Lymphadenopathy:     She has no cervical adenopathy.   Neurological: She has normal reflexes. She displays normal reflexes. No cranial nerve deficit. She exhibits normal muscle tone. Coordination normal.   Skin: Skin is warm and intact. No rash noted. No erythema. No pallor.       Significant Labs:  Cardiac Markers: No results for input(s): CKMB, TROPONINT, MYOGLOBIN in the last 168 hours.  CBC:     Recent Labs  Lab 04/18/18  0355   WBC 29.54*   RBC 2.92*   HGB 9.0*   HCT 28.0*      MCV 96   MCH 30.8   MCHC 32.1     CMP:     Recent Labs  Lab 04/18/18  0355   *   CALCIUM 10.2   ALBUMIN 1.8*   PROT  6.2      K 3.1*   CO2 27   CL 98   BUN 12   CREATININE 1.9*   ALKPHOS 126   ALT 16   *   BILITOT 13.2*     Coagulation:     Recent Labs  Lab 04/18/18  0355   INR 1.6*     LFTs:     Recent Labs  Lab 04/18/18  0355   ALT 16   *   ALKPHOS 126   BILITOT 13.2*   PROT 6.2   ALBUMIN 1.8*     All labs within the past 24 hours have been reviewed.     Lab Results   Component Value Date    ALT 16 04/18/2018     (H) 04/18/2018    ALKPHOS 126 04/18/2018    BILITOT 13.2 (H) 04/18/2018         Significant Imaging: reviewed

## 2018-04-18 NOTE — ASSESSMENT & PLAN NOTE
04/09/2018-will use PO vancomycin 250mg every 8 hours for 10 days .  Due to her multiple electrolyte abnormalities, there is concern for ileus ,will continue Flagyl for now and adjust therapy as needed   .  04/16/2018- will continue PO vanco/flagyl -wbc remains very high at 25, -   Will plan to repeat imaging if leucocytosis persists .    04/17/18- the CT scan of the abdomen showed pancolitis likely related to C difficle -will continue po vancomycin and flagyl ,.  04/18- on flagyl since admit yet has marked leucocytosis .,also on vancomycin PO , will add rectal vanco enema , stop IV Flagyl.

## 2018-04-18 NOTE — PROGRESS NOTES
[facility]  Critical Care Medicine  Progress Note    Patient Name: Rafael Duron  MRN: [id number]  Admission Date: 4/3/2018  Hospital Length of Stay: 15 days  Code Status: Full Code  Attending Provider: Ed Ram MD  Primary Care Provider: Herman Cowart MD   Principal Problem: Septic shock    Subjective:     HPI:  Ms Duron is a 21 yo obese BF with a PMH of HTN, gestational DM and HTN who presented to [facility] ED in [place], [place] on  with complaint of SOB and cough with known pregnancy.  OB US revealed no heart tones and she is also  with second trimester fetal demise estimated 22 week for which she passed with retained placenta.  After admission to ICU she had seizure activity with , K+ 1.9 and Bicarb 11.  She also had etoh level 268 and reportedly had been drinking etoh w/ honey heavily for several days.  She required intubation for airway protection per records and OB was unable to remove placenta manually and patient had to be taken to OR.  She received 2 liters IVF and Hgb dropped to 7.4 and was transfused 2 units PRBCs.  Vaginal bleeding was scant per records.  Yesterday she had temp 101.5 Ax, .  She as transferred to [facility] ICU yesterday evening for higher level of care.        Hospital/ICU Course:   - This AM she is sedated on vent on Levophed infusion spiking temp 101.9 with WBC 20, LA 6.3, UA+, electrolyte imbalance and Glucose 268     - SAT and SBT done this am, pt awake and following commands. She was successfully extubated to nasal cannula. Remains tachycardic with HR 140s and febrile with temp 103 overnight. WBC 18 and lactic acidosis improving to 4.8. Continuing to aggressively replace electrolytes.     - Over night patient had episode with bradycardia and hypotension during which she became unresponsive and agonally breathing. She responded with IVF and bicarb and was restarted on pressors. She experienced a similar episode  again last night, during which she was intubated. Vent settings were reviewed and adjusted this am. No more bradycardic/hypotensive episodes since last night. Remains on pressors. Leukocystosis unimproved with worsening bandemia. Urine and blood cx NGTD. Sputum cx growing staph and pseudomonas however CXR this am is unremarkable.    04/7 - Tolerating SAT and SBT. Meets extubating criteria. Acidosis improving. Leukocytosis improving. Remains on bicarb gtt.     04/8 - Extubated successfully yesterday. Asystolic arrest this AM.  ACLS initiated. Re - intubated 2 rounds CPR with Iv epinephrine X 1  and IV CaCl X 1 given. ROSC attained.   A - line placed.      4/9 - now with mild vaginal bleeding and severe anasarca with blistering.  Still on vent with sedation and Levophed/Vasopressin infusing.  Worsening UOP and creatine.  Spoke with brother at bedside in detail and all questions answered.   4/10 - Opens eyes alert. Mild increase in Cr -. Intravascular volume depletion. Will add albumin. Still with vagnal bleeding  4/11 - low grade temp, Slight worsening of CR. Good urine output; trach placed today, remains on mechanical vent support and low dose pressor  4/12 - remains on mechanical ventilation via trach; HIDA scan abnormal with no gall bladder filling  4/13 - external gall bladder drain placed by IR; remains on mechanical ventilation via Trach  4/14 - significant draining from gall bladder along with significant decline in leukocytosis although continued fevers last 24 hours; remains on mechanical ventilation via trach; intermittent hypotension overnight and creatinine bump this am  4/15 - continued high drainage from gall bladder; continued fevers 102+; remains on mechanical ventilation via trach, failed SBT with tachypnea, hypoventilation  4/16 continued mechanical ventilation via trach; fail SBT with tachypnea, hypoventilation; continued fever, controlling with external cooling blanket (24hr max 103.2); increased  leukocytosis; procalcitonin continues rising (now 11.6); INR, bili continue rising; biliary drain with continued 500-600ml/day output  4/17 - remains vent dependent with continued pressor requirement, fevers, increasing leukocytosis; slight decline INR, Tbili, and procal (all remain elevated); CT abd last evening with mild pancolitis not consistent with severity of ongoing illness  4/18 remains on mechanical ventilation via trach on pressor support with continued fevers and wbc rising (now 29,000) along with worsening t bili; some decline in INR and procalcitonin today; tagged wbc scan yesterday concerning for lungs as septic source    Review of Systems   Unable to perform ROS: Intubated       Objective:     Vital Signs (Most Recent):  Temp: 100 °F (37.8 °C) (04/18/18 1115)  Pulse: 99 (04/18/18 1145)  Resp: (!) 33 (04/18/18 1115)  BP: 110/72 (04/18/18 1145)  SpO2: 99 % (04/18/18 1145) Vital Signs (24h Range):  Temp:  [98.6 °F (37 °C)-101.1 °F (38.4 °C)] 100 °F (37.8 °C)  Pulse:  [] 99  Resp:  [24-33] 33  SpO2:  [79 %-100 %] 99 %  BP: ()/() 110/72     Weight: 122 kg (268 lb 15.4 oz)  Body mass index is 43.41 kg/m².      Intake/Output Summary (Last 24 hours) at 04/18/18 1511  Last data filed at 04/18/18 1400   Gross per 24 hour   Intake          4439.95 ml   Output             1991 ml   Net          2448.95 ml       Physical Exam   Constitutional: She is easily aroused. She appears ill. She is restrained.   Obese young female on mechanical ventilation via trach   HENT:   Head: Atraumatic.   Eyes: Pupils are equal, round, and reactive to light. Scleral icterus is present.   Neck: No JVD present.   Cardiovascular: Normal rate and regular rhythm.    Pulses:       Radial pulses are 2+ on the right side, and 2+ on the left side.        Dorsalis pedis pulses are 2+ on the right side, and 2+ on the left side.   Pulmonary/Chest: Tachypnea noted. She has decreased breath sounds. She has rhonchi.   Vent  controlled resp effort   Abdominal: She exhibits no distension. Bowel sounds are decreased. There is hepatomegaly.   Musculoskeletal: She exhibits edema (trace edema BLE).   Neurological: She is easily aroused. GCS eye subscore is 3. GCS verbal subscore is 1. GCS motor subscore is 6.   Skin: Skin is warm and dry. Capillary refill takes 2 to 3 seconds.        Mild bilat flank erythema       Vents:  Vent Mode: A/C (04/18/18 1115)  Ventilator Initiated: Yes (04/05/18 1930)  Set Rate: 18 bmp (04/18/18 1115)  Vt Set: 400 mL (04/18/18 1115)  Pressure Support: 0 cmH20 (04/18/18 1115)  PEEP/CPAP: 5 cmH20 (04/18/18 1115)  Oxygen Concentration (%): 40 (04/18/18 1145)  Peak Airway Pressure: 27 cmH2O (04/18/18 1115)  Plateau Pressure: 0 cmH20 (04/18/18 1115)  Total Ve: 15.4 mL (04/18/18 1115)  F/VT Ratio<105 (RSBI): (!) 69.62 (04/18/18 1115)    Lines/Drains/Airways     Peripherally Inserted Central Catheter Line                 PICC Double Lumen 04/14/18 1000 right brachial 4 days          Drain                 Rectal Tube 04/12/18 0710 rectal tube w/ balloon (indicate number of mLs) 6 days         Biliary Tube 04/13/18 1200 RLQ 5 days         Urethral Catheter 04/15/18 1250 Temperature probe 3 days         NG/OG Tube 04/16/18 1342 Other (comments) Right nostril 2 days          Airway                 Surgical Airway 04/11/18 1403 Shiley Cuffed;Long;Proximal 7 days          Peripheral Intravenous Line                 Peripheral IV - Single Lumen 04/13/18 1747 Left Forearm 4 days                Significant Labs:    CBC/Anemia Profile:    Recent Labs  Lab 04/17/18  0415 04/18/18  0355   WBC 26.27* 29.54*   HGB 8.9* 9.0*   HCT 28.1* 28.0*    165   MCV 96 96   RDW 20.1* 20.4*        Chemistries:    Recent Labs  Lab 04/17/18  0415 04/17/18  1459 04/18/18  0355     --  140   K 2.6* 3.3* 3.1*   CL 96  --  98   CO2 28  --  27   BUN 12  --  12   CREATININE 2.1*  --  1.9*   CALCIUM 10.0  --  10.2   ALBUMIN 1.8*  --  1.8*    PROT 6.0  --  6.2   BILITOT 11.7*  --  13.2*   ALKPHOS 127  --  126   ALT 17  --  16   *  --  120*   MG 1.7  --  1.4*       All pertinent labs within the past 24 hours have been reviewed.  ABG    Recent Labs  Lab 04/18/18  0458   PH 7.463*   PO2 120*   PCO2 41.3   HCO3 29.6*   BE 6         Significant Imaging:  I have reviewed all pertinent imaging results/findings within the past 24 hours.      Assessment/Plan:     Psychiatric   ETOH abuse    Continue electrolyte replacement, Thiamine, Folate, MVI.      Pulmonary   Pneumonia of right lower lobe due to methicillin resistant Staphylococcus aureus (MRSA)    Initial rt infiltrate from admission improving on chest film however imdium scan indicates continued high pulmonary activity  Last culture only positive for c albicans; change diflucan to mycamine for improved pulmonary coverage  Repeat cultures      Acute hypoxemic respiratory failure    Re intubated for third time 4/8 s/p asystolic arrest.    Vent settings reviewed and appropriate; FIO2 to keep SAO2 > 92%.   4/11 tracheostomy placed  Again failed SBT with hypoventilation, tachypnea & distress      Cardiopulmonary arrest    Cont supportive care and ICU cardiac monitoring      VIKRAM (acute kidney injury)    Nephrology following; creatinine improving       Electrolyte imbalance    Replace potassium and mag  monitor      ID   * Septic shock    ID following  Continued fevers, Wbc worsening  On abx day 15 and c diff management day 9  New cultures 4/13 with only candida (sputum and urine)  Continues to require pressor  procalcitonin decrease today    Septic source not clear -- discussed again with ID -- zyvox added s/t skin/soft tissue erythema to bilat flank; change diflucan to mycamine for improved fungal pulmonary coverage  Monitor temp, wbc, culture data      Clostridium difficile infection    On IV flagyl day 14, enteral vanco day 10  Rectal vanco added for max c diff coverage   Oncology   Acute blood loss  anemia    No acute transfusion indication      GI   Hepatomegaly    ? Alcoholic liver disease / toxic hepatitis ( DILI). Mainly cholestatic picture.  Monitoring liver enzymes, INR, and bili.     Significant gall bladder drainage, monitor      Other   Hyperbilirubinemia    GI following  Bili continues upward trend      Critical Care Daily Checklist:    A: Awake: RASS Goal/Actual Goal: RASS Goal: -1-->drowsy  Actual: Lim Agitation Sedation Scale (RASS): Alert and calm   B: Spontaneous Breathing Trial Performed? Spon. Breathing Trial Initiated?: Initiated (04/16/18 1100)   C: SAT & SBT Coordinated?  yes                      D: Delirium: CAM-ICU Overall CAM-ICU: Negative   E: Early Mobility Performed? ROM   F: Feeding Goal: Goals: Meet > 85 % EEN/EPN   Status: Nutrition Goal Status: progressing towards goal   Current Diet Order   Procedures    Tolerating TF at RD goal      AS: Analgesia/Sedation prn   T: Thromboembolic Prophylaxis heparin   H: HOB > 300 Yes   U: Stress Ulcer Prophylaxis (if needed) pepcid   G: Glucose Control monitoring   B: Bowel Function Stool Occurrence: 0   I: Indwelling Catheter (Lines & Rodriguez) Necessity reviewed   D: De-escalation of Antimicrobials/Pharmacotherapies reviewed    Plan for the day/ETD Antimicrobial change; supportive care    Code Status:  Family/Goals of Care: Full Code  Aware of poor prognosis, hopeful for recovery to eventual return home   I have discussed case and plan of care in detail with Dr Moore and Dr Ram; Status and plan of care were discussed with team on multidisciplinary rounds.    Critical Care Time: 66 minutes  Critical care was time spent personally by me on the following activities: development of treatment plan with patient or surrogate and bedside caregivers, discussions with consultants, evaluation of patient's response to treatment, examination of patient, ordering and performing treatments and interventions, ordering and review of laboratory studies,  ordering and review of radiographic studies, pulse oximetry, re-evaluation of patient's condition. This critical care time did not overlap with that of any other provider or involve time for any procedures.     Aracely Pal NP  Critical Care Medicine  [facility]

## 2018-04-18 NOTE — ASSESSMENT & PLAN NOTE
Currently intubated.  No acute infiltrates seen on CXR  Continue IV antibiotics empirically.  Pulmonary consult.    04/07/2018- will wean off ventilator as tolerated.  She is more awake and alert today.  Sputum cultures showed MRSA and pseudomonas-she is on vanco/meropenem-will deescalate soon.       04/08/2018- she is now intubated after asystolic cardiac arrest .Will wean off as tolerated.  04/09/2018- she remains intubated ,critical care follow up .wean off ventilator as tolerated.  4/10/2018 - likely secondary to MRSA and pseudamonal pneumonia. ID consulted, will consider double covering for pseudomonas.  04/12/18- continue Vent support  04/15/18- Trach collar in place  04/16- will continue trach collar and wean as tolerated.  04/17/18- continue trachea care/weaning   04/18-continue trach care ,wean as tolerated

## 2018-04-18 NOTE — ASSESSMENT & PLAN NOTE
04/09/2018-case discussed with nephrology -she has positive fluid balance, will continue lasix 60mg every 8 hours .  04/15/18- trending up , nephrology following  04/16/18- serum creatinine is increasing to 2.6,will continue to follow nephrology , will avoid nephrotoxic medications.   04/17-now off vancomycin, nephrology on board, will continue to closely monitor serum creatinine.  04/18- renal function continues to improve

## 2018-04-18 NOTE — PLAN OF CARE
Problem: Patient Care Overview  Goal: Plan of Care Review  Outcome: Ongoing (interventions implemented as appropriate)  Patient currently resting quietly in bed. Patient currently on levophed drip. Patient NSR on monitor at this time. Patient currently receiving oxygen by ventilator via tracheostomy. Patient turned by speciality bed every 30 minutes to avoid skin breakdown to back side. No sign of wound development noted. Patient has no restraint related injuries noted. Plan of care updated with family. There no further questions after updated on plan of care at this time. Will continue to monitor.
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
## 2018-04-18 NOTE — SUBJECTIVE & OBJECTIVE
Review of Systems   Unable to perform ROS: Intubated       Objective:     Vital Signs (Most Recent):  Temp: 100 °F (37.8 °C) (04/18/18 1115)  Pulse: 99 (04/18/18 1145)  Resp: (!) 33 (04/18/18 1115)  BP: 110/72 (04/18/18 1145)  SpO2: 99 % (04/18/18 1145) Vital Signs (24h Range):  Temp:  [98.6 °F (37 °C)-101.1 °F (38.4 °C)] 100 °F (37.8 °C)  Pulse:  [] 99  Resp:  [24-33] 33  SpO2:  [79 %-100 %] 99 %  BP: ()/() 110/72     Weight: 122 kg (268 lb 15.4 oz)  Body mass index is 43.41 kg/m².      Intake/Output Summary (Last 24 hours) at 04/18/18 1511  Last data filed at 04/18/18 1400   Gross per 24 hour   Intake          4439.95 ml   Output             1991 ml   Net          2448.95 ml       Physical Exam   Constitutional: She is easily aroused. She appears ill. She is restrained.   Obese young female on mechanical ventilation via trach   HENT:   Head: Atraumatic.   Eyes: Pupils are equal, round, and reactive to light. Scleral icterus is present.   Neck: No JVD present.   Cardiovascular: Normal rate and regular rhythm.    Pulses:       Radial pulses are 2+ on the right side, and 2+ on the left side.        Dorsalis pedis pulses are 2+ on the right side, and 2+ on the left side.   Pulmonary/Chest: Tachypnea noted. She has decreased breath sounds. She has rhonchi.   Vent controlled resp effort   Abdominal: She exhibits no distension. Bowel sounds are decreased. There is hepatomegaly.   Musculoskeletal: She exhibits edema (trace edema BLE).   Neurological: She is easily aroused. GCS eye subscore is 3. GCS verbal subscore is 1. GCS motor subscore is 6.   Skin: Skin is warm and dry. Capillary refill takes 2 to 3 seconds.        Mild bilat flank erythema       Vents:  Vent Mode: A/C (04/18/18 1115)  Ventilator Initiated: Yes (04/05/18 1930)  Set Rate: 18 bmp (04/18/18 1115)  Vt Set: 400 mL (04/18/18 1115)  Pressure Support: 0 cmH20 (04/18/18 1115)  PEEP/CPAP: 5 cmH20 (04/18/18 1115)  Oxygen Concentration (%): 40  (04/18/18 1145)  Peak Airway Pressure: 27 cmH2O (04/18/18 1115)  Plateau Pressure: 0 cmH20 (04/18/18 1115)  Total Ve: 15.4 mL (04/18/18 1115)  F/VT Ratio<105 (RSBI): (!) 69.62 (04/18/18 1115)    Lines/Drains/Airways     Peripherally Inserted Central Catheter Line                 PICC Double Lumen 04/14/18 1000 right brachial 4 days          Drain                 Rectal Tube 04/12/18 0710 rectal tube w/ balloon (indicate number of mLs) 6 days         Biliary Tube 04/13/18 1200 RLQ 5 days         Urethral Catheter 04/15/18 1250 Temperature probe 3 days         NG/OG Tube 04/16/18 1342 Other (comments) Right nostril 2 days          Airway                 Surgical Airway 04/11/18 1403 Shiley Cuffed;Long;Proximal 7 days          Peripheral Intravenous Line                 Peripheral IV - Single Lumen 04/13/18 1747 Left Forearm 4 days                Significant Labs:    CBC/Anemia Profile:    Recent Labs  Lab 04/17/18  0415 04/18/18  0355   WBC 26.27* 29.54*   HGB 8.9* 9.0*   HCT 28.1* 28.0*    165   MCV 96 96   RDW 20.1* 20.4*        Chemistries:    Recent Labs  Lab 04/17/18  0415 04/17/18  1459 04/18/18  0355     --  140   K 2.6* 3.3* 3.1*   CL 96  --  98   CO2 28  --  27   BUN 12  --  12   CREATININE 2.1*  --  1.9*   CALCIUM 10.0  --  10.2   ALBUMIN 1.8*  --  1.8*   PROT 6.0  --  6.2   BILITOT 11.7*  --  13.2*   ALKPHOS 127  --  126   ALT 17  --  16   *  --  120*   MG 1.7  --  1.4*       All pertinent labs within the past 24 hours have been reviewed.  ABG    Recent Labs  Lab 04/18/18  0458   PH 7.463*   PO2 120*   PCO2 41.3   HCO3 29.6*   BE 6         Significant Imaging:  I have reviewed all pertinent imaging results/findings within the past 24 hours.

## 2018-04-19 PROBLEM — E87.20 LACTIC ACID ACIDOSIS: Status: RESOLVED | Noted: 2018-01-01 | Resolved: 2018-01-01

## 2018-04-19 PROBLEM — K81.9 ACALCULOUS CHOLECYSTITIS: Status: ACTIVE | Noted: 2018-01-01

## 2018-04-19 PROBLEM — K70.11 ALCOHOLIC HEPATITIS WITH ASCITES: Status: ACTIVE | Noted: 2018-01-01

## 2018-04-19 PROBLEM — B37.49 CANDIDA UTI: Status: ACTIVE | Noted: 2018-01-01

## 2018-04-19 PROBLEM — F10.10 ETOH ABUSE: Chronic | Status: RESOLVED | Noted: 2018-01-01 | Resolved: 2018-01-01

## 2018-04-19 PROBLEM — K76.82 HEPATIC ENCEPHALOPATHY: Status: ACTIVE | Noted: 2018-01-01

## 2018-04-19 NOTE — ASSESSMENT & PLAN NOTE
Currently intubated.  No acute infiltrates seen on CXR  Continue IV antibiotics empirically.  Pulmonary consult.    04/07/2018- will wean off ventilator as tolerated.  She is more awake and alert today.  Sputum cultures showed MRSA and pseudomonas-she is on vanco/meropenem-will deescalate soon.       04/08/2018- she is now intubated after asystolic cardiac arrest .Will wean off as tolerated.  04/09/2018- she remains intubated ,critical care follow up .wean off ventilator as tolerated.  4/10/2018 - likely secondary to MRSA and pseudamonal pneumonia. ID consulted, will consider double covering for pseudomonas.  04/12/18- continue Vent support  04/15/18- Trach collar in place  04/16- will continue trach collar and wean as tolerated.  04/17/18- continue trachea care/weaning   04/18-continue trach care ,wean as tolerated   4/19- continue present care

## 2018-04-19 NOTE — PLAN OF CARE
Problem: Patient Care Overview  Goal: Plan of Care Review  Outcome: Ongoing (interventions implemented as appropriate)  Patient currently resting quietly in bed. Patient on levophed drip at this time. Blood pressure stable. Patient NSR on monitor at this time. Patient currently receiving oxygen by ventilator via tracheostomy . Patient turned by speciality bed every 30 minutes to avoid skin breakdown to back side. No sign of wound development noted. Patient has no restraint related injuries noted. Plan of care updated with family. There no further questions after updated on plan of care at this time. Will continue to monitor.

## 2018-04-19 NOTE — ASSESSMENT & PLAN NOTE
Re intubated for third time 4/8 s/p asystolic arrest.    Vent settings reviewed and appropriate; FIO2 to keep SAO2 > 92%.   4/11 tracheostomy placed  Cont daily SBT

## 2018-04-19 NOTE — ASSESSMENT & PLAN NOTE
04/07/2018- will closely monitor fever curve, will stop flagyl, change meropenem to zosyn,continue vancomycin for now.  Will deescalate soon.  Blood cultures -neg, sputum cultures-MRSA and pseudomonas   4/11/2018 - antibx adjusted today, per icu, ID consulted  04/13/18- possible due to acute cholecystectomy -s/p cholestomy tube placement   04/16/18- etiology of fever is unclear at this time, she was treated with vancomycin initially and was stopped due to worsening renal failure.  She remains on meropenem ,flagyl, diflucan.  With persistent fever , will follow repeat blood cultures and will discuss with critical care team about changing her lines.  Will do indium scan .  Her prognosis is guarded , will add 7 days of empiric zyvox if wbc continues to increase and fever persists .   04/18-wbc is 29, will add empiric zyvox, stop flagyl, add micafungin, follow sputum cultures   Prognosis guarded,     4/19- Etiology still unclear, could be Drug fever but WBC still rising--- Meropenem resumed

## 2018-04-19 NOTE — PROGRESS NOTES
[facility]  Critical Care Medicine  Progress Note    Patient Name: Rafael Duron  MRN: [id number]  Admission Date: 4/3/2018  Hospital Length of Stay: 16 days  Code Status: Full Code  Attending Provider: Paul Stewart MD  Primary Care Provider: Hermna Cowart MD   Principal Problem: Septic shock    Subjective:     HPI:  Ms Duron is a 21 yo obese BF with a PMH of HTN, gestational DM and HTN who presented to [facility] ED in [place], [place] on  with complaint of SOB and cough with known pregnancy.  OB US revealed no heart tones and she is also  with second trimester fetal demise estimated 22 week for which she passed with retained placenta.  After admission to ICU she had seizure activity with , K+ 1.9 and Bicarb 11.  She also had etoh level 268 and reportedly had been drinking etoh w/ honey heavily for several days.  She required intubation for airway protection per records and OB was unable to remove placenta manually and patient had to be taken to OR.  She received 2 liters IVF and Hgb dropped to 7.4 and was transfused 2 units PRBCs.  Vaginal bleeding was scant per records.  Yesterday she had temp 101.5 Ax, .  She as transferred to [facility] ICU yesterday evening for higher level of care.        Hospital/ICU Course:   - This AM she is sedated on vent on Levophed infusion spiking temp 101.9 with WBC 20, LA 6.3, UA+, electrolyte imbalance and Glucose 268     - SAT and SBT done this am, pt awake and following commands. She was successfully extubated to nasal cannula. Remains tachycardic with HR 140s and febrile with temp 103 overnight. WBC 18 and lactic acidosis improving to 4.8. Continuing to aggressively replace electrolytes.     - Over night patient had episode with bradycardia and hypotension during which she became unresponsive and agonally breathing. She responded with IVF and bicarb and was restarted on pressors. She experienced a similar episode  again last night, during which she was intubated. Vent settings were reviewed and adjusted this am. No more bradycardic/hypotensive episodes since last night. Remains on pressors. Leukocystosis unimproved with worsening bandemia. Urine and blood cx NGTD. Sputum cx growing staph and pseudomonas however CXR this am is unremarkable.    04/7 - Tolerating SAT and SBT. Meets extubating criteria. Acidosis improving. Leukocytosis improving. Remains on bicarb gtt.     04/8 - Extubated successfully yesterday. Asystolic arrest this AM.  ACLS initiated. Re - intubated 2 rounds CPR with Iv epinephrine X 1  and IV CaCl X 1 given. ROSC attained.   A - line placed.      4/9 - now with mild vaginal bleeding and severe anasarca with blistering.  Still on vent with sedation and Levophed/Vasopressin infusing.  Worsening UOP and creatine.  Spoke with brother at bedside in detail and all questions answered.   4/10 - Opens eyes alert. Mild increase in Cr -. Intravascular volume depletion. Will add albumin. Still with vagnal bleeding  4/11 - low grade temp, Slight worsening of CR. Good urine output; trach placed today, remains on mechanical vent support and low dose pressor  4/12 - remains on mechanical ventilation via trach; HIDA scan abnormal with no gall bladder filling  4/13 - external gall bladder drain placed by IR; remains on mechanical ventilation via Trach  4/14 - significant draining from gall bladder along with significant decline in leukocytosis although continued fevers last 24 hours; remains on mechanical ventilation via trach; intermittent hypotension overnight and creatinine bump this am  4/15 - continued high drainage from gall bladder; continued fevers 102+; remains on mechanical ventilation via trach, failed SBT with tachypnea, hypoventilation  4/16 continued mechanical ventilation via trach; fail SBT with tachypnea, hypoventilation; continued fever, controlling with external cooling blanket (24hr max 103.2); increased  leukocytosis; procalcitonin continues rising (now 11.6); INR, bili continue rising; biliary drain with continued 500-600ml/day output  4/17 - remains vent dependent with continued pressor requirement, fevers, increasing leukocytosis; slight decline INR, Tbili, and procal (all remain elevated); CT abd last evening with mild pancolitis not consistent with severity of ongoing illness  4/18 remains on mechanical ventilation via trach on pressor support with continued fevers and wbc rising (now 29,000) along with worsening t bili; some decline in INR and procalcitonin today; tagged wbc scan yesterday concerning for lungs as septic source  4/19 - still on mech vent and Levophed infusion.  Yumiko TF.  Still spiking temp and WBC increasing.      Review of Systems   Unable to perform ROS: Patient nonverbal       Objective:     Vital Signs (Most Recent):  Temp: 100.3 °F (37.9 °C) (04/19/18 0705)  Pulse: 107 (04/19/18 1000)  Resp: (!) 40 (04/19/18 0705)  BP: (!) 97/49 (04/19/18 1000)  SpO2: 100 % (04/19/18 1000) Vital Signs (24h Range):  Temp:  [99 °F (37.2 °C)-100.5 °F (38.1 °C)] 100.3 °F (37.9 °C)  Pulse:  [] 107  Resp:  [30-40] 40  SpO2:  [94 %-100 %] 100 %  BP: ()/() 97/49     Weight: 122 kg (268 lb 15.4 oz)  Body mass index is 43.41 kg/m².      Intake/Output Summary (Last 24 hours) at 04/19/18 1036  Last data filed at 04/19/18 1015   Gross per 24 hour   Intake          2747.04 ml   Output             2965 ml   Net          -217.96 ml       Physical Exam   Constitutional: She appears well-developed and well-nourished. She appears lethargic. She is easily aroused. She has a sickly appearance. She appears ill. She is restrained.   Obese young female on mechanical ventilation via trach   HENT:   Head: Atraumatic.   Nose:       Mouth/Throat: Oropharynx is clear and moist.   Eyes: Pupils are equal, round, and reactive to light. Scleral icterus is present.   Neck: Neck supple.       Obese    Cardiovascular:  Regular rhythm.  Tachycardia present.  Exam reveals distant heart sounds.    Pulses:       Radial pulses are 2+ on the right side, and 2+ on the left side.        Dorsalis pedis pulses are 2+ on the right side, and 2+ on the left side.   Pulmonary/Chest: Effort normal. No tachypnea. No respiratory distress. She has decreased breath sounds. She has rales.   Vent controlled resp effort   Abdominal: Soft. Bowel sounds are normal. She exhibits no distension. There is hepatomegaly. There is no tenderness.       Morbidly obese   Genitourinary:   Genitourinary Comments: Rodriguez in place   Musculoskeletal: Normal range of motion. She exhibits edema (trace edema BLE).   Trace edema   Lymphadenopathy:     She has no cervical adenopathy.   Neurological: She is easily aroused. She appears lethargic. GCS eye subscore is 3. GCS verbal subscore is 1. GCS motor subscore is 6.   Does not follow commands   Skin: Skin is warm and dry. Capillary refill takes less than 2 seconds. No cyanosis.            Vents:  Vent Mode: A/C (04/19/18 0748)  Ventilator Initiated: Yes (04/05/18 1930)  Set Rate: 18 bmp (04/19/18 0748)  Vt Set: 400 mL (04/19/18 0748)  Pressure Support: 0 cmH20 (04/19/18 0748)  PEEP/CPAP: 5 cmH20 (04/19/18 0748)  Oxygen Concentration (%): 40 (04/19/18 1000)  Peak Airway Pressure: 23 cmH2O (04/19/18 0748)  Plateau Pressure: 30 cmH20 (04/19/18 0748)  Total Ve: 12.8 mL (04/19/18 0748)  F/VT Ratio<105 (RSBI): (!) 69.62 (04/18/18 1115)    Lines/Drains/Airways     Peripherally Inserted Central Catheter Line                 PICC Double Lumen 04/14/18 1000 right brachial 5 days          Drain                 Rectal Tube 04/12/18 0710 rectal tube w/ balloon (indicate number of mLs) 7 days         Biliary Tube 04/13/18 1200 RLQ 5 days         Urethral Catheter 04/15/18 1250 Temperature probe 3 days         NG/OG Tube 04/16/18 1342 Other (comments) Right nostril 2 days          Airway                 Surgical Airway 04/11/18 1403  Kvng Cuffed;Long;Proximal 7 days          Peripheral Intravenous Line                 Peripheral IV - Single Lumen 04/13/18 1747 Left Forearm 5 days                Significant Labs:    CBC/Anemia Profile:    Recent Labs  Lab 04/18/18  0355 04/19/18  0405   WBC 29.54* 32.83*   HGB 9.0* 9.0*   HCT 28.0* 28.2*    154   MCV 96 97   RDW 20.4* 21.4*        Chemistries:    Recent Labs  Lab 04/17/18  1459 04/18/18  0355 04/19/18  0405   NA  --  140 140   K 3.3* 3.1* 2.9*   CL  --  98 99   CO2  --  27 27   BUN  --  12 12   CREATININE  --  1.9* 1.9*   CALCIUM  --  10.2 10.6*   ALBUMIN  --  1.8* 1.8*   PROT  --  6.2 6.5   BILITOT  --  13.2* 14.2*   ALKPHOS  --  126 127   ALT  --  16 15   AST  --  120* 109*   MG  --  1.4* 1.8   PHOS  --   --  2.2*       ABGs:   Recent Labs  Lab 04/19/18  0504   PH 7.443   PCO2 37.0   HCO3 25.3   POCSATURATED 94*   BE 1     Coagulation:   Recent Labs  Lab 04/19/18  0405   INR 1.6*     All pertinent labs within the past 24 hours have been reviewed.    Significant Imaging:  CXR: I have reviewed all pertinent results/findings within the past 24 hours and my personal findings are:  no acute process noted      Assessment/Plan:     Psychiatric   ETOH abuse    Continue electrolyte replacement, Thiamine, Folate, MVI.        Pulmonary   Pneumonia of right lower lobe due to methicillin resistant Staphylococcus aureus (MRSA)    Initial rt infiltrate from admission improving on chest film however imdium scan indicates continued high pulmonary activity  Last culture only positive for c albicans; changed diflucan to mycamine 4/18 for improved pulmonary coverage  Cont tracheal suctioning and VAP prophylaxis  Has been on IV Vanc over 10 days stop date 4/22 per ID  ID following        Acute hypoxemic respiratory failure    Re intubated for third time 4/8 s/p asystolic arrest.    Vent settings reviewed and appropriate; FIO2 to keep SAO2 > 92%.   4/11 tracheostomy placed  Cont daily SBT         Cardiac/Vascular    .        Cardiopulmonary arrest    Cont supportive care and ICU cardiac monitoring        Pure hyperglyceridemia    Cont SSI        Renal/    .        Candida UTI    + candida tropicalis  Micafungin Day # 2 of 5  ID following        VIKRAM (acute kidney injury)    Nephrology following; creatinine improving and UOP good        Electrolyte imbalance    Replace potassium and mag  Monitor daily electroytes        ID   * Septic shock    ID following  Continued fevers and WBC worsening  On abx day 15 and c diff management day 9  New cultures 4/13 with only candida (sputum and urine)  Continues to require pressor    Septic source not clear -- cont Vanc IV/NG, Zyvox, Merrem and Micafungin        Clostridium difficile infection    Completed 14 days Flagyl and cont enteral vanco day 11  ID following        Hematology    .        Oncology   Acute blood loss anemia    No acute transfusion indication and no active bleeding  Monitor daily CBC and cont folic acid        GI   Hepatomegaly    GI following        Hepatic encephalopathy    Cont Lactulose and Rifaximin        Acalculous cholecystitis    Has GB drain at this time  Cont IVAB        Alcoholic hepatitis with ascites    GI following: Att this time patient would not be a liver transplant candidate. One it is still unclear if her liver is significantly responsible for her presentation and second there is still concern for uncontroleld infection in a patient intubated on vasopressors. Given she has been critical but stable if we are going to better sort out her clinical situation giving FFP and proceeding with a transjugular liver biopsy is an option.  Bili continues upward trend        Other   History of IUFD    S/P D & C. No retained products of conception on ultrasound.           Preventive Measures and Monitoring:   Stress Ulcer: Pepcid  Nutrition: TF  Glucose control: SSI  Bowel prophylaxis: Lactulose  DVT prophylaxis: LMWH/SCDs  Hx CAD on B-Blocker:  no hx CAD  Head of Bed/Reposition: Elevate HOB and turn Q1-2 hours   Early Mobility: Bed rest  SBT: daily  Trach Day: #8  GB drain Day: #6  NG Day: #3  Central Line RUE PICC Day: #5  Rodriguez Day: #4  IVAB Day:  #16  Code Status: Full    Counseling/Consultation:I have discussed the care of this patient in detail with the bedside nursing staff and Dr. Whitman and Dr. Stewart    Critical Care Time: 84 minutes  Critical secondary to Patient has a condition that poses threat to life and bodily function: Acute Renal Failure and Resp Failure  Patient has an abrupt change in neurologic status: Hepatic Encephalopathy  Patient is currently on drug therapy requiring intensive monitoring for toxicity: Levophed infusion      Critical care was time spent personally by me on the following activities: development of treatment plan with patient or surrogate and bedside caregivers, discussions with consultants, evaluation of patient's response to treatment, examination of patient, ordering and performing treatments and interventions, ordering and review of laboratory studies, ordering and review of radiographic studies, pulse oximetry, re-evaluation of patient's condition. This critical care time did not overlap with that of any other provider or involve time for any procedures.     Ed Lowe NP  Critical Care Medicine  [facility]

## 2018-04-19 NOTE — PROGRESS NOTES
Patient assessed.  Soft bilat wrist restraints renewed due to risk of pulling lines, tubes and/or climbing OOB.    CHANDRIKA Lowe [facility]

## 2018-04-19 NOTE — PROGRESS NOTES
[facility]   Nephrology  Progress Note    Patient Name: Rafael Duron  MRN: [id number]  Admission Date: 4/3/2018  Hospital Length of Stay: 16 days  Attending Provider: Paul Stewart MD   Primary Care Physician: Herman Cowart MD  Principal Problem:Septic shock    Subjective:     HPI: 23 yo obese female with HTN, gestational DM  who presented to [facility] ED in [place], [place] on 4/2 with complaint of SOB and cough with known pregnancy. Workup revealed fetal demise (estimated at 22 weeks) and patient passed dead fetus but retained placenta. Placenta remnants were removed in OR vis D & C.  After admission to ICU she had seizure activity with , K+ 1.9 and Bicarb 11.  She also had EtOH level of 268.  She required intubation for airway protection per records. She as transferred to [facility] on 4/3/18 for higher level of care.    Patient presented with septic shock at [facility] and was started on IV pressors and IV antibiotics. Patient was initially stabilized and extubated on 4/5/18. OB/GYN following. She developed syncopal episode on 4/5/18 associated with bradycardia. She was successfully resuscitated with IV fluids and levophed. She subsequently deteriorated and was re-intubated on 4/5/18. Abdominal US revealed massive hepatomegaly with liver span of 33 cm. Patient's condition improved and she was extubated again on 4/7/18. Patient coded on 4/8/18 and was successfully resuscitated and again intubated. Patient was also diagnosed with C. Diff colitis and MRSA bacteremia.   Nephrology was consulted to help with patient's electrolyte care, renal care and volume management. I saw and examined patient in her hospital room. Patient is intubated and not able to provide any additional history.   Patient remains on antibiotics and pressor support. Chart review revealed that hyponatremia and hypokalemia have now resolved. However, hypocalcemia has persisted. In addition, patient's renal  function has worsened during current admission and creatinine has increased from 0.8 on 4/6/18 to 1.6 on 4/9/18. Volume review showed positive I/O balance with + 24 liters since admission,. Patient's UOP has been fluctuating with 1 to 3 liters of urine per day. Current UOP about 100 cc/hour.       Interval History:  No acute events     Review of patient's allergies indicates:  No Known Allergies  Current Facility-Administered Medications   Medication Frequency    albuterol-ipratropium 2.5mg-0.5mg/3mL nebulizer solution 3 mL Q4H PRN    bisacodyl suppository 10 mg Daily PRN    chlorhexidine 0.12 % solution 15 mL BID    dextrose 50% injection 12.5 g PRN    famotidine (PF) injection 20 mg Daily    fentaNYL injection 50 mcg Q2H PRN    folic acid-vit B6-vit B12 2.5-25-2 mg tablet 1 tablet Daily    glucagon (human recombinant) injection 1 mg PRN    heparin (porcine) injection 5,000 Units Q8H    insulin aspart U-100 pen 1-10 Units Q6H PRN    lactulose 20 gram/30 mL solution Soln 30 g Daily    linezolid 600mg/300ml IVPB 600 mg Q12H    lorazepam (ATIVAN) injection 2 mg Q2H PRN    magnesium sulfate 2g in water 50mL IVPB (premix) Once    Followed by    magnesium sulfate in dextrose IVPB (premix) 1 g Once    meropenem injection 500 mg Q6H    micafungin 100 mg in sodium chloride 0.9 % 100 mL IVPB (ready to mix system) Q24H    multivitamin tablet 1 tablet Daily    norepinephrine 32 mg in dextrose 5 % 250 mL infusion Continuous    ondansetron injection 4 mg Q8H PRN    pneumoc 13-bobby conj-dip cr(PF) 0.5 mL vaccine x 1 dose    rifAXIMin tablet 550 mg BID    sodium chloride 0.9% flush 5 mL PRN    thiamine tablet 100 mg Daily    vancomycin (VANCOCIN) 500 mg in sodium chloride 0.9% 100 mL enema Q6H    vitamin D 1000 units tablet 5,000 Units Daily       Objective:     Vital Signs (Most Recent):  Temp: (!) 101 °F (38.3 °C) (04/19/18 1105)  Pulse: (!) 116 (04/19/18 1158)  Resp: (!) 40 (04/19/18 0705)  BP: 110/60  (04/19/18 1145)  SpO2: 99 % (04/19/18 1158)  O2 Device (Oxygen Therapy): ventilator (04/19/18 1105) Vital Signs (24h Range):  Temp:  [99 °F (37.2 °C)-101 °F (38.3 °C)] 101 °F (38.3 °C)  Pulse:  [] 116  Resp:  [30-40] 40  SpO2:  [94 %-100 %] 99 %  BP: ()/() 110/60     Weight: 122 kg (268 lb 15.4 oz) (04/19/18 0545)  Body mass index is 43.41 kg/m².  Body surface area is 2.38 meters squared.    I/O last 3 completed shifts:  In: 3670.2 [I.V.:490.2; NG/GT:2080; IV Piggyback:1100]  Out: 4206 [Urine:2256; Drains:550; Stool:1400]    Physical Exam   Constitutional: She appears well-developed. No distress.   Morbidly obese    HENT:   Head: Normocephalic and atraumatic.   Mouth/Throat: Oropharynx is clear and moist. No oropharyngeal exudate.   Eyes: Conjunctivae and EOM are normal. Pupils are equal, round, and reactive to light.   Neck: Neck supple. No JVD present. Carotid bruit is not present. No tracheal deviation present. No thyroid mass and no thyromegaly present.   Trach in place    Cardiovascular: Normal rate, regular rhythm, normal heart sounds and intact distal pulses.  Exam reveals no gallop and no friction rub.    No murmur heard.  Pulmonary/Chest: No respiratory distress. She has no wheezes. She has rales. She exhibits no tenderness.   Abdominal: Soft. Bowel sounds are normal. She exhibits no distension, no abdominal bruit, no ascites and no mass. There is no hepatosplenomegaly. There is no tenderness. There is no rebound, no guarding and no CVA tenderness.   Cholecystostomy in place    Musculoskeletal: She exhibits edema. She exhibits no tenderness.   Dependant edema    Lymphadenopathy:     She has no cervical adenopathy.   Neurological: She has normal reflexes. She displays normal reflexes. No cranial nerve deficit. She exhibits normal muscle tone. Coordination normal.   Skin: Skin is warm and intact. No rash noted. No erythema. No pallor.       Significant Labs:  Recent Labs  Lab 04/19/18  5655    WBC 32.83*   RBC 2.91*   HGB 9.0*   HCT 28.2*      MCV 97   MCH 30.9   MCHC 31.9*     CMP:     Recent Labs  Lab 04/19/18  0405   *   CALCIUM 10.6*   ALBUMIN 1.8*   PROT 6.5      K 2.9*   CO2 27   CL 99   BUN 12   CREATININE 1.9*   ALKPHOS 127   ALT 15   *   BILITOT 14.2*     Coagulation:     Recent Labs  Lab 04/19/18  0405   INR 1.6*     LFTs:     Recent Labs  Lab 04/19/18  0405   ALT 15   *   ALKPHOS 127   BILITOT 14.2*   PROT 6.5   ALBUMIN 1.8*     All labs within the past 24 hours have been reviewed.     Lab Results   Component Value Date    ALT 15 04/19/2018     (H) 04/19/2018    ALKPHOS 127 04/19/2018    BILITOT 14.2 (H) 04/19/2018         Significant Imaging: reviewed     Assessment/Plan:     VIKRAM (acute kidney injury)    1. VIKRAM :  VIKRAM from ATN from low BP few days ago ,  follow renal fn, on tube feeds,  recovering now, UO good, Cr 1.9 mg/dl today ,     2. Electrolyte abnormalities,  K, Mg was repleted, K loss due to diarrhea from C diff colitis     Corrected calcium about 12, reduced in tube feeds      3. Hypotension : cont pressor support .     4. Abnormal LFTs , s/p Cholecystostomy placement ,  ? shock liver , alcohic hepatitis, GI following ,      5. Meds reviewed,     6. Anemia : multifactorial, pRBC tx when indicated     7. Candida UTI : on abx     8. C diff colitis , on abx                  I will follow-up with patient. Please contact us if you have any additional questions.     Total time spent 40 minutes including time needed to review the records,  patient  evaluation, documentation, face-to-face discussion with the primary and CC teams, more than 50% of the time was spent on coordination of care and counseling.       Rafael Ferreira MD  Nephrology  [facility]

## 2018-04-19 NOTE — PLAN OF CARE
Problem: Patient Care Overview  Goal: Plan of Care Review  Outcome: Ongoing (interventions implemented as appropriate)  Failed SBT today.  Remains on Levophed.  Tmax 102, cooling blanket reapplied.  Tolerating tube feeds.  Vanc enemas continue, questran stopped; more alert this afternoon post lactulose administration.  Copious amounts of thick brown secretions suctioned around trach site, minimal secretions suctioned deep tracheal. Culture of trach site sent for analysis.  Specialty bed, self rotating bed.  POC reviewed with patient and family.  All questions answered.

## 2018-04-19 NOTE — SUBJECTIVE & OBJECTIVE
Review of Systems   Unable to perform ROS: Patient nonverbal       Objective:     Vital Signs (Most Recent):  Temp: 100.3 °F (37.9 °C) (04/19/18 0705)  Pulse: 107 (04/19/18 1000)  Resp: (!) 40 (04/19/18 0705)  BP: (!) 97/49 (04/19/18 1000)  SpO2: 100 % (04/19/18 1000) Vital Signs (24h Range):  Temp:  [99 °F (37.2 °C)-100.5 °F (38.1 °C)] 100.3 °F (37.9 °C)  Pulse:  [] 107  Resp:  [30-40] 40  SpO2:  [94 %-100 %] 100 %  BP: ()/() 97/49     Weight: 122 kg (268 lb 15.4 oz)  Body mass index is 43.41 kg/m².      Intake/Output Summary (Last 24 hours) at 04/19/18 1036  Last data filed at 04/19/18 1015   Gross per 24 hour   Intake          2747.04 ml   Output             2965 ml   Net          -217.96 ml       Physical Exam   Constitutional: She appears well-developed and well-nourished. She appears lethargic. She is easily aroused. She has a sickly appearance. She appears ill. She is restrained.   Obese young female on mechanical ventilation via trach   HENT:   Head: Atraumatic.   Nose:       Mouth/Throat: Oropharynx is clear and moist.   Eyes: Pupils are equal, round, and reactive to light. Scleral icterus is present.   Neck: Neck supple.       Obese    Cardiovascular: Regular rhythm.  Tachycardia present.  Exam reveals distant heart sounds.    Pulses:       Radial pulses are 2+ on the right side, and 2+ on the left side.        Dorsalis pedis pulses are 2+ on the right side, and 2+ on the left side.   Pulmonary/Chest: Effort normal. No tachypnea. No respiratory distress. She has decreased breath sounds. She has rales.   Vent controlled resp effort   Abdominal: Soft. Bowel sounds are normal. She exhibits no distension. There is hepatomegaly. There is no tenderness.       Morbidly obese   Genitourinary:   Genitourinary Comments: Rodriguez in place   Musculoskeletal: Normal range of motion. She exhibits edema (trace edema BLE).   Trace edema   Lymphadenopathy:     She has no cervical adenopathy.   Neurological:  She is easily aroused. She appears lethargic. GCS eye subscore is 3. GCS verbal subscore is 1. GCS motor subscore is 6.   Does not follow commands   Skin: Skin is warm and dry. Capillary refill takes less than 2 seconds. No cyanosis.            Vents:  Vent Mode: A/C (04/19/18 0748)  Ventilator Initiated: Yes (04/05/18 1930)  Set Rate: 18 bmp (04/19/18 0748)  Vt Set: 400 mL (04/19/18 0748)  Pressure Support: 0 cmH20 (04/19/18 0748)  PEEP/CPAP: 5 cmH20 (04/19/18 0748)  Oxygen Concentration (%): 40 (04/19/18 1000)  Peak Airway Pressure: 23 cmH2O (04/19/18 0748)  Plateau Pressure: 30 cmH20 (04/19/18 0748)  Total Ve: 12.8 mL (04/19/18 0748)  F/VT Ratio<105 (RSBI): (!) 69.62 (04/18/18 1115)    Lines/Drains/Airways     Peripherally Inserted Central Catheter Line                 PICC Double Lumen 04/14/18 1000 right brachial 5 days          Drain                 Rectal Tube 04/12/18 0710 rectal tube w/ balloon (indicate number of mLs) 7 days         Biliary Tube 04/13/18 1200 RLQ 5 days         Urethral Catheter 04/15/18 1250 Temperature probe 3 days         NG/OG Tube 04/16/18 1342 Other (comments) Right nostril 2 days          Airway                 Surgical Airway 04/11/18 1403 Shiley Cuffed;Long;Proximal 7 days          Peripheral Intravenous Line                 Peripheral IV - Single Lumen 04/13/18 1747 Left Forearm 5 days                Significant Labs:    CBC/Anemia Profile:    Recent Labs  Lab 04/18/18  0355 04/19/18  0405   WBC 29.54* 32.83*   HGB 9.0* 9.0*   HCT 28.0* 28.2*    154   MCV 96 97   RDW 20.4* 21.4*        Chemistries:    Recent Labs  Lab 04/17/18  1459 04/18/18  0355 04/19/18  0405   NA  --  140 140   K 3.3* 3.1* 2.9*   CL  --  98 99   CO2  --  27 27   BUN  --  12 12   CREATININE  --  1.9* 1.9*   CALCIUM  --  10.2 10.6*   ALBUMIN  --  1.8* 1.8*   PROT  --  6.2 6.5   BILITOT  --  13.2* 14.2*   ALKPHOS  --  126 127   ALT  --  16 15   AST  --  120* 109*   MG  --  1.4* 1.8   PHOS  --   --  2.2*        ABGs:   Recent Labs  Lab 04/19/18  0504   PH 7.443   PCO2 37.0   HCO3 25.3   POCSATURATED 94*   BE 1     Coagulation:   Recent Labs  Lab 04/19/18  0405   INR 1.6*     All pertinent labs within the past 24 hours have been reviewed.    Significant Imaging:  CXR: I have reviewed all pertinent results/findings within the past 24 hours and my personal findings are:  no acute process noted

## 2018-04-19 NOTE — ASSESSMENT & PLAN NOTE
ID following  Continued fevers and WBC worsening  On abx day 15 and c diff management day 9  New cultures 4/13 with only candida (sputum and urine)  Continues to require pressor    Septic source not clear -- cont Vanc IV/NG, Zyvox, Merrem and Micafungin

## 2018-04-19 NOTE — SUBJECTIVE & OBJECTIVE
Interval History:  No acute events     Review of patient's allergies indicates:  No Known Allergies  Current Facility-Administered Medications   Medication Frequency    albuterol-ipratropium 2.5mg-0.5mg/3mL nebulizer solution 3 mL Q4H PRN    bisacodyl suppository 10 mg Daily PRN    chlorhexidine 0.12 % solution 15 mL BID    dextrose 50% injection 12.5 g PRN    famotidine (PF) injection 20 mg Daily    fentaNYL injection 50 mcg Q2H PRN    folic acid-vit B6-vit B12 2.5-25-2 mg tablet 1 tablet Daily    glucagon (human recombinant) injection 1 mg PRN    heparin (porcine) injection 5,000 Units Q8H    insulin aspart U-100 pen 1-10 Units Q6H PRN    lactulose 20 gram/30 mL solution Soln 30 g Daily    linezolid 600mg/300ml IVPB 600 mg Q12H    lorazepam (ATIVAN) injection 2 mg Q2H PRN    magnesium sulfate 2g in water 50mL IVPB (premix) Once    Followed by    magnesium sulfate in dextrose IVPB (premix) 1 g Once    meropenem injection 500 mg Q6H    micafungin 100 mg in sodium chloride 0.9 % 100 mL IVPB (ready to mix system) Q24H    multivitamin tablet 1 tablet Daily    norepinephrine 32 mg in dextrose 5 % 250 mL infusion Continuous    ondansetron injection 4 mg Q8H PRN    pneumoc 13-bobby conj-dip cr(PF) 0.5 mL vaccine x 1 dose    rifAXIMin tablet 550 mg BID    sodium chloride 0.9% flush 5 mL PRN    thiamine tablet 100 mg Daily    vancomycin (VANCOCIN) 500 mg in sodium chloride 0.9% 100 mL enema Q6H    vitamin D 1000 units tablet 5,000 Units Daily       Objective:     Vital Signs (Most Recent):  Temp: (!) 101 °F (38.3 °C) (04/19/18 1105)  Pulse: (!) 116 (04/19/18 1158)  Resp: (!) 40 (04/19/18 0705)  BP: 110/60 (04/19/18 1145)  SpO2: 99 % (04/19/18 1158)  O2 Device (Oxygen Therapy): ventilator (04/19/18 1105) Vital Signs (24h Range):  Temp:  [99 °F (37.2 °C)-101 °F (38.3 °C)] 101 °F (38.3 °C)  Pulse:  [] 116  Resp:  [30-40] 40  SpO2:  [94 %-100 %] 99 %  BP: ()/() 110/60     Weight: 122 kg  (268 lb 15.4 oz) (04/19/18 0545)  Body mass index is 43.41 kg/m².  Body surface area is 2.38 meters squared.    I/O last 3 completed shifts:  In: 3670.2 [I.V.:490.2; NG/GT:2080; IV Piggyback:1100]  Out: 4206 [Urine:2256; Drains:550; Stool:1400]    Physical Exam   Constitutional: She appears well-developed. No distress.   Morbidly obese    HENT:   Head: Normocephalic and atraumatic.   Mouth/Throat: Oropharynx is clear and moist. No oropharyngeal exudate.   Eyes: Conjunctivae and EOM are normal. Pupils are equal, round, and reactive to light.   Neck: Neck supple. No JVD present. Carotid bruit is not present. No tracheal deviation present. No thyroid mass and no thyromegaly present.   Trach in place    Cardiovascular: Normal rate, regular rhythm, normal heart sounds and intact distal pulses.  Exam reveals no gallop and no friction rub.    No murmur heard.  Pulmonary/Chest: No respiratory distress. She has no wheezes. She has rales. She exhibits no tenderness.   Abdominal: Soft. Bowel sounds are normal. She exhibits no distension, no abdominal bruit, no ascites and no mass. There is no hepatosplenomegaly. There is no tenderness. There is no rebound, no guarding and no CVA tenderness.   Cholecystostomy in place    Musculoskeletal: She exhibits edema. She exhibits no tenderness.   Dependant edema    Lymphadenopathy:     She has no cervical adenopathy.   Neurological: She has normal reflexes. She displays normal reflexes. No cranial nerve deficit. She exhibits normal muscle tone. Coordination normal.   Skin: Skin is warm and intact. No rash noted. No erythema. No pallor.       Significant Labs:  Recent Labs  Lab 04/19/18  0405   WBC 32.83*   RBC 2.91*   HGB 9.0*   HCT 28.2*      MCV 97   MCH 30.9   MCHC 31.9*     CMP:     Recent Labs  Lab 04/19/18  0405   *   CALCIUM 10.6*   ALBUMIN 1.8*   PROT 6.5      K 2.9*   CO2 27   CL 99   BUN 12   CREATININE 1.9*   ALKPHOS 127   ALT 15   *   BILITOT 14.2*      Coagulation:     Recent Labs  Lab 04/19/18  0405   INR 1.6*     LFTs:     Recent Labs  Lab 04/19/18  0405   ALT 15   *   ALKPHOS 127   BILITOT 14.2*   PROT 6.5   ALBUMIN 1.8*     All labs within the past 24 hours have been reviewed.     Lab Results   Component Value Date    ALT 15 04/19/2018     (H) 04/19/2018    ALKPHOS 127 04/19/2018    BILITOT 14.2 (H) 04/19/2018         Significant Imaging: reviewed

## 2018-04-19 NOTE — ASSESSMENT & PLAN NOTE
Initial rt infiltrate from admission improving on chest film however imdium scan indicates continued high pulmonary activity  Last culture only positive for c albicans; changed diflucan to mycamine 4/18 for improved pulmonary coverage  Cont tracheal suctioning and VAP prophylaxis  Has been on IV Vanc over 10 days stop date 4/22 per ID  ID following

## 2018-04-19 NOTE — SUBJECTIVE & OBJECTIVE
Interval History: remains the same, on Vent and Levophed, has been on Oral and Rectal vanco for persistent C diff. WBC still rising, hence placed back on Meropenem. K is low-- being replaced. Still has some peritracheal discharge-- being cultured.     Review of Systems   Unable to perform ROS: Intubated     Objective:     Vital Signs (Most Recent):  Temp: (!) 101 °F (38.3 °C) (04/19/18 1105)  Pulse: (!) 116 (04/19/18 1158)  Resp: (!) 40 (04/19/18 0705)  BP: 110/60 (04/19/18 1145)  SpO2: 99 % (04/19/18 1158) Vital Signs (24h Range):  Temp:  [99 °F (37.2 °C)-101 °F (38.3 °C)] 101 °F (38.3 °C)  Pulse:  [] 116  Resp:  [30-40] 40  SpO2:  [94 %-100 %] 99 %  BP: ()/() 110/60     Weight: 122 kg (268 lb 15.4 oz)  Body mass index is 43.41 kg/m².    Intake/Output Summary (Last 24 hours) at 04/19/18 1218  Last data filed at 04/19/18 1214   Gross per 24 hour   Intake          2833.04 ml   Output             2367 ml   Net           466.04 ml      Physical Exam   Constitutional: She appears well-developed. No distress.   Morbidly obese    HENT:   Head: Normocephalic and atraumatic.   Mouth/Throat: Oropharynx is clear and moist. No oropharyngeal exudate.   Eyes: Conjunctivae and EOM are normal. Pupils are equal, round, and reactive to light.   Neck: Neck supple. No JVD present. Carotid bruit is not present. No tracheal deviation present. No thyroid mass and no thyromegaly present.   Trach in place    Cardiovascular: Normal rate, regular rhythm, normal heart sounds and intact distal pulses.  Exam reveals no gallop and no friction rub.    No murmur heard.  Pulmonary/Chest: No respiratory distress. She has no wheezes. She has rales. She exhibits no tenderness.   Abdominal: Soft. Bowel sounds are normal. She exhibits no distension, no abdominal bruit, no ascites and no mass. There is no hepatosplenomegaly. There is no tenderness. There is no rebound, no guarding and no CVA tenderness.   Cholecystostomy in place     Musculoskeletal: She exhibits edema. She exhibits no tenderness.   Dependant edema    Lymphadenopathy:     She has no cervical adenopathy.   Neurological: She has normal reflexes. She displays normal reflexes. No cranial nerve deficit. She exhibits normal muscle tone. Coordination normal.   Skin: Skin is warm and intact. No rash noted. No erythema. No pallor.   Nursing note and vitals reviewed.      Significant Labs:   Blood Culture:   BMP:   Recent Labs  Lab 04/19/18  0405   *      K 2.9*   CL 99   CO2 27   BUN 12   CREATININE 1.9*   CALCIUM 10.6*   MG 1.8     CBC:   Recent Labs  Lab 04/18/18  0355 04/19/18  0405   WBC 29.54* 32.83*   HGB 9.0* 9.0*   HCT 28.0* 28.2*    154     CMP:   Recent Labs  Lab 04/17/18  1459 04/18/18  0355 04/19/18  0405   NA  --  140 140   K 3.3* 3.1* 2.9*   CL  --  98 99   CO2  --  27 27   GLU  --  154* 153*   BUN  --  12 12   CREATININE  --  1.9* 1.9*   CALCIUM  --  10.2 10.6*   PROT  --  6.2 6.5   ALBUMIN  --  1.8* 1.8*   BILITOT  --  13.2* 14.2*   ALKPHOS  --  126 127   AST  --  120* 109*   ALT  --  16 15   ANIONGAP  --  15 14   EGFRNONAA  --  37* 37*     Lactic Acid:   Magnesium:   Recent Labs  Lab 04/18/18  0355 04/19/18  0405   MG 1.4* 1.8     POCT Glucose:   Recent Labs  Lab 04/17/18 2036 04/18/18  0916 04/18/18 2013   POCTGLUCOSE 132* 182* 182*     Respiratory Culture:   All pertinent labs within the past 24 hours have been reviewed.    Significant Imaging: I have reviewed all pertinent imaging results/findings within the past 24 hours.

## 2018-04-19 NOTE — ASSESSMENT & PLAN NOTE
GI following: Att this time patient would not be a liver transplant candidate. One it is still unclear if her liver is significantly responsible for her presentation and second there is still concern for uncontroleld infection in a patient intubated on vasopressors. Given she has been critical but stable if we are going to better sort out her clinical situation giving FFP and proceeding with a transjugular liver biopsy is an option.  Bili continues upward trend

## 2018-04-19 NOTE — ASSESSMENT & PLAN NOTE
04/09/2018-will use PO vancomycin 250mg every 8 hours for 10 days .  Due to her multiple electrolyte abnormalities, there is concern for ileus ,will continue Flagyl for now and adjust therapy as needed   .  04/16/2018- will continue PO vanco/flagyl -wbc remains very high at 25, -   Will plan to repeat imaging if leucocytosis persists .    04/17/18- the CT scan of the abdomen showed pancolitis likely related to C difficle -will continue po vancomycin and flagyl ,.  04/18- on flagyl since admit yet has marked leucocytosis .,also on vancomycin PO , will add rectal vanco enema , stop IV Flagyl.    4/19- persists- on oral and Rectal Vanco

## 2018-04-19 NOTE — ASSESSMENT & PLAN NOTE
1. VIKRAM :  VIKRAM from [facility] from low BP few days ago ,  follow renal fn, on tube feeds,  recovering now, UO good, Cr 1.9 mg/dl today ,     2. Electrolyte abnormalities,  K, Mg was repleted, K loss due to diarrhea from C diff colitis     Corrected calcium about 12, reduced in tube feeds      3. Hypotension : cont pressor support .     4. Abnormal LFTs , s/p Cholecystostomy placement ,  ? shock liver , alcohic hepatitis, GI following ,      5. Meds reviewed,     6. Anemia : multifactorial, pRBC tx when indicated     7. Candida UTI : on abx     8. C diff colitis , on abx
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
## 2018-04-19 NOTE — ASSESSMENT & PLAN NOTE
K initially 1.9, improved to 2.5.  Monitor and replace.    04/07/2018-will replete hypocalcemia -corrected calcium is 8.1,phosphorus -2.5 ,will replete .    4/19- hypokalemia being corrected

## 2018-04-19 NOTE — PROGRESS NOTES
[facility] -   Adult Nutrition  Progress Note    SUMMARY     Recommendations    Recommendation/Intervention: 1.Continue current TF regimen. 2. Will continue to monitor.   Goals: Meet > 85 % EEN/EPN   Nutrition Goal Status: goal met   Communication of RD Recs:  (POC, sticky note)    Reason for Assessment    Reason for Assessment: RD follow-up  Dx:  1. Respiratory failure    2. Septic shock    3. Acute blood loss anemia    4. Acute hypoxemic respiratory failure    5. Acute urinary tract infection    6. Electrolyte imbalance    7. ETOH abuse    8. Hyperglycemia, unspecified    9. Hyponatremia    10. Metabolic acidosis    11. Retained products of conception with hemorrhage    12. Seizure    13. Hypertriglyceridemia    14. Shock liver    15. Fever, unspecified fever cause    16. S/P dilatation and curettage    17. History of IUFD    18. Hepatomegaly    19. Pneumonia of both lower lobes due to methicillin resistant Staphylococcus aureus (MRSA)    20. Asystole    21. Spontaneous  with cardiac arrest or failure    22. Hyperbilirubinemia    23. Cardiopulmonary arrest    24. Clostridium difficile infection    25. Pneumonia of right lower lobe due to methicillin resistant Staphylococcus aureus (MRSA)    26. Thrombocytopenia    27. UTI due to Klebsiella species    28. Respiratory failure, unspecified chronicity, unspecified whether with hypoxia or hypercapnia    29. VIKRAM (acute kidney injury)    30. Hypocalcemia    31. Hypervolemia, unspecified hypervolemia type    32. Elevated LFTs    33. Acute respiratory failure with hypoxia and hypercapnia    34. Acalculous cholecystitis    35. Alcoholic hepatitis with ascites    36. Candida UTI    37. Hepatic encephalopathy    38. Pure hyperglyceridemia      Past Medical History:   Diagnosis Date    Gestational diabetes     Hypertension        Interdisciplinary Rounds: attended  General Information Comments: Pt remains on ventilator via tracheostomy. Currently on  "TF at goal rate.   Nutrition Discharge Planning: too soon to determine    Nutrition Risk Screen    Nutrition Risk Screen: no indicators present    Nutrition/Diet History    Do you have any cultural, spiritual, Confucianism conflicts, given your current situation?: None      Anthropometrics    Temp: (!) 101 °F (38.3 °C)  Height Method: Estimated  Height: 5' 6" (167.6 cm)  Height (inches): 66 in  Weight Method: Bed Scale  Weight: 122 kg (268 lb 15.4 oz)  Weight (lb): 268.96 lb  Ideal Body Weight (IBW), Female: 130 lb  % Ideal Body Weight, Female (lb): 206.89 lb  BMI (Calculated): 43.5  BMI Grade: greater than 40 - morbid obesity       Lab/Procedures/Meds    Pertinent Labs Reviewed: reviewed    BMP  Lab Results   Component Value Date     04/19/2018    K 2.9 (L) 04/19/2018    CL 99 04/19/2018    CO2 27 04/19/2018    BUN 12 04/19/2018    CREATININE 1.9 (H) 04/19/2018    CALCIUM 10.6 (H) 04/19/2018    ANIONGAP 14 04/19/2018    ESTGFRAFRICA 42 (A) 04/19/2018    EGFRNONAA 37 (A) 04/19/2018     Lab Results   Component Value Date    CALCIUM 10.6 (H) 04/19/2018    PHOS 2.2 (L) 04/19/2018     Lab Results   Component Value Date    ALBUMIN 1.8 (L) 04/19/2018       Recent Labs  Lab 04/18/18 2013   POCTGLUCOSE 182*       Pertinent Medications Reviewed: reviewed    Physical Findings/Assessment    Overall Physical Appearance: on ventilator support (obese)  Tubes:  (OG)  Oral/Mouth Cavity:  (WDL)  Skin:  (Daniel Score 13)    Estimated/Assessed Needs    Weight Used For Calorie Calculations: 122 kg (268 lb 15.4 oz)  Energy Calorie Requirements (kcal): 2440.66  Energy Need Method: Axel State (modified)  Protein Requirements: 148 g   Weight Used For Protein Calculations: 59 kg (130 lb) (IBW - Obese ICU)     Fluid Need Method: RDA Method (or per MD)  RDA Method (mL): 2440.66         Nutrition Prescription Ordered    Current Diet Order: NPO  Current Nutrition Support Formula Ordered: Peptamen 1.5 w/Prebio  Current Nutrition Support Rate " Ordered: 60 (ml)  Current Nutrition Support Frequency Ordered: ml/hr  Oral Nutrition Supplement: Beneprotein 2 pack QID    Evaluation of Received Nutrient/Fluid Intake    Enteral Calories (kcal): 2360  Enteral Protein (gm): 145  Other Calories (kcal): 20.8 (IV Medications)  Total Calories (kcal): 2380.8  % Kcal Needs: 98  % Protein Needs: 98     Intake/Output Summary (Last 24 hours) at 04/19/18 1421  Last data filed at 04/19/18 1300   Gross per 24 hour   Intake          2633.04 ml   Output             2080 ml   Net           553.04 ml       % Intake of Estimated Energy Needs: 75 - 100 %  % Meal Intake: NPO    Nutrition Risk          Assessment and Plan    Acute hypoxemic respiratory failure    Contributing Nutrition Diagnosis  Inadequate energy intake     Related to (etiology):   Inability to consume sufficient energy     Signs and Symptoms (as evidenced by):   Intubated, NPO, no alternative means of nutrition.      Interventions/Recommendations (treatment strategy):  Please see RD recs above.    Nutrition Diagnosis Status:   Improving. 4.19.18. Pt on TF meeting 98 % of EEN/EPN               Monitor and Evaluation    Food and Nutrient Intake: energy intake, enteral nutrition intake  Food and Nutrient Adminstration: enteral and parenteral nutrition administration  Anthropometric Measurements: weight  Biochemical Data, Medical Tests and Procedures: electrolyte and renal panel, glucose/endocrine profile  Nutrition-Focused Physical Findings: overall appearance     Nutrition Follow-Up    RD Follow-up?: Yes (2xweekly)

## 2018-04-19 NOTE — ASSESSMENT & PLAN NOTE
Source likely fetal demise of unknown duration and retained products of concepttion placenta, removed surgically at outside hospital.  Continue IV fluids.  Follow up on blood and urine cultures.  Lactic acid improved to 5 from 7.  OB Gyn consult - Dr. Cardona.    04/08/2018- will continue vancomycin/zosyn ,follow repeat blood cultures .  Lactic acid is more than 12.  I called [facility] and discussed with the lab about her cultures-blood cultures done on 04/03-neg but urine culture -was positive for klebsiella -she is faxing the results. She will call [facility] to get the results.     04/09/2018-continue vasopressor support , on vanco,zosyn and  will deescalate tomorrow and follow CBc closely.  4/10/2018 - possible cause of liver failure and kidney failure, GI and nephro following. Urine output increased, will monitor.  04/15/18- persistent fever and leukocytosis , CDIFF positive   Repeat cultures -NGTD continue abx per ID  04/16/2018- will continue vasopressor support -will plan to image the chest if wbc is persistently increasing .Continue meropenem for now and will plan to adjust therapy in am     4/19- still requiring low dose Levophed  Consider Midodrine vs Florinef to wean her off Pressors

## 2018-04-19 NOTE — ASSESSMENT & PLAN NOTE
Currently 8.9 after 2 units PRBC transfusion at outside facility.  No active bleeding at this time.  Labs in AM.      04/07/2018- hemoglobin is stable at 8.7(was 8.9) two days ago.  Will continue to monitor closely  4/10/2018 - Pt transfused 4 units prbcs, plat, ffp and cryo. Hem/Onc following, will transfuse PRN.  04/17/18- hemoglobin is 8.9, no evidence of bleeding -will continue to monitor .     4/19- stable

## 2018-04-19 NOTE — ASSESSMENT & PLAN NOTE
Continue Vancomycin  04/15/18-persistent right upper lobe infiltrate  04/16/18- she was treated with vancomycin .Will follow repeat cbc and cultures.    04/17- treated with vanco/meropenem -completed 13 days of therapy    4/19- back of Merrem, IV Mycamine and oral and Rectal Vanco

## 2018-04-19 NOTE — PLAN OF CARE
Problem: Patient Care Overview  Goal: Plan of Care Review  Outcome: Ongoing (interventions implemented as appropriate)  Recommendations     Recommendation/Intervention: 1.Continue current TF regimen. 2. Will continue to monitor.   Goals: Meet > 85 % EEN/EPN   Nutrition Goal Status: goal met   Communication of RD Recs:  (POC, sticky note)

## 2018-04-19 NOTE — ASSESSMENT & PLAN NOTE
04/09/2018-case discussed with nephrology -she has positive fluid balance, will continue lasix 60mg every 8 hours .  04/15/18- trending up , nephrology following  04/16/18- serum creatinine is increasing to 2.6,will continue to follow nephrology , will avoid nephrotoxic medications.   04/17-now off vancomycin, nephrology on board, will continue to closely monitor serum creatinine.  04/18- renal function continues to improve  4/19- improving

## 2018-04-19 NOTE — ASSESSMENT & PLAN NOTE
S/p percutaneous drain placed-04/13/18 04/16/18- s/p percutaneous drain -will continue drain and continue meropenem for now(day 13 of total antibiotic therapy )  Case discussed with Dr Jeansonne -any surgical procedure will not affect management as this time.  04/17/18- will continue drain and monitor output.  4/19- on Mycamine

## 2018-04-20 PROBLEM — J96.02 ACUTE RESPIRATORY FAILURE WITH HYPOXIA AND HYPERCAPNIA: Status: ACTIVE | Noted: 2018-01-01
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
## 2018-04-20 NOTE — ASSESSMENT & PLAN NOTE
Currently intubated.  No acute infiltrates seen on CXR  Continue IV antibiotics empirically.  Pulmonary consult.    04/07/2018- will wean off ventilator as tolerated.  She is more awake and alert today.  Sputum cultures showed MRSA and pseudomonas-she is on vanco/meropenem-will deescalate soon.       04/08/2018- she is now intubated after asystolic cardiac arrest .Will wean off as tolerated.  04/09/2018- she remains intubated ,critical care follow up .wean off ventilator as tolerated.  4/10/2018 - likely secondary to MRSA and pseudamonal pneumonia. ID consulted, will consider double covering for pseudomonas.  04/12/18- continue Vent support  04/15/18- Trach collar in place  04/16- will continue trach collar and wean as tolerated.  04/17/18- continue trachea care/weaning   04/18-continue trach care ,wean as tolerated   4/19- continue present care  04/20-wean off ventilator as tolerated.

## 2018-04-20 NOTE — PLAN OF CARE
Problem: Patient Care Overview  Goal: Plan of Care Review  Outcome: Ongoing (interventions implemented as appropriate)  Pt resting in bed on vent. Pt tolerating well. Fentanyl gtt infusing. Pt sedated to desired level with no distress noted. Pt vitally stable at this time. No acute distress noted in pt at this time. Will continue to monitor pt closely.

## 2018-04-20 NOTE — NURSING
MD mishra notified that the trach suture sites were  red and inflamed. He said to remove the sutures

## 2018-04-20 NOTE — ASSESSMENT & PLAN NOTE
ID following  Continued fevers and Leukocytosis  On abx day 16 and c diff management day 10  New cultures 4/13 with only candida (sputum and urine)  Continues to require pressor    Septic source not clear -- cont Vanc IV/NG, Zyvox, Merrem and Micafungin per ID

## 2018-04-20 NOTE — PLAN OF CARE
Problem: Patient Care Overview  Goal: Plan of Care Review  Outcome: Ongoing (interventions implemented as appropriate)  Pt resting in bed on trach/vent. Levo gtt infusing. Pt tolerating Tf. Pt remains on cooling blanket due to increased temperatures. Tmax this shift 99.3. Vitals stable at this time. No acute distress noted in pt at this time. Will continue to monitor pt closely and follow POC.

## 2018-04-20 NOTE — PROGRESS NOTES
Trach care done. Site cleaned and dried. Inner cannula changed. HME replaced. Drain sponge placed under site. Some green thick oozing noticed. Sutures are still in place.

## 2018-04-20 NOTE — SUBJECTIVE & OBJECTIVE
Review of Systems   Unable to perform ROS: Patient nonverbal       Objective:     Vital Signs (Most Recent):  Temp: 99.3 °F (37.4 °C) (04/20/18 0300)  Pulse: 101 (04/20/18 0724)  Resp: (!) 44 (04/20/18 0724)  BP: 104/82 (04/20/18 0700)  SpO2: 99 % (04/20/18 0724) Vital Signs (24h Range):  Temp:  [99 °F (37.2 °C)-102 °F (38.9 °C)] 99.3 °F (37.4 °C)  Pulse:  [] 101  Resp:  [35-44] 44  SpO2:  [92 %-100 %] 99 %  BP: ()/(42-90) 104/82     Weight: 122 kg (268 lb 15.4 oz)  Body mass index is 43.41 kg/m².      Intake/Output Summary (Last 24 hours) at 04/20/18 0831  Last data filed at 04/20/18 0700   Gross per 24 hour   Intake          2481.35 ml   Output             2855 ml   Net          -373.65 ml       Physical Exam   Constitutional: She appears well-developed and well-nourished. She has a sickly appearance. She appears ill. She is restrained.   Obese young female on mechanical ventilation via trach   HENT:   Head: Atraumatic.   Nose:       Mouth/Throat: Oropharynx is clear and moist.   Eyes: Pupils are equal, round, and reactive to light. Scleral icterus is present.   Neck: Full passive range of motion without pain.       Obese    Cardiovascular: Regular rhythm.  Tachycardia present.  Exam reveals distant heart sounds.    Pulses:       Radial pulses are 2+ on the right side, and 2+ on the left side.        Dorsalis pedis pulses are 2+ on the right side, and 2+ on the left side.   Pulmonary/Chest: Effort normal. No tachypnea. No respiratory distress. She has decreased breath sounds. She has rales.   Vent controlled resp effort   Abdominal: Soft. Bowel sounds are normal. She exhibits no distension. There is hepatomegaly. There is no tenderness.       Morbidly obese   Genitourinary:   Genitourinary Comments: Rodriguez in place   Musculoskeletal: Normal range of motion. She exhibits edema (trace edema BLE).   +1 bilat tibial edema   Lymphadenopathy:     She has no cervical adenopathy.   Neurological: She is alert.    Nods head to questions.  Follows commands inconsistently    Skin: Skin is warm and dry. Capillary refill takes less than 2 seconds. No cyanosis.            Vents:  Vent Mode: (S) Spont (Placed on spontaneous per Nawaf, NP) (04/20/18 0724)  Ventilator Initiated: Yes (04/05/18 1930)  Set Rate: 0 bmp (04/20/18 0724)  Vt Set: 400 mL (04/20/18 0724)  Pressure Support: 10 cmH20 (04/20/18 0724)  PEEP/CPAP: 5 cmH20 (04/20/18 0724)  Oxygen Concentration (%): 35 (04/20/18 0724)  Peak Airway Pressure: 16 cmH2O (04/20/18 0724)  Plateau Pressure: 30 cmH20 (04/20/18 0724)  Total Ve: 14.7 mL (04/20/18 0724)  F/VT Ratio<105 (RSBI): 122.22 (04/20/18 0724)    Lines/Drains/Airways     Peripherally Inserted Central Catheter Line                 PICC Double Lumen 04/14/18 1000 right brachial 5 days          Drain                 Rectal Tube 04/12/18 0710 rectal tube w/ balloon (indicate number of mLs) 8 days         Biliary Tube 04/13/18 1200 RLQ 6 days         Urethral Catheter 04/15/18 1250 Temperature probe 4 days         NG/OG Tube 04/16/18 1342 Other (comments) Right nostril 3 days          Airway                 Surgical Airway 04/11/18 1403 Shiley Cuffed;Long;Proximal 8 days                Significant Labs:    CBC/Anemia Profile:    Recent Labs  Lab 04/19/18  0405 04/20/18  0439   WBC 32.83* 31.54*   HGB 9.0* 8.4*   HCT 28.2* 26.9*    153   MCV 97 99*   RDW 21.4* 22.6*        Chemistries:    Recent Labs  Lab 04/19/18  0405 04/20/18  0439    139   K 2.9* 3.6   CL 99 101   CO2 27 25   BUN 12 14   CREATININE 1.9* 2.3*   CALCIUM 10.6* 10.6*   ALBUMIN 1.8* 1.8*   PROT 6.5 6.5   BILITOT 14.2* 14.8*   ALKPHOS 127 125   ALT 15 14   * 109*   MG 1.8  --    PHOS 2.2* 2.7       ABGs:   Recent Labs  Lab 04/20/18  0505   PH 7.383   PCO2 40.3   HCO3 24.0   POCSATURATED 99   BE -1     Coagulation:   Recent Labs  Lab 04/20/18  0439   INR 1.6*     All pertinent labs within the past 24 hours have been reviewed.

## 2018-04-20 NOTE — PROGRESS NOTES
Pt was on spontaneous for breathing trial. Placed patient back on previous settings and 100% fio2 for bronch. RT assisted in bronch. Patient back on previous settings with 35% fio2. Patient tolerated well. Specimens will be sent to lab.

## 2018-04-20 NOTE — ASSESSMENT & PLAN NOTE
1. VIKRAM :  VIKRAM from ATN from low BP few days ago ,  follow renal fn, on tube feeds,  UO dropped and Cr increased to  2.3 mg/dl today ,     May be volume depletion, will start gentle hydration,     2. Electrolyte abnormalities,  K, Mg was repleted, K loss due to diarrhea from C diff colitis ,     Corrected calcium about 12, reduced in tube feeds      3. Hypotension : cont pressor support .     4. Abnormal LFTs , s/p Cholecystostomy placement ,  ? shock liver , alcohic hepatitis, GI following ,      5. Meds reviewed,     6. Anemia : multifactorial, pRBC tx when indicated     7. Candida UTI : on abx     8. C diff colitis , on abx

## 2018-04-20 NOTE — PROGRESS NOTES
[facility]  Critical Care Medicine  Progress Note    Patient Name: Rafael Duron  MRN: [id number]  Admission Date: 4/3/2018  Hospital Length of Stay: 17 days  Code Status: Full Code  Attending Provider: Ed Ram MD  Primary Care Provider: Herman Cowart MD   Principal Problem: Septic shock    Subjective:     HPI:  Ms Duron is a 21 yo obese BF with a PMH of HTN, gestational DM and HTN who [place] [facility] [place], [place] on  with complaint of SOB and cough with known pregnancy.  OB US revealed no heart tones and she is also  with second trimester fetal demise estimated 22 week for which she passed with retained placenta.  After admission to ICU she had seizure activity with , K+ 1.9 and Bicarb 11.  She also had etoh level 268 and reportedly had been drinking etoh w/ honey heavily for several days.  She required intubation for airway protection per records and OB was unable to remove placenta manually and patient had to be taken to OR.  She received 2 liters IVF and Hgb dropped to 7.4 and was transfused 2 units PRBCs.  Vaginal bleeding was scant per records.  Yesterday she had temp 101.5 Ax, .  She as transferred to [facility] ICU yesterday evening for higher level of care.        Hospital/ICU Course:   - This AM she is sedated on vent on Levophed infusion spiking temp 101.9 with WBC 20, LA 6.3, UA+, electrolyte imbalance and Glucose 268     - SAT and SBT done this am, pt awake and following commands. She was successfully extubated to nasal cannula. Remains tachycardic with HR 140s and febrile with temp 103 overnight. WBC 18 and lactic acidosis improving to 4.8. Continuing to aggressively replace electrolytes.     - Over night patient had episode with bradycardia and hypotension during which she became unresponsive and agonally breathing. She responded with IVF and bicarb and was restarted on pressors. She experienced a similar episode  again last night, during which she was intubated. Vent settings were reviewed and adjusted this am. No more bradycardic/hypotensive episodes since last night. Remains on pressors. Leukocystosis unimproved with worsening bandemia. Urine and blood cx NGTD. Sputum cx growing staph and pseudomonas however CXR this am is unremarkable.    04/7 - Tolerating SAT and SBT. Meets extubating criteria. Acidosis improving. Leukocytosis improving. Remains on bicarb gtt.     04/8 - Extubated successfully yesterday. Asystolic arrest this AM.  ACLS initiated. Re - intubated 2 rounds CPR with Iv epinephrine X 1  and IV CaCl X 1 given. ROSC attained.   A - line placed.      4/9 - now with mild vaginal bleeding and severe anasarca with blistering.  Still on vent with sedation and Levophed/Vasopressin infusing.  Worsening UOP and creatine.  Spoke with brother at bedside in detail and all questions answered.   4/10 - Opens eyes alert. Mild increase in Cr -. Intravascular volume depletion. Will add albumin. Still with vagnal bleeding  4/11 - low grade temp, Slight worsening of CR. Good urine output; trach placed today, remains on mechanical vent support and low dose pressor  4/12 - remains on mechanical ventilation via trach; HIDA scan abnormal with no gall bladder filling  4/13 - external gall bladder drain placed by IR; remains on mechanical ventilation via Trach  4/14 - significant draining from gall bladder along with significant decline in leukocytosis although continued fevers last 24 hours; remains on mechanical ventilation via trach; intermittent hypotension overnight and creatinine bump this am  4/15 - continued high drainage from gall bladder; continued fevers 102+; remains on mechanical ventilation via trach, failed SBT with tachypnea, hypoventilation  4/16 continued mechanical ventilation via trach; fail SBT with tachypnea, hypoventilation; continued fever, controlling with external cooling blanket (24hr max 103.2); increased  leukocytosis; procalcitonin continues rising (now 11.6); INR, bili continue rising; biliary drain with continued 500-600ml/day output  4/17 - remains vent dependent with continued pressor requirement, fevers, increasing leukocytosis; slight decline INR, Tbili, and procal (all remain elevated); CT abd last evening with mild pancolitis not consistent with severity of ongoing illness  4/18 remains on mechanical ventilation via trach on pressor support with continued fevers and wbc rising (now 29,000) along with worsening t bili; some decline in INR and procalcitonin today; tagged wbc scan yesterday concerning for lungs as septic source  4/19 - still on mech vent and Levophed infusion.  Yumiko TF.  Still spiking temp and WBC increasing.    4/20 - awake on vent still on low dose Levophed infusion.  Yumiko TF.  Tolerating SBT    Review of Systems   Unable to perform ROS: Patient nonverbal       Objective:     Vital Signs (Most Recent):  Temp: 99.3 °F (37.4 °C) (04/20/18 0300)  Pulse: 101 (04/20/18 0724)  Resp: (!) 44 (04/20/18 0724)  BP: 104/82 (04/20/18 0700)  SpO2: 99 % (04/20/18 0724) Vital Signs (24h Range):  Temp:  [99 °F (37.2 °C)-102 °F (38.9 °C)] 99.3 °F (37.4 °C)  Pulse:  [] 101  Resp:  [35-44] 44  SpO2:  [92 %-100 %] 99 %  BP: ()/(42-90) 104/82     Weight: 122 kg (268 lb 15.4 oz)  Body mass index is 43.41 kg/m².      Intake/Output Summary (Last 24 hours) at 04/20/18 0831  Last data filed at 04/20/18 0700   Gross per 24 hour   Intake          2481.35 ml   Output             2855 ml   Net          -373.65 ml       Physical Exam   Constitutional: She appears well-developed and well-nourished. She has a sickly appearance. She appears ill. She is restrained.   Obese young female on mechanical ventilation via trach   HENT:   Head: Atraumatic.   Nose:       Mouth/Throat: Oropharynx is clear and moist.   Eyes: Pupils are equal, round, and reactive to light. Scleral icterus is present.   Neck: Full passive range of  motion without pain.       Obese    Cardiovascular: Regular rhythm.  Tachycardia present.  Exam reveals distant heart sounds.    Pulses:       Radial pulses are 2+ on the right side, and 2+ on the left side.        Dorsalis pedis pulses are 2+ on the right side, and 2+ on the left side.   Pulmonary/Chest: Effort normal. No tachypnea. No respiratory distress. She has decreased breath sounds. She has rales.   Vent controlled resp effort   Abdominal: Soft. Bowel sounds are normal. She exhibits no distension. There is hepatomegaly. There is no tenderness.       Morbidly obese   Genitourinary:   Genitourinary Comments: Rodriguez in place   Musculoskeletal: Normal range of motion. She exhibits edema (trace edema BLE).   +1 bilat tibial edema   Lymphadenopathy:     She has no cervical adenopathy.   Neurological: She is alert.   Nods head to questions.  Follows commands inconsistently    Skin: Skin is warm and dry. Capillary refill takes less than 2 seconds. No cyanosis.            Vents:  Vent Mode: (S) Spont (Placed on spontaneous per Nawaf, NP) (04/20/18 0724)  Ventilator Initiated: Yes (04/05/18 1930)  Set Rate: 0 bmp (04/20/18 0724)  Vt Set: 400 mL (04/20/18 0724)  Pressure Support: 10 cmH20 (04/20/18 0724)  PEEP/CPAP: 5 cmH20 (04/20/18 0724)  Oxygen Concentration (%): 35 (04/20/18 0724)  Peak Airway Pressure: 16 cmH2O (04/20/18 0724)  Plateau Pressure: 30 cmH20 (04/20/18 0724)  Total Ve: 14.7 mL (04/20/18 0724)  F/VT Ratio<105 (RSBI): 122.22 (04/20/18 0724)    Lines/Drains/Airways     Peripherally Inserted Central Catheter Line                 PICC Double Lumen 04/14/18 1000 right brachial 5 days          Drain                 Rectal Tube 04/12/18 0710 rectal tube w/ balloon (indicate number of mLs) 8 days         Biliary Tube 04/13/18 1200 RLQ 6 days         Urethral Catheter 04/15/18 1250 Temperature probe 4 days         NG/OG Tube 04/16/18 1342 Other (comments) Right nostril 3 days          Airway                  Surgical Airway 04/11/18 1403 Shiley Cuffed;Long;Proximal 8 days                Significant Labs:    CBC/Anemia Profile:    Recent Labs  Lab 04/19/18  0405 04/20/18  0439   WBC 32.83* 31.54*   HGB 9.0* 8.4*   HCT 28.2* 26.9*    153   MCV 97 99*   RDW 21.4* 22.6*        Chemistries:    Recent Labs  Lab 04/19/18  0405 04/20/18  0439    139   K 2.9* 3.6   CL 99 101   CO2 27 25   BUN 12 14   CREATININE 1.9* 2.3*   CALCIUM 10.6* 10.6*   ALBUMIN 1.8* 1.8*   PROT 6.5 6.5   BILITOT 14.2* 14.8*   ALKPHOS 127 125   ALT 15 14   * 109*   MG 1.8  --    PHOS 2.2* 2.7       ABGs:   Recent Labs  Lab 04/20/18  0505   PH 7.383   PCO2 40.3   HCO3 24.0   POCSATURATED 99   BE -1     Coagulation:   Recent Labs  Lab 04/20/18  0439   INR 1.6*     All pertinent labs within the past 24 hours have been reviewed.          Assessment/Plan:     Pulmonary   Pneumonia of both lower lobes due to methicillin resistant Staphylococcus aureus (MRSA)    Initial rt infiltrate from admission improving on chest film however imdium scan indicates continued high pulmonary activity  Last culture only positive for c albicans; changed diflucan to mycamine 4/18 for improved pulmonary coverage  Cont tracheal suctioning and VAP prophylaxis  Has been on IV Vanc over 10 days stop date 4/22 per ID  ID following        Acute hypoxemic respiratory failure    Re intubated for third time 4/8 s/p asystolic arrest.    Vent settings reviewed and appropriate; FIO2 to keep SAO2 > 92%.   4/11 tracheostomy placed  On SBT now RR varies from 17 to 46 but no resp distress only tachypnea        Cardiac/Vascular    .        Cardiopulmonary arrest    Cont supportive care and ICU cardiac monitoring        Pure hyperglyceridemia    Cont SSI        Renal/    .        Candida UTI    + candida tropicalis  Micafungin Day # 3 of 5  ID following        Electrolyte imbalance    Replace potassium and mag as needed  Monitor daily electroytes        VIKRAM (acute kidney  injury)    Nephrology following; creatinine back up and oliguria noted        ID   * Septic shock    ID following  Continued fevers and Leukocytosis  On abx day 16 and c diff management day 10  New cultures 4/13 with only candida (sputum and urine)  Continues to require pressor    Septic source not clear -- cont Vanc IV/NG, Zyvox, Merrem and Micafungin per ID        Clostridium difficile infection    Completed 14 days Flagyl and cont enteral vanco day 12  ID following        Hematology    .        Oncology   Acute blood loss anemia    No acute transfusion indication and no active bleeding  Monitor daily CBC and cont folic acid        GI   Hepatomegaly    GI following        Hepatic encephalopathy    Cont Lactulose and Rifaximin        Acalculous cholecystitis    Has GB drain at this time  Cont IVAB        Alcoholic hepatitis with ascites    GI following: Att this time patient would not be a liver transplant candidate. One it is still unclear if her liver is significantly responsible for her presentation and second there is still concern for uncontroleld infection in a patient intubated on vasopressors. Given she has been critical but stable if we are going to better sort out her clinical situation giving FFP and proceeding with a transjugular liver biopsy is an option.  Bili continues upward trend        Preventive Measures and Monitoring:   Stress Ulcer: Pepcid  Nutrition: TF  Glucose control: SSI  Bowel prophylaxis: Lactulose  DVT prophylaxis: LMWH/SCDs  Hx CAD on B-Blocker: no hx CAD  Head of Bed/Reposition: Elevate HOB and turn Q1-2 hours   Early Mobility: Bed rest  SBT: daily  Trach Day: #9  GB drain Day: #7  NG Day: #4  Central Line RUE PICC Day: #6  Rodriguez Day: #5  IVAB Day:  #17  Code Status: Full     Counseling/Consultation:I have discussed the care of this patient in detail with the bedside nursing staff and Dr. Whitman and Dr. Ram    Critical Care Time: 58 minutes  Critical secondary to Patient has a  condition that poses threat to life and bodily function: Acute Renal Failure and Resp Failure  Patient is currently on drug therapy requiring intensive monitoring for toxicity: Levophed infusion      Critical care was time spent personally by me on the following activities: development of treatment plan with patient or surrogate and bedside caregivers, discussions with consultants, evaluation of patient's response to treatment, examination of patient, ordering and performing treatments and interventions, ordering and review of laboratory studies, ordering and review of radiographic studies, pulse oximetry, re-evaluation of patient's condition. This critical care time did not overlap with that of any other provider or involve time for any procedures.     Ed Lowe NP  Critical Care Medicine  [facility]

## 2018-04-20 NOTE — ASSESSMENT & PLAN NOTE
04/07/2018- will closely monitor fever curve, will stop flagyl, change meropenem to zosyn,continue vancomycin for now.  Will deescalate soon.  Blood cultures -neg, sputum cultures-MRSA and pseudomonas   4/11/2018 - antibx adjusted today, per icu, ID consulted  04/13/18- possible due to acute cholecystectomy -s/p cholestomy tube placement   04/16/18- etiology of fever is unclear at this time, she was treated with vancomycin initially and was stopped due to worsening renal failure.  She remains on meropenem ,flagyl, diflucan.  With persistent fever , will follow repeat blood cultures and will discuss with critical care team about changing her lines.  Will do indium scan .  Her prognosis is guarded , will add 7 days of empiric zyvox if wbc continues to increase and fever persists .   04/18-wbc is 29, will add empiric zyvox, stop flagyl, add micafungin, follow sputum cultures   Prognosis guarded,     4/19- Etiology still unclear, could be Drug fever but WBC still rising--- Meropenem resumed  04/20- will send lactate acid, will continue empiric therapy with  Zyvox/meropenem ,micafungin, will closely monitor wbc

## 2018-04-20 NOTE — PLAN OF CARE
Problem: Patient Care Overview  Goal: Plan of Care Review  Outcome: Ongoing (interventions implemented as appropriate)  Vitals signs closely monitored. Fall precautions in place. Patient on clrt bed. Pain assessed every two hours. Safety promoted. Infection risks reduced. Bronch preformed at bedside. Samples sent to lab. Trach care preformed. Levo adjusted per pt's needs; see mar.

## 2018-04-20 NOTE — ASSESSMENT & PLAN NOTE
Source likely fetal demise of unknown duration and retained products of concepttion placenta, removed surgically at outside hospital.  Continue IV fluids.  Follow up on blood and urine cultures.  Lactic acid improved to 5 from 7.  OB Gyn consult - Dr. Cardona.    04/08/2018- will continue vancomycin/zosyn ,follow repeat blood cultures .  Lactic acid is more than 12.  I called [facility] and discussed with the lab about her cultures-blood cultures done on 04/03-neg but urine culture -was positive for klebsiella -she is faxing the results. She will call [employer] to get the results.     04/09/2018-continue vasopressor support , on vanco,zosyn and  will deescalate tomorrow and follow CBc closely.  4/10/2018 - possible cause of liver failure and kidney failure, GI and nephro following. Urine output increased, will monitor.  04/15/18- persistent fever and leukocytosis , CDIFF positive   Repeat cultures -NGTD continue abx per ID  04/16/2018- will continue vasopressor support -will plan to image the chest if wbc is persistently increasing .Continue meropenem for now and will plan to adjust therapy in am     4/19- still requiring low dose Levophed  Consider Midodrine vs Florinef to wean her off Pressors  04/20- will wean vasopressors as tolerated .,

## 2018-04-20 NOTE — SUBJECTIVE & OBJECTIVE
Interval History: 04/20-remains critically ill . Still on vasopressor support , wbc continues to increase, etiology is related to C difficle and possible other source.  All repeat cultures remain negative . She is on optimal  Antimicrobial therapy .    Review of Systems   Unable to perform ROS: Acuity of condition     Objective:     Vital Signs (Most Recent):  Temp: 99.9 °F (37.7 °C) (04/20/18 1515)  Pulse: (!) 111 (04/20/18 1530)  Resp: (!) 39 (04/20/18 0905)  BP: (!) 131/58 (04/20/18 1530)  SpO2: 100 % (04/20/18 1530) Vital Signs (24h Range):  Temp:  [98.3 °F (36.8 °C)-100 °F (37.8 °C)] 99.9 °F (37.7 °C)  Pulse:  [] 111  Resp:  [35-44] 39  SpO2:  [90 %-100 %] 100 %  BP: ()/(37-90) 131/58     Weight: 122 kg (268 lb 15.4 oz)  Body mass index is 43.41 kg/m².    Intake/Output Summary (Last 24 hours) at 04/20/18 1549  Last data filed at 04/20/18 1530   Gross per 24 hour   Intake          2823.57 ml   Output             1909 ml   Net           914.57 ml      Physical Exam   Constitutional: She appears well-developed. No distress.   Morbidly obese    HENT:   Head: Normocephalic and atraumatic.   Mouth/Throat: Oropharynx is clear and moist. No oropharyngeal exudate.   Eyes: EOM are normal. Pupils are equal, round, and reactive to light. Scleral icterus is present.   Neck: Neck supple. No JVD present. Carotid bruit is not present. No tracheal deviation present. No thyroid mass and no thyromegaly present.   Trach in place    Cardiovascular: Normal rate, regular rhythm, normal heart sounds and intact distal pulses.  Exam reveals no gallop and no friction rub.    No murmur heard.  Pulmonary/Chest: No respiratory distress. She has no wheezes. She has rales. She exhibits no tenderness.   Abdominal: Soft. Bowel sounds are normal. She exhibits no distension, no abdominal bruit, no ascites and no mass. There is no hepatosplenomegaly. There is no tenderness. There is no rebound, no guarding and no CVA tenderness.    Cholecystostomy in place    Musculoskeletal: She exhibits edema. She exhibits no tenderness.   Dependant edema    Lymphadenopathy:     She has no cervical adenopathy.   Neurological: She has normal reflexes. She displays normal reflexes. No cranial nerve deficit. She exhibits normal muscle tone. Coordination normal.   Skin: Skin is warm and intact. No rash noted. No erythema. No pallor.   Along the flanks-areas of erythema.edema noted    Nursing note and vitals reviewed.      Significant Labs:   Blood Culture: No results for input(s): LABBLOO in the last 48 hours.  BMP:   Recent Labs  Lab 04/19/18 0405 04/20/18  0439   * 221*    139   K 2.9* 3.6   CL 99 101   CO2 27 25   BUN 12 14   CREATININE 1.9* 2.3*   CALCIUM 10.6* 10.6*   MG 1.8  --      CBC:   Recent Labs  Lab 04/19/18 0405 04/20/18  0439   WBC 32.83* 31.54*   HGB 9.0* 8.4*   HCT 28.2* 26.9*    153     All pertinent labs within the past 24 hours have been reviewed.    Significant Imaging: I have reviewed all pertinent imaging results/findings within the past 24 hours.

## 2018-04-20 NOTE — ASSESSMENT & PLAN NOTE
04/09/2018-will use PO vancomycin 250mg every 8 hours for 10 days .  Due to her multiple electrolyte abnormalities, there is concern for ileus ,will continue Flagyl for now and adjust therapy as needed   .  04/16/2018- will continue PO vanco/flagyl -wbc remains very high at 25, -   Will plan to repeat imaging if leucocytosis persists .    04/17/18- the CT scan of the abdomen showed pancolitis likely related to C difficle -will continue po vancomycin and flagyl ,.  04/18- on flagyl since admit yet has marked leucocytosis .,also on vancomycin PO , will add rectal vanco enema , stop IV Flagyl.    4/19- persists- on oral and Rectal Vanco  04/20- will continue oral and rectal  vanco for now. She has been on therapy for c difficle with vanco since 04/09

## 2018-04-20 NOTE — ASSESSMENT & PLAN NOTE
Re intubated for third time 4/8 s/p asystolic arrest.    Vent settings reviewed and appropriate; FIO2 to keep SAO2 > 92%.   4/11 tracheostomy placed  On SBT now RR varies from 17 to 46 but no resp distress only tachypnea

## 2018-04-20 NOTE — SUBJECTIVE & OBJECTIVE
Interval History:  No acute events     Review of patient's allergies indicates:  No Known Allergies  Current Facility-Administered Medications   Medication Frequency    albuterol-ipratropium 2.5mg-0.5mg/3mL nebulizer solution 3 mL Q4H PRN    bisacodyl suppository 10 mg Daily PRN    chlorhexidine 0.12 % solution 15 mL BID    dextrose 50% injection 12.5 g PRN    famotidine (PF) injection 20 mg Daily    fentaNYL injection 50 mcg Q2H PRN    folic acid-vit B6-vit B12 2.5-25-2 mg tablet 1 tablet Daily    glucagon (human recombinant) injection 1 mg PRN    heparin (porcine) injection 5,000 Units Q8H    insulin aspart U-100 pen 1-10 Units Q6H PRN    lactulose 20 gram/30 mL solution Soln 30 g Daily    linezolid 600mg/300ml IVPB 600 mg Q12H    lorazepam (ATIVAN) injection 2 mg Q2H PRN    meropenem injection 500 mg Q6H    micafungin 100 mg in sodium chloride 0.9 % 100 mL IVPB (ready to mix system) Q24H    midazolam (VERSED) 1 mg/mL injection 4 mg Once    multivitamin tablet 1 tablet Daily    norepinephrine 32 mg in dextrose 5 % 250 mL infusion Continuous    ondansetron injection 4 mg Q8H PRN    pneumoc 13-bobby conj-dip cr(PF) 0.5 mL vaccine x 1 dose    rifAXIMin tablet 550 mg BID    sodium chloride 0.9% flush 5 mL PRN    thiamine tablet 100 mg Daily    vancomycin (VANCOCIN) 500 mg in sodium chloride 0.9% 100 mL enema Q6H    vitamin D 1000 units tablet 5,000 Units Daily       Objective:     Vital Signs (Most Recent):  Temp: 98.9 °F (37.2 °C) (04/20/18 0900)  Pulse: 94 (04/20/18 1053)  Resp: (!) 39 (04/20/18 0905)  BP: (!) 96/55 (04/20/18 0900)  SpO2: 100 % (04/20/18 1053)  O2 Device (Oxygen Therapy): ventilator (04/20/18 1053) Vital Signs (24h Range):  Temp:  [98.9 °F (37.2 °C)-102 °F (38.9 °C)] 98.9 °F (37.2 °C)  Pulse:  [] 94  Resp:  [35-44] 39  SpO2:  [92 %-100 %] 100 %  BP: ()/(37-90) 96/55     Weight: 122 kg (268 lb 15.4 oz) (04/19/18 0545)  Body mass index is 43.41 kg/m².  Body surface  area is 2.38 meters squared.    I/O last 3 completed shifts:  In: 3643.3 [I.V.:303.3; NG/GT:2440; IV Piggyback:900]  Out: 4279 [Urine:1279; Drains:875; Stool:2125]    Physical Exam   Constitutional: She appears well-developed. No distress.   Morbidly obese    HENT:   Head: Normocephalic and atraumatic.   Mouth/Throat: Oropharynx is clear and moist. No oropharyngeal exudate.   Eyes: Conjunctivae and EOM are normal. Pupils are equal, round, and reactive to light.   Neck: Neck supple. No JVD present. Carotid bruit is not present. No tracheal deviation present. No thyroid mass and no thyromegaly present.   Trach in place    Cardiovascular: Normal rate, regular rhythm, normal heart sounds and intact distal pulses.  Exam reveals no gallop and no friction rub.    No murmur heard.  Pulmonary/Chest: No respiratory distress. She has no wheezes. She has rales. She exhibits no tenderness.   Abdominal: Soft. Bowel sounds are normal. She exhibits no distension, no abdominal bruit, no ascites and no mass. There is no hepatosplenomegaly. There is no tenderness. There is no rebound, no guarding and no CVA tenderness.   Cholecystostomy in place    Musculoskeletal: She exhibits edema. She exhibits no tenderness.   Dependant edema    Lymphadenopathy:     She has no cervical adenopathy.   Neurological: She has normal reflexes. She displays normal reflexes. No cranial nerve deficit. She exhibits normal muscle tone. Coordination normal.   Skin: Skin is warm and intact. No rash noted. No erythema. No pallor.       Significant Labs:    Recent Labs  Lab 04/20/18 0439   WBC 31.54*   RBC 2.73*   HGB 8.4*   HCT 26.9*      MCV 99*   MCH 30.8   MCHC 31.2*     CMP:     Recent Labs  Lab 04/20/18 0439   *   CALCIUM 10.6*   ALBUMIN 1.8*   PROT 6.5      K 3.6   CO2 25      BUN 14   CREATININE 2.3*   ALKPHOS 125   ALT 14   *   BILITOT 14.8*     Coagulation:     Recent Labs  Lab 04/20/18 0439   INR 1.6*     LFTs:      Recent Labs  Lab 04/20/18  0439   ALT 14   *   ALKPHOS 125   BILITOT 14.8*   PROT 6.5   ALBUMIN 1.8*     All labs within the past 24 hours have been reviewed.     Lab Results   Component Value Date    ALT 14 04/20/2018     (H) 04/20/2018    ALKPHOS 125 04/20/2018    BILITOT 14.8 (H) 04/20/2018     Lab Results   Component Value Date    .6 (H) 04/09/2018    CALCIUM 10.6 (H) 04/20/2018    CAION 0.86 (L) 04/10/2018    PHOS 2.7 04/20/2018         Significant Imaging: reviewed

## 2018-04-20 NOTE — PLAN OF CARE
Problem: Patient Care Overview  Goal: Plan of Care Review  Outcome: Ongoing (interventions implemented as appropriate)  Pt continues to be on mechanical vent with no significant changes to note from a respiratory standpoint.

## 2018-04-20 NOTE — PROGRESS NOTES
[facility] -   Nephrology  Progress Note    Patient Name: Rafael Duron  MRN: [id number]  Admission Date: 4/3/2018  Hospital Length of Stay: 17 days  Attending Provider: Ed Ram MD   Primary Care Physician: Herman Cowart MD  Principal Problem:Septic shock    Subjective:     HPI: 21 yo obese female with HTN, gestational DM  who presented to [facility] ED in [place], [place] on 4/2 with complaint of SOB and cough with known pregnancy. Workup revealed fetal demise (estimated at 22 weeks) and patient passed dead fetus but retained placenta. Placenta remnants were removed in OR vis D & C.  After admission to ICU she had seizure activity with , K+ 1.9 and Bicarb 11.  She also had EtOH level of 268.  She required intubation for airway protection per records. She as transferred to [facility] ICU on 4/3/18 for higher level of care.    Patient presented with septic shock at [facility] and was started on IV pressors and IV antibiotics. Patient was initially stabilized and extubated on 4/5/18. OB/GYN following. She developed syncopal episode on 4/5/18 associated with bradycardia. She was successfully resuscitated with IV fluids and levophed. She subsequently deteriorated and was re-intubated on 4/5/18. Abdominal US revealed massive hepatomegaly with liver span of 33 cm. Patient's condition improved and she was extubated again on 4/7/18. Patient coded on 4/8/18 and was successfully resuscitated and again intubated. Patient was also diagnosed with C. Diff colitis and MRSA bacteremia.   Nephrology was consulted to help with patient's electrolyte care, renal care and volume management. I saw and examined patient in her hospital room. Patient is intubated and not able to provide any additional history.   Patient remains on antibiotics and pressor support. Chart review revealed that hyponatremia and hypokalemia have now resolved. However, hypocalcemia has persisted. In addition, patient's renal  function has worsened during current admission and creatinine has increased from 0.8 on 4/6/18 to 1.6 on 4/9/18. Volume review showed positive I/O balance with + 24 liters since admission,. Patient's UOP has been fluctuating with 1 to 3 liters of urine per day. Current UOP about 100 cc/hour.       Interval History:  No acute events     Review of patient's allergies indicates:  No Known Allergies  Current Facility-Administered Medications   Medication Frequency    albuterol-ipratropium 2.5mg-0.5mg/3mL nebulizer solution 3 mL Q4H PRN    bisacodyl suppository 10 mg Daily PRN    chlorhexidine 0.12 % solution 15 mL BID    dextrose 50% injection 12.5 g PRN    famotidine (PF) injection 20 mg Daily    fentaNYL injection 50 mcg Q2H PRN    folic acid-vit B6-vit B12 2.5-25-2 mg tablet 1 tablet Daily    glucagon (human recombinant) injection 1 mg PRN    heparin (porcine) injection 5,000 Units Q8H    insulin aspart U-100 pen 1-10 Units Q6H PRN    lactulose 20 gram/30 mL solution Soln 30 g Daily    linezolid 600mg/300ml IVPB 600 mg Q12H    lorazepam (ATIVAN) injection 2 mg Q2H PRN    meropenem injection 500 mg Q6H    micafungin 100 mg in sodium chloride 0.9 % 100 mL IVPB (ready to mix system) Q24H    midazolam (VERSED) 1 mg/mL injection 4 mg Once    multivitamin tablet 1 tablet Daily    norepinephrine 32 mg in dextrose 5 % 250 mL infusion Continuous    ondansetron injection 4 mg Q8H PRN    pneumoc 13-bobby conj-dip cr(PF) 0.5 mL vaccine x 1 dose    rifAXIMin tablet 550 mg BID    sodium chloride 0.9% flush 5 mL PRN    thiamine tablet 100 mg Daily    vancomycin (VANCOCIN) 500 mg in sodium chloride 0.9% 100 mL enema Q6H    vitamin D 1000 units tablet 5,000 Units Daily       Objective:     Vital Signs (Most Recent):  Temp: 98.9 °F (37.2 °C) (04/20/18 0900)  Pulse: 94 (04/20/18 1053)  Resp: (!) 39 (04/20/18 0905)  BP: (!) 96/55 (04/20/18 0900)  SpO2: 100 % (04/20/18 1053)  O2 Device (Oxygen Therapy): ventilator  (04/20/18 1053) Vital Signs (24h Range):  Temp:  [98.9 °F (37.2 °C)-102 °F (38.9 °C)] 98.9 °F (37.2 °C)  Pulse:  [] 94  Resp:  [35-44] 39  SpO2:  [92 %-100 %] 100 %  BP: ()/(37-90) 96/55     Weight: 122 kg (268 lb 15.4 oz) (04/19/18 0545)  Body mass index is 43.41 kg/m².  Body surface area is 2.38 meters squared.    I/O last 3 completed shifts:  In: 3643.3 [I.V.:303.3; NG/GT:2440; IV Piggyback:900]  Out: 4279 [Urine:1279; Drains:875; Stool:2125]    Physical Exam   Constitutional: She appears well-developed. No distress.   Morbidly obese    HENT:   Head: Normocephalic and atraumatic.   Mouth/Throat: Oropharynx is clear and moist. No oropharyngeal exudate.   Eyes: Conjunctivae and EOM are normal. Pupils are equal, round, and reactive to light.   Neck: Neck supple. No JVD present. Carotid bruit is not present. No tracheal deviation present. No thyroid mass and no thyromegaly present.   Trach in place    Cardiovascular: Normal rate, regular rhythm, normal heart sounds and intact distal pulses.  Exam reveals no gallop and no friction rub.    No murmur heard.  Pulmonary/Chest: No respiratory distress. She has no wheezes. She has rales. She exhibits no tenderness.   Abdominal: Soft. Bowel sounds are normal. She exhibits no distension, no abdominal bruit, no ascites and no mass. There is no hepatosplenomegaly. There is no tenderness. There is no rebound, no guarding and no CVA tenderness.   Cholecystostomy in place    Musculoskeletal: She exhibits edema. She exhibits no tenderness.   Dependant edema    Lymphadenopathy:     She has no cervical adenopathy.   Neurological: She has normal reflexes. She displays normal reflexes. No cranial nerve deficit. She exhibits normal muscle tone. Coordination normal.   Skin: Skin is warm and intact. No rash noted. No erythema. No pallor.       Significant Labs:    Recent Labs  Lab 04/20/18  0439   WBC 31.54*   RBC 2.73*   HGB 8.4*   HCT 26.9*      MCV 99*   MCH 30.8    MCHC 31.2*     CMP:     Recent Labs  Lab 04/20/18  0439   *   CALCIUM 10.6*   ALBUMIN 1.8*   PROT 6.5      K 3.6   CO2 25      BUN 14   CREATININE 2.3*   ALKPHOS 125   ALT 14   *   BILITOT 14.8*     Coagulation:     Recent Labs  Lab 04/20/18  0439   INR 1.6*     LFTs:     Recent Labs  Lab 04/20/18  0439   ALT 14   *   ALKPHOS 125   BILITOT 14.8*   PROT 6.5   ALBUMIN 1.8*     All labs within the past 24 hours have been reviewed.     Lab Results   Component Value Date    ALT 14 04/20/2018     (H) 04/20/2018    ALKPHOS 125 04/20/2018    BILITOT 14.8 (H) 04/20/2018     Lab Results   Component Value Date    .6 (H) 04/09/2018    CALCIUM 10.6 (H) 04/20/2018    CAION 0.86 (L) 04/10/2018    PHOS 2.7 04/20/2018         Significant Imaging: reviewed     Assessment/Plan:     VIKRAM (acute kidney injury)    1. VIKRAM :  VIKRAM from ATN from low BP few days ago ,  follow renal fn, on tube feeds,  UO dropped and Cr increased to  2.3 mg/dl today ,     May be volume depletion, will start gentle hydration,     2. Electrolyte abnormalities,  K, Mg was repleted, K loss due to diarrhea from C diff colitis ,     Corrected calcium about 12, reduced in tube feeds      3. Hypotension : cont pressor support .     4. Abnormal LFTs , s/p Cholecystostomy placement ,  ? shock liver , alcohic hepatitis, GI following ,      5. Meds reviewed,     6. Anemia : multifactorial, pRBC tx when indicated     7. Candida UTI : on abx     8. C diff colitis , on abx                 I will follow-up with patient. Please contact us if you have any additional questions.     Total time spent 40 minutes including time needed to review the records,  patient  evaluation, documentation, face-to-face discussion with the primary and CC teams, more than 50% of the time was spent on coordination of care and counseling.       Rafael Ferreira MD  Nephrology  [facility] -

## 2018-04-21 PROBLEM — K76.82 HEPATIC ENCEPHALOPATHY: Status: RESOLVED | Noted: 2018-01-01 | Resolved: 2018-01-01

## 2018-04-21 NOTE — ASSESSMENT & PLAN NOTE
04/07/2018- will closely monitor fever curve, will stop flagyl, change meropenem to zosyn,continue vancomycin for now.  Will deescalate soon.  Blood cultures -neg, sputum cultures-MRSA and pseudomonas   4/11/2018 - antibx adjusted today, per icu, ID consulted  04/13/18- possible due to acute cholecystectomy -s/p cholestomy tube placement   04/16/18- etiology of fever is unclear at this time, she was treated with vancomycin initially and was stopped due to worsening renal failure.  She remains on meropenem ,flagyl, diflucan.  With persistent fever , will follow repeat blood cultures and will discuss with critical care team about changing her lines.  Will do indium scan .  Her prognosis is guarded , will add 7 days of empiric zyvox if wbc continues to increase and fever persists .   04/18-wbc is 29, will add empiric zyvox, stop flagyl, add micafungin, follow sputum cultures   Prognosis guarded,     4/19- Etiology still unclear, could be Drug fever but WBC still rising--- Meropenem resumed  04/20- will send lactate acid, will continue empiric therapy with  Zyvox/meropenem ,micafungin, will closely monitor wbc   04/21- will stop meropenem and slowly deescalate therapy -continue micafungin and zyvox. Will continue C difficle therapy

## 2018-04-21 NOTE — ASSESSMENT & PLAN NOTE
Continue Vancomycin  04/15/18-persistent right upper lobe infiltrate  04/16/18- she was treated with vancomycin .Will follow repeat cbc and cultures.    04/17- treated with vanco/meropenem -completed 13 days of therapy    4/19- back of Merrem, IV Mycamine and oral and Rectal Vanco  04/20- Treated , will continue zyvox , stop meropenem

## 2018-04-21 NOTE — SUBJECTIVE & OBJECTIVE
Review of Systems   Unable to perform ROS: Patient nonverbal       Objective:     Vital Signs (Most Recent):  Temp: (!) 100.4 °F (38 °C) (04/21/18 0900)  Pulse: 102 (04/21/18 0900)  Resp: (!) 39 (04/21/18 0330)  BP: 124/72 (04/21/18 0900)  SpO2: 100 % (04/21/18 0900) Vital Signs (24h Range):  Temp:  [98.3 °F (36.8 °C)-102.7 °F (39.3 °C)] 100.4 °F (38 °C)  Pulse:  [] 102  Resp:  [35-39] 39  SpO2:  [90 %-100 %] 100 %  BP: ()/() 124/72     Weight: 122 kg (268 lb 15.4 oz)  Body mass index is 43.41 kg/m².      Intake/Output Summary (Last 24 hours) at 04/21/18 1000  Last data filed at 04/21/18 0902   Gross per 24 hour   Intake          2532.37 ml   Output             1879 ml   Net           653.37 ml       Physical Exam   Constitutional: She appears well-developed and well-nourished. She is easily aroused. She has a sickly appearance. She appears ill.   Obese young female on mechanical ventilation via trach   HENT:   Head: Atraumatic.   Nose:       Mouth/Throat: Oropharynx is clear and moist.   Eyes: Pupils are equal, round, and reactive to light. Scleral icterus is present.   Neck: Full passive range of motion without pain.       Obese    Cardiovascular: Regular rhythm.  Tachycardia present.  Exam reveals distant heart sounds.    Pulses:       Radial pulses are 2+ on the right side, and 2+ on the left side.        Dorsalis pedis pulses are 2+ on the right side, and 2+ on the left side.   Pulmonary/Chest: Effort normal. No tachypnea. No respiratory distress. She has decreased breath sounds.   Vent controlled resp effort   Abdominal: Soft. She exhibits no distension. Bowel sounds are decreased. There is hepatomegaly. There is no tenderness.       Morbidly obese   Genitourinary:   Genitourinary Comments: Rodriguez in place   Musculoskeletal: Normal range of motion. She exhibits edema (trace edema BLE).   +1 bilat tibial edema   Lymphadenopathy:     She has no cervical adenopathy.   Neurological: She is alert  and easily aroused.   Nods head to questions.  Follows commands inconsistently    Skin: Skin is warm and dry. Capillary refill takes less than 2 seconds. No cyanosis.            Vents:  Vent Mode: A/C (04/21/18 0850)  Ventilator Initiated: Yes (04/05/18 1930)  Set Rate: 18 bmp (04/21/18 0850)  Vt Set: 400 mL (04/21/18 0850)  Pressure Support: 0 cmH20 (04/21/18 0850)  PEEP/CPAP: 5 cmH20 (04/21/18 0850)  Oxygen Concentration (%): 35 (04/21/18 0900)  Peak Airway Pressure: 27 cmH2O (04/21/18 0850)  Plateau Pressure: 30 cmH20 (04/21/18 0850)  Total Ve: 13.8 mL (04/21/18 0850)  F/VT Ratio<105 (RSBI): 6500 (04/21/18 0330)    Lines/Drains/Airways     Peripherally Inserted Central Catheter Line                 PICC Double Lumen 04/14/18 1000 right brachial 7 days          Drain                 Rectal Tube 04/12/18 0710 rectal tube w/ balloon (indicate number of mLs) 9 days         Biliary Tube 04/13/18 1200 RLQ 7 days         Urethral Catheter 04/15/18 1250 Temperature probe 5 days         NG/OG Tube 04/16/18 1342 Other (comments) Right nostril 4 days          Airway                 Surgical Airway 04/11/18 1403 Shiley Cuffed;Long;Proximal 9 days                Significant Labs:    CBC/Anemia Profile:    Recent Labs  Lab 04/20/18  0439 04/21/18  0445   WBC 31.54* 31.43*   HGB 8.4* 8.0*   HCT 26.9* 25.4*    162   MCV 99* 98   RDW 22.6* 23.1*        Chemistries:    Recent Labs  Lab 04/20/18  0439 04/21/18  0445    140   K 3.6 3.1*    102   CO2 25 23   BUN 14 16   CREATININE 2.3* 2.4*   CALCIUM 10.6* 10.3   ALBUMIN 1.8* 1.7*   PROT 6.5 6.0   BILITOT 14.8* 13.8*   ALKPHOS 125 107   ALT 14 11   * 101*   MG  --  1.8   PHOS 2.7 2.6*       Coagulation:   Recent Labs  Lab 04/21/18  0445   INR 1.6*     All pertinent labs within the past 24 hours have been reviewed.    Significant Imaging:  CXR: I have reviewed all pertinent results/findings within the past 24 hours and my personal findings are:  no acute pulm  process noted

## 2018-04-21 NOTE — EICU
Patient at risk of self-harm due to pulling at lines and tubes while intubated.    · Will renew orders for soft bilateral wrist restraints to reduce the risk of self-harm.
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
## 2018-04-21 NOTE — ASSESSMENT & PLAN NOTE
Initial rt infiltrate from admission resolved on chest film however imdium scan indicates continued high pulmonary activity  Last culture only positive for c albicans; changed diflucan to mycamine 4/18 for improved pulmonary coverage  Cont tracheal suctioning and VAP prophylaxis  ID following managing Antbs

## 2018-04-21 NOTE — PROGRESS NOTES
Trach care done. Site cleaned. Inner cannula changed. Drain sponge placed under flange. Trach is secured and intact. She has a 7XL shiley in place. Extra trach and obturator at bedside. Ambubag and mask at bedside as well.

## 2018-04-21 NOTE — PROCEDURES
Rafael Duron  Female, 22 y.o., 1995      MRN:   [id number]  CSN:   [id number]    Indication: Right lung infiltrate.  Persistent fever.  Sepsis.  Scope used from ICU.  Sedation 4 mg IV Versed.  The patient has a tracheostomy, she was connected to the ventilator, FiO2 100% during the procedure was applied.  Scope was inserted through the tracheostomy, normal trachea noted, sharp jessy seen, mild amount of yellowish secretion was noted in bilateral endobronchial trees. Right lungwas performed.  Right middle lobe BAL was performed.  120 cc of saline were instilled.  20-40 cc returned . Patient tolerated the procedure well.  Specimen sent for microbiology, cell count and cytology  No blood loss.  Recommendation:  follow-up on test results.    Performing provider:    Desmond Moore M.D.  4.20.2018

## 2018-04-21 NOTE — ASSESSMENT & PLAN NOTE
ID following  Continued fevers and Leukocytosis  On abx day 17 and c diff management day 11  New cultures 4/13 with only candida (sputum and urine)  Weaned off Levophed this AM    Septic source not clear -- cont Vanc MD/NG, Zyvox, Merrem and Micafungin per ID

## 2018-04-21 NOTE — ASSESSMENT & PLAN NOTE
S/p cholecystectomy tube-supportive care  04/21- will start antibiotic descalation .  Will stop meropenem.

## 2018-04-21 NOTE — PROGRESS NOTES
[facility] -   Nephrology  Progress Note    Patient Name: Rafael Duron  MRN: [id number]  Admission Date: 4/3/2018  Hospital Length of Stay: 18 days  Attending Provider: Ed Ram MD   Primary Care Physician: Herman Cowart MD  Principal Problem:VIKRAM (acute kidney injury)    Subjective:     HPI: 23 yo obese female with HTN, gestational DM  who presented to [facility] ED in [place], [place] on 4/2 with complaint of SOB and cough with known pregnancy. Workup revealed fetal demise (estimated at 22 weeks) and patient passed dead fetus but retained placenta. Placenta remnants were removed in OR vis D & C.  After admission to ICU she had seizure activity with , K+ 1.9 and Bicarb 11.  She also had EtOH level of 268.  She required intubation for airway protection per records. She as transferred to [facility] ICU on 4/3/18 for higher level of care.    Patient presented with septic shock at [facility] and was started on IV pressors and IV antibiotics. Patient was initially stabilized and extubated on 4/5/18. OB/GYN following. She developed syncopal episode on 4/5/18 associated with bradycardia. She was successfully resuscitated with IV fluids and levophed. She subsequently deteriorated and was re-intubated on 4/5/18. Abdominal US revealed massive hepatomegaly with liver span of 33 cm. Patient's condition improved and she was extubated again on 4/7/18. Patient coded on 4/8/18 and was successfully resuscitated and again intubated. Patient was also diagnosed with C. Diff colitis and MRSA bacteremia.   Nephrology was consulted to help with patient's electrolyte care, renal care and volume management. I saw and examined patient in her hospital room. Patient is intubated and not able to provide any additional history.   Patient remains on antibiotics and pressor support. Chart review revealed that hyponatremia and hypokalemia have now resolved. However, hypocalcemia has persisted. In addition,  patient's renal function has worsened during current admission and creatinine has increased from 0.8 on 4/6/18 to 1.6 on 4/9/18. Volume review showed positive I/O balance with + 24 liters since admission,. Patient's UOP has been fluctuating with 1 to 3 liters of urine per day. Current UOP about 100 cc/hour.       Interval History:  No acute events     Review of patient's allergies indicates:  No Known Allergies  Current Facility-Administered Medications   Medication Frequency    0.9%  NaCl infusion Continuous    albuterol-ipratropium 2.5mg-0.5mg/3mL nebulizer solution 3 mL Q4H PRN    bisacodyl suppository 10 mg Daily PRN    dextrose 50% injection 12.5 g PRN    famotidine (PF) injection 20 mg Daily    fentaNYL injection 50 mcg Q2H PRN    folic acid-vit B6-vit B12 2.5-25-2 mg tablet 1 tablet Daily    glucagon (human recombinant) injection 1 mg PRN    heparin (porcine) injection 5,000 Units Q8H    insulin aspart U-100 pen 1-10 Units Q6H PRN    lactulose 20 gram/30 mL solution Soln 30 g Daily    linezolid 600mg/300ml IVPB 600 mg Q12H    lorazepam (ATIVAN) injection 2 mg Q2H PRN    multivitamin tablet 1 tablet Daily    norepinephrine 32 mg in dextrose 5 % 250 mL infusion Continuous    ondansetron injection 4 mg Q8H PRN    pneumoc 13-bobby conj-dip cr(PF) 0.5 mL vaccine x 1 dose    rifAXIMin tablet 550 mg BID    sodium chloride 0.9% flush 5 mL PRN    thiamine tablet 100 mg Daily    vancomycin (VANCOCIN) 500 mg in sodium chloride 0.9% 100 mL enema Q6H    vancomycin 250mg / 10ml oral suspension 250 mg Q6H    vitamin D 1000 units tablet 5,000 Units Daily       Objective:     Vital Signs (Most Recent):  Temp: (!) 100.8 °F (38.2 °C) (04/21/18 1100)  Pulse: 105 (04/21/18 1130)  Resp: (!) 42 (04/21/18 1113)  BP: 110/63 (04/21/18 1130)  SpO2: 100 % (04/21/18 1130)  O2 Device (Oxygen Therapy): ventilator (04/21/18 1113) Vital Signs (24h Range):  Temp:  [98.4 °F (36.9 °C)-102.7 °F (39.3 °C)] 100.8 °F (38.2  °C)  Pulse:  [] 105  Resp:  [35-42] 42  SpO2:  [90 %-100 %] 100 %  BP: ()/() 110/63     Weight: 122 kg (268 lb 15.4 oz) (04/19/18 0545)  Body mass index is 43.41 kg/m².  Body surface area is 2.38 meters squared.    I/O last 3 completed shifts:  In: 4113.7 [I.V.:953.7; NG/GT:2160; IV Piggyback:1000]  Out: 2845 [Urine:595; Drains:750; Stool:1500]    Physical Exam   Constitutional: She appears well-developed. No distress.   Morbidly obese    HENT:   Head: Normocephalic and atraumatic.   Mouth/Throat: Oropharynx is clear and moist. No oropharyngeal exudate.   Eyes: Conjunctivae and EOM are normal. Pupils are equal, round, and reactive to light.   Neck: Neck supple. No JVD present. Carotid bruit is not present. No tracheal deviation present. No thyroid mass and no thyromegaly present.   Trach in place    Cardiovascular: Normal rate, regular rhythm, normal heart sounds and intact distal pulses.  Exam reveals no gallop and no friction rub.    No murmur heard.  Pulmonary/Chest: No respiratory distress. She has no wheezes. She has rales. She exhibits no tenderness.   Abdominal: Soft. Bowel sounds are normal. She exhibits no distension, no abdominal bruit, no ascites and no mass. There is no hepatosplenomegaly. There is no tenderness. There is no rebound, no guarding and no CVA tenderness.   Cholecystostomy in place    Musculoskeletal: She exhibits edema. She exhibits no tenderness.   Dependant edema    Lymphadenopathy:     She has no cervical adenopathy.   Neurological: She has normal reflexes. She displays normal reflexes. No cranial nerve deficit. She exhibits normal muscle tone. Coordination normal.   Skin: Skin is warm and intact. No rash noted. No erythema. No pallor.       Significant Labs:    Recent Labs  Lab 04/21/18  0445   WBC 31.43*   RBC 2.60*   HGB 8.0*   HCT 25.4*      MCV 98   MCH 30.8   MCHC 31.5*     CMP:     Recent Labs  Lab 04/21/18  0445   *   CALCIUM 10.3   ALBUMIN 1.7*    PROT 6.0      K 3.1*   CO2 23      BUN 16   CREATININE 2.4*   ALKPHOS 107   ALT 11   *   BILITOT 13.8*     Coagulation:     Recent Labs  Lab 04/21/18  0445   INR 1.6*     LFTs:     Recent Labs  Lab 04/21/18  0445   ALT 11   *   ALKPHOS 107   BILITOT 13.8*   PROT 6.0   ALBUMIN 1.7*     All labs within the past 24 hours have been reviewed.     Lab Results   Component Value Date    ALT 11 04/21/2018     (H) 04/21/2018    ALKPHOS 107 04/21/2018    BILITOT 13.8 (H) 04/21/2018     Lab Results   Component Value Date    .6 (H) 04/09/2018    CALCIUM 10.3 04/21/2018    CAION 0.86 (L) 04/10/2018    PHOS 2.6 (L) 04/21/2018         Significant Imaging: reviewed     Assessment/Plan:     * VIKRAM (acute kidney injury)    1. VIKRAM :  VIKRAM from ATN from low BP few days ago ,  follow renal fn, on tube feeds,  UO dropped and Cr increased to  2.3 mg/dl today , cont gentle hydration , no indication for RRT,      2. Electrolyte abnormalities,  K, Mg was repleted, K loss due to diarrhea from C diff colitis ,     Corrected calcium about 12, reduced in tube feeds      3. Hypotension : cont pressor support . BP improving     4. Abnormal LFTs , s/p Cholecystostomy placement ,  ? shock liver , alcohic hepatitis , Bili better today     5. Meds reviewed,     6. Anemia : multifactorial, pRBC tx when indicated     7. Candida UTI : on abx     8. C diff colitis , on abx                  I will follow-up with patient. Please contact us if you have any additional questions.     Total time spent 40 minutes including time needed to review the records,  patient  evaluation, documentation, face-to-face discussion with the primary and CC teams, more than 50% of the time was spent on coordination of care and counseling.       Rafael Ferreira MD  Nephrology  [facility] -

## 2018-04-21 NOTE — SUBJECTIVE & OBJECTIVE
Interval History:  No acute events     Review of patient's allergies indicates:  No Known Allergies  Current Facility-Administered Medications   Medication Frequency    0.9%  NaCl infusion Continuous    albuterol-ipratropium 2.5mg-0.5mg/3mL nebulizer solution 3 mL Q4H PRN    bisacodyl suppository 10 mg Daily PRN    dextrose 50% injection 12.5 g PRN    famotidine (PF) injection 20 mg Daily    fentaNYL injection 50 mcg Q2H PRN    folic acid-vit B6-vit B12 2.5-25-2 mg tablet 1 tablet Daily    glucagon (human recombinant) injection 1 mg PRN    heparin (porcine) injection 5,000 Units Q8H    insulin aspart U-100 pen 1-10 Units Q6H PRN    lactulose 20 gram/30 mL solution Soln 30 g Daily    linezolid 600mg/300ml IVPB 600 mg Q12H    lorazepam (ATIVAN) injection 2 mg Q2H PRN    multivitamin tablet 1 tablet Daily    norepinephrine 32 mg in dextrose 5 % 250 mL infusion Continuous    ondansetron injection 4 mg Q8H PRN    pneumoc 13-bobby conj-dip cr(PF) 0.5 mL vaccine x 1 dose    rifAXIMin tablet 550 mg BID    sodium chloride 0.9% flush 5 mL PRN    thiamine tablet 100 mg Daily    vancomycin (VANCOCIN) 500 mg in sodium chloride 0.9% 100 mL enema Q6H    vancomycin 250mg / 10ml oral suspension 250 mg Q6H    vitamin D 1000 units tablet 5,000 Units Daily       Objective:     Vital Signs (Most Recent):  Temp: (!) 100.8 °F (38.2 °C) (04/21/18 1100)  Pulse: 105 (04/21/18 1130)  Resp: (!) 42 (04/21/18 1113)  BP: 110/63 (04/21/18 1130)  SpO2: 100 % (04/21/18 1130)  O2 Device (Oxygen Therapy): ventilator (04/21/18 1113) Vital Signs (24h Range):  Temp:  [98.4 °F (36.9 °C)-102.7 °F (39.3 °C)] 100.8 °F (38.2 °C)  Pulse:  [] 105  Resp:  [35-42] 42  SpO2:  [90 %-100 %] 100 %  BP: ()/() 110/63     Weight: 122 kg (268 lb 15.4 oz) (04/19/18 0545)  Body mass index is 43.41 kg/m².  Body surface area is 2.38 meters squared.    I/O last 3 completed shifts:  In: 4113.7 [I.V.:953.7; NG/GT:2160; IV  Piggyback:1000]  Out: 2845 [Urine:595; Drains:750; Stool:1500]    Physical Exam   Constitutional: She appears well-developed. No distress.   Morbidly obese    HENT:   Head: Normocephalic and atraumatic.   Mouth/Throat: Oropharynx is clear and moist. No oropharyngeal exudate.   Eyes: Conjunctivae and EOM are normal. Pupils are equal, round, and reactive to light.   Neck: Neck supple. No JVD present. Carotid bruit is not present. No tracheal deviation present. No thyroid mass and no thyromegaly present.   Trach in place    Cardiovascular: Normal rate, regular rhythm, normal heart sounds and intact distal pulses.  Exam reveals no gallop and no friction rub.    No murmur heard.  Pulmonary/Chest: No respiratory distress. She has no wheezes. She has rales. She exhibits no tenderness.   Abdominal: Soft. Bowel sounds are normal. She exhibits no distension, no abdominal bruit, no ascites and no mass. There is no hepatosplenomegaly. There is no tenderness. There is no rebound, no guarding and no CVA tenderness.   Cholecystostomy in place    Musculoskeletal: She exhibits edema. She exhibits no tenderness.   Dependant edema    Lymphadenopathy:     She has no cervical adenopathy.   Neurological: She has normal reflexes. She displays normal reflexes. No cranial nerve deficit. She exhibits normal muscle tone. Coordination normal.   Skin: Skin is warm and intact. No rash noted. No erythema. No pallor.       Significant Labs:    Recent Labs  Lab 04/21/18 0445   WBC 31.43*   RBC 2.60*   HGB 8.0*   HCT 25.4*      MCV 98   MCH 30.8   MCHC 31.5*     CMP:     Recent Labs  Lab 04/21/18 0445   *   CALCIUM 10.3   ALBUMIN 1.7*   PROT 6.0      K 3.1*   CO2 23      BUN 16   CREATININE 2.4*   ALKPHOS 107   ALT 11   *   BILITOT 13.8*     Coagulation:     Recent Labs  Lab 04/21/18 0445   INR 1.6*     LFTs:     Recent Labs  Lab 04/21/18 0445   ALT 11   *   ALKPHOS 107   BILITOT 13.8*   PROT 6.0   ALBUMIN  1.7*     All labs within the past 24 hours have been reviewed.     Lab Results   Component Value Date    ALT 11 04/21/2018     (H) 04/21/2018    ALKPHOS 107 04/21/2018    BILITOT 13.8 (H) 04/21/2018     Lab Results   Component Value Date    .6 (H) 04/09/2018    CALCIUM 10.3 04/21/2018    CAION 0.86 (L) 04/10/2018    PHOS 2.6 (L) 04/21/2018         Significant Imaging: reviewed

## 2018-04-21 NOTE — ASSESSMENT & PLAN NOTE
Re intubated for third time 4/8 s/p asystolic arrest.    Vent settings reviewed and appropriate; FIO2 to keep SAO2 > 92%.   4/11 tracheostomy placed  On SBT again today RR varies from 17 to 46 but no resp distress only tachypnea

## 2018-04-21 NOTE — ASSESSMENT & PLAN NOTE
Currently intubated.  No acute infiltrates seen on CXR  Continue IV antibiotics empirically.  Pulmonary consult.    04/07/2018- will wean off ventilator as tolerated.  She is more awake and alert today.  Sputum cultures showed MRSA and pseudomonas-she is on vanco/meropenem-will deescalate soon.       04/08/2018- she is now intubated after asystolic cardiac arrest .Will wean off as tolerated.  04/09/2018- she remains intubated ,critical care follow up .wean off ventilator as tolerated.  4/10/2018 - likely secondary to MRSA and pseudamonal pneumonia. ID consulted, will consider double covering for pseudomonas.  04/12/18- continue Vent support  04/15/18- Trach collar in place  04/16- will continue trach collar and wean as tolerated.  04/17/18- continue trachea care/weaning   04/18-continue trach care ,wean as tolerated   4/19- continue present care  04/20-wean off ventilator as tolerated.  04/21- continue trach care /pulmonology follow up

## 2018-04-21 NOTE — ASSESSMENT & PLAN NOTE
1. VIKRAM :  VIKRAM from ATN from low BP few days ago ,  follow renal fn, on tube feeds,  UO dropped and Cr increased to  2.3 mg/dl today , cont gentle hydration , no indication for RRT,      2. Electrolyte abnormalities,  K, Mg was repleted, K loss due to diarrhea from C diff colitis ,     Corrected calcium about 12, reduced in tube feeds      3. Hypotension : cont pressor support . BP improving     4. Abnormal LFTs , s/p Cholecystostomy placement ,  ? shock liver , alcohic hepatitis , Bili better today     5. Meds reviewed,     6. Anemia : multifactorial, pRBC tx when indicated     7. Candida UTI : on abx     8. C diff colitis , on abx

## 2018-04-21 NOTE — SUBJECTIVE & OBJECTIVE
Interval History: 04/21- she remains febrile . Today is day 19 of total antimicrobial therapy .S he is now off vasopressor support      Review of Systems   Unable to perform ROS: Acuity of condition     Objective:     Vital Signs (Most Recent):  Temp: 98.2 °F (36.8 °C) (04/21/18 1700)  Pulse: 96 (04/21/18 1715)  Resp: (!) 42 (04/21/18 1255)  BP: (!) 108/51 (04/21/18 1715)  SpO2: 97 % (04/21/18 1715) Vital Signs (24h Range):  Temp:  [98.2 °F (36.8 °C)-102.7 °F (39.3 °C)] 98.2 °F (36.8 °C)  Pulse:  [] 96  Resp:  [35-42] 42  SpO2:  [95 %-100 %] 97 %  BP: ()/() 108/51     Weight: 122 kg (268 lb 15.4 oz)  Body mass index is 43.41 kg/m².    Intake/Output Summary (Last 24 hours) at 04/21/18 1800  Last data filed at 04/21/18 1700   Gross per 24 hour   Intake           2896.9 ml   Output             1304 ml   Net           1592.9 ml      Physical Exam   Constitutional: She appears well-developed. No distress.   Morbidly obese    HENT:   Head: Normocephalic and atraumatic.   Mouth/Throat: Oropharynx is clear and moist. No oropharyngeal exudate.   Eyes: EOM are normal. Pupils are equal, round, and reactive to light. Scleral icterus is present.   Neck: Neck supple. No JVD present. Carotid bruit is not present. No tracheal deviation present. No thyroid mass and no thyromegaly present.   Trach in place    Cardiovascular: Normal rate, regular rhythm, normal heart sounds and intact distal pulses.  Exam reveals no gallop and no friction rub.    No murmur heard.  Pulmonary/Chest: No respiratory distress. She has no wheezes. She has rales. She exhibits no tenderness.   Abdominal: Soft. Bowel sounds are normal. She exhibits no distension, no abdominal bruit, no ascites and no mass. There is no hepatosplenomegaly. There is no tenderness. There is no rebound, no guarding and no CVA tenderness.   Cholecystostomy in place    Musculoskeletal: She exhibits edema. She exhibits no tenderness.   Dependant edema     Lymphadenopathy:     She has no cervical adenopathy.   Neurological: She has normal reflexes. She displays normal reflexes. No cranial nerve deficit. She exhibits normal muscle tone. Coordination normal.   Skin: Skin is warm and intact. No rash noted. No erythema. No pallor.   Along the flanks-areas of erythema.edema noted    Nursing note and vitals reviewed.      Significant Labs:   Bilirubin:   Recent Labs  Lab 04/17/18  0415 04/18/18  0355 04/19/18  0405 04/20/18  0439 04/21/18  0445   BILITOT 11.7* 13.2* 14.2* 14.8* 13.8*     Blood Culture: No results for input(s): LABBLOO in the last 48 hours.  Urine Studies: No results for input(s): COLORU, APPEARANCEUA, PHUR, SPECGRAV, PROTEINUA, GLUCUA, KETONESU, BILIRUBINUA, OCCULTUA, NITRITE, UROBILINOGEN, LEUKOCYTESUR, RBCUA, WBCUA, BACTERIA, SQUAMEPITHEL, HYALINECASTS in the last 48 hours.    Invalid input(s): WRIGHTSUR  All pertinent labs within the past 24 hours have been reviewed.    Significant Imaging: I have reviewed and interpreted all pertinent imaging results/findings within the past 24 hours.

## 2018-04-21 NOTE — PROGRESS NOTES
[facility]  Critical Care Medicine  Progress Note    Patient Name: Rafael Duron  MRN: [id number]  Admission Date: 4/3/2018  Hospital Length of Stay: 18 days  Code Status: Full Code  Attending Provider: Ed Ram MD  Primary Care Provider: Herman Cowart MD   Principal Problem: VIKRAM (acute kidney injury)    Subjective:     HPI:  Ms Duron is a 21 yo obese BF with a PMH of HTN, gestational DM and HTN who presented to [facility] ED in [place], [place] on  with complaint of SOB and cough with known pregnancy.  OB US revealed no heart tones and she is also  with second trimester fetal demise estimated 22 week for which she passed with retained placenta.  After admission to ICU she had seizure activity with , K+ 1.9 and Bicarb 11.  She also had etoh level 268 and reportedly had been drinking etoh w/ honey heavily for several days.  She required intubation for airway protection per records and OB was unable to remove placenta manually and patient had to be taken to OR.  She received 2 liters IVF and Hgb dropped to 7.4 and was transfused 2 units PRBCs.  Vaginal bleeding was scant per records.  Yesterday she had temp 101.5 Ax, .  She as transferred to [facility] ICU yesterday evening for higher level of care.        Hospital/ICU Course:   - This AM she is sedated on vent on Levophed infusion spiking temp 101.9 with WBC 20, LA 6.3, UA+, electrolyte imbalance and Glucose 268     - SAT and SBT done this am, pt awake and following commands. She was successfully extubated to nasal cannula. Remains tachycardic with HR 140s and febrile with temp 103 overnight. WBC 18 and lactic acidosis improving to 4.8. Continuing to aggressively replace electrolytes.     - Over night patient had episode with bradycardia and hypotension during which she became unresponsive and agonally breathing. She responded with IVF and bicarb and was restarted on pressors. She experienced a  similar episode again last night, during which she was intubated. Vent settings were reviewed and adjusted this am. No more bradycardic/hypotensive episodes since last night. Remains on pressors. Leukocystosis unimproved with worsening bandemia. Urine and blood cx NGTD. Sputum cx growing staph and pseudomonas however CXR this am is unremarkable.    04/7 - Tolerating SAT and SBT. Meets extubating criteria. Acidosis improving. Leukocytosis improving. Remains on bicarb gtt.     04/8 - Extubated successfully yesterday. Asystolic arrest this AM.  ACLS initiated. Re - intubated 2 rounds CPR with Iv epinephrine X 1  and IV CaCl X 1 given. ROSC attained.   A - line placed.      4/9 - now with mild vaginal bleeding and severe anasarca with blistering.  Still on vent with sedation and Levophed/Vasopressin infusing.  Worsening UOP and creatine.  Spoke with brother at bedside in detail and all questions answered.   4/10 - Opens eyes alert. Mild increase in Cr -. Intravascular volume depletion. Will add albumin. Still with vagnal bleeding  4/11 - low grade temp, Slight worsening of CR. Good urine output; trach placed today, remains on mechanical vent support and low dose pressor  4/12 - remains on mechanical ventilation via trach; HIDA scan abnormal with no gall bladder filling  4/13 - external gall bladder drain placed by IR; remains on mechanical ventilation via Trach  4/14 - significant draining from gall bladder along with significant decline in leukocytosis although continued fevers last 24 hours; remains on mechanical ventilation via trach; intermittent hypotension overnight and creatinine bump this am  4/15 - continued high drainage from gall bladder; continued fevers 102+; remains on mechanical ventilation via trach, failed SBT with tachypnea, hypoventilation  4/16 continued mechanical ventilation via trach; fail SBT with tachypnea, hypoventilation; continued fever, controlling with external cooling blanket (24hr max  103.2); increased leukocytosis; procalcitonin continues rising (now 11.6); INR, bili continue rising; biliary drain with continued 500-600ml/day output  4/17 - remains vent dependent with continued pressor requirement, fevers, increasing leukocytosis; slight decline INR, Tbili, and procal (all remain elevated); CT abd last evening with mild pancolitis not consistent with severity of ongoing illness  4/18 remains on mechanical ventilation via trach on pressor support with continued fevers and wbc rising (now 29,000) along with worsening t bili; some decline in INR and procalcitonin today; tagged wbc scan yesterday concerning for lungs as septic source  4/19 - still on mech vent and Levophed infusion.  Yumiko TF.  Still spiking temp and WBC increasing.    4/20 - awake on vent still on low dose Levophed infusion.  Yumiko TF.  Tolerating SBT  4/21 - Yumiko SBT yesterday and tolerating TF.  Worsening UOP and creatine.  Off Levophed at 0700 hr this AM.  Awakens and nods head to questions    Review of Systems   Unable to perform ROS: Patient nonverbal       Objective:     Vital Signs (Most Recent):  Temp: (!) 100.4 °F (38 °C) (04/21/18 0900)  Pulse: 102 (04/21/18 0900)  Resp: (!) 39 (04/21/18 0330)  BP: 124/72 (04/21/18 0900)  SpO2: 100 % (04/21/18 0900) Vital Signs (24h Range):  Temp:  [98.3 °F (36.8 °C)-102.7 °F (39.3 °C)] 100.4 °F (38 °C)  Pulse:  [] 102  Resp:  [35-39] 39  SpO2:  [90 %-100 %] 100 %  BP: ()/() 124/72     Weight: 122 kg (268 lb 15.4 oz)  Body mass index is 43.41 kg/m².      Intake/Output Summary (Last 24 hours) at 04/21/18 1000  Last data filed at 04/21/18 0902   Gross per 24 hour   Intake          2532.37 ml   Output             1879 ml   Net           653.37 ml       Physical Exam   Constitutional: She appears well-developed and well-nourished. She is easily aroused. She has a sickly appearance. She appears ill.   Obese young female on mechanical ventilation via trach   HENT:   Head:  Atraumatic.   Nose:       Mouth/Throat: Oropharynx is clear and moist.   Eyes: Pupils are equal, round, and reactive to light. Scleral icterus is present.   Neck: Full passive range of motion without pain.       Obese    Cardiovascular: Regular rhythm.  Tachycardia present.  Exam reveals distant heart sounds.    Pulses:       Radial pulses are 2+ on the right side, and 2+ on the left side.        Dorsalis pedis pulses are 2+ on the right side, and 2+ on the left side.   Pulmonary/Chest: Effort normal. No tachypnea. No respiratory distress. She has decreased breath sounds.   Vent controlled resp effort   Abdominal: Soft. She exhibits no distension. Bowel sounds are decreased. There is hepatomegaly. There is no tenderness.       Morbidly obese   Genitourinary:   Genitourinary Comments: Rodriguez in place   Musculoskeletal: Normal range of motion. She exhibits edema (trace edema BLE).   +1 bilat tibial edema   Lymphadenopathy:     She has no cervical adenopathy.   Neurological: She is alert and easily aroused.   Nods head to questions.  Follows commands inconsistently    Skin: Skin is warm and dry. Capillary refill takes less than 2 seconds. No cyanosis.            Vents:  Vent Mode: A/C (04/21/18 0850)  Ventilator Initiated: Yes (04/05/18 1930)  Set Rate: 18 bmp (04/21/18 0850)  Vt Set: 400 mL (04/21/18 0850)  Pressure Support: 0 cmH20 (04/21/18 0850)  PEEP/CPAP: 5 cmH20 (04/21/18 0850)  Oxygen Concentration (%): 35 (04/21/18 0900)  Peak Airway Pressure: 27 cmH2O (04/21/18 0850)  Plateau Pressure: 30 cmH20 (04/21/18 0850)  Total Ve: 13.8 mL (04/21/18 0850)  F/VT Ratio<105 (RSBI): 6500 (04/21/18 0330)    Lines/Drains/Airways     Peripherally Inserted Central Catheter Line                 PICC Double Lumen 04/14/18 1000 right brachial 7 days          Drain                 Rectal Tube 04/12/18 0710 rectal tube w/ balloon (indicate number of mLs) 9 days         Biliary Tube 04/13/18 1200 RLQ 7 days         Urethral Catheter  04/15/18 1250 Temperature probe 5 days         NG/OG Tube 04/16/18 1342 Other (comments) Right nostril 4 days          Airway                 Surgical Airway 04/11/18 1403 Shiley Cuffed;Long;Proximal 9 days                Significant Labs:    CBC/Anemia Profile:    Recent Labs  Lab 04/20/18  0439 04/21/18  0445   WBC 31.54* 31.43*   HGB 8.4* 8.0*   HCT 26.9* 25.4*    162   MCV 99* 98   RDW 22.6* 23.1*        Chemistries:    Recent Labs  Lab 04/20/18  0439 04/21/18  0445    140   K 3.6 3.1*    102   CO2 25 23   BUN 14 16   CREATININE 2.3* 2.4*   CALCIUM 10.6* 10.3   ALBUMIN 1.8* 1.7*   PROT 6.5 6.0   BILITOT 14.8* 13.8*   ALKPHOS 125 107   ALT 14 11   * 101*   MG  --  1.8   PHOS 2.7 2.6*       Coagulation:   Recent Labs  Lab 04/21/18  0445   INR 1.6*     All pertinent labs within the past 24 hours have been reviewed.    Significant Imaging:  CXR: I have reviewed all pertinent results/findings within the past 24 hours and my personal findings are:  no acute pulm process noted      Assessment/Plan:     Pulmonary   Pneumonia of right lower lobe due to methicillin resistant Staphylococcus aureus (MRSA)    Initial rt infiltrate from admission resolved on chest film however imdium scan indicates continued high pulmonary activity  Last culture only positive for c albicans; changed diflucan to mycamine 4/18 for improved pulmonary coverage  Cont tracheal suctioning and VAP prophylaxis  ID following managing Antbs        Acute respiratory failure with hypoxia and hypercapnia    Re intubated for third time 4/8 s/p asystolic arrest.    Vent settings reviewed and appropriate; FIO2 to keep SAO2 > 92%.   4/11 tracheostomy placed  On SBT again today RR varies from 17 to 46 but no resp distress only tachypnea        Cardiac/Vascular    .        Cardiopulmonary arrest    Cont supportive care and ICU cardiac monitoring        Pure hyperglyceridemia    Cont SSI        Renal/    .        * VIKRAM (acute kidney  injury)    Nephrology following  Worsening oliguria and creatine  On IVFs        Candida UTI    + candida tropicalis  Micafungin Day # 4 of 5  ID following        Electrolyte imbalance    Replace potassium and mag   Monitor daily electroytes        ID   Clostridium difficile infection    On LA and NG Vanc  ID following        Septic shock    ID following  Continued fevers and Leukocytosis  On abx day 17 and c diff management day 11  New cultures 4/13 with only candida (sputum and urine)  Weaned off Levophed this AM    Septic source not clear -- cont Vanc LA/NG, Zyvox, Merrem and Micafungin per ID        Hematology    .        Oncology   Acute blood loss anemia    No acute transfusion indication and no active bleeding  Monitor daily CBC and cont folic acid        GI   Hepatomegaly    GI following        Acalculous cholecystitis    Has GB drain at this time  Cont IVAB per ID        Alcoholic hepatitis with ascites    GI following: At this time patient would not be a liver transplant candidate. One it is still unclear if her liver is significantly responsible for her presentation and second there is still concern for uncontroleld infection in a patient intubated on vasopressors. Given she has been critical but stable if we are going to better sort out her clinical situation giving FFP and proceeding with a transjugular liver biopsy is an option.  Bili continues upward trend        Preventive Measures and Monitoring:   Stress Ulcer: Pepcid  Nutrition: TF  Glucose control: SSI  Bowel prophylaxis: Lactulose  DVT prophylaxis: LMWH/SCDs  Hx CAD on B-Blocker: no hx CAD  Head of Bed/Reposition: Elevate HOB and turn Q1-2 hours   Early Mobility: Bed rest  SBT: daily  Trach Day: #10  GB drain Day: #8  NG Day: #5  Central Line RUE PICC Day: #7  Rodriguez Day: #6  IVAB Day:  #18  Code Status: Full     Counseling/Consultation:I have discussed the care of this patient in detail with the bedside nursing staff and Dr. Whitman and   Nnadi    Critical Care Time: 54 minutes  Critical secondary to Patient has a condition that poses threat to life and bodily function: Acute Renal Failure and Resp Failure on mechanical ventilation      Critical care was time spent personally by me on the following activities: development of treatment plan with patient or surrogate and bedside caregivers, discussions with consultants, evaluation of patient's response to treatment, examination of patient, ordering and performing treatments and interventions, ordering and review of laboratory studies, ordering and review of radiographic studies, pulse oximetry, re-evaluation of patient's condition. This critical care time did not overlap with that of any other provider or involve time for any procedures.     Ed Lowe NP  Critical Care Medicine  [facility]

## 2018-04-21 NOTE — PLAN OF CARE
Problem: Patient Care Overview  Goal: Plan of Care Review  Outcome: Ongoing (interventions implemented as appropriate)  Patient remains on levophed and IVF this a.m.  See I/o for out put of drain, fecal tube, and aiken.  Patient on specialty bed, it has repositioned her every thirty minutes during the night.  Patient nodding appropriately to questions this a.m.  She continues to be on contact precautions.  See MAR for medication doses and times of administration.

## 2018-04-21 NOTE — ASSESSMENT & PLAN NOTE
04/09/2018-will use PO vancomycin 250mg every 8 hours for 10 days .  Due to her multiple electrolyte abnormalities, there is concern for ileus ,will continue Flagyl for now and adjust therapy as needed   .  04/16/2018- will continue PO vanco/flagyl -wbc remains very high at 25, -   Will plan to repeat imaging if leucocytosis persists .    04/17/18- the CT scan of the abdomen showed pancolitis likely related to C difficle -will continue po vancomycin and flagyl ,.  04/18- on flagyl since admit yet has marked leucocytosis .,also on vancomycin PO , will add rectal vanco enema , stop IV Flagyl.    4/19- persists- on oral and Rectal Vanco  04/20- will continue oral and rectal  vanco for now. She has been on therapy for c difficle with vanco since 04/09 04/21-wbc remains high at 31, will stop meropenem, continue PO and vanco enema

## 2018-04-21 NOTE — ASSESSMENT & PLAN NOTE
Source likely fetal demise of unknown duration and retained products of concepttion placenta, removed surgically at outside hospital.  Continue IV fluids.  Follow up on blood and urine cultures.  Lactic acid improved to 5 from 7.  OB Gyn consult - Dr. Cardona.    04/08/2018- will continue vancomycin/zosyn ,follow repeat blood cultures .  Lactic acid is more than 12.  I called [facility] and discussed with the lab about her cultures-blood cultures done on 04/03-neg but urine culture -was positive for klebsiella -she is faxing the results. She will call [facility] to get the results.     04/09/2018-continue vasopressor support , on vanco,zosyn and  will deescalate tomorrow and follow CBc closely.  4/10/2018 - possible cause of liver failure and kidney failure, GI and nephro following. Urine output increased, will monitor.  04/15/18- persistent fever and leukocytosis , CDIFF positive   Repeat cultures -NGTD continue abx per ID  04/16/2018- will continue vasopressor support -will plan to image the chest if wbc is persistently increasing .Continue meropenem for now and will plan to adjust therapy in am     4/19- still requiring low dose Levophed  Consider Midodrine vs Florinef to wean her off Pressors  04/20- will wean vasopressors as tolerated .,   04/21- now off vasopressors.

## 2018-04-21 NOTE — PLAN OF CARE
Problem: Patient Care Overview  Goal: Plan of Care Review  Outcome: Ongoing (interventions implemented as appropriate)  Patient remains on ventilator at this time. #7 XL shiley in place and secure at this time. No redness or breakdown noted around stoma or trach ties, patient is oozing secretions around trach. Respiratory to follow.

## 2018-04-21 NOTE — ASSESSMENT & PLAN NOTE
GI following: At this time patient would not be a liver transplant candidate. One it is still unclear if her liver is significantly responsible for her presentation and second there is still concern for uncontroleld infection in a patient intubated on vasopressors. Given she has been critical but stable if we are going to better sort out her clinical situation giving FFP and proceeding with a transjugular liver biopsy is an option.  Bili continues upward trend

## 2018-04-21 NOTE — PLAN OF CARE
Problem: Patient Care Overview  Goal: Plan of Care Review  Outcome: Ongoing (interventions implemented as appropriate)  Pt continues to be on mechanical vent. Tolerated spontaneous most of the day today. She is back on previous settings to rest tonight.

## 2018-04-21 NOTE — PLAN OF CARE
Problem: Patient Care Overview  Goal: Plan of Care Review  Outcome: Ongoing (interventions implemented as appropriate)  Vitals signs closely monitored. Fall precautions in place. Patient turned every two hours. Pain assessed every two hours. Safety promoted. Infection risks reduced. Levo stopped. IV fluids stopped.

## 2018-04-22 NOTE — ASSESSMENT & PLAN NOTE
S/p cholecystectomy tube-supportive care  04/21- will start antibiotic descalation .  Will stop meropenem.    04/22- continue cholecystectomy tube,drianing well

## 2018-04-22 NOTE — EICU
eICU Note :    Called by the [facility]:    Problem: Renewal of restraints due to risk of self harm    Pertinent History and labs reviewed :23 y/o F on Mechanical ventilator s/p extubation and reintubation , currently on Mechanical vent with bilateral Pneumonia      Treatment /Intervention given:Renewals done        Claudia Yates M.D  eICU Physician

## 2018-04-22 NOTE — NURSING
PT self removed NGT. PT was in restraints, but slipped down in the bed making the restraints loose. TF were still being held. New NGT inserted

## 2018-04-22 NOTE — ASSESSMENT & PLAN NOTE
04/07/2018- will closely monitor fever curve, will stop flagyl, change meropenem to zosyn,continue vancomycin for now.  Will deescalate soon.  Blood cultures -neg, sputum cultures-MRSA and pseudomonas   4/11/2018 - antibx adjusted today, per icu, ID consulted  04/13/18- possible due to acute cholecystectomy -s/p cholestomy tube placement   04/16/18- etiology of fever is unclear at this time, she was treated with vancomycin initially and was stopped due to worsening renal failure.  She remains on meropenem ,flagyl, diflucan.  With persistent fever , will follow repeat blood cultures and will discuss with critical care team about changing her lines.  Will do indium scan .  Her prognosis is guarded , will add 7 days of empiric zyvox if wbc continues to increase and fever persists .   04/18-wbc is 29, will add empiric zyvox, stop flagyl, add micafungin, follow sputum cultures   Prognosis guarded,     4/19- Etiology still unclear, could be Drug fever but WBC still rising--- Meropenem resumed  04/20- will send lactate acid, will continue empiric therapy with  Zyvox/meropenem ,micafungin, will closely monitor wbc   04/21- will stop meropenem and slowly deescalate therapy -continue micafungin and zyvox. Will continue C difficle therapy   04/22- will continue to monitor fever curve and deescalate antimicrobial therapy , will limit therapy to C difficle

## 2018-04-22 NOTE — SUBJECTIVE & OBJECTIVE
Interval History: 04/22 - day 20 of total antimicrobial therapy , wbc is 26-on a downward trend , now off meropenem,serum creatinine is 2.5,she remains weak with intermittent fever .  Urine output is low -oliguria  Review of Systems   Unable to perform ROS: Acuity of condition     Objective:     Vital Signs (Most Recent):  Temp: 100 °F (37.8 °C) (04/22/18 0800)  Pulse: (!) 113 (04/22/18 0906)  Resp: (!) 39 (04/22/18 0906)  BP: 124/68 (04/22/18 0906)  SpO2: 100 % (04/22/18 0906) Vital Signs (24h Range):  Temp:  [98.2 °F (36.8 °C)-101 °F (38.3 °C)] 100 °F (37.8 °C)  Pulse:  [] 113  Resp:  [19-42] 39  SpO2:  [95 %-100 %] 100 %  BP: ()/(47-82) 124/68     Weight: 121 kg (266 lb 12.1 oz)  Body mass index is 43.06 kg/m².    Intake/Output Summary (Last 24 hours) at 04/22/18 0928  Last data filed at 04/22/18 0800   Gross per 24 hour   Intake             2980 ml   Output             1808 ml   Net             1172 ml      Physical Exam   Constitutional: She appears well-developed. No distress.   Morbidly obese    HENT:   Head: Normocephalic and atraumatic.   Mouth/Throat: Oropharynx is clear and moist. No oropharyngeal exudate.   Eyes: EOM are normal. Pupils are equal, round, and reactive to light. Scleral icterus is present.   Neck: Neck supple. No JVD present. Carotid bruit is not present. No tracheal deviation present. No thyroid mass and no thyromegaly present.   Trach in place    Cardiovascular: Normal rate, regular rhythm, normal heart sounds and intact distal pulses.  Exam reveals no gallop and no friction rub.    No murmur heard.  Pulmonary/Chest: No respiratory distress. She has no wheezes. She has rales. She exhibits no tenderness.   Abdominal: Soft. Bowel sounds are normal. She exhibits no distension, no abdominal bruit, no ascites and no mass. There is no hepatosplenomegaly. There is no tenderness. There is no rebound, no guarding and no CVA tenderness.   Cholecystostomy in place    Musculoskeletal: She  exhibits edema. She exhibits no tenderness.   Dependant edema    Lymphadenopathy:     She has no cervical adenopathy.   Neurological: She has normal reflexes. She displays normal reflexes. No cranial nerve deficit. She exhibits normal muscle tone. Coordination normal.   Skin: Skin is warm and intact. No rash noted. No erythema. No pallor.   Along the flanks-areas of erythema.edema noted    Nursing note and vitals reviewed.      Significant Labs:   Bilirubin:   Recent Labs  Lab 04/18/18  0355 04/19/18  0405 04/20/18  0439 04/21/18  0445 04/22/18  0400   BILITOT 13.2* 14.2* 14.8* 13.8* 12.9*     Blood Culture: No results for input(s): LABBLOO in the last 48 hours.  BMP:   Recent Labs  Lab 04/22/18  0400   *      K 3.1*      CO2 21*   BUN 19   CREATININE 2.5*   CALCIUM 10.1   MG 1.7     CBC:   Recent Labs  Lab 04/21/18  0445 04/22/18  0400   WBC 31.43* 26.85*   HGB 8.0* 7.5*   HCT 25.4* 23.7*    171     All pertinent labs within the past 24 hours have been reviewed.    Significant Imaging: I have reviewed and interpreted all pertinent imaging results/findings within the past 24 hours.

## 2018-04-22 NOTE — PLAN OF CARE
Problem: Patient Care Overview  Goal: Plan of Care Review  Outcome: Ongoing (interventions implemented as appropriate)  Vitals signs closely monitored. Fall precautions in place. Patient turned every two hours. Pain assessed every two hours. Safety promoted. Infection risks reduced.
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
## 2018-04-22 NOTE — PLAN OF CARE
Problem: Patient Care Overview  Goal: Plan of Care Review  Outcome: Ongoing (interventions implemented as appropriate)  Patient Tmax was 99.9 F tonight.  She is still on the cooling blanket. Levophed remains off.  Rodriguez, flexiseal, and gall bladder drain still in place.  Patient has done much better tonight with following commands for me.  She remains on contact precautions.  See MAR for medication doses and times of administration.

## 2018-04-22 NOTE — NURSING
LYNSEY Lowe notified that pt was incessantly coughing and alarming vent. She had frothy white sputum. 80 IV lasix ordered and vent settings switched back to pressure control

## 2018-04-22 NOTE — ASSESSMENT & PLAN NOTE
Currently intubated.  No acute infiltrates seen on CXR  Continue IV antibiotics empirically.  Pulmonary consult.    04/07/2018- will wean off ventilator as tolerated.  She is more awake and alert today.  Sputum cultures showed MRSA and pseudomonas-she is on vanco/meropenem-will deescalate soon.       04/08/2018- she is now intubated after asystolic cardiac arrest .Will wean off as tolerated.  04/09/2018- she remains intubated ,critical care follow up .wean off ventilator as tolerated.  4/10/2018 - likely secondary to MRSA and pseudamonal pneumonia. ID consulted, will consider double covering for pseudomonas.  04/12/18- continue Vent support  04/15/18- Trach collar in place  04/16- will continue trach collar and wean as tolerated.  04/17/18- continue trachea care/weaning   04/18-continue trach care ,wean as tolerated   4/19- continue present care  04/20-wean off ventilator as tolerated.  04/21- continue trach care /pulmonology follow up   04/22-continue ventilatory support and weaning

## 2018-04-22 NOTE — ASSESSMENT & PLAN NOTE
Initial rt infiltrate from admission resolved on chest film however imdium scan indicates continued high pulmonary activity  Cont tracheal suctioning and VAP prophylaxis  ID following stopped IVAB and Antifungals

## 2018-04-22 NOTE — PROGRESS NOTES
[facility] -   Nephrology  Progress Note    Patient Name: Rafael Duron  MRN: [id number]  Admission Date: 4/3/2018  Hospital Length of Stay: 19 days  Attending Provider: Ed Ram MD   Primary Care Physician: Herman Cowart MD  Principal Problem:VIKRAM (acute kidney injury)    Subjective:     HPI: 23 yo obese female with HTN, gestational DM  who presented to [facility] ED in [place], [place] on 4/2 with complaint of SOB and cough with known pregnancy. Workup revealed fetal demise (estimated at 22 weeks) and patient passed dead fetus but retained placenta. Placenta remnants were removed in OR vis D & C.  After admission to ICU she had seizure activity with , K+ 1.9 and Bicarb 11.  She also had EtOH level of 268.  She required intubation for airway protection per records. She as transferred to [facility] ICU on 4/3/18 for higher level of care.    Patient presented with septic shock at [facility] and was started on IV pressors and IV antibiotics. Patient was initially stabilized and extubated on 4/5/18. OB/GYN following. She developed syncopal episode on 4/5/18 associated with bradycardia. She was successfully resuscitated with IV fluids and levophed. She subsequently deteriorated and was re-intubated on 4/5/18. Abdominal US revealed massive hepatomegaly with liver span of 33 cm. Patient's condition improved and she was extubated again on 4/7/18. Patient coded on 4/8/18 and was successfully resuscitated and again intubated. Patient was also diagnosed with C. Diff colitis and MRSA bacteremia.   Nephrology was consulted to help with patient's electrolyte care, renal care and volume management. I saw and examined patient in her hospital room. Patient is intubated and not able to provide any additional history.   Patient remains on antibiotics and pressor support. Chart review revealed that hyponatremia and hypokalemia have now resolved. However, hypocalcemia has persisted. In addition,  patient's renal function has worsened during current admission and creatinine has increased from 0.8 on 4/6/18 to 1.6 on 4/9/18. Volume review showed positive I/O balance with + 24 liters since admission,. Patient's UOP has been fluctuating with 1 to 3 liters of urine per day. Current UOP about 100 cc/hour.       Interval History:  No acute events , oxygenation problem, Fluids stopped, received IV Lasix,     Review of patient's allergies indicates: No Known Allergies      Current Facility-Administered Medications   Medication Frequency    albuterol-ipratropium 2.5mg-0.5mg/3mL nebulizer solution 3 mL Q4H PRN    bisacodyl suppository 10 mg Daily PRN    dextrose 50% injection 12.5 g PRN    famotidine (PF) injection 20 mg Daily    fentaNYL injection 50 mcg Q2H PRN    folic acid-vit B6-vit B12 2.5-25-2 mg tablet 1 tablet Daily    glucagon (human recombinant) injection 1 mg PRN    heparin (porcine) injection 5,000 Units Q8H    insulin aspart U-100 pen 1-10 Units Q6H PRN    lactulose 20 gram/30 mL solution Soln 30 g TID    lorazepam (ATIVAN) injection 2 mg Q2H PRN    multivitamin tablet 1 tablet Daily    ondansetron injection 4 mg Q8H PRN    pneumoc 13-bobby conj-dip cr(PF) 0.5 mL vaccine x 1 dose    potassium chloride 40 mEq in 100 mL IVPB (FOR CENTRAL LINE ADMINISTRATION ONLY) Q4H    rifAXIMin tablet 550 mg BID    sodium chloride 0.9% flush 5 mL PRN    thiamine tablet 100 mg Daily    vancomycin 250mg / 10ml oral suspension 250 mg Q6H    vitamin D 1000 units tablet 5,000 Units Daily       Objective:     Vital Signs (Most Recent):  Temp: 99 °F (37.2 °C) (04/22/18 1100)  Pulse: (!) 112 (04/22/18 1130)  Resp: (!) 28 (04/22/18 1130)  BP: 120/85 (04/22/18 1130)  SpO2: 100 % (04/22/18 1130)  O2 Device (Oxygen Therapy): ventilator (04/22/18 1115) Vital Signs (24h Range):  Temp:  [98.2 °F (36.8 °C)-100 °F (37.8 °C)] 99 °F (37.2 °C)  Pulse:  [] 112  Resp:  [19-45] 28  SpO2:  [95 %-100 %] 100 %  BP:  ()/(47-85) 120/85     Weight: 121 kg (266 lb 12.1 oz) (04/22/18 0537)  Body mass index is 43.06 kg/m².  Body surface area is 2.37 meters squared.    I/O last 3 completed shifts:  In: 4875.6 [I.V.:965.6; NG/GT:2810; IV Piggyback:1100]  Out: 2936 [Urine:771; Drains:865; Stool:1300]    Physical Exam   Constitutional: She appears well-developed. No distress.   Morbidly obese    HENT:   Head: Normocephalic and atraumatic.   Mouth/Throat: Oropharynx is clear and moist. No oropharyngeal exudate.   Eyes: Conjunctivae and EOM are normal. Pupils are equal, round, and reactive to light.   Neck: Neck supple. No JVD present. Carotid bruit is not present. No tracheal deviation present. No thyroid mass and no thyromegaly present.   Trach in place    Cardiovascular: Normal rate, regular rhythm, normal heart sounds and intact distal pulses.  Exam reveals no gallop and no friction rub.    No murmur heard.  Pulmonary/Chest: No respiratory distress. She has no wheezes. She has rales. She exhibits no tenderness.   Abdominal: Soft. Bowel sounds are normal. She exhibits no distension, no abdominal bruit, no ascites and no mass. There is no hepatosplenomegaly. There is no tenderness. There is no rebound, no guarding and no CVA tenderness.   Cholecystostomy in place    Musculoskeletal: She exhibits edema. She exhibits no tenderness.   Dependant edema    Lymphadenopathy:     She has no cervical adenopathy.   Neurological: She has normal reflexes. She displays normal reflexes. No cranial nerve deficit. She exhibits normal muscle tone. Coordination normal.   Skin: Skin is warm and intact. No rash noted. No erythema. No pallor.       Significant Labs:    Recent Labs  Lab 04/22/18  0400   WBC 26.85*   RBC 2.40*   HGB 7.5*   HCT 23.7*      MCV 99*   MCH 31.3*   MCHC 31.6*     CMP:     Recent Labs  Lab 04/22/18  0400   *   CALCIUM 10.1   ALBUMIN 1.6*   PROT 5.8*      K 3.1*   CO2 21*      BUN 19   CREATININE 2.5*    ALKPHOS 93   ALT 12   *   BILITOT 12.9*     Coagulation:     Recent Labs  Lab 04/22/18  0400   INR 1.6*     LFTs:     Recent Labs  Lab 04/22/18  0400   ALT 12   *   ALKPHOS 93   BILITOT 12.9*   PROT 5.8*   ALBUMIN 1.6*     All labs within the past 24 hours have been reviewed.     Lab Results   Component Value Date    ALT 12 04/22/2018     (H) 04/22/2018    ALKPHOS 93 04/22/2018    BILITOT 12.9 (H) 04/22/2018     Lab Results   Component Value Date    .6 (H) 04/09/2018    CALCIUM 10.1 04/22/2018    CAION 0.86 (L) 04/10/2018    PHOS 3.0 04/22/2018         Significant Imaging: reviewed     Assessment/Plan:     * VIKRAM (acute kidney injury)    1. VIKRAM :  VIKRAM from ATN from low BP few days ago ,  follow renal fn, on tube feeds,  UO dropped , about 500 ml y'day , Cr increased to  2.5 mg/dl today , dose of IV lasix today ,      no indication for RRT,  Will cont to assess on a daily basis for same,     2. Electrolyte abnormalities,  K was repleted, K loss due to diarrhea from C diff colitis ,     Corrected calcium about 12, reduced in tube feeds        3. Hypotension : cont pressor support when needed . BP improving     4. Abnormal LFTs , s/p Cholecystostomy placement ,  ? shock liver , alcohic hepatitis , Bili better today     5. Meds reviewed,     6. Anemia : multifactorial, pRBC tx when indicated     7. Candida UTI : finished abx     8. C diff colitis , on abx                  I will follow-up with patient. Please contact us if you have any additional questions.     Total time spent 40 minutes including time needed to review the records,  patient  evaluation, documentation, face-to-face discussion with the primary and CC teams, more than 50% of the time was spent on coordination of care and counseling.       Rafael Ferreira MD  Nephrology  [facility] -

## 2018-04-22 NOTE — ASSESSMENT & PLAN NOTE
ID following  Continued fevers and Leukocytosis  c diff management day 12  New cultures 4/13 with only candida (sputum and urine)  Off Levophed last 24 hours    Septic source not clear -- cont Vanc NV/NG and stopped Zyvox, Merrem and Micafungin per ID

## 2018-04-22 NOTE — PROGRESS NOTES
[facility]  Critical Care Medicine  Progress Note    Patient Name: Rafael Duron  MRN: [id number]  Admission Date: 4/3/2018  Hospital Length of Stay: 19 days  Code Status: Full Code  Attending Provider: Ed Ram MD  Primary Care Provider: Herman Cowart MD   Principal Problem: VIKRAM (acute kidney injury)    Subjective:     HPI:  Ms Duron is a 21 yo obese BF with a PMH of HTN, gestational DM and HTN who presented to [facility] ED in [place], [place] on  with complaint of SOB and cough with known pregnancy.  OB US revealed no heart tones and she is also  with second trimester fetal demise estimated 22 week for which she passed with retained placenta.  After admission to ICU she had seizure activity with , K+ 1.9 and Bicarb 11.  She also had etoh level 268 and reportedly had been drinking etoh w/ honey heavily for several days.  She required intubation for airway protection per records and OB was unable to remove placenta manually and patient had to be taken to OR.  She received 2 liters IVF and Hgb dropped to 7.4 and was transfused 2 units PRBCs.  Vaginal bleeding was scant per records.  Yesterday she had temp 101.5 Ax, .  She as transferred to [facility] ICU yesterday evening for higher level of care.        Hospital/ICU Course:   - This AM she is sedated on vent on Levophed infusion spiking temp 101.9 with WBC 20, LA 6.3, UA+, electrolyte imbalance and Glucose 268     - SAT and SBT done this am, pt awake and following commands. She was successfully extubated to nasal cannula. Remains tachycardic with HR 140s and febrile with temp 103 overnight. WBC 18 and lactic acidosis improving to 4.8. Continuing to aggressively replace electrolytes.     - Over night patient had episode with bradycardia and hypotension during which she became unresponsive and agonally breathing. She responded with IVF and bicarb and was restarted on pressors. She experienced a  similar episode again last night, during which she was intubated. Vent settings were reviewed and adjusted this am. No more bradycardic/hypotensive episodes since last night. Remains on pressors. Leukocystosis unimproved with worsening bandemia. Urine and blood cx NGTD. Sputum cx growing staph and pseudomonas however CXR this am is unremarkable.    04/7 - Tolerating SAT and SBT. Meets extubating criteria. Acidosis improving. Leukocytosis improving. Remains on bicarb gtt.     04/8 - Extubated successfully yesterday. Asystolic arrest this AM.  ACLS initiated. Re - intubated 2 rounds CPR with Iv epinephrine X 1  and IV CaCl X 1 given. ROSC attained.   A - line placed.      4/9 - now with mild vaginal bleeding and severe anasarca with blistering.  Still on vent with sedation and Levophed/Vasopressin infusing.  Worsening UOP and creatine.  Spoke with brother at bedside in detail and all questions answered.   4/10 - Opens eyes alert. Mild increase in Cr -. Intravascular volume depletion. Will add albumin. Still with vagnal bleeding  4/11 - low grade temp, Slight worsening of CR. Good urine output; trach placed today, remains on mechanical vent support and low dose pressor  4/12 - remains on mechanical ventilation via trach; HIDA scan abnormal with no gall bladder filling  4/13 - external gall bladder drain placed by IR; remains on mechanical ventilation via Trach  4/14 - significant draining from gall bladder along with significant decline in leukocytosis although continued fevers last 24 hours; remains on mechanical ventilation via trach; intermittent hypotension overnight and creatinine bump this am  4/15 - continued high drainage from gall bladder; continued fevers 102+; remains on mechanical ventilation via trach, failed SBT with tachypnea, hypoventilation  4/16 continued mechanical ventilation via trach; fail SBT with tachypnea, hypoventilation; continued fever, controlling with external cooling blanket (24hr max  103.2); increased leukocytosis; procalcitonin continues rising (now 11.6); INR, bili continue rising; biliary drain with continued 500-600ml/day output  4/17 - remains vent dependent with continued pressor requirement, fevers, increasing leukocytosis; slight decline INR, Tbili, and procal (all remain elevated); CT abd last evening with mild pancolitis not consistent with severity of ongoing illness  4/18 remains on mechanical ventilation via trach on pressor support with continued fevers and wbc rising (now 29,000) along with worsening t bili; some decline in INR and procalcitonin today; tagged wbc scan yesterday concerning for lungs as septic source  4/19 - still on mech vent and Levophed infusion.  Yumiko TF.  Still spiking temp and WBC increasing.    4/20 - awake on vent still on low dose Levophed infusion.  Yumiko TF.  Tolerating SBT  4/21 - Yumiko SBT yesterday and tolerating TF.  Worsening UOP and creatine.  Off Levophed at 0700 hr this AM.  Awakens and nods head to questions  4/22 - tolerating TF.  UOP averaging 10 ml/hr.  Awake and responsive.  Tachypnea in mid 40s on SBT but no distress.     Review of Systems   Unable to perform ROS: Patient nonverbal       Objective:     Vital Signs (Most Recent):  Temp: 100 °F (37.8 °C) (04/22/18 0800)  Pulse: (!) 113 (04/22/18 0906)  Resp: (!) 39 (04/22/18 0906)  BP: 124/68 (04/22/18 0906)  SpO2: 100 % (04/22/18 0906) Vital Signs (24h Range):  Temp:  [98.2 °F (36.8 °C)-101 °F (38.3 °C)] 100 °F (37.8 °C)  Pulse:  [] 113  Resp:  [19-42] 39  SpO2:  [95 %-100 %] 100 %  BP: ()/(47-82) 124/68     Weight: 121 kg (266 lb 12.1 oz)  Body mass index is 43.06 kg/m².      Intake/Output Summary (Last 24 hours) at 04/22/18 0928  Last data filed at 04/22/18 0800   Gross per 24 hour   Intake             2980 ml   Output             1808 ml   Net             1172 ml       Physical Exam   Constitutional: She appears well-developed and well-nourished. She has a sickly appearance. She  appears ill.   Obese young female on mechanical ventilation via trach   HENT:   Head: Atraumatic.   Nose:       Mouth/Throat: Oropharynx is clear and moist.   Eyes: Pupils are equal, round, and reactive to light. Scleral icterus is present.   Neck: Full passive range of motion without pain.       Obese    Cardiovascular: Regular rhythm.  Tachycardia present.  Exam reveals distant heart sounds.    Pulses:       Radial pulses are 2+ on the right side, and 2+ on the left side.        Dorsalis pedis pulses are 2+ on the right side, and 2+ on the left side.   Pulmonary/Chest: Effort normal. Tachypnea noted. No respiratory distress. She has decreased breath sounds. She has rales.   Vent controlled resp effort   Abdominal: She exhibits no distension. Bowel sounds are decreased. There is hepatomegaly. There is no tenderness.       Morbidly obese.  Semi-soft to firm   Genitourinary:   Genitourinary Comments: Rodriguez in place   Musculoskeletal: Normal range of motion. She exhibits edema (trace edema BLE).   +1 bilat tibial edema   Lymphadenopathy:     She has no cervical adenopathy.   Neurological: She is alert.   Nods head to questions.  Follows commands inconsistently    Skin: Skin is warm and dry. Capillary refill takes less than 2 seconds. No cyanosis.            Vents:  Vent Mode: Spont (04/22/18 0906)  Ventilator Initiated: Yes (04/05/18 1930)  Set Rate: 0 bmp (04/22/18 0906)  Vt Set: 400 mL (04/22/18 0906)  Pressure Support: 6 cmH20 (04/22/18 0906)  PEEP/CPAP: 5 cmH20 (04/22/18 0906)  Oxygen Concentration (%): 35 (04/22/18 0906)  Peak Airway Pressure: 13 cmH2O (04/22/18 0906)  Plateau Pressure: 30 cmH20 (04/22/18 0906)  Total Ve: 15.1 mL (04/22/18 0906)  F/VT Ratio<105 (RSBI): 108.94 (04/22/18 0906)    Lines/Drains/Airways     Peripherally Inserted Central Catheter Line                 PICC Double Lumen 04/14/18 1000 right brachial 7 days          Drain                 Rectal Tube 04/12/18 0710 rectal tube w/ balloon  (indicate number of mLs) 10 days         Biliary Tube 04/13/18 1200 RLQ 8 days         Urethral Catheter 04/15/18 1250 Temperature probe 6 days         NG/OG Tube 04/16/18 1342 Other (comments) Right nostril 5 days          Airway                 Surgical Airway 04/11/18 1403 Shiley Cuffed;Long;Proximal 10 days                Significant Labs:    CBC/Anemia Profile:    Recent Labs  Lab 04/21/18  0445 04/22/18  0400   WBC 31.43* 26.85*   HGB 8.0* 7.5*   HCT 25.4* 23.7*    171   MCV 98 99*   RDW 23.1* 23.7*        Chemistries:    Recent Labs  Lab 04/21/18  0445 04/22/18  0400    140   K 3.1* 3.1*    104   CO2 23 21*   BUN 16 19   CREATININE 2.4* 2.5*   CALCIUM 10.3 10.1   ALBUMIN 1.7* 1.6*   PROT 6.0 5.8*   BILITOT 13.8* 12.9*   ALKPHOS 107 93   ALT 11 12   * 103*   MG 1.8 1.7   PHOS 2.6* 3.0       Coagulation:   Recent Labs  Lab 04/22/18  0400   INR 1.6*     All pertinent labs within the past 24 hours have been reviewed.  NH3 80        Assessment/Plan:     Pulmonary   Pneumonia of right lower lobe due to methicillin resistant Staphylococcus aureus (MRSA)    Initial rt infiltrate from admission resolved on chest film however imdium scan indicates continued high pulmonary activity  Cont tracheal suctioning and VAP prophylaxis  ID following stopped IVAB and Antifungals        Acute respiratory failure with hypoxia and hypercapnia    Re intubated for third time 4/8 s/p asystolic arrest.    Vent settings reviewed and appropriate; FIO2 to keep SAO2 > 92%.   4/11 tracheostomy placed  On SBT again today tachypnic to 40 with 6 pressure support but down to upper 20s with +10 pressure support        Cardiac/Vascular    .        Cardiopulmonary arrest    Cont supportive care and ICU cardiac monitoring        Pure hyperglyceridemia    Cont SSI        Renal/    .        * VIKRAM (acute kidney injury)    Nephrology following  Cont Oliguria  Creatine holding        Candida UTI    + candida  tropicalis  Micafungin Day # 5 of 5 and stopped  ID following        Electrolyte imbalance    Replace potassium and mag again today  Monitor daily electroytes        ID   Septic shock    ID following  Continued fevers and Leukocytosis  c diff management day 12  New cultures 4/13 with only candida (sputum and urine)  Off Levophed last 24 hours    Septic source not clear -- cont Vanc IL/NG and stopped Zyvox, Merrem and Micafungin per ID        Clostridium difficile infection    On IL and NG Vanc  ID following        Hematology    .        Oncology   Acute blood loss anemia    No acute transfusion indication and no active bleeding  Monitor daily CBC and cont folic acid  Conservative transfusion protocol        GI   Hepatomegaly    GI following        Acalculous cholecystitis    Has GB drain at this time  Off IVAB per ID        Alcoholic hepatitis with ascites    Per GI's last note, patient would not be a liver transplant candidate. One it is still unclear if her liver is significantly responsible for her presentation and second there is still concern for uncontroleld infection in a patient intubated on vasopressors. Given she has been critical but stable if we are going to better sort out her clinical situation giving FFP and proceeding with a transjugular liver biopsy is an option.  Bili continues upward trend        Preventive Measures and Monitoring:   Stress Ulcer: Pepcid  Nutrition: TF  Glucose control: SSI  Bowel prophylaxis: Lactulose  DVT prophylaxis: SQ Hep/SCDs  Hx CAD on B-Blocker: no hx CAD  Head of Bed/Reposition: Elevate HOB and turn Q1-2 hours   Early Mobility: Bed rest  SBT: daily  Trach Day: #11  GB drain Day: #9  NG Day: #6  Central Line RUE PICC Day: #8  Rodriguez Day: #7  Code Status: Full     Counseling/Consultation:I have discussed the care of this patient in detail with the bedside nursing staff and Dr. Whitman and Dr. Ram    Critical Care Time: 49 minutes  Critical secondary to Patient has a  condition that poses threat to life and bodily function: Acute Renal Failure and [facility]      Critical care was time spent personally by me on the following activities: development of treatment plan with patient or surrogate and bedside caregivers, discussions with consultants, evaluation of patient's response to treatment, examination of patient, ordering and performing treatments and interventions, ordering and review of laboratory studies, ordering and review of radiographic studies, pulse oximetry, re-evaluation of patient's condition. This critical care time did not overlap with that of any other provider or involve time for any procedures.     Ed Lowe, NP  Critical Care Medicine  [facility]

## 2018-04-22 NOTE — ASSESSMENT & PLAN NOTE
04/09/2018-case discussed with nephrology -she has positive fluid balance, will continue lasix 60mg every 8 hours .  04/15/18- trending up , nephrology following  04/16/18- serum creatinine is increasing to 2.6,will continue to follow nephrology , will avoid nephrotoxic medications.   04/17-now off vancomycin, nephrology on board, will continue to closely monitor serum creatinine.  04/18- renal function continues to improve  4/19- improving  04/22-she is oliguric, will discuss with nephrology

## 2018-04-22 NOTE — ASSESSMENT & PLAN NOTE
Continue Vancomycin  04/15/18-persistent right upper lobe infiltrate  04/16/18- she was treated with vancomycin .Will follow repeat cbc and cultures.    04/17- treated with vanco/meropenem -completed 13 days of therapy    4/19- back of Merrem, IV Mycamine and oral and Rectal Vanco  04/20- Treated , will continue zyvox , stop meropenem  04/22-treated

## 2018-04-22 NOTE — ASSESSMENT & PLAN NOTE
Per GI's last note, patient would not be a liver transplant candidate. One it is still unclear if her liver is significantly responsible for her presentation and second there is still concern for uncontroleld infection in a patient intubated on vasopressors. Given she has been critical but stable if we are going to better sort out her clinical situation giving FFP and proceeding with a transjugular liver biopsy is an option.  Bili continues upward trend

## 2018-04-22 NOTE — SUBJECTIVE & OBJECTIVE
Review of Systems   Unable to perform ROS: Patient nonverbal       Objective:     Vital Signs (Most Recent):  Temp: 100 °F (37.8 °C) (04/22/18 0800)  Pulse: (!) 113 (04/22/18 0906)  Resp: (!) 39 (04/22/18 0906)  BP: 124/68 (04/22/18 0906)  SpO2: 100 % (04/22/18 0906) Vital Signs (24h Range):  Temp:  [98.2 °F (36.8 °C)-101 °F (38.3 °C)] 100 °F (37.8 °C)  Pulse:  [] 113  Resp:  [19-42] 39  SpO2:  [95 %-100 %] 100 %  BP: ()/(47-82) 124/68     Weight: 121 kg (266 lb 12.1 oz)  Body mass index is 43.06 kg/m².      Intake/Output Summary (Last 24 hours) at 04/22/18 0928  Last data filed at 04/22/18 0800   Gross per 24 hour   Intake             2980 ml   Output             1808 ml   Net             1172 ml       Physical Exam   Constitutional: She appears well-developed and well-nourished. She has a sickly appearance. She appears ill.   Obese young female on mechanical ventilation via trach   HENT:   Head: Atraumatic.   Nose:       Mouth/Throat: Oropharynx is clear and moist.   Eyes: Pupils are equal, round, and reactive to light. Scleral icterus is present.   Neck: Full passive range of motion without pain.       Obese    Cardiovascular: Regular rhythm.  Tachycardia present.  Exam reveals distant heart sounds.    Pulses:       Radial pulses are 2+ on the right side, and 2+ on the left side.        Dorsalis pedis pulses are 2+ on the right side, and 2+ on the left side.   Pulmonary/Chest: Effort normal. Tachypnea noted. No respiratory distress. She has decreased breath sounds. She has rales.   Vent controlled resp effort   Abdominal: She exhibits no distension. Bowel sounds are decreased. There is hepatomegaly. There is no tenderness.       Morbidly obese.  Semi-soft to firm   Genitourinary:   Genitourinary Comments: Rodriguez in place   Musculoskeletal: Normal range of motion. She exhibits edema (trace edema BLE).   +1 bilat tibial edema   Lymphadenopathy:     She has no cervical adenopathy.   Neurological: She is  alert.   Nods head to questions.  Follows commands inconsistently    Skin: Skin is warm and dry. Capillary refill takes less than 2 seconds. No cyanosis.            Vents:  Vent Mode: Spont (04/22/18 0906)  Ventilator Initiated: Yes (04/05/18 1930)  Set Rate: 0 bmp (04/22/18 0906)  Vt Set: 400 mL (04/22/18 0906)  Pressure Support: 6 cmH20 (04/22/18 0906)  PEEP/CPAP: 5 cmH20 (04/22/18 0906)  Oxygen Concentration (%): 35 (04/22/18 0906)  Peak Airway Pressure: 13 cmH2O (04/22/18 0906)  Plateau Pressure: 30 cmH20 (04/22/18 0906)  Total Ve: 15.1 mL (04/22/18 0906)  F/VT Ratio<105 (RSBI): 108.94 (04/22/18 0906)    Lines/Drains/Airways     Peripherally Inserted Central Catheter Line                 PICC Double Lumen 04/14/18 1000 right brachial 7 days          Drain                 Rectal Tube 04/12/18 0710 rectal tube w/ balloon (indicate number of mLs) 10 days         Biliary Tube 04/13/18 1200 RLQ 8 days         Urethral Catheter 04/15/18 1250 Temperature probe 6 days         NG/OG Tube 04/16/18 1342 Other (comments) Right nostril 5 days          Airway                 Surgical Airway 04/11/18 1403 Shiley Cuffed;Long;Proximal 10 days                Significant Labs:    CBC/Anemia Profile:    Recent Labs  Lab 04/21/18  0445 04/22/18  0400   WBC 31.43* 26.85*   HGB 8.0* 7.5*   HCT 25.4* 23.7*    171   MCV 98 99*   RDW 23.1* 23.7*        Chemistries:    Recent Labs  Lab 04/21/18  0445 04/22/18  0400    140   K 3.1* 3.1*    104   CO2 23 21*   BUN 16 19   CREATININE 2.4* 2.5*   CALCIUM 10.3 10.1   ALBUMIN 1.7* 1.6*   PROT 6.0 5.8*   BILITOT 13.8* 12.9*   ALKPHOS 107 93   ALT 11 12   * 103*   MG 1.8 1.7   PHOS 2.6* 3.0       Coagulation:   Recent Labs  Lab 04/22/18  0400   INR 1.6*     All pertinent labs within the past 24 hours have been reviewed.  NH3 80

## 2018-04-22 NOTE — ASSESSMENT & PLAN NOTE
04/09/2018-will use PO vancomycin 250mg every 8 hours for 10 days .  Due to her multiple electrolyte abnormalities, there is concern for ileus ,will continue Flagyl for now and adjust therapy as needed   .  04/16/2018- will continue PO vanco/flagyl -wbc remains very high at 25, -   Will plan to repeat imaging if leucocytosis persists .    04/17/18- the CT scan of the abdomen showed pancolitis likely related to C difficle -will continue po vancomycin and flagyl ,.  04/18- on flagyl since admit yet has marked leucocytosis .,also on vancomycin PO , will add rectal vanco enema , stop IV Flagyl.    4/19- persists- on oral and Rectal Vanco  04/20- will continue oral and rectal  vanco for now. She has been on therapy for c difficle with vanco since 04/09 04/21-wbc remains high at 31, will stop meropenem, continue PO and vanco enema   04/22- day 13 of C difficle therapy , will stop zyvox,micafungin, wbc is on a downward trend

## 2018-04-22 NOTE — PROGRESS NOTES
Trach care done. Site cleaned. Inner cannula changed. HME, inline suction catheter, and omniflex changed as well.   New drain sponge placed under flange. Trach is secured and intact. She has a 7XL shiley in place. Extra trach and obturator at bedside. Ambubag and mask at bedside as well.

## 2018-04-22 NOTE — ASSESSMENT & PLAN NOTE
Re intubated for third time 4/8 s/p asystolic arrest.    Vent settings reviewed and appropriate; FIO2 to keep SAO2 > 92%.   4/11 tracheostomy placed  On SBT again today tachypnic to 40 with 6 pressure support but down to upper 20s with +10 pressure support

## 2018-04-22 NOTE — ASSESSMENT & PLAN NOTE
1. VIKRAM :  VIKRAM from [facility] from low BP few days ago ,  follow renal fn, on tube feeds,  UO dropped , about 500 ml y'day , Cr increased to  2.5 mg/dl today , dose of IV lasix today ,      no indication for RRT,  Will cont to assess on a daily basis for same,     2. Electrolyte abnormalities,  K was repleted, K loss due to diarrhea from C diff colitis ,     Corrected calcium about 12, reduced in tube feeds        3. Hypotension : cont pressor support when needed . BP improving     4. Abnormal LFTs , s/p Cholecystostomy placement ,  ? shock liver , alcohic hepatitis , Bili better today     5. Meds reviewed,     6. Anemia : multifactorial, pRBC tx when indicated     7. Candida UTI : finished abx     8. C diff colitis , on abx

## 2018-04-22 NOTE — SUBJECTIVE & OBJECTIVE
Interval History:  No acute events , oxygenation problem, Fluids stopped, received IV Lasix,     Review of patient's allergies indicates: No Known Allergies      Current Facility-Administered Medications   Medication Frequency    albuterol-ipratropium 2.5mg-0.5mg/3mL nebulizer solution 3 mL Q4H PRN    bisacodyl suppository 10 mg Daily PRN    dextrose 50% injection 12.5 g PRN    famotidine (PF) injection 20 mg Daily    fentaNYL injection 50 mcg Q2H PRN    folic acid-vit B6-vit B12 2.5-25-2 mg tablet 1 tablet Daily    glucagon (human recombinant) injection 1 mg PRN    heparin (porcine) injection 5,000 Units Q8H    insulin aspart U-100 pen 1-10 Units Q6H PRN    lactulose 20 gram/30 mL solution Soln 30 g TID    lorazepam (ATIVAN) injection 2 mg Q2H PRN    multivitamin tablet 1 tablet Daily    ondansetron injection 4 mg Q8H PRN    pneumoc 13-bobby conj-dip cr(PF) 0.5 mL vaccine x 1 dose    potassium chloride 40 mEq in 100 mL IVPB (FOR CENTRAL LINE ADMINISTRATION ONLY) Q4H    rifAXIMin tablet 550 mg BID    sodium chloride 0.9% flush 5 mL PRN    thiamine tablet 100 mg Daily    vancomycin 250mg / 10ml oral suspension 250 mg Q6H    vitamin D 1000 units tablet 5,000 Units Daily       Objective:     Vital Signs (Most Recent):  Temp: 99 °F (37.2 °C) (04/22/18 1100)  Pulse: (!) 112 (04/22/18 1130)  Resp: (!) 28 (04/22/18 1130)  BP: 120/85 (04/22/18 1130)  SpO2: 100 % (04/22/18 1130)  O2 Device (Oxygen Therapy): ventilator (04/22/18 1115) Vital Signs (24h Range):  Temp:  [98.2 °F (36.8 °C)-100 °F (37.8 °C)] 99 °F (37.2 °C)  Pulse:  [] 112  Resp:  [19-45] 28  SpO2:  [95 %-100 %] 100 %  BP: ()/(47-85) 120/85     Weight: 121 kg (266 lb 12.1 oz) (04/22/18 0537)  Body mass index is 43.06 kg/m².  Body surface area is 2.37 meters squared.    I/O last 3 completed shifts:  In: 4875.6 [I.V.:965.6; NG/GT:2810; IV Piggyback:1100]  Out: 2936 [Urine:771; Drains:865; Stool:1300]    Physical Exam   Constitutional:  She appears well-developed. No distress.   Morbidly obese    HENT:   Head: Normocephalic and atraumatic.   Mouth/Throat: Oropharynx is clear and moist. No oropharyngeal exudate.   Eyes: Conjunctivae and EOM are normal. Pupils are equal, round, and reactive to light.   Neck: Neck supple. No JVD present. Carotid bruit is not present. No tracheal deviation present. No thyroid mass and no thyromegaly present.   Trach in place    Cardiovascular: Normal rate, regular rhythm, normal heart sounds and intact distal pulses.  Exam reveals no gallop and no friction rub.    No murmur heard.  Pulmonary/Chest: No respiratory distress. She has no wheezes. She has rales. She exhibits no tenderness.   Abdominal: Soft. Bowel sounds are normal. She exhibits no distension, no abdominal bruit, no ascites and no mass. There is no hepatosplenomegaly. There is no tenderness. There is no rebound, no guarding and no CVA tenderness.   Cholecystostomy in place    Musculoskeletal: She exhibits edema. She exhibits no tenderness.   Dependant edema    Lymphadenopathy:     She has no cervical adenopathy.   Neurological: She has normal reflexes. She displays normal reflexes. No cranial nerve deficit. She exhibits normal muscle tone. Coordination normal.   Skin: Skin is warm and intact. No rash noted. No erythema. No pallor.       Significant Labs:    Recent Labs  Lab 04/22/18  0400   WBC 26.85*   RBC 2.40*   HGB 7.5*   HCT 23.7*      MCV 99*   MCH 31.3*   MCHC 31.6*     CMP:     Recent Labs  Lab 04/22/18  0400   *   CALCIUM 10.1   ALBUMIN 1.6*   PROT 5.8*      K 3.1*   CO2 21*      BUN 19   CREATININE 2.5*   ALKPHOS 93   ALT 12   *   BILITOT 12.9*     Coagulation:     Recent Labs  Lab 04/22/18  0400   INR 1.6*     LFTs:     Recent Labs  Lab 04/22/18  0400   ALT 12   *   ALKPHOS 93   BILITOT 12.9*   PROT 5.8*   ALBUMIN 1.6*     All labs within the past 24 hours have been reviewed.     Lab Results   Component  Value Date    ALT 12 04/22/2018     (H) 04/22/2018    ALKPHOS 93 04/22/2018    BILITOT 12.9 (H) 04/22/2018     Lab Results   Component Value Date    .6 (H) 04/09/2018    CALCIUM 10.1 04/22/2018    CAION 0.86 (L) 04/10/2018    PHOS 3.0 04/22/2018         Significant Imaging: reviewed

## 2018-04-23 PROBLEM — B37.49 CANDIDA UTI: Status: RESOLVED | Noted: 2018-01-01 | Resolved: 2018-01-01

## 2018-04-23 PROBLEM — A41.9 SEVERE SEPSIS: Status: ACTIVE | Noted: 2018-01-01

## 2018-04-23 PROBLEM — R65.20 SEVERE SEPSIS: Status: ACTIVE | Noted: 2018-01-01

## 2018-04-23 PROBLEM — J15.212 PNEUMONIA OF RIGHT LOWER LOBE DUE TO METHICILLIN RESISTANT STAPHYLOCOCCUS AUREUS (MRSA): Status: RESOLVED | Noted: 2018-01-01 | Resolved: 2018-01-01

## 2018-04-23 PROBLEM — A49.8 CLOSTRIDIUM DIFFICILE INFECTION: Status: RESOLVED | Noted: 2018-01-01 | Resolved: 2018-01-01

## 2018-04-23 PROBLEM — A41.9 SEPTIC SHOCK: Status: RESOLVED | Noted: 2018-01-01 | Resolved: 2018-01-01

## 2018-04-23 PROBLEM — R65.21 SEPTIC SHOCK: Status: RESOLVED | Noted: 2018-01-01 | Resolved: 2018-01-01

## 2018-04-23 NOTE — ASSESSMENT & PLAN NOTE
No acute transfusion indication and no active bleeding  Monitor daily CBC and cont folic acid  Conservative transfusion protocol

## 2018-04-23 NOTE — SUBJECTIVE & OBJECTIVE
Interval History: 04/23- She remains on ventilator support , wbc is 28 , she is now off systemic antimicrobial therapy .    Review of Systems   Unable to perform ROS: Acuity of condition     Objective:     Vital Signs (Most Recent):  Temp: 98.5 °F (36.9 °C) (04/23/18 0305)  Pulse: 103 (04/23/18 0919)  Resp: (!) 35 (04/23/18 0919)  BP: 126/63 (04/23/18 0802)  SpO2: 96 % (04/23/18 0919) Vital Signs (24h Range):  Temp:  [98.3 °F (36.8 °C)-101.7 °F (38.7 °C)] 98.5 °F (36.9 °C)  Pulse:  [] 103  Resp:  [15-41] 35  SpO2:  [92 %-100 %] 96 %  BP: (100-142)/(57-99) 126/63     Weight: 121 kg (266 lb 12.1 oz)  Body mass index is 43.06 kg/m².    Intake/Output Summary (Last 24 hours) at 04/23/18 0920  Last data filed at 04/23/18 0705   Gross per 24 hour   Intake             1240 ml   Output             2448 ml   Net            -1208 ml      Physical Exam   Constitutional: She appears well-developed. No distress.   Morbidly obese    HENT:   Head: Normocephalic and atraumatic.   Mouth/Throat: Oropharynx is clear and moist. No oropharyngeal exudate.   Eyes: EOM are normal. Pupils are equal, round, and reactive to light. Scleral icterus is present.   Neck: Neck supple. No JVD present. Carotid bruit is not present. No tracheal deviation present. No thyroid mass and no thyromegaly present.   Trach in place    Cardiovascular: Normal rate, regular rhythm, normal heart sounds and intact distal pulses.  Exam reveals no gallop and no friction rub.    No murmur heard.  Pulmonary/Chest: No respiratory distress. She has no wheezes. She has rales. She exhibits no tenderness.   Abdominal: Soft. Bowel sounds are normal. She exhibits no distension, no abdominal bruit, no ascites and no mass. There is no hepatosplenomegaly. There is no tenderness. There is no rebound, no guarding and no CVA tenderness.   Cholecystostomy in place    Musculoskeletal: She exhibits edema. She exhibits no tenderness.   Dependant edema    Lymphadenopathy:     She  has no cervical adenopathy.   Neurological: She has normal reflexes. She displays normal reflexes. No cranial nerve deficit. She exhibits normal muscle tone. Coordination normal.   Skin: Skin is warm and intact. No rash noted. No erythema. No pallor.   Along the flanks-areas of erythema.edema noted    Nursing note and vitals reviewed.      Significant Labs:   Blood Culture: No results for input(s): LABBLOO in the last 48 hours.  BMP:   Recent Labs  Lab 04/23/18  0353   *      K 3.2*      CO2 22*   BUN 22*   CREATININE 2.5*   CALCIUM 10.9*   MG 1.8     CBC:   Recent Labs  Lab 04/22/18  0400 04/23/18  0353   WBC 26.85* 28.91*   HGB 7.5* 7.8*   HCT 23.7* 24.8*    188     CMP:   Recent Labs  Lab 04/22/18  0400 04/23/18  0353    144   K 3.1* 3.2*    107   CO2 21* 22*   * 120*   BUN 19 22*   CREATININE 2.5* 2.5*   CALCIUM 10.1 10.9*   PROT 5.8* 6.1   ALBUMIN 1.6* 1.7*   BILITOT 12.9* 13.9*   ALKPHOS 93 93   * 115*   ALT 12 11   ANIONGAP 15 15   EGFRNONAA 26* 26*     All pertinent labs within the past 24 hours have been reviewed.    Significant Imaging: I have reviewed and interpreted all pertinent imaging results/findings within the past 24 hours.

## 2018-04-23 NOTE — ASSESSMENT & PLAN NOTE
Currently intubated.  No acute infiltrates seen on CXR  Continue IV antibiotics empirically.  Pulmonary consult.    04/07/2018- will wean off ventilator as tolerated.  She is more awake and alert today.  Sputum cultures showed MRSA and pseudomonas-she is on vanco/meropenem-will deescalate soon.       04/08/2018- she is now intubated after asystolic cardiac arrest .Will wean off as tolerated.  04/09/2018- she remains intubated ,critical care follow up .wean off ventilator as tolerated.  4/10/2018 - likely secondary to MRSA and pseudamonal pneumonia. ID consulted, will consider double covering for pseudomonas.  04/12/18- continue Vent support  04/15/18- Trach collar in place  04/16- will continue trach collar and wean as tolerated.  04/17/18- continue trachea care/weaning   04/18-continue trach care ,wean as tolerated   4/19- continue present care  04/20-wean off ventilator as tolerated.  04/21- continue trach care /pulmonology follow up   04/22-continue ventilatory support and weaning   04/23 - will wean off ventilatory support as tolerated

## 2018-04-23 NOTE — PROGRESS NOTES
[facility]  Critical Care Medicine  Progress Note    Patient Name: Rafael Duron  MRN: [id number]  Admission Date: 4/3/2018  Hospital Length of Stay: 20 days  Code Status: Full Code  Attending Provider: Ed Ram MD  Primary Care Provider: Herman Cowart MD   Principal Problem: Acute respiratory failure with hypoxia and hypercapnia    Subjective:     HPI:  Ms Druon is a 21 yo obese BF with a PMH of HTN, gestational DM and HTN who presented to [facility] ED in [place], [place] on  with complaint of SOB and cough with known pregnancy.  OB US revealed no heart tones and she is also  with second trimester fetal demise estimated 22 week for which she passed with retained placenta.  After admission to ICU she had seizure activity with , K+ 1.9 and Bicarb 11.  She also had etoh level 268 and reportedly had been drinking etoh w/ honey heavily for several days.  She required intubation for airway protection per records and OB was unable to remove placenta manually and patient had to be taken to OR.  She received 2 liters IVF and Hgb dropped to 7.4 and was transfused 2 units PRBCs.  Vaginal bleeding was scant per records.  Yesterday she had temp 101.5 Ax, .  She as transferred to [facility] ICU yesterday evening for higher level of care.        Hospital/ICU Course:   - This AM she is sedated on vent on Levophed infusion spiking temp 101.9 with WBC 20, LA 6.3, UA+, electrolyte imbalance and Glucose 268     - SAT and SBT done this am, pt awake and following commands. She was successfully extubated to nasal cannula. Remains tachycardic with HR 140s and febrile with temp 103 overnight. WBC 18 and lactic acidosis improving to 4.8. Continuing to aggressively replace electrolytes.     - Over night patient had episode with bradycardia and hypotension during which she became unresponsive and agonally breathing. She responded with IVF and bicarb and was restarted on  pressors. She experienced a similar episode again last night, during which she was intubated. Vent settings were reviewed and adjusted this am. No more bradycardic/hypotensive episodes since last night. Remains on pressors. Leukocystosis unimproved with worsening bandemia. Urine and blood cx NGTD. Sputum cx growing staph and pseudomonas however CXR this am is unremarkable.    04/7 - Tolerating SAT and SBT. Meets extubating criteria. Acidosis improving. Leukocytosis improving. Remains on bicarb gtt.     04/8 - Extubated successfully yesterday. Asystolic arrest this AM.  ACLS initiated. Re - intubated 2 rounds CPR with Iv epinephrine X 1  and IV CaCl X 1 given. ROSC attained.   A - line placed.      4/9 - now with mild vaginal bleeding and severe anasarca with blistering.  Still on vent with sedation and Levophed/Vasopressin infusing.  Worsening UOP and creatine.  Spoke with brother at bedside in detail and all questions answered.   4/10 - Opens eyes alert. Mild increase in Cr -. Intravascular volume depletion. Will add albumin. Still with vagnal bleeding  4/11 - low grade temp, Slight worsening of CR. Good urine output; trach placed today, remains on mechanical vent support and low dose pressor  4/12 - remains on mechanical ventilation via trach; HIDA scan abnormal with no gall bladder filling  4/13 - external gall bladder drain placed by IR; remains on mechanical ventilation via Trach  4/14 - significant draining from gall bladder along with significant decline in leukocytosis although continued fevers last 24 hours; remains on mechanical ventilation via trach; intermittent hypotension overnight and creatinine bump this am  4/15 - continued high drainage from gall bladder; continued fevers 102+; remains on mechanical ventilation via trach, failed SBT with tachypnea, hypoventilation  4/16 continued mechanical ventilation via trach; fail SBT with tachypnea, hypoventilation; continued fever, controlling with external  cooling blanket (24hr max 103.2); increased leukocytosis; procalcitonin continues rising (now 11.6); INR, bili continue rising; biliary drain with continued 500-600ml/day output  4/17 - remains vent dependent with continued pressor requirement, fevers, increasing leukocytosis; slight decline INR, Tbili, and procal (all remain elevated); CT abd last evening with mild pancolitis not consistent with severity of ongoing illness  4/18 remains on mechanical ventilation via trach on pressor support with continued fevers and wbc rising (now 29,000) along with worsening t bili; some decline in INR and procalcitonin today; tagged wbc scan yesterday concerning for lungs as septic source  4/19 - still on mech vent and Levophed infusion.  Yumiko TF.  Still spiking temp and WBC increasing.    4/20 - awake on vent still on low dose Levophed infusion.  Yumiko TF.  Tolerating SBT  4/21 - Yumiko SBT yesterday and tolerating TF.  Worsening UOP and creatine.  Off Levophed at 0700 hr this AM.  Awakens and nods head to questions  4/22 - tolerating TF.  UOP averaging 10 ml/hr.  Awake and responsive.  Tachypnea in mid 40s on SBT but no distress.   4/23 - Failed SBT yesterday.  Diuresed fair post Lasix yesterday.  Still spiking temps and persistent Leukocytosis    Review of Systems   Unable to perform ROS: Patient nonverbal       Objective:     Vital Signs (Most Recent):  Temp: 98.9 °F (37.2 °C) (04/23/18 0705)  Pulse: 103 (04/23/18 0919)  Resp: (!) 35 (04/23/18 0919)  BP: 126/70 (04/23/18 0905)  SpO2: 96 % (04/23/18 0919) Vital Signs (24h Range):  Temp:  [98.3 °F (36.8 °C)-101.7 °F (38.7 °C)] 98.9 °F (37.2 °C)  Pulse:  [] 103  Resp:  [16-35] 35  SpO2:  [92 %-100 %] 96 %  BP: (100-142)/(57-99) 126/70     Weight: 121 kg (266 lb 12.1 oz)  Body mass index is 43.06 kg/m².      Intake/Output Summary (Last 24 hours) at 04/23/18 1200  Last data filed at 04/23/18 1005   Gross per 24 hour   Intake             1330 ml   Output             2414 ml   Net             -1084 ml       Physical Exam   Constitutional: Vital signs are normal. She appears well-developed and well-nourished. She has a sickly appearance. She appears ill.   Obese young female on mechanical ventilation via trach   HENT:   Head: Atraumatic.   Nose:       Mouth/Throat: Oropharynx is clear and moist.   Eyes: Pupils are equal, round, and reactive to light. Scleral icterus is present.   Neck: Full passive range of motion without pain.       Obese    Cardiovascular: Regular rhythm.  Tachycardia present.  Exam reveals distant heart sounds.    Pulses:       Radial pulses are 2+ on the right side, and 2+ on the left side.        Dorsalis pedis pulses are 2+ on the right side, and 2+ on the left side.   Pulmonary/Chest: Effort normal. Tachypnea noted. No respiratory distress. She has decreased breath sounds. She has rales.   Vent controlled resp effort   Abdominal: She exhibits no distension. Bowel sounds are decreased. There is hepatomegaly. There is no tenderness.       Morbidly obese.  Semi-soft to firm   Genitourinary:   Genitourinary Comments: Rodriguez in place   Musculoskeletal: Normal range of motion. She exhibits edema (trace edema BLE).   +1 bilat tibial edema   Lymphadenopathy:     She has no cervical adenopathy.   Neurological: She is alert.   Nods head to questions.  Follows commands inconsistently    Skin: Skin is warm and dry. Capillary refill takes less than 2 seconds. No cyanosis.        Psychiatric: Her affect is blunt.       Vents:  Vent Mode: A/C (04/23/18 0919)  Ventilator Initiated: Yes (04/05/18 1930)  Set Rate: 18 bmp (04/23/18 0919)  Vt Set: 0 mL (04/23/18 0919)  Pressure Support: 0 cmH20 (04/23/18 0919)  PEEP/CPAP: 5 cmH20 (04/23/18 0919)  Oxygen Concentration (%): 40 (04/23/18 0919)  Peak Airway Pressure: 29 cmH2O (04/23/18 0919)  Plateau Pressure: 30 cmH20 (04/23/18 0919)  Total Ve: 14.2 mL (04/23/18 0919)  F/VT Ratio<105 (RSBI): (!) (P) 97.77 (04/23/18 0919)    Lines/Drains/Airways      Peripherally Inserted Central Catheter Line                 PICC Double Lumen 04/14/18 1000 right brachial 9 days          Drain                 Rectal Tube 04/12/18 0710 rectal tube w/ balloon (indicate number of mLs) 11 days         Biliary Tube 04/13/18 1200 RLQ 10 days         Urethral Catheter 04/15/18 1250 Temperature probe 7 days         NG/OG Tube 04/22/18 1500 nasogastric Right nostril less than 1 day          Airway                 Surgical Airway 04/11/18 1403 Shiley Cuffed;Long;Proximal 11 days                Significant Labs:    CBC/Anemia Profile:    Recent Labs  Lab 04/22/18  0400 04/23/18  0353   WBC 26.85* 28.91*   HGB 7.5* 7.8*   HCT 23.7* 24.8*    188   MCV 99* 99*   RDW 23.7* 24.6*        Chemistries:    Recent Labs  Lab 04/22/18  0400 04/23/18  0353    144   K 3.1* 3.2*    107   CO2 21* 22*   BUN 19 22*   CREATININE 2.5* 2.5*   CALCIUM 10.1 10.9*   ALBUMIN 1.6* 1.7*   PROT 5.8* 6.1   BILITOT 12.9* 13.9*   ALKPHOS 93 93   ALT 12 11   * 115*   MG 1.7 1.8   PHOS 3.0 3.3       Coagulation:   Recent Labs  Lab 04/23/18  0353   INR 1.6*     All pertinent labs within the past 24 hours have been reviewed.  NH3 71        Assessment/Plan:     Pulmonary   * Acute respiratory failure with hypoxia and hypercapnia    Vent settings reviewed and appropriate; FIO2 to keep SAO2 > 92%.   4/11 tracheostomy placed  Failed SBT yesterday  Cont VAP prophylaxis        Cardiac/Vascular    .        Cardiopulmonary arrest    Cont supportive care and ICU cardiac monitoring        Pure hyperglyceridemia    Cont SSI        Renal/    .        Electrolyte imbalance    Replace potassium and mag again today  Monitor daily electroytes        VIKRAM (acute kidney injury)    Nephrology following  Cont Oliguria  Creatine holding  Daily BMP and accurate I/Os        ID   Severe sepsis    ID following  Has completed course of IVAB and Antifungals for C-Diff, MRSA PNA, Klebiella UTI, Candida UTI/PNA  WBC scan  unremarkable this hospitalization except lungs  Still spiking temps and persistent leukocytosis  Repeat CT Abd ordered as well as MRI spine            Hematology    .        Oncology   Acute blood loss anemia    No acute transfusion indication and no active bleeding  Monitor daily CBC and cont folic acid  Conservative transfusion protocol        GI   Hepatomegaly    Repeat CT Abd pending        Acalculous cholecystitis    Has GB drain at this time  Off IVAB per ID  Culture NGTD        Alcoholic hepatitis with ascites    GI not following  Hx heavy etoh abuse  Cont Lactulose and Rifaximin  Cont Thiamine, MVI and Folic Acid          Preventive Measures and Monitoring:   Stress Ulcer: Pepcid  Nutrition: TF  Glucose control: SSI  Bowel prophylaxis: Lactulose  DVT prophylaxis: SQ Hep/SCDs  Hx CAD on B-Blocker: no hx CAD  Head of Bed/Reposition: Elevate HOB and turn Q1-2 hours   Early Mobility: Bed rest  SBT: daily  Trach Day: #12  GB drain Day: #10  NG Day: #7  Central Line RUE PICC Day: #9  Rodriguez Day: #8  Code Status: Full     Counseling/Consultation:I have discussed the care of this patient in detail with the bedside nursing staff and Dr. Hurt and Dr. Ram    Critical Care Time: 58 minutes  Critical secondary to Patient has a condition that poses threat to life and bodily function: Acute Renal Failure and Resp Failure on mechanical ventilation      Critical care was time spent personally by me on the following activities: development of treatment plan with patient or surrogate and bedside caregivers, discussions with consultants, evaluation of patient's response to treatment, examination of patient, ordering and performing treatments and interventions, ordering and review of laboratory studies, ordering and review of radiographic studies, pulse oximetry, re-evaluation of patient's condition. This critical care time did not overlap with that of any other provider or involve time for any procedures.     Ed Mccracken  LYNSEY Lowe  Critical Care Medicine  [facility]

## 2018-04-23 NOTE — ASSESSMENT & PLAN NOTE
04/09/2018-case discussed with nephrology -she has positive fluid balance, will continue lasix 60mg every 8 hours .  04/15/18- trending up , nephrology following  04/16/18- serum creatinine is increasing to 2.6,will continue to follow nephrology , will avoid nephrotoxic medications.   04/17-now off vancomycin, nephrology on board, will continue to closely monitor serum creatinine.  04/18- renal function continues to improve  4/19- improving  04/22-she is oliguric, will discuss with nephrology   04/23- nephrology follow up , will continue diuresis , continue to monitor urine output-1612mls -last 24hours

## 2018-04-23 NOTE — ASSESSMENT & PLAN NOTE
ID following  Has completed course of IVAB and Antifungals for C-Diff, MRSA PNA, Klebiella UTI, Candida UTI/PNA  WBC scan unremarkable this hospitalization except lungs  Still spiking temps and persistent leukocytosis  Repeat CT Abd ordered as well as MRI spine

## 2018-04-23 NOTE — ASSESSMENT & PLAN NOTE
GI not following  Hx heavy etoh abuse  Cont Lactulose and Rifaximin  Cont Thiamine, MVI and Folic Acid

## 2018-04-23 NOTE — ASSESSMENT & PLAN NOTE
04/07/2018- will closely monitor fever curve, will stop flagyl, change meropenem to zosyn,continue vancomycin for now.  Will deescalate soon.  Blood cultures -neg, sputum cultures-MRSA and pseudomonas   4/11/2018 - antibx adjusted today, per icu, ID consulted  04/13/18- possible due to acute cholecystectomy -s/p cholestomy tube placement   04/16/18- etiology of fever is unclear at this time, she was treated with vancomycin initially and was stopped due to worsening renal failure.  She remains on meropenem ,flagyl, diflucan.  With persistent fever , will follow repeat blood cultures and will discuss with critical care team about changing her lines.  Will do indium scan .  Her prognosis is guarded , will add 7 days of empiric zyvox if wbc continues to increase and fever persists .   04/18-wbc is 29, will add empiric zyvox, stop flagyl, add micafungin, follow sputum cultures   Prognosis guarded,     4/19- Etiology still unclear, could be Drug fever but WBC still rising--- Meropenem resumed  04/20- will send lactate acid, will continue empiric therapy with  Zyvox/meropenem ,micafungin, will closely monitor wbc   04/21- will stop meropenem and slowly deescalate therapy -continue micafungin and zyvox. Will continue C difficle therapy   04/22- will continue to monitor fever curve and deescalate antimicrobial therapy , will limit therapy to C difficle   04/23 - etiology is unclear, will do MRI of spinal axis and also repeat abdominal CT scan .  Case was discussed with Dr Prasanna Mims

## 2018-04-23 NOTE — PLAN OF CARE
CM called mother and gave her update on the next transition of care to LTAC. CM explained what is this LOC. Mother understood. Mother was in agreement and CM read off choices for care. Mother stated [facility] was fine. Preference form witness by Jb Bowen RN and referral placed in [facility]. CM contacted Carmelita at [facility]. She will be here this afternoon for clinical updates. CM to f/u for safe transition     04/23/18 1133   Discharge Reassessment   Assessment Type Discharge Planning Reassessment   Provided patient/caregiver education on the expected discharge date and the discharge plan No   Do you have any problems affording any of your prescribed medications? No   Discharge Plan A Long-term acute care facility (LTAC)   Discharge Plan B Long-term acute care facility (LTAC)   Patient choice form signed by patient/caregiver N/A   Can the patient answer the patient profile reliably? No, cognitively impaired   How does the patient rate their overall health at the present time? Fair   Describe the patient's ability to walk at the present time. Major restrictions/daily assistance from another person   How often would a person be available to care for the patient? Whenever needed   Number of comorbid conditions (as recorded on the chart) One   During the past month, has the patient often been bothered by feeling down, depressed or hopeless? No   During the past month, has the patient often been bothered by little interest or pleasure in doing things? No

## 2018-04-23 NOTE — EICU
Non violent soft restraints renewed for preventing self injury and harm while on Vent from agitation.

## 2018-04-23 NOTE — SUBJECTIVE & OBJECTIVE
Review of Systems   Unable to perform ROS: Patient nonverbal       Objective:     Vital Signs (Most Recent):  Temp: 98.9 °F (37.2 °C) (04/23/18 0705)  Pulse: 103 (04/23/18 0919)  Resp: (!) 35 (04/23/18 0919)  BP: 126/70 (04/23/18 0905)  SpO2: 96 % (04/23/18 0919) Vital Signs (24h Range):  Temp:  [98.3 °F (36.8 °C)-101.7 °F (38.7 °C)] 98.9 °F (37.2 °C)  Pulse:  [] 103  Resp:  [16-35] 35  SpO2:  [92 %-100 %] 96 %  BP: (100-142)/(57-99) 126/70     Weight: 121 kg (266 lb 12.1 oz)  Body mass index is 43.06 kg/m².      Intake/Output Summary (Last 24 hours) at 04/23/18 1200  Last data filed at 04/23/18 1005   Gross per 24 hour   Intake             1330 ml   Output             2414 ml   Net            -1084 ml       Physical Exam   Constitutional: Vital signs are normal. She appears well-developed and well-nourished. She has a sickly appearance. She appears ill.   Obese young female on mechanical ventilation via trach   HENT:   Head: Atraumatic.   Nose:       Mouth/Throat: Oropharynx is clear and moist.   Eyes: Pupils are equal, round, and reactive to light. Scleral icterus is present.   Neck: Full passive range of motion without pain.       Obese    Cardiovascular: Regular rhythm.  Tachycardia present.  Exam reveals distant heart sounds.    Pulses:       Radial pulses are 2+ on the right side, and 2+ on the left side.        Dorsalis pedis pulses are 2+ on the right side, and 2+ on the left side.   Pulmonary/Chest: Effort normal. Tachypnea noted. No respiratory distress. She has decreased breath sounds. She has rales.   Vent controlled resp effort   Abdominal: She exhibits no distension. Bowel sounds are decreased. There is hepatomegaly. There is no tenderness.       Morbidly obese.  Semi-soft to firm   Genitourinary:   Genitourinary Comments: Rodriguez in place   Musculoskeletal: Normal range of motion. She exhibits edema (trace edema BLE).   +1 bilat tibial edema   Lymphadenopathy:     She has no cervical adenopathy.    Neurological: She is alert.   Nods head to questions.  Follows commands inconsistently    Skin: Skin is warm and dry. Capillary refill takes less than 2 seconds. No cyanosis.        Psychiatric: Her affect is blunt.       Vents:  Vent Mode: A/C (04/23/18 0919)  Ventilator Initiated: Yes (04/05/18 1930)  Set Rate: 18 bmp (04/23/18 0919)  Vt Set: 0 mL (04/23/18 0919)  Pressure Support: 0 cmH20 (04/23/18 0919)  PEEP/CPAP: 5 cmH20 (04/23/18 0919)  Oxygen Concentration (%): 40 (04/23/18 0919)  Peak Airway Pressure: 29 cmH2O (04/23/18 0919)  Plateau Pressure: 30 cmH20 (04/23/18 0919)  Total Ve: 14.2 mL (04/23/18 0919)  F/VT Ratio<105 (RSBI): (!) (P) 97.77 (04/23/18 0919)    Lines/Drains/Airways     Peripherally Inserted Central Catheter Line                 PICC Double Lumen 04/14/18 1000 right brachial 9 days          Drain                 Rectal Tube 04/12/18 0710 rectal tube w/ balloon (indicate number of mLs) 11 days         Biliary Tube 04/13/18 1200 RLQ 10 days         Urethral Catheter 04/15/18 1250 Temperature probe 7 days         NG/OG Tube 04/22/18 1500 nasogastric Right nostril less than 1 day          Airway                 Surgical Airway 04/11/18 1403 Shiley Cuffed;Long;Proximal 11 days                Significant Labs:    CBC/Anemia Profile:    Recent Labs  Lab 04/22/18  0400 04/23/18  0353   WBC 26.85* 28.91*   HGB 7.5* 7.8*   HCT 23.7* 24.8*    188   MCV 99* 99*   RDW 23.7* 24.6*        Chemistries:    Recent Labs  Lab 04/22/18  0400 04/23/18  0353    144   K 3.1* 3.2*    107   CO2 21* 22*   BUN 19 22*   CREATININE 2.5* 2.5*   CALCIUM 10.1 10.9*   ALBUMIN 1.6* 1.7*   PROT 5.8* 6.1   BILITOT 12.9* 13.9*   ALKPHOS 93 93   ALT 12 11   * 115*   MG 1.7 1.8   PHOS 3.0 3.3       Coagulation:   Recent Labs  Lab 04/23/18  0353   INR 1.6*     All pertinent labs within the past 24 hours have been reviewed.  NH3 71
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
## 2018-04-23 NOTE — PROGRESS NOTES
1615 Pt taken down via portable vent to CT scan and MRI,  pt still currently in MRI.  MRI is to last 2.5 hours.

## 2018-04-23 NOTE — ASSESSMENT & PLAN NOTE
04/09/2018-will use PO vancomycin 250mg every 8 hours for 10 days .  Due to her multiple electrolyte abnormalities, there is concern for ileus ,will continue Flagyl for now and adjust therapy as needed   .  04/16/2018- will continue PO vanco/flagyl -wbc remains very high at 25, -   Will plan to repeat imaging if leucocytosis persists .    04/17/18- the CT scan of the abdomen showed pancolitis likely related to C difficle -will continue po vancomycin and flagyl ,.  04/18- on flagyl since admit yet has marked leucocytosis .,also on vancomycin PO , will add rectal vanco enema , stop IV Flagyl.    4/19- persists- on oral and Rectal Vanco  04/20- will continue oral and rectal  vanco for now. She has been on therapy for c difficle with vanco since 04/09 04/21-wbc remains high at 31, will stop meropenem, continue PO and vanco enema   04/22- day 13 of C difficle therapy , will stop zyvox,micafungin, wbc is on a downward trend  04/23- day 14 of C difficle therapy - will stop C difficile therapy -  Repeat CT scan of the abdomen will be done, repeat C .difficle assay is negative.

## 2018-04-23 NOTE — PROGRESS NOTES
Follow up visit with Ms. Duron.  She remains on low air loss bariatric bed. Bilateral heels intact with no breakdown. Perineum, bilateral medial thighs, bilateratal buttock MASD reassessed with good improvement noted.  Continue use of paste barrier cream. Flexi Seal and Rodriguez catheter in use. Trach noted with gauze sponge intact and no drainage noted.pasquale-trach skin intact. Will follow.

## 2018-04-23 NOTE — PLAN OF CARE
Problem: Patient Care Overview  Goal: Plan of Care Review  Outcome: Ongoing (interventions implemented as appropriate)  Patient remains on ventilator at this time. Shiley #7 XL in place and secure at this time. No redness or breakdown noted at this time. Patient does continue to ooze green secretions around trach. Patient tolerates well.

## 2018-04-23 NOTE — ASSESSMENT & PLAN NOTE
S/p cholecystectomy tube-supportive care  04/21- will start antibiotic descalation .  Will stop meropenem.    04/22- continue cholecystectomy tube,bridgette guzman    4494- s/p cholecystectomy tube-

## 2018-04-23 NOTE — PROGRESS NOTES
[facility] -   Adult Nutrition  Progress Note    SUMMARY     Recommendations    Recommendation/Intervention: 1.Continue current TF regimen. 2. Will continue to monitor.   Goals: Meet > 85 % EEN/EPN   Nutrition Goal Status: goal met   Communication of RD Recs:  (POC, sticky note)    Reason for Assessment    Reason for Assessment: RD follow-up  Dx:  1. Respiratory failure    2. Septic shock    3. Acute blood loss anemia    4. Acute hypoxemic respiratory failure    5. Acute urinary tract infection    6. Electrolyte imbalance    7. ETOH abuse    8. Hyperglycemia, unspecified    9. Hyponatremia    10. Metabolic acidosis    11. Retained products of conception with hemorrhage    12. Seizure    13. Hypertriglyceridemia    14. Shock liver    15. Fever, unspecified fever cause    16. S/P dilatation and curettage    17. History of IUFD    18. Hepatomegaly    19. Pneumonia of both lower lobes due to methicillin resistant Staphylococcus aureus (MRSA)    20. Asystole    21. Spontaneous  with cardiac arrest or failure    22. Hyperbilirubinemia    23. Cardiopulmonary arrest    24. Clostridium difficile infection    25. Pneumonia of right lower lobe due to methicillin resistant Staphylococcus aureus (MRSA)    26. Thrombocytopenia    27. UTI due to Klebsiella species    28. Respiratory failure, unspecified chronicity, unspecified whether with hypoxia or hypercapnia    29. VIKRAM (acute kidney injury)    30. Hypocalcemia    31. Hypervolemia, unspecified hypervolemia type    32. Elevated LFTs    33. Acute respiratory failure with hypoxia and hypercapnia    34. Acalculous cholecystitis    35. Alcoholic hepatitis with ascites    36. Candida UTI    37. Hepatic encephalopathy    38. Pure hyperglyceridemia    39. Lung infiltrate on CT      Past Medical History:   Diagnosis Date    Gestational diabetes     Hypertension        Interdisciplinary Rounds: attended  General Information Comments: Pt remains on ventilator  "support. Patient remains on continuous tube feedings at goal rate.   Nutrition Discharge Planning: too soon to determine    Nutrition Risk Screen    Nutrition Risk Screen: no indicators present    Nutrition/Diet History    Do you have any cultural, spiritual, Evangelical conflicts, given your current situation?: None      Anthropometrics    Temp: 98.9 °F (37.2 °C)  Height Method: Estimated  Height: 5' 6" (167.6 cm)  Height (inches): 66 in  Weight Method: Bed Scale  Weight: 121 kg (266 lb 12.1 oz)  Weight (lb): 266.76 lb  Ideal Body Weight (IBW), Female: 130 lb  % Ideal Body Weight, Female (lb): 206.89 lb  BMI (Calculated): 43.5  BMI Grade: greater than 40 - morbid obesity       Lab/Procedures/Meds    Pertinent Labs Reviewed: reviewed    BMP  Lab Results   Component Value Date     04/23/2018    K 3.2 (L) 04/23/2018     04/23/2018    CO2 22 (L) 04/23/2018    BUN 22 (H) 04/23/2018    CREATININE 2.5 (H) 04/23/2018    CALCIUM 10.9 (H) 04/23/2018    ANIONGAP 15 04/23/2018    ESTGFRAFRICA 30 (A) 04/23/2018    EGFRNONAA 26 (A) 04/23/2018     Lab Results   Component Value Date    CALCIUM 10.9 (H) 04/23/2018    PHOS 3.3 04/23/2018     Lab Results   Component Value Date    ALBUMIN 1.7 (L) 04/23/2018       Recent Labs  Lab 04/23/18  0833   POCTGLUCOSE 98       Pertinent Medications Reviewed: reviewed    Physical Findings/Assessment    Overall Physical Appearance: on ventilator support (obese)  Tubes:  (OG)  Oral/Mouth Cavity:  (WDL)  Skin:  (incision site neck, wound shearing; moisture associated dermatitis medial thigh, wound; moisture associated dermatitis midline gluteal cleft)    Estimated/Assessed Needs    Weight Used For Calorie Calculations: 121 kg (266 lb 12.1 oz)  Energy Calorie Requirements (kcal): 2522  Energy Need Method: [place] (modified)  Protein Requirements: 148 g   Weight Used For Protein Calculations: 59 kg (130 lb) (IBW - Obese ICU)     Fluid Need Method: RDA Method (or per MD)  RDA Method (mL): " 2522         Nutrition Prescription Ordered    Current Diet Order: NPO  Current Nutrition Support Formula Ordered: Peptamen 1.5 w/Prebio  Current Nutrition Support Rate Ordered: 60 (ml)  Current Nutrition Support Frequency Ordered: ml/hr  Oral Nutrition Supplement: Beneprotein 2 pack QID    Evaluation of Received Nutrient/Fluid Intake    Enteral Calories (kcal): 2360  Enteral Protein (gm): 145  Total Calories (kcal): 2360  % Kcal Needs: 94  % Protein Needs: 98     Intake/Output Summary (Last 24 hours) at 04/23/18 1041  Last data filed at 04/23/18 1005   Gross per 24 hour   Intake             1330 ml   Output             2523 ml   Net            -1193 ml       % Intake of Estimated Energy Needs: 75 - 100 %  % Meal Intake: NPO    Nutrition Risk          Assessment and Plan    Acute hypoxemic respiratory failure    Contributing Nutrition Diagnosis  Inadequate energy intake     Related to (etiology):   Inability to consume sufficient energy     Signs and Symptoms (as evidenced by):   Intubated, NPO, no alternative means of nutrition.      Interventions/Recommendations (treatment strategy):  Please see RD recs above.    Nutrition Diagnosis Status:   Improving. 4.23.18. Pt on TF meeting needs               Monitor and Evaluation    Food and Nutrient Intake: energy intake, enteral nutrition intake  Food and Nutrient Adminstration: enteral and parenteral nutrition administration  Anthropometric Measurements: weight  Biochemical Data, Medical Tests and Procedures: electrolyte and renal panel, glucose/endocrine profile  Nutrition-Focused Physical Findings: overall appearance     Nutrition Follow-Up    RD Follow-up?: Yes (2xweekly)

## 2018-04-24 PROBLEM — E87.8 ELECTROLYTE IMBALANCE: Status: RESOLVED | Noted: 2018-01-01 | Resolved: 2018-01-01

## 2018-04-24 PROBLEM — R57.9 SHOCK, UNSPECIFIED: Status: ACTIVE | Noted: 2018-01-01

## 2018-04-24 PROBLEM — E83.52 HYPERCALCEMIA: Status: ACTIVE | Noted: 2018-01-01

## 2018-04-24 PROBLEM — E87.70 HYPERVOLEMIA: Status: ACTIVE | Noted: 2018-01-01

## 2018-04-24 NOTE — ASSESSMENT & PLAN NOTE
Currently intubated.  No acute infiltrates seen on CXR  Continue IV antibiotics empirically.  Pulmonary consult.    04/07/2018- will wean off ventilator as tolerated.  She is more awake and alert today.  Sputum cultures showed MRSA and pseudomonas-she is on vanco/meropenem-will deescalate soon.       04/08/2018- she is now intubated after asystolic cardiac arrest .Will wean off as tolerated.  04/09/2018- she remains intubated ,critical care follow up .wean off ventilator as tolerated.  4/10/2018 - likely secondary to MRSA and pseudamonal pneumonia. ID consulted, will consider double covering for pseudomonas.  04/12/18- continue Vent support  04/15/18- Trach collar in place  04/16- will continue trach collar and wean as tolerated.  04/17/18- continue trachea care/weaning   04/18-continue trach care ,wean as tolerated   4/19- continue present care  04/20-wean off ventilator as tolerated.  04/21- continue trach care /pulmonology follow up   04/22-continue ventilatory support and weaning   04/23 - will wean off ventilatory support as tolerated   4/24- cont Vent support via Trach, has new fever and Leukocytosis, on IV abx.

## 2018-04-24 NOTE — PLAN OF CARE
Problem: Patient Care Overview  Goal: Plan of Care Review  Outcome: Ongoing (interventions implemented as appropriate)  Pt stable. Pt is NSR/ST on the heart monitor.  Pt remains on vent.  Restraints remain in place for pt still trying to pull at lines and drains.  Pt received IV potassium and mag, and abx adjusted see MAR.  Pt received lactulose and rectal tube intact with output >1000 this shift.  Rodriguez intact draining rosa urine.  Gallbladder drain intact and draining to gravity bag.  Pt on cooling blanket.  Temp max 101.  Cdiff recollected and negative, MD aware.  Pt remains on ISO.  Pt was free from falls or injuries during duration of shift.  Pt turned and repositioned with rotation therapy speciality bed.   PICC intact with no redness, swelling or drainage, dressing changed this shift.  Pt had CT and MRI this shift; pt currently still in MRI.  Bed low, wheels locked, bed alarm on, call light in reach.  Pt instructed to call for assistance.   Plan of care reviewed with pt and mom via phone.  Pt nods head to understanding and mother verbalized understanding.

## 2018-04-24 NOTE — SUBJECTIVE & OBJECTIVE
Interval History: Pt was seen and examined in ICU. No new events, stable last pm. Discussed with ICU team. No overall change.    Review of patient's allergies indicates:  No Known Allergies  Current Facility-Administered Medications   Medication Frequency    albuterol-ipratropium 2.5mg-0.5mg/3mL nebulizer solution 3 mL Q4H PRN    bisacodyl suppository 10 mg Daily PRN    dextrose 50% injection 12.5 g PRN    famotidine (PF) injection 20 mg Daily    fentaNYL injection 50 mcg Q2H PRN    folic acid-vit B6-vit B12 2.5-25-2 mg tablet 1 tablet Daily    glucagon (human recombinant) injection 1 mg PRN    heparin (porcine) injection 5,000 Units Q8H    insulin aspart U-100 pen 1-10 Units Q6H PRN    lactulose 20 gram/30 mL solution Soln 30 g TID    linezolid 600mg/300ml IVPB 600 mg Q12H    lorazepam (ATIVAN) injection 2 mg Q2H PRN    micafungin 100 mg in sodium chloride 0.9 % 100 mL IVPB (ready to mix system) Q24H    ondansetron injection 4 mg Q8H PRN    piperacillin-tazobactam 4.5 g in dextrose 5 % 100 mL IVPB (ready to mix system) Q8H    pneumoc 13-bobby conj-dip cr(PF) 0.5 mL vaccine x 1 dose    rifAXIMin tablet 550 mg BID    sodium chloride 0.9% flush 5 mL PRN    thiamine tablet 100 mg Daily       Objective:     Vital Signs (Most Recent):  Temp: 99 °F (37.2 °C) (04/24/18 1105)  Pulse: (!) 114 (04/24/18 1105)  Resp: (!) 30 (04/24/18 1105)  BP: 109/60 (04/24/18 1105)  SpO2: 99 % (04/24/18 1105)  O2 Device (Oxygen Therapy): ventilator (04/24/18 1010) Vital Signs (24h Range):  Temp:  [98.4 °F (36.9 °C)-102.1 °F (38.9 °C)] 99 °F (37.2 °C)  Pulse:  [] 114  Resp:  [19-41] 30  SpO2:  [92 %-100 %] 99 %  BP: ()/() 109/60     Weight: 118 kg (260 lb 2.3 oz) (04/24/18 0600)  Body mass index is 41.99 kg/m².  Body surface area is 2.34 meters squared.    I/O last 3 completed shifts:  In: 2620 [I.V.:50; NG/GT:2370; IV Piggyback:200]  Out: 4921 [Urine:1046; Drains:875; Stool:3000]    Physical Exam    Constitutional: She appears well-developed and well-nourished.   Neck: No JVD present.   Cardiovascular: Normal rate and regular rhythm.  Exam reveals no friction rub.    Pulmonary/Chest: Breath sounds normal.   Abdominal: Soft.   Musculoskeletal: She exhibits edema.   Skin: Skin is warm and dry.   Nursing note and vitals reviewed.      Significant Labs: reviewed  BMP  Lab Results   Component Value Date     04/24/2018    K 4.5 04/24/2018     (H) 04/24/2018    CO2 17 (L) 04/24/2018    BUN 25 (H) 04/24/2018    CREATININE 2.9 (H) 04/24/2018    CALCIUM 11.5 (H) 04/24/2018    ANIONGAP 17 (H) 04/24/2018    ESTGFRAFRICA 25 (A) 04/24/2018    EGFRNONAA 22 (A) 04/24/2018     Lab Results   Component Value Date    WBC 37.01 (H) 04/24/2018    HGB 8.0 (L) 04/24/2018    HCT 25.7 (L) 04/24/2018     (H) 04/24/2018     04/24/2018         Significant Imaging: reviewed

## 2018-04-24 NOTE — PROGRESS NOTES
Renal f/u note:  Metabolic acidosis has markedly worsened.  Noted also decreased UOP this afternoon  Discussed with ICU:  The following abnormal labs were noted and discussed with ICU:    BMP  Lab Results   Component Value Date     (H) 04/24/2018    K 4.0 04/24/2018     (H) 04/24/2018    CO2 11 (L) 04/24/2018    BUN 29 (H) 04/24/2018    CREATININE 3.4 (H) 04/24/2018    CALCIUM 11.2 (H) 04/24/2018    ANIONGAP 23 (H) 04/24/2018    ESTGFRAFRICA 21 (A) 04/24/2018    EGFRNONAA 18 (A) 04/24/2018     ABG    Recent Labs  Lab 04/24/18  1433   PH 7.260*   PO2 99   PCO2 24.3*   HCO3 10.9*   BE -16     Lactic acid 10    A/P worsened metabolic acidosis/lactic acidosis  Suspect occult infection, sepsis  Respiratory alkalosis  Oliguria, worsened  Pt will benefit from dialysis  Pt is hypotensive and will not tolerate conventional hemodialysis  Pt was started on CRRT (continuous dialysis)  Dialysis orders and prescription discussed with HD RN.  Pt remains critically ill.  Pt received multiple visits and evaluations by me in ICU today    Luciana Queen MD

## 2018-04-24 NOTE — SUBJECTIVE & OBJECTIVE
Review of Systems   Unable to perform ROS: Patient nonverbal       Objective:     Vital Signs (Most Recent):  Temp: 99 °F (37.2 °C) (04/24/18 1105)  Pulse: (!) 114 (04/24/18 1105)  Resp: (!) 30 (04/24/18 1105)  BP: 109/60 (04/24/18 1105)  SpO2: 99 % (04/24/18 1105) Vital Signs (24h Range):  Temp:  [98.4 °F (36.9 °C)-102.1 °F (38.9 °C)] 99 °F (37.2 °C)  Pulse:  [] 114  Resp:  [19-41] 30  SpO2:  [93 %-100 %] 99 %  BP: ()/() 109/60     Weight: 118 kg (260 lb 2.3 oz)  Body mass index is 41.99 kg/m².      Intake/Output Summary (Last 24 hours) at 04/24/18 1257  Last data filed at 04/24/18 1200   Gross per 24 hour   Intake             2220 ml   Output             2909 ml   Net             -689 ml       Physical Exam   Constitutional: She appears well-developed and well-nourished. She appears toxic. She has a sickly appearance. She appears ill.   Obese young female on mechanical ventilation via trach   HENT:   Head: Atraumatic.   Nose:       Mouth/Throat: Oropharynx is clear and moist.   Eyes: Pupils are equal, round, and reactive to light. Scleral icterus is present.   Neck: Full passive range of motion without pain.       Obese    Cardiovascular: Regular rhythm.  Tachycardia present.  Exam reveals distant heart sounds.    Pulses:       Radial pulses are 2+ on the right side, and 2+ on the left side.        Dorsalis pedis pulses are 2+ on the right side, and 2+ on the left side.   Pulmonary/Chest: Effort normal. Tachypnea noted. No respiratory distress. She has decreased breath sounds. She has rales.   Vent controlled resp effort   Abdominal: Bowel sounds are normal. There is hepatomegaly. There is tenderness in the left upper quadrant. There is no rebound and no guarding.       Morbidly obese.  Semi-soft to firm   Genitourinary:   Genitourinary Comments: Rodriguez in place   Musculoskeletal: Normal range of motion. She exhibits edema (trace edema BLE).   Mild general LE edema   Lymphadenopathy:     She has  no cervical adenopathy.   Neurological: She is alert.   Nods head to questions.  Follows commands inconsistently    Skin: Skin is warm and dry. Capillary refill takes less than 2 seconds. No cyanosis.            Vents:  Vent Mode: A/C (04/24/18 1010)  Ventilator Initiated: Yes (04/05/18 1930)  Set Rate: 18 bmp (04/24/18 1010)  Vt Set: 0 mL (04/24/18 1010)  Pressure Support: 0 cmH20 (04/24/18 1010)  PEEP/CPAP: 5 cmH20 (04/24/18 1010)  Oxygen Concentration (%): 40 (04/24/18 1105)  Peak Airway Pressure: 28 cmH2O (04/24/18 1010)  Plateau Pressure: 0 cmH20 (04/24/18 1010)  Total Ve: 22.6 mL (04/24/18 1010)  F/VT Ratio<105 (RSBI): (!) 28.44 (04/24/18 1010)    Lines/Drains/Airways     Peripherally Inserted Central Catheter Line                 PICC Double Lumen 04/14/18 1000 right brachial 10 days          Drain                 Rectal Tube 04/12/18 0710 rectal tube w/ balloon (indicate number of mLs) 12 days         Biliary Tube 04/13/18 1200 RLQ 11 days         Urethral Catheter 04/15/18 1250 Temperature probe 9 days         NG/OG Tube 04/22/18 1500 nasogastric Right nostril 1 day          Airway                 Surgical Airway 04/11/18 1403 Shiley Cuffed;Long;Proximal 12 days                Significant Labs:    CBC/Anemia Profile:    Recent Labs  Lab 04/23/18  0353 04/24/18  0411   WBC 28.91* 37.01*   HGB 7.8* 8.0*   HCT 24.8* 25.7*    223   MCV 99* 102*   RDW 24.6* 25.9*        Chemistries:    Recent Labs  Lab 04/23/18  0353 04/24/18  0411    145   K 3.2* 4.5    111*   CO2 22* 17*   BUN 22* 25*   CREATININE 2.5* 2.9*   CALCIUM 10.9* 11.5*   ALBUMIN 1.7* 1.8*   PROT 6.1 6.6   BILITOT 13.9* 15.9*   ALKPHOS 93 106   ALT 11 16   * 160*   MG 1.8 2.1   PHOS 3.3 4.4       All pertinent labs within the past 24 hours have been reviewed.    Significant Imaging:  CT: I have reviewed all pertinent results/findings within the past 24 hours and my personal findings are:  Abd: no change from previous

## 2018-04-24 NOTE — ASSESSMENT & PLAN NOTE
Currently 8.9 after 2 units PRBC transfusion at outside facility.  No active bleeding at this time.  Labs in AM.      04/07/2018- hemoglobin is stable at 8.7(was 8.9) two days ago.  Will continue to monitor closely  4/10/2018 - Pt transfused 4 units prbcs, plat, ffp and cryo. Hem/Onc following, will transfuse PRN.  04/17/18- hemoglobin is 8.9, no evidence of bleeding -will continue to monitor .     4/19- stable    4/24- Stable H/H
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
## 2018-04-24 NOTE — ASSESSMENT & PLAN NOTE
04/07/2018- will closely monitor fever curve, will stop flagyl, change meropenem to zosyn,continue vancomycin for now.  Will deescalate soon.  Blood cultures -neg, sputum cultures-MRSA and pseudomonas   4/11/2018 - antibx adjusted today, per icu, ID consulted  04/13/18- possible due to acute cholecystectomy -s/p cholestomy tube placement   04/16/18- etiology of fever is unclear at this time, she was treated with vancomycin initially and was stopped due to worsening renal failure.  She remains on meropenem ,flagyl, diflucan.  With persistent fever , will follow repeat blood cultures and will discuss with critical care team about changing her lines.  Will do indium scan .  Her prognosis is guarded , will add 7 days of empiric zyvox if wbc continues to increase and fever persists .   04/18-wbc is 29, will add empiric zyvox, stop flagyl, add micafungin, follow sputum cultures   Prognosis guarded,     4/19- Etiology still unclear, could be Drug fever but WBC still rising--- Meropenem resumed  04/20- will send lactate acid, will continue empiric therapy with  Zyvox/meropenem ,micafungin, will closely monitor wbc   04/21- will stop meropenem and slowly deescalate therapy -continue micafungin and zyvox. Will continue C difficle therapy   04/22- will continue to monitor fever curve and deescalate antimicrobial therapy , will limit therapy to C difficle   04/23 - etiology is unclear, will do MRI of spinal axis and also repeat abdominal CT scan .  Case was discussed with Dr Prasanna Mims   4/24- still unclear about etiology-- no obvious infection or Drug fever

## 2018-04-24 NOTE — H&P (VIEW-ONLY)
Referring Provider:    Mitzi Self  No address on file  Subjective:   Patient: Rafael Duron [id number], :1995   Visit date:4/3/2018 12:53 PM    Reason for consultation:  Respiratory Failure/Failure to wean from vent    HPI:      Ms Duron is a 23 yo obese BF with a PMH of HTN, gestational DM and HTN who presented to [facility] ED in [place], [place] on  with complaint of SOB and cough with known pregnancy.  OB US revealed no heart tones and she is also  with second trimester fetal demise estimated 22 week for which she passed with retained placenta.  After admission to ICU she had seizure activity with , K+ 1.9 and Bicarb 11.  She also had etoh level 268 and reportedly had been drinking etoh w/ honey heavily for several days.  She required intubation for airway protection per records and OB was unable to remove placenta manually and patient had to be taken to OR.   She as transferred to [facility] ICU for higher level of care.         He has remained on the ventilator without weaning for the past 9 day(s).  her cardiopulmonary comorbidities have been optimized as much as possible and this has not resulted in resolution of the need for ventilatory support.      Peak Pressures- 18  Vasopressors- Yes  Anticoagulants- No  BMI- Body mass index is 48.75 kg/m².      Review of Systems:  Unable to perform due to patient's current medical condition    Her meds, allergies, medical, surgical, social & family histories were reviewed & updated:  -     Jodi [unfilled]  -     She  has a past medical history of Gestational diabetes and Hypertension.   -     She  does not have any pertinent problems on file.   -     She  has no past surgical history on file.  -     She  reports that she has never smoked. She has never used smokeless tobacco. She reports that she drinks alcohol. She reports that she does not use drugs.  -     Her family history includes Diabetes in her father, mother, and sister;  "Heart disease in her mother.  -     She has No Known Allergies.    Objective:     Physical Exam:  Vitals:  BP (!) 101/42 (BP Location: Left arm, Patient Position: Lying)   Pulse 94   Temp 99.9 °F (37.7 °C) (Oral)   Resp 20   Ht 5' 6" (1.676 m)   Wt (!) 137 kg (302 lb 0.5 oz)   LMP  (LMP Unknown)   SpO2 97%   Breastfeeding? No   BMI 48.75 kg/m²   General appearance: Critically ill, intubated    Eyes:  Extraocular motions intact, PERRL    Communication:  Unable to communicate due to medical state    Ears:  Otoscopy of external auditory canals and tympanic membranes was normal, clinical speech reception thresholds grossly intact, no mass/lesion of auricle.  Nose:  No masses/lesions of external nose, nasal mucosa, septum, and turbinates were within normal limits.  Mouth:  No mass/lesion of lips, teeth, gums, hard/soft palate, tongue, tonsils, or oropharynx.    Cardiovascular:  No pedal edema; Radial Pulses +2     Neck & Lymphatics:  No cervical lymphadenopathy, no neck mass/crepitus/ asymmetry, trachea is midline, no thyroid enlargement/tenderness/mass.    Psych: Oriented x3,  Alert with normal mood and affect.     Respiration/Chest:  Symmetric expansion during respiration, normal respiratory effort.    Skin:  Warm and intact. No ulcerations of face, scalp, neck.    Laboratory:  CBC:   Recent Labs  Lab 04/11/18  0321   WBC 19.04*   RBC 3.05*   HGB 9.3*   HCT 29.0*   PLT 57*   MCV 95   MCH 30.5   MCHC 32.1         Assessment & Plan:   Chronic respiratory failure/Failure to wean from vent- [facility], [place] remains critically ill with severe respiratory disease and complex medical conditions.  she is not on anticoagulation or coagulopathic due to medical conditions.  she is morbidly obese.  The acute risk of tracheostomy placement is bleeding, pneumothorax, pneumomediastinum, subcutaneous emphysema, damage to the esophagus, injury to the vocal cords, tracheostomy blockage or dislodgment.  Long term risks are " erosion/thinning of the trachea, tracheosophageal fisutula, granulation tissue or scar tissue in the airway and persistent tracheocutaneous fistula after decannulation.  In children, smokers, alcohol abusers, diabetics, immunocompromised patients, patients with chronic diseases or respiratory infection or persons taking steroids the risks are considerably higher.  The health care proxy had the opportunity to ask several questions to their satisfaction and consent was documented to proceed with tracheostomy placement.

## 2018-04-24 NOTE — PROGRESS NOTES
1615- Pt transported to CT and MRI on portable ventilator and stretcher.  Pt tolerating well.    1845- Prn ativan given to help with restlessness during MRI.  Pt tolerated ativan well, see flow sheet for vitals.  1900-This RN remained with pt until night shift nurse, Kortney, arrived to MRI, report given and night nurse to assume care.  Pt stable and MRI almost completed.

## 2018-04-24 NOTE — PROCEDURES
"Rafael Duron is a 22 y.o. female patient.    Temp: 99 °F (37.2 °C) (04/24/18 1705)  Pulse: (!) 118 (04/24/18 1715)  Resp: (!) 32 (04/24/18 1715)  BP: 102/62 (04/24/18 1715)  SpO2: 98 % (04/24/18 1715)  Weight: 118 kg (260 lb 2.3 oz) (04/24/18 0600)  Height: 5' 6" (167.6 cm) (04/19/18 0545)       Arterial Line  Date/Time: 4/24/2018 5:33 PM  Location procedure was performed: [facility] INTENSIVE CARE UNIT  Performed by: VINNY LOWE  Authorized by: VINNY LOWE   Pre-op Diagnosis: shock  Post-operative diagnosis: septic shock  Consent Done: Not Needed  Preparation: Patient was prepped and draped in the usual sterile fashion.  Indications: multiple ABGs, respiratory failure and hemodynamic monitoring  Location: right brachial  Patient sedated: no  Needle gauge: 20  Seldinger technique: Seldinger technique used  Number of attempts: 2  Complications: No  Estimated blood loss (mL): 2  Specimens: No  Implants: No  Post-procedure: line sutured and dressing applied  Post-procedure CMS: unchanged  Patient tolerance: Patient tolerated the procedure well with no immediate complications          Vinny Lowe  4/24/2018  "

## 2018-04-24 NOTE — SUBJECTIVE & OBJECTIVE
Interval History: remains intubated on Vent, spiked a temp to 102 F last nite as well as WBC jumped to 37K- hence continued on IV abx-- Zyvox and Zosyn. Getting TF. CXR no new infiltrates, MRI C/S, T/S and L/S neg, CT abd neg except for massive hepatomegaly..     Review of Systems   Unable to perform ROS: Acuity of condition     Objective:     Vital Signs (Most Recent):  Temp: 99 °F (37.2 °C) (04/24/18 1105)  Pulse: (!) 114 (04/24/18 1105)  Resp: (!) 30 (04/24/18 1105)  BP: 109/60 (04/24/18 1105)  SpO2: 99 % (04/24/18 1105) Vital Signs (24h Range):  Temp:  [98.4 °F (36.9 °C)-102.1 °F (38.9 °C)] 99 °F (37.2 °C)  Pulse:  [] 114  Resp:  [19-41] 30  SpO2:  [93 %-100 %] 99 %  BP: ()/() 109/60     Weight: 118 kg (260 lb 2.3 oz)  Body mass index is 41.99 kg/m².    Intake/Output Summary (Last 24 hours) at 04/24/18 1232  Last data filed at 04/24/18 1200   Gross per 24 hour   Intake             2220 ml   Output             2909 ml   Net             -689 ml      Physical Exam   Constitutional: Vital signs are normal. She appears well-developed and well-nourished. She has a sickly appearance. She appears ill.   Obese young female on mechanical ventilation via trach   HENT:   Head: Atraumatic.   Nose:       Mouth/Throat: Oropharynx is clear and moist.   Eyes: Pupils are equal, round, and reactive to light. Scleral icterus is present.   Neck: Full passive range of motion without pain.       Obese    Cardiovascular: Regular rhythm.  Tachycardia present.  Exam reveals distant heart sounds.    Pulses:       Radial pulses are 2+ on the right side, and 2+ on the left side.        Dorsalis pedis pulses are 2+ on the right side, and 2+ on the left side.   Pulmonary/Chest: Effort normal. Tachypnea noted. No respiratory distress. She has decreased breath sounds. She has rales.   Vent controlled resp effort   Abdominal: She exhibits no distension. Bowel sounds are decreased. There is hepatomegaly. There is no tenderness.        Morbidly obese.  Semi-soft to firm   Genitourinary:   Genitourinary Comments: Rodriguez in place   Musculoskeletal: Normal range of motion. She exhibits edema (trace edema BLE).   +1 bilat tibial edema   Lymphadenopathy:     She has no cervical adenopathy.   Neurological: She is alert.   Nods head to questions.  Follows commands inconsistently    Skin: Skin is warm and dry. Capillary refill takes less than 2 seconds. No cyanosis.        Psychiatric: Her affect is blunt.   Nursing note and vitals reviewed.      Significant Labs:   Blood Culture:   BMP:   Recent Labs  Lab 04/24/18  0411   *      K 4.5   *   CO2 17*   BUN 25*   CREATININE 2.9*   CALCIUM 11.5*   MG 2.1     CBC:   Recent Labs  Lab 04/23/18 0353 04/24/18 0411   WBC 28.91* 37.01*   HGB 7.8* 8.0*   HCT 24.8* 25.7*    223     CMP:   Recent Labs  Lab 04/23/18 0353 04/24/18  0411    145   K 3.2* 4.5    111*   CO2 22* 17*   * 123*   BUN 22* 25*   CREATININE 2.5* 2.9*   CALCIUM 10.9* 11.5*   PROT 6.1 6.6   ALBUMIN 1.7* 1.8*   BILITOT 13.9* 15.9*   ALKPHOS 93 106   * 160*   ALT 11 16   ANIONGAP 15 17*   EGFRNONAA 26* 22*     Respiratory Culture:   All pertinent labs within the past 24 hours have been reviewed.    Significant Imaging: I have reviewed all pertinent imaging results/findings within the past 24 hours.

## 2018-04-24 NOTE — ASSESSMENT & PLAN NOTE
04/09/2018-case discussed with nephrology -she has positive fluid balance, will continue lasix 60mg every 8 hours .  04/15/18- trending up , nephrology following  04/16/18- serum creatinine is increasing to 2.6,will continue to follow nephrology , will avoid nephrotoxic medications.   04/17-now off vancomycin, nephrology on board, will continue to closely monitor serum creatinine.  04/18- renal function continues to improve  4/19- improving  04/22-she is oliguric, will discuss with nephrology   04/23- nephrology follow up , will continue diuresis , continue to monitor urine output-1612mls -last 24 hours  4/24- BUN/Cr worsening, Nephrology following, off any diuretics, she is > 12 L fluid Neg

## 2018-04-24 NOTE — ASSESSMENT & PLAN NOTE
K initially 1.9, improved to 2.5.  Monitor and replace.    04/07/2018-will replete hypocalcemia -corrected calcium is 8.1,phosphorus -2.5 ,will replete .    4/19- hypokalemia being corrected    4/24- corrected

## 2018-04-24 NOTE — ASSESSMENT & PLAN NOTE
Nephrology following  Now with mild Met Acidosis  Cont Oliguria  Creatine worsening  No RRT yet per Renal  Daily BMP and accurate I/Os

## 2018-04-24 NOTE — OP NOTE
Sterile technique was performed in the RLQ, lidocaine was used as a local anesthetic.  1500 ccs of dark rosa fluid removed and sent to lab for eval.  Pt tolerated the procedure well without immediate complications.  Please see radiologist report for details. F/u with PCP and/or ordering physician.

## 2018-04-24 NOTE — SUBJECTIVE & OBJECTIVE
Interval History: Pt was seen and examined in ICU. H/o was reviewed. No new c/o's, stable last pm, discussed with ICU team.    Review of patient's allergies indicates:  No Known Allergies  Current Facility-Administered Medications   Medication Frequency    albuterol-ipratropium 2.5mg-0.5mg/3mL nebulizer solution 3 mL Q4H PRN    bisacodyl suppository 10 mg Daily PRN    ceftAZIDime injection 1 g Q12H    dextrose 50% injection 12.5 g PRN    famotidine (PF) injection 20 mg Daily    fentaNYL injection 50 mcg Q2H PRN    folic acid-vit B6-vit B12 2.5-25-2 mg tablet 1 tablet Daily    glucagon (human recombinant) injection 1 mg PRN    heparin (porcine) injection 5,000 Units Q8H    insulin aspart U-100 pen 1-10 Units Q6H PRN    lactulose 20 gram/30 mL solution Soln 30 g TID    lorazepam (ATIVAN) injection 2 mg Q2H PRN    multivitamin tablet 1 tablet Daily    ondansetron injection 4 mg Q8H PRN    pneumoc 13-bobby conj-dip cr(PF) 0.5 mL vaccine x 1 dose    potassium chloride 40 mEq in 100 mL IVPB (FOR CENTRAL LINE ADMINISTRATION ONLY) Q4H    rifAXIMin tablet 550 mg BID    sodium chloride 0.9% flush 5 mL PRN    thiamine tablet 100 mg Daily    vitamin D 1000 units tablet 5,000 Units Daily       Objective:     Vital Signs (Most Recent):  Temp: 100 °F (37.8 °C) (04/23/18 1505)  Pulse: 96 (04/23/18 1845)  Resp: (!) 22 (04/23/18 1845)  BP: (!) 94/51 (04/23/18 1845)  SpO2: 99 % (04/23/18 1845)  O2 Device (Oxygen Therapy): ventilator (04/23/18 1845) Vital Signs (24h Range):  Temp:  [98.5 °F (36.9 °C)-101.1 °F (38.4 °C)] 100 °F (37.8 °C)  Pulse:  [] 96  Resp:  [20-41] 22  SpO2:  [92 %-100 %] 99 %  BP: ()/() 94/51     Weight: 121 kg (266 lb 12.1 oz) (04/22/18 0537)  Body mass index is 43.06 kg/m².  Body surface area is 2.37 meters squared.    I/O last 3 completed shifts:  In: 2680 [I.V.:50; NG/GT:2230; IV Piggyback:400]  Out: 5347 [Urine:1997; Drains:550; Stool:2800]    Physical Exam   Constitutional:  She appears well-developed and well-nourished.   Neck: No JVD present.   Cardiovascular: Normal rate and regular rhythm.  Exam reveals no friction rub.    Pulmonary/Chest: Breath sounds normal.   Abdominal: Soft.   Musculoskeletal: She exhibits edema.   Skin: Skin is warm and dry.   Nursing note and vitals reviewed.      Significant Labs: reviewed  BMP  Lab Results   Component Value Date     04/23/2018    K 3.2 (L) 04/23/2018     04/23/2018    CO2 22 (L) 04/23/2018    BUN 22 (H) 04/23/2018    CREATININE 2.5 (H) 04/23/2018    CALCIUM 10.9 (H) 04/23/2018    ANIONGAP 15 04/23/2018    ESTGFRAFRICA 30 (A) 04/23/2018    EGFRNONAA 26 (A) 04/23/2018     Lab Results   Component Value Date    WBC 28.91 (H) 04/23/2018    HGB 7.8 (L) 04/23/2018    HCT 24.8 (L) 04/23/2018    MCV 99 (H) 04/23/2018     04/23/2018       Significant Imaging: reviewed, CXR minimal pulm edema

## 2018-04-24 NOTE — ASSESSMENT & PLAN NOTE
GI no longer following  Hx heavy etoh abuse  Cont Lactulose and Rifaximin - NH3 still elevated  Cont Thiamine, MVI and Folic Acid

## 2018-04-24 NOTE — PLAN OF CARE
Problem: Patient Care Overview  Goal: Plan of Care Review  Outcome: Ongoing (interventions implemented as appropriate)  Patient remains in restraints this a.m.  Restraint order renewed this a.m.  She is also still on tube feedings this a.m.  Rodriguez with criticore, fecal management system, bilary tube, and ng tube still intact.  While performing mouth care last night, patient bit the green sponge off of the white stick I was cleaning her teeth and gums with.  After repeated request for her to spit it out and not swallow it, I got another nurse to come and speak with patient and she spit it out in her hand.  Patient continues to have copious amounts of yellow liquid stool in her fecal management system. Urine output was noted to decrease last night with several hours having less than 10 to 0 ml/hr.  Tmax was 102.1.  See MAR for medication doses and times of administration.

## 2018-04-24 NOTE — ASSESSMENT & PLAN NOTE
Has huge hepatomegaly-- on Rifaximin    04/21- continue rifaxamin  4/24-persistent massive hepatomegaly

## 2018-04-24 NOTE — CONSULTS
[facility] -   Vascular Surgery  Operative Note    SUMMARY     Date of Procedure: 4/11/2018     Procedure: bedside right femoral vas catheter placement with US guidance    Surgeon(s) and Role:     Noah Cardona MD    Assisting Surgeon: None    Pre-Operative Diagnosis: Respiratory failure [J96.90]  Septic shock [A41.9, R65.21]  Renal failure acute    Post-Operative Diagnosis: Post-Op Diagnosis Codes:     * Respiratory failure [J96.90]     * Septic shock [A41.9, R65.21]  same    Anesthesia: General    Technical Procedures Used: after consent obatined and risk explained to family.  Right groin prep sterile.  1% plain lidocaine used. Successful cannulation with needle and wire advance.  Dilators used then catheter advanced over wire. Each port flushed and aspirated with heparin saline then locked with 2000 units of heparin in each port.  Sutured in with prolene.      Description of the Findings of the Procedure: bedside right femoral vas catheter placed with US guidance: no dvt with successful cannulation.     Significant Surgical Tasks Conducted by the Assistant(s), if Applicable: none    Complications: No    Estimated Blood Loss (EBL): * No values recorded between 4/11/2018  1:44 PM and 4/11/2018  2:33 PM *           Implants: * No implants in log *    Specimens:   Specimen (12h ago through future)    None                  Condition: Critical    Disposition: ICU - intubated and hemodynamically stable.    Attestation: I performed the procedure.

## 2018-04-24 NOTE — PROGRESS NOTES
[facility]  Critical Care Medicine  Progress Note    Patient Name: Rafael Duron  MRN: [id number]  Admission Date: 4/3/2018  Hospital Length of Stay: 21 days  Code Status: Full Code  Attending Provider: Paul Stewart MD  Primary Care Provider: Herman Cowart MD   Principal Problem: Acute respiratory failure with hypoxia and hypercapnia    Subjective:     HPI:  Ms Duron is a 21 yo obese BF with a PMH of HTN, gestational DM and HTN who presented to [facility] ED in [place], [place] on  with complaint of SOB and cough with known pregnancy.  OB US revealed no heart tones and she is also  with second trimester fetal demise estimated 22 week for which she passed with retained placenta.  After admission to ICU she had seizure activity with , K+ 1.9 and Bicarb 11.  She also had etoh level 268 and reportedly had been drinking etoh w/ honey heavily for several days.  She required intubation for airway protection per records and OB was unable to remove placenta manually and patient had to be taken to OR.  She received 2 liters IVF and Hgb dropped to 7.4 and was transfused 2 units PRBCs.  Vaginal bleeding was scant per records.  Yesterday she had temp 101.5 Ax, .  She as transferred to [facility] ICU yesterday evening for higher level of care.        Hospital/ICU Course:   - This AM she is sedated on vent on Levophed infusion spiking temp 101.9 with WBC 20, LA 6.3, UA+, electrolyte imbalance and Glucose 268     - SAT and SBT done this am, pt awake and following commands. She was successfully extubated to nasal cannula. Remains tachycardic with HR 140s and febrile with temp 103 overnight. WBC 18 and lactic acidosis improving to 4.8. Continuing to aggressively replace electrolytes.     - Over night patient had episode with bradycardia and hypotension during which she became unresponsive and agonally breathing. She responded with IVF and bicarb and was restarted on  pressors. She experienced a similar episode again last night, during which she was intubated. Vent settings were reviewed and adjusted this am. No more bradycardic/hypotensive episodes since last night. Remains on pressors. Leukocystosis unimproved with worsening bandemia. Urine and blood cx NGTD. Sputum cx growing staph and pseudomonas however CXR this am is unremarkable.    04/7 - Tolerating SAT and SBT. Meets extubating criteria. Acidosis improving. Leukocytosis improving. Remains on bicarb gtt.     04/8 - Extubated successfully yesterday. Asystolic arrest this AM.  ACLS initiated. Re - intubated 2 rounds CPR with Iv epinephrine X 1  and IV CaCl X 1 given. ROSC attained.   A - line placed.      4/9 - now with mild vaginal bleeding and severe anasarca with blistering.  Still on vent with sedation and Levophed/Vasopressin infusing.  Worsening UOP and creatine.  Spoke with brother at bedside in detail and all questions answered.   4/10 - Opens eyes alert. Mild increase in Cr -. Intravascular volume depletion. Will add albumin. Still with vagnal bleeding  4/11 - low grade temp, Slight worsening of CR. Good urine output; trach placed today, remains on mechanical vent support and low dose pressor  4/12 - remains on mechanical ventilation via trach; HIDA scan abnormal with no gall bladder filling  4/13 - external gall bladder drain placed by IR; remains on mechanical ventilation via Trach  4/14 - significant draining from gall bladder along with significant decline in leukocytosis although continued fevers last 24 hours; remains on mechanical ventilation via trach; intermittent hypotension overnight and creatinine bump this am  4/15 - continued high drainage from gall bladder; continued fevers 102+; remains on mechanical ventilation via trach, failed SBT with tachypnea, hypoventilation  4/16 continued mechanical ventilation via trach; fail SBT with tachypnea, hypoventilation; continued fever, controlling with external  cooling blanket (24hr max 103.2); increased leukocytosis; procalcitonin continues rising (now 11.6); INR, bili continue rising; biliary drain with continued 500-600ml/day output  4/17 - remains vent dependent with continued pressor requirement, fevers, increasing leukocytosis; slight decline INR, Tbili, and procal (all remain elevated); CT abd last evening with mild pancolitis not consistent with severity of ongoing illness  4/18 remains on mechanical ventilation via trach on pressor support with continued fevers and wbc rising (now 29,000) along with worsening t bili; some decline in INR and procalcitonin today; tagged wbc scan yesterday concerning for lungs as septic source  4/19 - still on mech vent and Levophed infusion.  Yumiko TF.  Still spiking temp and WBC increasing.    4/20 - awake on vent still on low dose Levophed infusion.  Yumiko TF.  Tolerating SBT  4/21 - Yumiko SBT yesterday and tolerating TF.  Worsening UOP and creatine.  Off Levophed at 0700 hr this AM.  Awakens and nods head to questions  4/22 - tolerating TF.  UOP averaging 10 ml/hr.  Awake and responsive.  Tachypnea in mid 40s on SBT but no distress.   4/23 - Failed SBT yesterday.  Diuresed fair post Lasix yesterday.  Still spiking temps and persistent Leukocytosis  4/24 - Tachypnic on full vent support today - No SBT.  Yumiko TF.  LUQ Abd tender.  Still spiking temps and WBC up off Antb    Review of Systems   Unable to perform ROS: Patient nonverbal       Objective:     Vital Signs (Most Recent):  Temp: 99 °F (37.2 °C) (04/24/18 1105)  Pulse: (!) 114 (04/24/18 1105)  Resp: (!) 30 (04/24/18 1105)  BP: 109/60 (04/24/18 1105)  SpO2: 99 % (04/24/18 1105) Vital Signs (24h Range):  Temp:  [98.4 °F (36.9 °C)-102.1 °F (38.9 °C)] 99 °F (37.2 °C)  Pulse:  [] 114  Resp:  [19-41] 30  SpO2:  [93 %-100 %] 99 %  BP: ()/() 109/60     Weight: 118 kg (260 lb 2.3 oz)  Body mass index is 41.99 kg/m².      Intake/Output Summary (Last 24 hours) at 04/24/18  1257  Last data filed at 04/24/18 1200   Gross per 24 hour   Intake             2220 ml   Output             2909 ml   Net             -689 ml       Physical Exam   Constitutional: She appears well-developed and well-nourished. She appears toxic. She has a sickly appearance. She appears ill.   Obese young female on mechanical ventilation via trach   HENT:   Head: Atraumatic.   Nose:       Mouth/Throat: Oropharynx is clear and moist.   Eyes: Pupils are equal, round, and reactive to light. Scleral icterus is present.   Neck: Full passive range of motion without pain.       Obese    Cardiovascular: Regular rhythm.  Tachycardia present.  Exam reveals distant heart sounds.    Pulses:       Radial pulses are 2+ on the right side, and 2+ on the left side.        Dorsalis pedis pulses are 2+ on the right side, and 2+ on the left side.   Pulmonary/Chest: Effort normal. Tachypnea noted. No respiratory distress. She has decreased breath sounds. She has rales.   Vent controlled resp effort   Abdominal: Bowel sounds are normal. There is hepatomegaly. There is tenderness in the left upper quadrant. There is no rebound and no guarding.       Morbidly obese.  Semi-soft to firm   Genitourinary:   Genitourinary Comments: Rodriguez in place   Musculoskeletal: Normal range of motion. She exhibits edema (trace edema BLE).   Mild general LE edema   Lymphadenopathy:     She has no cervical adenopathy.   Neurological: She is alert.   Nods head to questions.  Follows commands inconsistently    Skin: Skin is warm and dry. Capillary refill takes less than 2 seconds. No cyanosis.            Vents:  Vent Mode: A/C (04/24/18 1010)  Ventilator Initiated: Yes (04/05/18 1930)  Set Rate: 18 bmp (04/24/18 1010)  Vt Set: 0 mL (04/24/18 1010)  Pressure Support: 0 cmH20 (04/24/18 1010)  PEEP/CPAP: 5 cmH20 (04/24/18 1010)  Oxygen Concentration (%): 40 (04/24/18 1105)  Peak Airway Pressure: 28 cmH2O (04/24/18 1010)  Plateau Pressure: 0 cmH20 (04/24/18  1010)  Total Ve: 22.6 mL (04/24/18 1010)  F/VT Ratio<105 (RSBI): (!) 28.44 (04/24/18 1010)    Lines/Drains/Airways     Peripherally Inserted Central Catheter Line                 PICC Double Lumen 04/14/18 1000 right brachial 10 days          Drain                 Rectal Tube 04/12/18 0710 rectal tube w/ balloon (indicate number of mLs) 12 days         Biliary Tube 04/13/18 1200 RLQ 11 days         Urethral Catheter 04/15/18 1250 Temperature probe 9 days         NG/OG Tube 04/22/18 1500 nasogastric Right nostril 1 day          Airway                 Surgical Airway 04/11/18 1403 Shiley Cuffed;Long;Proximal 12 days                Significant Labs:    CBC/Anemia Profile:    Recent Labs  Lab 04/23/18  0353 04/24/18  0411   WBC 28.91* 37.01*   HGB 7.8* 8.0*   HCT 24.8* 25.7*    223   MCV 99* 102*   RDW 24.6* 25.9*        Chemistries:    Recent Labs  Lab 04/23/18  0353 04/24/18  0411    145   K 3.2* 4.5    111*   CO2 22* 17*   BUN 22* 25*   CREATININE 2.5* 2.9*   CALCIUM 10.9* 11.5*   ALBUMIN 1.7* 1.8*   PROT 6.1 6.6   BILITOT 13.9* 15.9*   ALKPHOS 93 106   ALT 11 16   * 160*   MG 1.8 2.1   PHOS 3.3 4.4       All pertinent labs within the past 24 hours have been reviewed.    Significant Imaging:  CT: I have reviewed all pertinent results/findings within the past 24 hours and my personal findings are:  Abd: no change from previous      Assessment/Plan:     Pulmonary   * Acute respiratory failure with hypoxia and hypercapnia    Vent settings reviewed and adjusted; FIO2 to keep SAO2 > 92%.  Increase PIP   4/11 tracheostomy placed  Too tachypnic for SBT today  Cont VAP prophylaxis        Cardiac/Vascular    .        Pure hyperglyceridemia    Cont SSI        Renal/    .        VIKRAM (acute kidney injury)    Nephrology following  Now with mild Met Acidosis  Cont Oliguria  Creatine worsening  No RRT yet per Renal  Daily BMP and accurate I/Os        ID   Severe sepsis    ID following  Has completed  course of IVAB and Antifungals for C-Diff, MRSA PNA, Klebiella UTI, Candida UTI/PNA  WBC scan unremarkable this hospitalization except lungs  Still spiking temps and persistent leukocytosis on and off Antbs  Repeat CT Abd no acute finding from before and no infectious source  Zyvox, Micafungin and Zosyn added this AM per ID due to increased WBC and still spiking temps  Has tenderness to LUQ Abd may need to consider Expl Lap  Will obtain diagnostic Paracentesis of ascitic fluid for WBCs            Hematology    .        Oncology   Acute blood loss anemia    No acute transfusion indication and no active bleeding  Monitor daily CBC and cont folic acid  Conservative transfusion protocol        GI   Hepatomegaly    Repeat CT Abd no change        Acalculous cholecystitis    Has GB drain at this time  Back on Antb per ID  Culture NGTD        Alcoholic hepatitis with ascites    GI no longer following  Hx heavy etoh abuse  Cont Lactulose and Rifaximin - NH3 still elevated  Cont Thiamine, MVI and Folic Acid               Preventive Measures and Monitoring:   Stress Ulcer: Pepcid  Nutrition: TF  Glucose control: SSI  Bowel prophylaxis: Lactulose  DVT prophylaxis: SQ Hep/SCDs  Hx CAD on B-Blocker: no hx CAD  Head of Bed/Reposition: Elevate HOB and turn Q1-2 hours   Early Mobility: Bed rest  SBT: daily  Trach Day: #13  GB drain Day: #11  NG Day: #8  Central Line RUE PICC Day: #10  Rodriguez Day: #9  Code Status: Full     Counseling/Consultation:I have discussed the care of this patient in detail with the bedside nursing staff and Dr. Hurt and Dr. Stewart      Critical Care Time: 59 minutes  Critical secondary to Patient has a condition that poses threat to life and bodily function: Acute Renal Failure and Resp Failure on mechanical ventilation.      Critical care was time spent personally by me on the following activities: development of treatment plan with patient or surrogate and bedside caregivers, discussions with consultants,  evaluation of patient's response to treatment, examination of patient, ordering and performing treatments and interventions, ordering and review of laboratory studies, ordering and review of radiographic studies, pulse oximetry, re-evaluation of patient's condition. This critical care time did not overlap with that of any other provider or involve time for any procedures.     Ed Lowe NP  Critical Care Medicine  [facility]
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
## 2018-04-24 NOTE — PROGRESS NOTES
[facility]   Nephrology  Progress Note    Patient Name: Rafael Duron  MRN: [id number]  Admission Date: 4/3/2018  Hospital Length of Stay: 21 days  Attending Provider: Paul Stewart MD   Primary Care Physician: Herman Cowart MD  Principal Problem:Acute respiratory failure with hypoxia and hypercapnia. VIKRAM    Subjective:     HPI: 21 yo obese female with HTN, gestational DM  who presented to [facility] ED in [place], [place] on 4/2 with complaint of SOB and cough with known pregnancy. Workup revealed fetal demise (estimated at 22 weeks) and patient passed dead fetus but retained placenta. Placenta remnants were removed in OR vis D & C.  After admission to ICU she had seizure activity with , K+ 1.9 and Bicarb 11.  She also had EtOH level of 268.  She required intubation for airway protection per records. She as transferred to [facility] on 4/3/18 for higher level of care.    Patient presented with septic shock at [facility] and was started on IV pressors and IV antibiotics. Patient was initially stabilized and extubated on 4/5/18. OB/GYN following. She developed syncopal episode on 4/5/18 associated with bradycardia. She was successfully resuscitated with IV fluids and levophed. She subsequently deteriorated and was re-intubated on 4/5/18. Abdominal US revealed massive hepatomegaly with liver span of 33 cm. Patient's condition improved and she was extubated again on 4/7/18. Patient coded on 4/8/18 and was successfully resuscitated and again intubated. Patient was also diagnosed with C. Diff colitis and MRSA bacteremia.   Nephrology was consulted to help with patient's electrolyte care, renal care and volume management. I saw and examined patient in her hospital room. Patient is intubated and not able to provide any additional history.   Patient remains on antibiotics and pressor support. Chart review revealed that hyponatremia and hypokalemia have now resolved. However,  hypocalcemia has persisted. In addition, patient's renal function has worsened during current admission and creatinine has increased from 0.8 on 4/6/18 to 1.6 on 4/9/18. Volume review showed positive I/O balance with + 24 liters since admission,. Patient's UOP has been fluctuating with 1 to 3 liters of urine per day. Current UOP about 100 cc/hour.       Interval History: Pt was seen and examined in ICU. No new events, stable last pm. Discussed with ICU team. No overall change.    Review of patient's allergies indicates:  No Known Allergies  Current Facility-Administered Medications   Medication Frequency    albuterol-ipratropium 2.5mg-0.5mg/3mL nebulizer solution 3 mL Q4H PRN    bisacodyl suppository 10 mg Daily PRN    dextrose 50% injection 12.5 g PRN    famotidine (PF) injection 20 mg Daily    fentaNYL injection 50 mcg Q2H PRN    folic acid-vit B6-vit B12 2.5-25-2 mg tablet 1 tablet Daily    glucagon (human recombinant) injection 1 mg PRN    heparin (porcine) injection 5,000 Units Q8H    insulin aspart U-100 pen 1-10 Units Q6H PRN    lactulose 20 gram/30 mL solution Soln 30 g TID    linezolid 600mg/300ml IVPB 600 mg Q12H    lorazepam (ATIVAN) injection 2 mg Q2H PRN    micafungin 100 mg in sodium chloride 0.9 % 100 mL IVPB (ready to mix system) Q24H    ondansetron injection 4 mg Q8H PRN    piperacillin-tazobactam 4.5 g in dextrose 5 % 100 mL IVPB (ready to mix system) Q8H    pneumoc 13-bobby conj-dip cr(PF) 0.5 mL vaccine x 1 dose    rifAXIMin tablet 550 mg BID    sodium chloride 0.9% flush 5 mL PRN    thiamine tablet 100 mg Daily       Objective:     Vital Signs (Most Recent):  Temp: 99 °F (37.2 °C) (04/24/18 1105)  Pulse: (!) 114 (04/24/18 1105)  Resp: (!) 30 (04/24/18 1105)  BP: 109/60 (04/24/18 1105)  SpO2: 99 % (04/24/18 1105)  O2 Device (Oxygen Therapy): ventilator (04/24/18 1010) Vital Signs (24h Range):  Temp:  [98.4 °F (36.9 °C)-102.1 °F (38.9 °C)] 99 °F (37.2 °C)  Pulse:  []  114  Resp:  [19-41] 30  SpO2:  [92 %-100 %] 99 %  BP: ()/() 109/60     Weight: 118 kg (260 lb 2.3 oz) (04/24/18 0600)  Body mass index is 41.99 kg/m².  Body surface area is 2.34 meters squared.    I/O last 3 completed shifts:  In: 2620 [I.V.:50; NG/GT:2370; IV Piggyback:200]  Out: 4921 [Urine:1046; Drains:875; Stool:3000]    Physical Exam   Constitutional: She appears well-developed and well-nourished.   Neck: No JVD present.   Cardiovascular: Normal rate and regular rhythm.  Exam reveals no friction rub.    Pulmonary/Chest: Breath sounds normal.   Abdominal: Soft.   Musculoskeletal: She exhibits edema.   Skin: Skin is warm and dry.   Nursing note and vitals reviewed.      Significant Labs: reviewed  BMP  Lab Results   Component Value Date     04/24/2018    K 4.5 04/24/2018     (H) 04/24/2018    CO2 17 (L) 04/24/2018    BUN 25 (H) 04/24/2018    CREATININE 2.9 (H) 04/24/2018    CALCIUM 11.5 (H) 04/24/2018    ANIONGAP 17 (H) 04/24/2018    ESTGFRAFRICA 25 (A) 04/24/2018    EGFRNONAA 22 (A) 04/24/2018     Lab Results   Component Value Date    WBC 37.01 (H) 04/24/2018    HGB 8.0 (L) 04/24/2018    HCT 25.7 (L) 04/24/2018     (H) 04/24/2018     04/24/2018         Significant Imaging: reviewed    Assessment/Plan:         21 y/o female with VIKRAM and sepsis:         VIKRAM (acute kidney injury)     Renal: VIKRAM due to sepsis and hypotension. No change in renal function  VIKRAM likely due to sepsis and hypotension  Suspect hypercalcemia contributing to renal failure  Vit D was topped yesterday, s Ca likely will improve  Remains critically ill  K normal  Will continue to assess for indications for HD, none today  Edema present, due to 3rd spacing, has low s alb due to shock liver     2. Hypotension :due to sepsis., BP stable  Continue pressor support when needed      4. Abnormal LFTs , s/p Cholecystostomy placement ,  ? shock liver , alcohic hepatitis , Bili better today      5. Meds reviewed,       6. Anemia : multifactorial, pRBC tx when indicated      7. Candida UTI : finished abx      8. C diff colitis , on abx              Plans and recommendations:  As discussed above and  No indications for diuretics at this point (low BP and already intubated/trached)  Total critical care time spent 40 minutes including time needed to review the records, the   patient evaluation, documentation, face-to-face discussion with the patient,   more than 50% of the time was spent on coordination of care and counseling.    Level V visit.              Luciana Queen MD  Nephrology  [facility]

## 2018-04-24 NOTE — SUBJECTIVE & OBJECTIVE
No prescriptions prior to admission.       Review of patient's allergies indicates:  No Known Allergies    Past Medical History:   Diagnosis Date    Gestational diabetes     Hypertension      History reviewed. No pertinent surgical history.  Family History     Problem Relation (Age of Onset)    Diabetes Mother, Father, Sister    Heart disease Mother        Social History Main Topics    Smoking status: Never Smoker    Smokeless tobacco: Never Used    Alcohol use Yes    Drug use: No    Sexual activity: Not on file     Review of Systems   Unable to perform ROS: Intubated     Objective:     Vital Signs (Most Recent):  Temp: 98.9 °F (37.2 °C) (04/24/18 1345)  Pulse: (!) 122 (04/24/18 1617)  Resp: (!) 35 (04/24/18 1617)  BP: (!) 114/49 (04/24/18 1617)  SpO2: 99 % (04/24/18 1617) Vital Signs (24h Range):  Temp:  [98.4 °F (36.9 °C)-102.1 °F (38.9 °C)] 98.9 °F (37.2 °C)  Pulse:  [] 122  Resp:  [19-35] 35  SpO2:  [96 %-100 %] 99 %  BP: ()/(32-80) 114/49     Weight: 118 kg (260 lb 2.3 oz)  Body mass index is 41.99 kg/m².    Physical Exam   Constitutional: She appears well-nourished.   HENT:   Head: Normocephalic.   Neck: Neck supple.   Cardiovascular:   tachycardia   Pulmonary/Chest:   intubated   Abdominal: Soft.   distended   Vitals reviewed.      Significant Labs:  All pertinent labs from the last 24 hours have been reviewed.    Significant Diagnostics:  I have reviewed all pertinent imaging results/findings within the past 24 hours.

## 2018-04-24 NOTE — PROGRESS NOTES
[facility] -   Nephrology  Progress Note    Patient Name: Rafael Duron  MRN: [id number]  Admission Date: 4/3/2018  Hospital Length of Stay: 20 days  Attending Provider: Ed Ram MD   Primary Care Physician: Herman Cowart MD  Principal Problem:Acute respiratory failure with hypoxia and hypercapnia. VIKRAM    Subjective:     HPI: 21 yo obese female with HTN, gestational DM  who presented to [facility] ED in [place], [place] on 4/2 with complaint of SOB and cough with known pregnancy. Workup revealed fetal demise (estimated at 22 weeks) and patient passed dead fetus but retained placenta. Placenta remnants were removed in OR vis D & C.  After admission to ICU she had seizure activity with , K+ 1.9 and Bicarb 11.  She also had EtOH level of 268.  She required intubation for airway protection per records. She as transferred to [facility] ICU on 4/3/18 for higher level of care.    Patient presented with septic shock at [facility] and was started on IV pressors and IV antibiotics. Patient was initially stabilized and extubated on 4/5/18. OB/GYN following. She developed syncopal episode on 4/5/18 associated with bradycardia. She was successfully resuscitated with IV fluids and levophed. She subsequently deteriorated and was re-intubated on 4/5/18. Abdominal US revealed massive hepatomegaly with liver span of 33 cm. Patient's condition improved and she was extubated again on 4/7/18. Patient coded on 4/8/18 and was successfully resuscitated and again intubated. Patient was also diagnosed with C. Diff colitis and MRSA bacteremia.   Nephrology was consulted to help with patient's electrolyte care, renal care and volume management. I saw and examined patient in her hospital room. Patient is intubated and not able to provide any additional history.   Patient remains on antibiotics and pressor support. Chart review revealed that hyponatremia and hypokalemia have now resolved. However,  hypocalcemia has persisted. In addition, patient's renal function has worsened during current admission and creatinine has increased from 0.8 on 4/6/18 to 1.6 on 4/9/18. Volume review showed positive I/O balance with + 24 liters since admission,. Patient's UOP has been fluctuating with 1 to 3 liters of urine per day. Current UOP about 100 cc/hour.       Interval History: Pt was seen and examined in ICU. H/o was reviewed. No new c/o's, stable last pm, discussed with ICU team.    Review of patient's allergies indicates:  No Known Allergies  Current Facility-Administered Medications   Medication Frequency    albuterol-ipratropium 2.5mg-0.5mg/3mL nebulizer solution 3 mL Q4H PRN    bisacodyl suppository 10 mg Daily PRN    ceftAZIDime injection 1 g Q12H    dextrose 50% injection 12.5 g PRN    famotidine (PF) injection 20 mg Daily    fentaNYL injection 50 mcg Q2H PRN    folic acid-vit B6-vit B12 2.5-25-2 mg tablet 1 tablet Daily    glucagon (human recombinant) injection 1 mg PRN    heparin (porcine) injection 5,000 Units Q8H    insulin aspart U-100 pen 1-10 Units Q6H PRN    lactulose 20 gram/30 mL solution Soln 30 g TID    lorazepam (ATIVAN) injection 2 mg Q2H PRN    multivitamin tablet 1 tablet Daily    ondansetron injection 4 mg Q8H PRN    pneumoc 13-bobby conj-dip cr(PF) 0.5 mL vaccine x 1 dose    potassium chloride 40 mEq in 100 mL IVPB (FOR CENTRAL LINE ADMINISTRATION ONLY) Q4H    rifAXIMin tablet 550 mg BID    sodium chloride 0.9% flush 5 mL PRN    thiamine tablet 100 mg Daily    vitamin D 1000 units tablet 5,000 Units Daily       Objective:     Vital Signs (Most Recent):  Temp: 100 °F (37.8 °C) (04/23/18 1505)  Pulse: 96 (04/23/18 1845)  Resp: (!) 22 (04/23/18 1845)  BP: (!) 94/51 (04/23/18 1845)  SpO2: 99 % (04/23/18 1845)  O2 Device (Oxygen Therapy): ventilator (04/23/18 1845) Vital Signs (24h Range):  Temp:  [98.5 °F (36.9 °C)-101.1 °F (38.4 °C)] 100 °F (37.8 °C)  Pulse:  [] 96  Resp:   [20-41] 22  SpO2:  [92 %-100 %] 99 %  BP: ()/() 94/51     Weight: 121 kg (266 lb 12.1 oz) (04/22/18 0537)  Body mass index is 43.06 kg/m².  Body surface area is 2.37 meters squared.    I/O last 3 completed shifts:  In: 2680 [I.V.:50; NG/GT:2230; IV Piggyback:400]  Out: 5347 [Urine:1997; Drains:550; Stool:2800]    Physical Exam   Constitutional: She appears well-developed and well-nourished.   Neck: No JVD present.   Cardiovascular: Normal rate and regular rhythm.  Exam reveals no friction rub.    Pulmonary/Chest: Breath sounds normal.   Abdominal: Soft.   Musculoskeletal: She exhibits edema.   Skin: Skin is warm and dry.   Nursing note and vitals reviewed.      Significant Labs: reviewed  BMP  Lab Results   Component Value Date     04/23/2018    K 3.2 (L) 04/23/2018     04/23/2018    CO2 22 (L) 04/23/2018    BUN 22 (H) 04/23/2018    CREATININE 2.5 (H) 04/23/2018    CALCIUM 10.9 (H) 04/23/2018    ANIONGAP 15 04/23/2018    ESTGFRAFRICA 30 (A) 04/23/2018    EGFRNONAA 26 (A) 04/23/2018     Lab Results   Component Value Date    WBC 28.91 (H) 04/23/2018    HGB 7.8 (L) 04/23/2018    HCT 24.8 (L) 04/23/2018    MCV 99 (H) 04/23/2018     04/23/2018       Significant Imaging: reviewed, CXR minimal pulm edema    Assessment/Plan:   23 y/o female with VIKRAM and sepsis:    VIKARM (acute kidney injury)    Renal: VIKRAM due to sepsis and hypotension  Pt is critically ill  K normal to low  Will cont to assess for indications for HD  Hypercalcemia, mild, noted  Will d/c Vit D,   Edema noted, due to 3rd spacing, has low s alb due to shock liver    2. Hypotension :due to sepsis.   Cont pressor support when needed     4. Abnormal LFTs , s/p Cholecystostomy placement ,  ? shock liver , alcohic hepatitis , Bili better today     5. Meds reviewed,     6. Anemia : multifactorial, pRBC tx when indicated     7. Candida UTI : finished abx     8. C diff colitis , on abx           Plans and recommendations:  As discussed  above and  No indications for diuretics at this point (low BP and already intubated/trached)  Total critical care time spent 45 minutes including time needed to review the records, the   patient evaluation, documentation, face-to-face discussion with the patient,   more than 50% of the time was spent on coordination of care and counseling.    Level V visit.      Luciana Queen MD  Nephrology  [facility]

## 2018-04-24 NOTE — ASSESSMENT & PLAN NOTE
ID following  Has completed course of IVAB and Antifungals for C-Diff, MRSA PNA, Klebiella UTI, Candida UTI/PNA  WBC scan unremarkable this hospitalization except lungs  Still spiking temps and persistent leukocytosis on and off Antbs  Repeat CT Abd no acute finding from before and no infectious source  Zyvox, Micafungin and Zosyn added this AM per ID due to increased WBC and still spiking temps  Has tenderness to LUQ Abd may need to consider Expl Lap  Will obtain diagnostic Paracentesis of ascitic fluid for WBCs

## 2018-04-24 NOTE — ASSESSMENT & PLAN NOTE
Vent settings reviewed and adjusted; FIO2 to keep SAO2 > 92%.  Increase PIP   4/11 tracheostomy placed  Too tachypnic for SBT today  Cont VAP prophylaxis

## 2018-04-24 NOTE — ASSESSMENT & PLAN NOTE
21 y/o female with VIKRAM and sepsis:         VIKRAM (acute kidney injury)     Renal: VIKRAM due to sepsis and hypotension  Pt is critically ill  K normal to low  Will cont to assess for indications for HD  Hypercalcemia, mild, noted  Will d/c Vit D,   Edema noted, due to 3rd spacing, has low s alb due to shock liver     2. Hypotension :due to sepsis.   Cont pressor support when needed      4. Abnormal LFTs , s/p Cholecystostomy placement ,  ? shock liver , alcohic hepatitis , Bili better today      5. Meds reviewed,      6. Anemia : multifactorial, pRBC tx when indicated      7. Candida UTI : finished abx      8. C diff colitis , on abx              Plans and recommendations:  As discussed above and  No indications for diuretics at this point (low BP and already intubated/trached)  Total critical care time spent 45 minutes including time needed to review the records, the   patient evaluation, documentation, face-to-face discussion with the patient,   more than 50% of the time was spent on coordination of care and counseling.    Level V visit.

## 2018-04-24 NOTE — PROGRESS NOTES
Ms Oliverio is hyperventilating and has lower BP and decreased UOP.  Stat ABG done with lytes revealing worsening Met Acidosis and severe Hypoglycemia.  Starting IVFs w/ bicarb and notified Dr. Queen of possible need for RRT vs CRRT.  Stat BMP ordered and adding Levophed infusion.    CHANDRIKA Lowe [facility]
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
## 2018-04-24 NOTE — ASSESSMENT & PLAN NOTE
S/p cholecystectomy tube-supportive care  04/21- will start antibiotic descalation .  Will stop meropenem.    04/22- continue cholecystectomy tube,bridgette guzman    3943- s/p cholecystectomy tube-  4/24- no change

## 2018-04-25 PROBLEM — G93.41 SEPSIS DUE TO UNDETERMINED ORGANISM WITH METABOLIC ENCEPHALOPATHY: Status: ACTIVE | Noted: 2018-01-01

## 2018-04-25 PROBLEM — D68.9 COAGULOPATHY: Status: ACTIVE | Noted: 2018-01-01

## 2018-04-25 PROBLEM — A41.9 SEPSIS DUE TO UNDETERMINED ORGANISM WITH METABOLIC ENCEPHALOPATHY: Status: ACTIVE | Noted: 2018-01-01

## 2018-04-25 PROBLEM — E16.2 HYPOGLYCEMIA: Status: ACTIVE | Noted: 2018-01-01

## 2018-04-25 PROBLEM — R65.20 SEPSIS DUE TO UNDETERMINED ORGANISM WITH METABOLIC ENCEPHALOPATHY: Status: ACTIVE | Noted: 2018-01-01

## 2018-04-25 NOTE — PROGRESS NOTES
[facility]  Critical Care Medicine  Progress Note    Patient Name: Rafael Duron  MRN: [id number]  Admission Date: 4/3/2018  Hospital Length of Stay: 22 days  Code Status: Full Code  Attending Provider: Paul Stewart MD  Primary Care Provider: Herman Cowart MD   Principal Problem: Acute respiratory failure with hypoxia and hypercapnia    Subjective:     HPI:  Ms Duron is a 21 yo obese BF with a PMH of HTN, gestational DM and HTN who presented to [facility] ED in [place], [place] on  with complaint of SOB and cough with known pregnancy.  OB US revealed no heart tones and she is also  with second trimester fetal demise estimated 22 week for which she passed with retained placenta.  After admission to ICU she had seizure activity with , K+ 1.9 and Bicarb 11.  She also had etoh level 268 and reportedly had been drinking etoh w/ honey heavily for several days.  She required intubation for airway protection per records and OB was unable to remove placenta manually and patient had to be taken to OR.  She received 2 liters IVF and Hgb dropped to 7.4 and was transfused 2 units PRBCs.  Vaginal bleeding was scant per records.  Yesterday she had temp 101.5 Ax, .  She as transferred to [facility] ICU yesterday evening for higher level of care.        Hospital/ICU Course:   - This AM she is sedated on vent on Levophed infusion spiking temp 101.9 with WBC 20, LA 6.3, UA+, electrolyte imbalance and Glucose 268     - SAT and SBT done this am, pt awake and following commands. She was successfully extubated to nasal cannula. Remains tachycardic with HR 140s and febrile with temp 103 overnight. WBC 18 and lactic acidosis improving to 4.8. Continuing to aggressively replace electrolytes.     - Over night patient had episode with bradycardia and hypotension during which she became unresponsive and agonally breathing. She responded with IVF and bicarb and was restarted on  pressors. She experienced a similar episode again last night, during which she was intubated. Vent settings were reviewed and adjusted this am. No more bradycardic/hypotensive episodes since last night. Remains on pressors. Leukocystosis unimproved with worsening bandemia. Urine and blood cx NGTD. Sputum cx growing staph and pseudomonas however CXR this am is unremarkable.    04/7 - Tolerating SAT and SBT. Meets extubating criteria. Acidosis improving. Leukocytosis improving. Remains on bicarb gtt.     04/8 - Extubated successfully yesterday. Asystolic arrest this AM.  ACLS initiated. Re - intubated 2 rounds CPR with Iv epinephrine X 1  and IV CaCl X 1 given. ROSC attained.   A - line placed.      4/9 - now with mild vaginal bleeding and severe anasarca with blistering.  Still on vent with sedation and Levophed/Vasopressin infusing.  Worsening UOP and creatine.  Spoke with brother at bedside in detail and all questions answered.   4/10 - Opens eyes alert. Mild increase in Cr -. Intravascular volume depletion. Will add albumin. Still with vagnal bleeding  4/11 - low grade temp, Slight worsening of CR. Good urine output; trach placed today, remains on mechanical vent support and low dose pressor  4/12 - remains on mechanical ventilation via trach; HIDA scan abnormal with no gall bladder filling  4/13 - external gall bladder drain placed by IR; remains on mechanical ventilation via Trach  4/14 - significant draining from gall bladder along with significant decline in leukocytosis although continued fevers last 24 hours; remains on mechanical ventilation via trach; intermittent hypotension overnight and creatinine bump this am  4/15 - continued high drainage from gall bladder; continued fevers 102+; remains on mechanical ventilation via trach, failed SBT with tachypnea, hypoventilation  4/16 continued mechanical ventilation via trach; fail SBT with tachypnea, hypoventilation; continued fever, controlling with external  cooling blanket (24hr max 103.2); increased leukocytosis; procalcitonin continues rising (now 11.6); INR, bili continue rising; biliary drain with continued 500-600ml/day output  4/17 - remains vent dependent with continued pressor requirement, fevers, increasing leukocytosis; slight decline INR, Tbili, and procal (all remain elevated); CT abd last evening with mild pancolitis not consistent with severity of ongoing illness  4/18 remains on mechanical ventilation via trach on pressor support with continued fevers and wbc rising (now 29,000) along with worsening t bili; some decline in INR and procalcitonin today; tagged wbc scan yesterday concerning for lungs as septic source  4/19 - still on mech vent and Levophed infusion.  Yumiko TF.  Still spiking temp and WBC increasing.    4/20 - awake on vent still on low dose Levophed infusion.  Yumiko TF.  Tolerating SBT  4/21 - Yumiko SBT yesterday and tolerating TF.  Worsening UOP and creatine.  Off Levophed at 0700 hr this AM.  Awakens and nods head to questions  4/22 - tolerating TF.  UOP averaging 10 ml/hr.  Awake and responsive.  Tachypnea in mid 40s on SBT but no distress.   4/23 - Failed SBT yesterday.  Diuresed fair post Lasix yesterday.  Still spiking temps and persistent Leukocytosis  4/24 - Tachypnic on full vent support today - No SBT.  Yumiko TF.  LUQ Abd tender.  Still spiking temps and WBC up off Antb  4/25 - Worsening met acidosis yesterday afternoon and not improving with CRRT started yesterday as well.  Pupils unequal.  Shock yesterday now on Vasopressin and Levophed infusions    Review of Systems   Unable to perform ROS: Patient nonverbal       Objective:     Vital Signs (Most Recent):  Temp: 98.7 °F (37.1 °C) (04/25/18 0302)  Pulse: (!) 113 (04/25/18 0715)  Resp: (!) 38 (04/25/18 0715)  BP: (!) 129/91 (04/25/18 0615)  SpO2: 97 % (04/25/18 0715) Vital Signs (24h Range):  Temp:  [98.6 °F (37 °C)-99.7 °F (37.6 °C)] 98.7 °F (37.1 °C)  Pulse:  [107-123] 113  Resp:   [19-42] 38  SpO2:  [96 %-100 %] 97 %  BP: ()/(32-91) 129/91     Weight: 120 kg (264 lb 8.8 oz)  Body mass index is 42.7 kg/m².      Intake/Output Summary (Last 24 hours) at 04/25/18 0842  Last data filed at 04/25/18 0600   Gross per 24 hour   Intake          4632.43 ml   Output             2145 ml   Net          2487.43 ml       Physical Exam   Constitutional: She appears well-developed and well-nourished. She appears toxic. She has a sickly appearance. She appears ill. She appears distressed.   Obese young female on mechanical ventilation via trach   HENT:   Head: Atraumatic.   Nose:       Mouth/Throat: Oropharynx is clear and moist.   Eyes: Scleral icterus is present. Pupils are unequal.   Pupils unequal right larger than left but both reactive to light.     Neck: Neck supple.       Obese    Cardiovascular: Regular rhythm.  Tachycardia present.  Exam reveals distant heart sounds.    Pulses:       Radial pulses are 1+ on the right side, and 1+ on the left side.        Dorsalis pedis pulses are 1+ on the right side, and 1+ on the left side.   Pulmonary/Chest: Accessory muscle usage present. Tachypnea noted. She is in respiratory distress. She has decreased breath sounds. She has rales.   Vent controlled resp effort   Abdominal: Bowel sounds are absent. There is hepatomegaly. There is no rebound and no guarding.       Morbidly obese.  Semi-soft to firm   Genitourinary:   Genitourinary Comments: Rodriguez in place   Musculoskeletal: Normal range of motion. She exhibits edema (trace edema BLE).   Mild general LE edema   Lymphadenopathy:     She has no cervical adenopathy.   Neurological: She is unresponsive.   Min withdrawal to pain.  No purposeful movements and eyes closed to pain.  Pupils unequal.   Skin: Skin is warm and dry. Capillary refill takes less than 2 seconds. No cyanosis.            Vents:  Vent Mode: A/C (04/25/18 0715)  Ventilator Initiated: Yes (04/05/18 1930)  Set Rate: 18 bmp (04/25/18 0715)  Vt  Set: 0 mL (04/25/18 0715)  Pressure Support: 0 cmH20 (04/25/18 0715)  PEEP/CPAP: 5 cmH20 (04/25/18 0715)  Oxygen Concentration (%): 35 (04/25/18 0715)  Peak Airway Pressure: 31 cmH2O (04/25/18 0715)  Plateau Pressure: 0 cmH20 (04/25/18 0715)  Total Ve: 20.1 mL (04/25/18 0715)  F/VT Ratio<105 (RSBI): (!) 59.75 (04/25/18 0715)    Lines/Drains/Airways     Peripherally Inserted Central Catheter Line                 PICC Double Lumen 04/14/18 1000 right brachial 10 days          Central Venous Catheter Line                 Hemodialysis Catheter 04/24/18 1600 right femoral less than 1 day          Drain                 Rectal Tube 04/12/18 0710 rectal tube w/ balloon (indicate number of mLs) 13 days         Biliary Tube 04/13/18 1200 RLQ 11 days         Urethral Catheter 04/15/18 1250 Temperature probe 9 days         NG/OG Tube 04/22/18 1500 nasogastric Right nostril 2 days          Airway                 Surgical Airway 04/11/18 1403 Shiley Cuffed;Long;Proximal 13 days          Arterial Line                 Arterial Line 04/24/18 1800 Right Brachial less than 1 day                Significant Labs:    CBC/Anemia Profile:    Recent Labs  Lab 04/24/18  0411 04/24/18  1433 04/25/18  0411   WBC 37.01*  --  52.20*   HGB 8.0*  --  7.3*   HCT 25.7* 24* 24.6*     --  246   *  --  108*   RDW 25.9*  --  27.9*        Chemistries:    Recent Labs  Lab 04/24/18  0411 04/24/18  1446 04/24/18  2139 04/25/18  0010 04/25/18  0411    147* 143  --  139  139   K 4.5 4.0 4.3  --  5.1  5.1   * 113* 107  --  102  102   CO2 17* 11* 10*  --  8*  8*   BUN 25* 29* 28*  --  25*  25*   CREATININE 2.9* 3.4* 3.4*  --  3.4*  3.4*   CALCIUM 11.5* 11.2* 10.1  --  9.6  9.6   ALBUMIN 1.8*  --  1.8*  --  1.9*  1.9*   PROT 6.6  --   --   --  6.6   BILITOT 15.9*  --   --   --  18.3*   ALKPHOS 106  --   --   --  159*   ALT 16  --   --   --  66*   *  --   --   --  967*   MG 2.1  --   --  2.0 1.9   PHOS 4.4  --  6.0*  --   7.0*  7.0*       ABGs:   Recent Labs  Lab 04/25/18  0750   PH 7.069*   PCO2 27.6*   HCO3 8.0*   POCSATURATED 66*   BE -22     Coagulation:   Recent Labs  Lab 04/24/18  1105 04/25/18  0411   INR  --  2.9*   APTT 53.1*  --      Lactic Acid:   Recent Labs  Lab 04/24/18  1800   LACTATE >12.0*     All pertinent labs within the past 24 hours have been reviewed.    Significant Imaging:  CXR: I have reviewed all pertinent results/findings within the past 24 hours and my personal findings are:  mild interstitial infiltrates bilat but no focal infiltrate      Assessment/Plan:     Pulmonary   * Acute respiratory failure with hypoxia and hypercapnia    Vent settings reviewed and adjusted.  Cont full vent support with PCV  FIO2 to keep SAO2 > 92%.   4/11 tracheostomy placed  Too tachypnic for SBT today  Cont VAP prophylaxis        Cardiac/Vascular    .        Renal/    .        Lactic acidosis    IVAB and CRRT  BP support        Metabolic acidosis    CRRT Day #2 and Bicarb infusion  Renal following  Bicarb IVP stat          VIKRAM (acute kidney injury)    Nephrology following  Now with severe Met Acidosis and anuria  CRRT day #2        ID   Sepsis due to undetermined organism with metabolic encephalopathy    ICU neuro monitoring  Cont CRRT, BP support and IVAB        Septic shock    Currently Urine and Blood cultures NGTD  Sputum cult + GNRs  S/P Rx for C-Diff, MRSA PNA, Klebsiella UTI and Fungal UTI/PNA  ID following  Cont Zyvox, Micafungin and Zosyn  Cont Levophed and Vasopressin infusions        Hematology    .        Coagulopathy    Stop SQ hep  Follow coags and monitor for any bleeding        Oncology   Acute blood loss anemia    No acute transfusion indication and no active bleeding  Monitor daily CBC and cont folic acid  Conservative transfusion protocol        Endocrine   Hypoglycemia    Hourly glucose monitoring  PRN D50 IVP  Dextrose IVFs        GI   Hepatomegaly    Repeat CT Abd no change        Acalculous  cholecystitis    Has GB drain at this time still mod to large output  Back on Antb per ID  Culture NGTD        Alcoholic hepatitis with ascites    Hx heavy etoh abuse  Cont Lactulose and Rifaximin - NH3 still elevated  Cont Thiamine, MVI and Folic Acid          Preventive Measures and Monitoring:   Stress Ulcer: Pepcid  Nutrition: NPO and TF held  Glucose control: SSI and hourly glucose monitoring  Bowel prophylaxis: Lactulose  DVT prophylaxis: SQ Hep/SCDs  Hx CAD on B-Blocker: no hx CAD  Head of Bed/Reposition: Elevate HOB and turn Q1-2 hours   Early Mobility: Bed rest  SBT: daily  Trach Day: #14  Left Brachial Arterial Line Day: #1  Right Femoral Vein VasCath Day: #2  GB drain Day: #12  NG Day: #9  Central Line RUE PICC Day: #11  Rodriguez Day: #10  IVAB Day: #2  Code Status: Full     Counseling/Consultation:I have discussed the care of this patient in detail with the bedside nursing staff and Dr. Hurt and Dr. Stewart and Dr. Queen.    Patient is toxic and in multiorganism failure and actively dying.  Have called family and they are trying to get here asap.  Very poor prognosis.    Critical Care Time: 80 minutes  Critical secondary to Patient has a condition that poses threat to life and bodily function: Severe Respiratory Distress, Acute Renal Failure and Resp Failure  Patient has an abrupt change in neurologic status: Encephalopathy  Patient is currently on drug therapy requiring intensive monitoring for toxicity: Levophed infusion.      Critical care was time spent personally by me on the following activities: development of treatment plan with patient or surrogate and bedside caregivers, discussions with consultants, evaluation of patient's response to treatment, examination of patient, ordering and performing treatments and interventions, ordering and review of laboratory studies, ordering and review of radiographic studies, pulse oximetry, re-evaluation of patient's condition. This critical care time did not  overlap with that of any other provider or involve time for any procedures.     Ed Lowe, NP  Critical Care Medicine  [facility]

## 2018-04-25 NOTE — PLAN OF CARE
Problem: Patient Care Overview  Goal: Plan of Care Review  Outcome: Ongoing (interventions implemented as appropriate)  Patient responds to voice. Will follow commands. Remains on Vent breathing 35-42 BPM. EICU made aware.  PRN ativan and Fentanyl administered as needed.     Patient remains ST on monitor.  Pressor support increased on shift. Remains NPO. Tube feeding held per report from day shift. Q1H CBG.  PRN D50 administered as needed. Flexi seal in place.  No drainage from gall bladder drain on shift.  CRRT continued.  UF goal of 100.  Poor UOP noted on shift.  Specialty bed rotating patient Q15M.  Restraints in place.  POC discussed with patient's mother over the phone.

## 2018-04-25 NOTE — PLAN OF CARE
CRRT started per p/p. Lines secure. See orders. Supplies at bs. Reported to nurse. See CRRT flow sheet

## 2018-04-25 NOTE — SUBJECTIVE & OBJECTIVE
Subjective:     Interval History:   The patient has clinically declined over the last 48 hours. She was febrile yesterday. WBC count increased. She is now on CRRT and requiring pressor support. INR has also increased significantly. She is having diarrhea; fecal management in place. Tube feeds now on hold. Repeat C. Diff was negative. S/p paracentesis yesterday. Fluid analysis not consistent with SBP. SAAG 0.6 which is not consistent with portal hypertension.     Review of Systems   Unable to perform ROS: Acuity of condition     Objective:     Vital Signs (Most Recent):  Temp: 98.8 °F (37.1 °C) (04/25/18 1105)  Pulse: (!) 111 (04/25/18 1115)  Resp: (!) 28 (04/25/18 1115)  BP: (!) 138/47 (04/25/18 1105)  SpO2: 100 % (04/25/18 1115) Vital Signs (24h Range):  Temp:  [98.6 °F (37 °C)-99.1 °F (37.3 °C)] 98.8 °F (37.1 °C)  Pulse:  [107-123] 111  Resp:  [19-43] 28  SpO2:  [95 %-100 %] 100 %  BP: ()/(30-91) 138/47     Weight: 120 kg (264 lb 8.8 oz) (04/25/18 0600)  Body mass index is 42.7 kg/m².      Intake/Output Summary (Last 24 hours) at 04/25/18 1127  Last data filed at 04/25/18 1100   Gross per 24 hour   Intake          4002.43 ml   Output             1120 ml   Net          2882.43 ml       Lines/Drains/Airways     Peripherally Inserted Central Catheter Line                 PICC Double Lumen 04/14/18 1000 right brachial 11 days          Central Venous Catheter Line                 Hemodialysis Catheter 04/24/18 1600 right femoral less than 1 day          Drain                 Rectal Tube 04/12/18 0710 rectal tube w/ balloon (indicate number of mLs) 13 days         Biliary Tube 04/13/18 1200 RLQ 11 days         Urethral Catheter 04/15/18 1250 Temperature probe 9 days         NG/OG Tube 04/22/18 1500 nasogastric Right nostril 2 days          Airway                 Surgical Airway 04/11/18 1403 Shiley Cuffed;Long;Proximal 13 days          Arterial Line                 Arterial Line 04/25/18 0815 Left Brachial less  than 1 day                Physical Exam   Constitutional: She appears ill.   Obese female   HENT:   Head: Normocephalic and atraumatic.   Cardiovascular: Regular rhythm.  Tachycardia present.    Pulmonary/Chest:   Has trach on vent   Abdominal: She exhibits distension. Bowel sounds are absent. There is hepatomegaly.   Neurological:   Doesn't respond to me       Significant Labs:  CBC:   Recent Labs  Lab 04/24/18  0411 04/24/18  1433 04/25/18  0411   WBC 37.01*  --  52.20*   HGB 8.0*  --  7.3*   HCT 25.7* 24* 24.6*     --  246     CMP:   Recent Labs  Lab 04/25/18  0411   *  231*   CALCIUM 9.6  9.6   ALBUMIN 1.9*  1.9*   PROT 6.6     139   K 5.1  5.1   CO2 8*  8*     102   BUN 25*  25*   CREATININE 3.4*  3.4*   ALKPHOS 159*   ALT 66*   *   BILITOT 18.3*     Coagulation:   Recent Labs  Lab 04/24/18  1105 04/25/18  0411   INR  --  2.9*   APTT 53.1*  --          Significant Imaging:  Imaging results within the past 24 hours have been reviewed.

## 2018-04-25 NOTE — PROGRESS NOTES
Jevon Santoro with [facility] notified of death.  stated she was not a coroners' case.

## 2018-04-25 NOTE — PROGRESS NOTES
Pt became hypotensive, tachycardic and hyperventilating this afternoon. Stat ABG showed severe metabolic Acidosis, Blood sugar low, pt given D50 and monitored closely. Pt also got 2amps of Bicarb and gtt started until CRRT could be started. Pt became Hypotensive-- hence Levophed started. 1000 of Albumin given, 500 NS administered. D/w Nephro and decision was made to start CRRT with consult for vascular to place vascath. IR performed bedside Paracentesis that was previously ordered, paracentesis successfully removed 1.5L of fluid and sent to lab. No complications at this time. Dr. Cardona placed a right femoral vascath. Vasopressin Added to support BP and a Right brachial art line placed. Pt remained on cooling blanket to control temp. Tmax 100.  Pt is ST on the heart monitor. CRRT initiated and Heparin gtt started for CRRT, non titratable.    Condition severely critical  Prognosis guarded to poor.

## 2018-04-25 NOTE — ASSESSMENT & PLAN NOTE
Currently intubated.  No acute infiltrates seen on CXR  Continue IV antibiotics empirically.  Pulmonary consult.    04/07/2018- will wean off ventilator as tolerated.  She is more awake and alert today.  Sputum cultures showed MRSA and pseudomonas-she is on vanco/meropenem-will deescalate soon.       04/08/2018- she is now intubated after asystolic cardiac arrest .Will wean off as tolerated.  04/09/2018- she remains intubated ,critical care follow up .wean off ventilator as tolerated.  4/10/2018 - likely secondary to MRSA and pseudamonal pneumonia. ID consulted, will consider double covering for pseudomonas.  04/12/18- continue Vent support  04/15/18- Trach collar in place  04/16- will continue trach collar and wean as tolerated.  04/17/18- continue trachea care/weaning   04/18-continue trach care ,wean as tolerated   4/19- continue present care  04/20-wean off ventilator as tolerated.  04/21- continue trach care /pulmonology follow up   04/22-continue ventilatory support and weaning   04/23 - will wean off ventilatory support as tolerated   4/24- cont Vent support via Trach, has new fever and Leukocytosis, on IV abx.    4/25- remains on vent via Trach

## 2018-04-25 NOTE — EICU
eLert for increased work of breathing    21 y/o F presented with septic shock, likely due to retained products of conception, seizure (alcoholic vs hyponatremia), respiratory failure.    Antibiotics resumed yesterday due to development of fever, worsening leukocytosis, increasing pressor requirement and lactic acidosis.  CRRT initiated approximately 8 hours ago. Positive 2.6 liters yesterday.    CXR no significant infiltrate. Tracheostomy in place    Camera assessment: tachypneic with desynchrony  Vent settings: pressure AC RR 18 IP 25 iTime 0.91, peak pressure 31    · Acidosis and fever likely etiology for increased minute ventilation  · Decreased iTime to 0.8  · Instructed bedside to give fentanyl and ativan as previously ordered for comfort and improve vent synchrony

## 2018-04-25 NOTE — PROGRESS NOTES
[facility] -   Gastroenterology  Progress Note    Patient Name: Rafael Duron  MRN: [id number]  Admission Date: 4/3/2018  Hospital Length of Stay: 22 days  Code Status: Full Code   Attending Provider: Paul Stewart MD  Consulting Provider: Jay Pelayo PA-C  Primary Care Physician: Herman Cowart MD  Principal Problem: Acute respiratory failure with hypoxia and hypercapnia      Subjective:     Interval History:   The patient has clinically declined over the last 48 hours. She was febrile yesterday. WBC count increased. She is now on CRRT and requiring pressor support. INR has also increased significantly. She is having diarrhea; fecal management in place. Tube feeds now on hold. Repeat C. Diff was negative. S/p paracentesis yesterday. Fluid analysis not consistent with SBP. SAAG 0.6 which is not consistent with portal hypertension.     Review of Systems   Unable to perform ROS: Acuity of condition     Objective:     Vital Signs (Most Recent):  Temp: 98.8 °F (37.1 °C) (04/25/18 1105)  Pulse: (!) 111 (04/25/18 1115)  Resp: (!) 28 (04/25/18 1115)  BP: (!) 138/47 (04/25/18 1105)  SpO2: 100 % (04/25/18 1115) Vital Signs (24h Range):  Temp:  [98.6 °F (37 °C)-99.1 °F (37.3 °C)] 98.8 °F (37.1 °C)  Pulse:  [107-123] 111  Resp:  [19-43] 28  SpO2:  [95 %-100 %] 100 %  BP: ()/(30-91) 138/47     Weight: 120 kg (264 lb 8.8 oz) (04/25/18 0600)  Body mass index is 42.7 kg/m².      Intake/Output Summary (Last 24 hours) at 04/25/18 1127  Last data filed at 04/25/18 1100   Gross per 24 hour   Intake          4002.43 ml   Output             1120 ml   Net          2882.43 ml       Lines/Drains/Airways     Peripherally Inserted Central Catheter Line                 PICC Double Lumen 04/14/18 1000 right brachial 11 days          Central Venous Catheter Line                 Hemodialysis Catheter 04/24/18 1600 right femoral less than 1 day          Drain                 Rectal Tube 04/12/18 0710 rectal tube w/ balloon  (indicate number of mLs) 13 days         Biliary Tube 04/13/18 1200 RLQ 11 days         Urethral Catheter 04/15/18 1250 Temperature probe 9 days         NG/OG Tube 04/22/18 1500 nasogastric Right nostril 2 days          Airway                 Surgical Airway 04/11/18 1403 Shiley Cuffed;Long;Proximal 13 days          Arterial Line                 Arterial Line 04/25/18 0815 Left Brachial less than 1 day                Physical Exam   Constitutional: She appears ill.   Obese female   HENT:   Head: Normocephalic and atraumatic.   Cardiovascular: Regular rhythm.  Tachycardia present.    Pulmonary/Chest:   Has trach on vent   Abdominal: She exhibits distension. Bowel sounds are absent. There is hepatomegaly.   Neurological:   Doesn't respond to me       Significant Labs:  CBC:   Recent Labs  Lab 04/24/18  0411 04/24/18  1433 04/25/18  0411   WBC 37.01*  --  52.20*   HGB 8.0*  --  7.3*   HCT 25.7* 24* 24.6*     --  246     CMP:   Recent Labs  Lab 04/25/18  0411   *  231*   CALCIUM 9.6  9.6   ALBUMIN 1.9*  1.9*   PROT 6.6     139   K 5.1  5.1   CO2 8*  8*     102   BUN 25*  25*   CREATININE 3.4*  3.4*   ALKPHOS 159*   ALT 66*   *   BILITOT 18.3*     Coagulation:   Recent Labs  Lab 04/24/18  1105 04/25/18  0411   INR  --  2.9*   APTT 53.1*  --          Significant Imaging:  Imaging results within the past 24 hours have been reviewed.    Assessment/Plan:     Elevated LFTs    21 yo admitted for septic shock after retained products of conception.    Except bilirubin, LFTs had trended down, but now acutely up overnight. Likely related to new issues with hypotension. MELD has increased significantly. Overall, prognosis not good.  Continue Rifaximin. Adjust Lactulose as needed since patient is having diarrhea.   No signs of SBP.   SAAG not consistent with portal hypertension.   Continue supportive care by HM and ICU teams.     MELD-Na score: 41 at 4/25/2018  4:11 AM  MELD score: 41 at  4/25/2018  4:11 AM  Calculated from:  Serum Creatinine: 3.4 mg/dL at 4/25/2018  4:11 AM  Serum Sodium: 139 mmol/L (Rounded to 137) at 4/25/2018  4:11 AM  Total Bilirubin: 18.3 mg/dL at 4/25/2018  4:11 AM  INR(ratio): 2.9 at 4/25/2018  4:11 AM  Age: 22 years               VIKRAM (acute kidney injury)    Patient has been started on CRRT. Nephrology following.         Alcoholic hepatitis with ascites    No additional recommendations at this time.   See above.         Hepatomegaly    Secondary to fatty liver disease. Nothing further to do at this time.         Acute blood loss anemia    No overt GI bleed.  Continue to monitor and transfuse as indicated.             Thank you for your consult. We will continue to follow peripherally.     Jay Pelayo PA-C  Gastroenterology  [facility] [place]

## 2018-04-25 NOTE — SUBJECTIVE & OBJECTIVE
Interval History: remains unresponsive, intubated on vent, LFts shot up, Severely acidotic and Hypotensive, Meld score 41. Family notified again of her critical condition.    Review of Systems   Unable to perform ROS: Acuity of condition     Objective:     Vital Signs (Most Recent):  Temp: 98.8 °F (37.1 °C) (04/25/18 1105)  Pulse: (!) 111 (04/25/18 1115)  Resp: (!) 28 (04/25/18 1115)  BP: (!) 138/47 (04/25/18 1105)  SpO2: 100 % (04/25/18 1115) Vital Signs (24h Range):  Temp:  [98.6 °F (37 °C)-99.1 °F (37.3 °C)] 98.8 °F (37.1 °C)  Pulse:  [107-123] 111  Resp:  [19-43] 28  SpO2:  [95 %-100 %] 100 %  BP: ()/(30-91) 138/47     Weight: 120 kg (264 lb 8.8 oz)  Body mass index is 42.7 kg/m².    Intake/Output Summary (Last 24 hours) at 04/25/18 1221  Last data filed at 04/25/18 1100   Gross per 24 hour   Intake          4002.43 ml   Output             1120 ml   Net          2882.43 ml      Physical Exam   Constitutional: She appears well-developed and well-nourished. She appears toxic. She has a sickly appearance. She appears ill. She appears distressed.   Obese young female on mechanical ventilation via trach   HENT:   Head: Atraumatic.   Nose:       Mouth/Throat: Oropharynx is clear and moist.   Eyes: Scleral icterus is present. Pupils are unequal.   Pupils unequal right larger than left but both reactive to light.     Neck: Neck supple.       Obese    Cardiovascular: Regular rhythm.  Tachycardia present.  Exam reveals distant heart sounds.    Pulses:       Radial pulses are 1+ on the right side, and 1+ on the left side.        Dorsalis pedis pulses are 1+ on the right side, and 1+ on the left side.   Pulmonary/Chest: Accessory muscle usage present. Tachypnea noted. She is in respiratory distress. She has decreased breath sounds. She has rales.   Vent controlled resp effort   Abdominal: Bowel sounds are absent. There is hepatomegaly. There is no rebound and no guarding.       Morbidly obese.  Semi-soft to firm    Genitourinary:   Genitourinary Comments: Rodriguez in place   Musculoskeletal: Normal range of motion. She exhibits edema (trace edema BLE).   Mild general LE edema   Lymphadenopathy:     She has no cervical adenopathy.   Neurological: She is unresponsive.   Min withdrawal to pain.  No purposeful movements and eyes closed to pain.  Pupils unequal.   Skin: Skin is warm and dry. Capillary refill takes less than 2 seconds. No cyanosis.        Nursing note and vitals reviewed.      Significant Labs:   ABGs:   Recent Labs  Lab 04/25/18  0841   PH 7.085*   PCO2 25.9*   HCO3 7.8*   POCSATURATED 90*   BE -22     Bilirubin:   Recent Labs  Lab 04/21/18  0445 04/22/18  0400 04/23/18  0353 04/24/18  0411 04/25/18  0411   BILITOT 13.8* 12.9* 13.9* 15.9* 18.3*     Blood Culture:   Recent Labs  Lab 04/24/18  0707   LABBLOO No Growth to date  No Growth to date     BMP:   Recent Labs  Lab 04/25/18  0411   *  231*     139   K 5.1  5.1     102   CO2 8*  8*   BUN 25*  25*   CREATININE 3.4*  3.4*   CALCIUM 9.6  9.6   MG 1.9     CBC:   Recent Labs  Lab 04/24/18  0411 04/24/18  1433 04/25/18  0411   WBC 37.01*  --  52.20*   HGB 8.0*  --  7.3*   HCT 25.7* 24* 24.6*     --  246     CMP:   Recent Labs  Lab 04/24/18  0411 04/24/18  1446 04/24/18  2139 04/25/18  0411    147* 143 139  139   K 4.5 4.0 4.3 5.1  5.1   * 113* 107 102  102   CO2 17* 11* 10* 8*  8*   * 123* 259* 231*  231*   BUN 25* 29* 28* 25*  25*   CREATININE 2.9* 3.4* 3.4* 3.4*  3.4*   CALCIUM 11.5* 11.2* 10.1 9.6  9.6   PROT 6.6  --   --  6.6   ALBUMIN 1.8*  --  1.8* 1.9*  1.9*   BILITOT 15.9*  --   --  18.3*   ALKPHOS 106  --   --  159*   *  --   --  967*   ALT 16  --   --  66*   ANIONGAP 17* 23* 26* 29*  29*   EGFRNONAA 22* 18* 18* 18*  18*     Coagulation:   Recent Labs  Lab 04/24/18  1105 04/25/18  0411   INR  --  2.9*   APTT 53.1*  --      Lactic Acid:   Recent Labs  Lab 04/24/18  1800   LACTATE >12.0*      Magnesium:   Recent Labs  Lab 04/24/18  0411 04/25/18  0010 04/25/18  0411   MG 2.1 2.0 1.9     POCT Glucose:   Recent Labs  Lab 04/25/18  0432 04/25/18  0554 04/25/18  0748   POCTGLUCOSE 86 73 76     Prealbumin:   Troponin:   All pertinent labs within the past 24 hours have been reviewed.    Significant Imaging: I have reviewed all pertinent imaging results/findings within the past 24 hours.

## 2018-04-25 NOTE — PLAN OF CARE
Patient . CM offered comfort and supportive care to the family. Family stated the body will be release to [facility] [phone]. Family already called . They still have the baby she had previously and the plan  is to bury them together.      18 1545   Final Note   Assessment Type Final Discharge Note   Discharge Disposition    Hospital Follow Up  Appt(s) scheduled? No   Discharge plans and expectations educations in teach back method with documentation complete? No   Right Care Referral Info   Post Acute Recommendation No Care

## 2018-04-25 NOTE — PROGRESS NOTES
Just had a detailed d/w pt's mother, sister and brother and explained her critical condition and very poor prognosis. They understand and accept and wish to keep her comfortable only and do not want any resuscitation and also wish to withdraw any and all treatments and life support including CRRT and vent support.    DNR ordered, comfort care only ordered.

## 2018-04-25 NOTE — ASSESSMENT & PLAN NOTE
Etiology multifactorial with severe Lactic acidosis  Likely sec to Dead gut vs Liver Ischemia or severe overwhelming sepsis  She has a known huge liver upon presentation and her MELD is 41  Prognosis is poor  Await family to discuss comfort care and hospice

## 2018-04-25 NOTE — PLAN OF CARE
"Problem: Patient Care Overview  Goal: Plan of Care Review  Outcome: Ongoing (interventions implemented as appropriate)  Pt remains on vent and in restraints for attempting to pull at lines.  NG tube left nare in place, tube feeding on hold per NP order.  Rectal tube in place drained 1700ml stool.  Rodriguez intact with minimal to no urine output this shift, MD aware.  Gallbladder drain 100ml yellow fluid, leaking around sites, dressing changed this shift.  Pt became hypotensive, tachycardic and hyperventilating this afternoon.  NP notified; new orders received.  ABG ordered with zora, see results.  Blood sugar low, pt given D50 and monitored closely.  NP assessed pt and also ordered to give 2amps of Bicarb and gtt started until CRRT could be started.  Levophed started, titrated per orders.  1000 of Albumin given, 500 NS administered.  NP spoke with nephro and decision was made to start CRRT with consult for vascular to place vascath. Case PA arrived at bedside to do paracentesis that was previously ordered, paracentesis successfully removed 1.5L of fluid and sent to lab.  No complications at this time.  Brendan BAÑUELOS at bedside and placed a right femoral vascath.  Vasopressin started.  Right brachial art line placed this shift by KATHI Lowe NP.  Pt remains on cooling blanket to control temp. Tmax 100.  Pt is ST on the heart monitor.  CRRT initiated and Heparin gtt started for CRRT, non titratable.  Serial labs to start.  Pt turned and repositioned via rotation therapy bed.  PICC intact with no redness, swelling or drainage.  Bed low, wheels locked, bed alarm on.  Plan of care reviewed with pt mother via phone to obtain consent for procedures.  Pt mother verbalizes understanding and states to "do everything we can for her."       "

## 2018-04-25 NOTE — PROGRESS NOTES
I called sister, Andrea Duron, and brother Qiana, and informed them of patient's dramatic deterioration of overall health and recommended they come to hospital asap.  I called her mother but got voice mailbox that was full and unable to leave message.  I told siblings to notify all family to come to hospital asap.    CHANDRIKA Lowe [facility]
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
## 2018-04-25 NOTE — ASSESSMENT & PLAN NOTE
Currently Urine and Blood cultures NGTD  Sputum cult + GNRs  S/P Rx for C-Diff, MRSA PNA, Klebsiella UTI and Fungal UTI/PNA  ID following  Cont Zyvox, Micafungin and Zosyn  Cont Levophed and Vasopressin infusions

## 2018-04-25 NOTE — ASSESSMENT & PLAN NOTE
21 yo admitted for septic shock after retained products of conception.    Except bilirubin, LFTs had trended down, but now acutely up overnight. Likely related to new issues with hypotension. MELD has increased significantly. Overall, prognosis not good.  Continue Rifaximin. Adjust Lactulose as needed since patient is having diarrhea.   No signs of SBP.   SAAG not consistent with portal hypertension.   Continue supportive care by HM and ICU teams.     MELD-Na score: 41 at 4/25/2018  4:11 AM  MELD score: 41 at 4/25/2018  4:11 AM  Calculated from:  Serum Creatinine: 3.4 mg/dL at 4/25/2018  4:11 AM  Serum Sodium: 139 mmol/L (Rounded to 137) at 4/25/2018  4:11 AM  Total Bilirubin: 18.3 mg/dL at 4/25/2018  4:11 AM  INR(ratio): 2.9 at 4/25/2018  4:11 AM  Age: 22 years

## 2018-04-25 NOTE — SUBJECTIVE & OBJECTIVE
Interval History:  She continues to have fever and increasing leucocytosis after stopping antibiotics.  WBC is now 37.  Etiology is unclear.  MRI of the spinal axis  is negative ,Abdominal Ct scan did not show any abscess.    Review of Systems   Unable to perform ROS: Acuity of condition     Objective:     Vital Signs (Most Recent):  Temp: 98.9 °F (37.2 °C) (04/24/18 1902)  Pulse: 109 (04/24/18 2145)  Resp: (!) 30 (04/24/18 2145)  BP: (!) 114/58 (04/24/18 2100)  SpO2: 100 % (04/24/18 2145) Vital Signs (24h Range):  Temp:  [98.4 °F (36.9 °C)-102.1 °F (38.9 °C)] 98.9 °F (37.2 °C)  Pulse:  [103-123] 109  Resp:  [19-42] 30  SpO2:  [96 %-100 %] 100 %  BP: ()/(32-80) 114/58     Weight: 118 kg (260 lb 2.3 oz)  Body mass index is 41.99 kg/m².    Estimated Creatinine Clearance: 33.6 mL/min (A) (based on SCr of 3.4 mg/dL (H)).    Physical Exam   Constitutional: She appears well-developed. No distress.   Morbidly obese    HENT:   Head: Normocephalic and atraumatic.   Mouth/Throat: Oropharynx is clear and moist. No oropharyngeal exudate.   Eyes: EOM are normal. Pupils are equal, round, and reactive to light. Scleral icterus is present.   Neck: Neck supple. No JVD present. Carotid bruit is not present. No tracheal deviation present. No thyroid mass and no thyromegaly present.   Trach in place    Cardiovascular: Normal rate, regular rhythm, normal heart sounds and intact distal pulses.  Exam reveals no gallop and no friction rub.    No murmur heard.  Pulmonary/Chest: No respiratory distress. She has no wheezes. She has rales. She exhibits no tenderness.   Abdominal: Soft. Bowel sounds are normal. She exhibits no distension, no abdominal bruit, no ascites and no mass. There is hepatomegaly. There is no hepatosplenomegaly. There is no tenderness. There is no rebound, no guarding and no CVA tenderness.   Cholecystostomy in place ,hepatomegaly   Musculoskeletal: She exhibits edema. She exhibits no tenderness.   Dependant edema     Lymphadenopathy:     She has no cervical adenopathy.   Neurological: She has normal reflexes. She displays normal reflexes. No cranial nerve deficit. She exhibits normal muscle tone. Coordination normal.   Skin: Skin is warm and intact. No rash noted. No erythema. No pallor.   Along the flanks-areas of erythema.edema noted    Nursing note and vitals reviewed.      Significant Labs:   Blood Culture:   Recent Labs  Lab 04/03/18  2133 04/03/18  2204 04/05/18  0413 04/13/18  1616   LABBLOO No growth after 5 days. No growth after 5 days. No growth after 5 days. No growth after 5 days.  No growth after 5 days.     BMP:   Recent Labs  Lab 04/24/18  0411  04/24/18  2139   *  < > 259*     < > 143   K 4.5  < > 4.3   *  < > 107   CO2 17*  < > 10*   BUN 25*  < > 28*   CREATININE 2.9*  < > 3.4*   CALCIUM 11.5*  < > 10.1   MG 2.1  --   --    < > = values in this interval not displayed.  All pertinent labs within the past 24 hours have been reviewed.    Significant Imaging: I have reviewed all pertinent imaging results/findings within the past 24 hours.

## 2018-04-25 NOTE — PROGRESS NOTES
CRRT daily maintenance. Primary nurse informed this RN of the pt's labs notified Dr. Queen, he stated to put her on a 2K bath and 40Bicarb. Notified primary nurse of change. Encouraged primary nurse to flush hourly to prevent dialyzer from clotting. Equipment/supplies at bedside for maintenance and emergency disconnect. Bags 1-4 hung, Alarms engaged and pressures WNL. Advised if assistance is needed to call the on-call nurse.

## 2018-04-25 NOTE — PROGRESS NOTES
Had long conference with mother, brother and sister at bedside.  All questions answered and patient's current grave condition explained in detail with MOSF and failing aggressive Max Rx.  Family including mother request we make her comfortable and stop all aggressive Rx with full DNR.  Dr. Stewart present for family conference and decision.  Will stop all aggressive Rx and make comfortable and will sign off.     CHANDRIKA Lowe [facility]

## 2018-04-25 NOTE — ASSESSMENT & PLAN NOTE
See above -- now has MSOF with Shock and Liver Failure  Continue supportive care- with CRRT, Pressors  Prognosis extremely poor  Await family

## 2018-04-25 NOTE — PROCEDURES
"Rafael Duron is a 22 y.o. female patient.    Temp: 98.7 °F (37.1 °C) (04/25/18 0302)  Pulse: (!) 113 (04/25/18 0715)  Resp: (!) 38 (04/25/18 0715)  BP: (!) 129/91 (04/25/18 0615)  SpO2: 97 % (04/25/18 0715)  Weight: 120 kg (264 lb 8.8 oz) (04/25/18 0600)  Height: 5' 6" (167.6 cm) (04/19/18 0545)       Arterial Line  Date/Time: 4/25/2018 7:22 AM  Location procedure was performed: [facility] INTENSIVE CARE UNIT  Performed by: VINNY LOWE  Authorized by: VINNY LOWE   Pre-op Diagnosis: shock  Post-operative diagnosis: shock  Consent Done: Not Needed  Preparation: Patient was prepped and draped in the usual sterile fashion.  Indications: multiple ABGs, respiratory failure and hemodynamic monitoring  Location: left brachial  Patient sedated: no  Needle gauge: 20  Seldinger technique: Seldinger technique used  Number of attempts: 1  Complications: No  Estimated blood loss (mL): 2  Specimens: No  Implants: No  Post-procedure: line sutured and dressing applied  Post-procedure CMS: unchanged  Patient tolerance: Patient tolerated the procedure well with no immediate complications          Vinny Lowe  4/25/2018  "

## 2018-04-25 NOTE — ASSESSMENT & PLAN NOTE
Hx heavy etoh abuse  Cont Lactulose and Rifaximin - NH3 still elevated  Cont Thiamine, MVI and Folic Acid

## 2018-04-25 NOTE — SUBJECTIVE & OBJECTIVE
Interval History: Pt was seen and examined. Discussed with ICU team. No new events last pm.    Review of patient's allergies indicates:  No Known Allergies  Current Facility-Administered Medications   Medication Frequency    0.9%  NaCl infusion Continuous    0.9%  NaCl infusion Continuous    albuterol-ipratropium 2.5mg-0.5mg/3mL nebulizer solution 3 mL Q4H PRN    bisacodyl suppository 10 mg Daily PRN    dextrose 5 % 1,000 mL with sodium bicarbonate 1 mEq/mL (8.4 %) 150 mEq infusion Continuous    dextrose 50% injection 12.5 g PRN    famotidine (PF) injection 20 mg Daily    fentaNYL injection 50 mcg Q2H PRN    folic acid-vit B6-vit B12 2.5-25-2 mg tablet 1 tablet Daily    glucagon (human recombinant) injection 1 mg PRN    heparin (porcine) injection 4,000 Units PRN    heparin 25,000 units in dextrose 5% 250 mL (100 units/mL) infusion (heparin infusion) Continuous    insulin aspart U-100 pen 1-10 Units Q6H PRN    lactulose 20 gram/30 mL solution Soln 30 g TID    linezolid 600mg/300ml IVPB 600 mg Q12H    lorazepam (ATIVAN) injection 2 mg Q2H PRN    magnesium sulfate 2g in water 50mL IVPB (premix) PRN    micafungin 100 mg in sodium chloride 0.9 % 100 mL IVPB (ready to mix system) Q24H    norepinephrine 16 mg in dextrose 5 % 250 mL infusion Continuous    ondansetron injection 4 mg Q8H PRN    piperacillin-tazobactam 4.5 g in dextrose 5 % 100 mL IVPB (ready to mix system) Q12H    pneumoc 13-bobby conj-dip cr(PF) 0.5 mL vaccine x 1 dose    rifAXIMin tablet 550 mg BID    sodium chloride 0.9% flush 5 mL PRN    sodium phosphate 20.01 mmol in dextrose 5 % 250 mL IVPB PRN    sodium phosphate 30 mmol in dextrose 5 % 250 mL IVPB PRN    sodium phosphate 39.99 mmol in dextrose 5 % 250 mL IVPB PRN    thiamine tablet 100 mg Daily    vasopressin (PITRESSIN) 0.2 Units/mL in dextrose 5 % 100 mL infusion Continuous       Objective:     Vital Signs (Most Recent):  Temp: 99.1 °F (37.3 °C) (04/25/18 0705)  Pulse:  (!) 115 (04/25/18 0915)  Resp: (!) 39 (04/25/18 0915)  BP: (!) 75/30 (04/25/18 0900)  SpO2: 95 % (04/25/18 0915)  O2 Device (Oxygen Therapy): ventilator (04/25/18 0750) Vital Signs (24h Range):  Temp:  [98.6 °F (37 °C)-99.7 °F (37.6 °C)] 99.1 °F (37.3 °C)  Pulse:  [107-123] 115  Resp:  [19-42] 39  SpO2:  [95 %-100 %] 95 %  BP: ()/(30-91) 75/30     Weight: 120 kg (264 lb 8.8 oz) (04/25/18 0600)  Body mass index is 42.7 kg/m².  Body surface area is 2.36 meters squared.    I/O last 3 completed shifts:  In: 5312.4 [I.V.:2492.4; NG/GT:1320; IV Piggyback:1500]  Out: 3634 [Urine:209; Drains:425; Stool:3000]    Physical Exam   Constitutional: She appears well-developed and well-nourished.   Neck: No JVD present.   Cardiovascular: Normal rate and regular rhythm.  Exam reveals no friction rub.    Pulmonary/Chest: Breath sounds normal.   Abdominal: Soft.   Musculoskeletal: She exhibits edema.   Skin: Skin is warm and dry.   Nursing note and vitals reviewed.      Significant Labs: reviewed  BMP  Lab Results   Component Value Date     04/25/2018     04/25/2018    K 5.1 04/25/2018    K 5.1 04/25/2018     04/25/2018     04/25/2018    CO2 8 (LL) 04/25/2018    CO2 8 (LL) 04/25/2018    BUN 25 (H) 04/25/2018    BUN 25 (H) 04/25/2018    CREATININE 3.4 (H) 04/25/2018    CREATININE 3.4 (H) 04/25/2018    CALCIUM 9.6 04/25/2018    CALCIUM 9.6 04/25/2018    ANIONGAP 29 (H) 04/25/2018    ANIONGAP 29 (H) 04/25/2018    ESTGFRAFRICA 21 (A) 04/25/2018    ESTGFRAFRICA 21 (A) 04/25/2018    EGFRNONAA 18 (A) 04/25/2018    EGFRNONAA 18 (A) 04/25/2018     Lab Results   Component Value Date    WBC 52.20 (HH) 04/25/2018    HGB 7.3 (L) 04/25/2018    HCT 24.6 (L) 04/25/2018     (H) 04/25/2018     04/25/2018     ABG    Recent Labs  Lab 04/25/18  0841   PH 7.085*   PO2 79*   PCO2 25.9*   HCO3 7.8*   BE -22       Significant Imaging: reviewed

## 2018-04-25 NOTE — PROGRESS NOTES
Family wishes to begin comfort measures at this time.  CRRT stopped, gtts dc'd. Pt placed on trach collar.  Family to bedside.        Shelli CASTRO

## 2018-04-25 NOTE — PROGRESS NOTES
[facility]  Infectious Disease  Progress Note    Patient Name: Rafael Duron  MRN: [id number]  Admission Date: 4/3/2018  Length of Stay: 21 days  Attending Physician: Paul Stewart MD  Primary Care Provider: Herman Cowart MD    Isolation Status: Contact  Assessment/Plan:      * Acute respiratory failure with hypoxia and hypercapnia    S/p tracheostomy tube-continue ventilatory support and weaning         Acalculous cholecystitis    Will continue cholecystectomy tube        VIKRAM (acute kidney injury)    Nephrology follow up .  Will avoid nephrotoxic meds.  Will continue to monitor closely .        Fever    Etiology at this time is unclear.  Will follow repeat cultures .  All previous positive cultures have been treated.  Urine culture was positive for klebsiella which was treated .  Respiratory cultures- MRSA and pseudomonas- both treated .    She developed worsening leucocytosis when antibiotics was stopped . WBC is now 37 .  Will restart empiric antimicrobial therapy with zyvox, meropenem and micafungin and will monitor wbc             Anticipated Disposition:     Thank you for your consult. I will follow-up with patient. Please contact us if you have any additional questions.    Ed Ram MD  Infectious Disease  [facility]    Subjective:     Principal Problem:Acute respiratory failure with hypoxia and hypercapnia    HPI: No notes on file  Interval History:  She continues to have fever and increasing leucocytosis after stopping antibiotics.  WBC is now 37.  Etiology is unclear.  MRI of the spinal axis  is negative ,Abdominal Ct scan did not show any abscess.    Review of Systems   Unable to perform ROS: Acuity of condition     Objective:     Vital Signs (Most Recent):  Temp: 98.9 °F (37.2 °C) (04/24/18 1902)  Pulse: 109 (04/24/18 2145)  Resp: (!) 30 (04/24/18 2145)  BP: (!) 114/58 (04/24/18 2100)  SpO2: 100 % (04/24/18 2145) Vital Signs (24h Range):  Temp:  [98.4 °F (36.9  °C)-102.1 °F (38.9 °C)] 98.9 °F (37.2 °C)  Pulse:  [103-123] 109  Resp:  [19-42] 30  SpO2:  [96 %-100 %] 100 %  BP: ()/(32-80) 114/58     Weight: 118 kg (260 lb 2.3 oz)  Body mass index is 41.99 kg/m².    Estimated Creatinine Clearance: 33.6 mL/min (A) (based on SCr of 3.4 mg/dL (H)).    Physical Exam   Constitutional: She appears well-developed. No distress.   Morbidly obese    HENT:   Head: Normocephalic and atraumatic.   Mouth/Throat: Oropharynx is clear and moist. No oropharyngeal exudate.   Eyes: EOM are normal. Pupils are equal, round, and reactive to light. Scleral icterus is present.   Neck: Neck supple. No JVD present. Carotid bruit is not present. No tracheal deviation present. No thyroid mass and no thyromegaly present.   Trach in place    Cardiovascular: Normal rate, regular rhythm, normal heart sounds and intact distal pulses.  Exam reveals no gallop and no friction rub.    No murmur heard.  Pulmonary/Chest: No respiratory distress. She has no wheezes. She has rales. She exhibits no tenderness.   Abdominal: Soft. Bowel sounds are normal. She exhibits no distension, no abdominal bruit, no ascites and no mass. There is hepatomegaly. There is no hepatosplenomegaly. There is no tenderness. There is no rebound, no guarding and no CVA tenderness.   Cholecystostomy in place ,hepatomegaly   Musculoskeletal: She exhibits edema. She exhibits no tenderness.   Dependant edema    Lymphadenopathy:     She has no cervical adenopathy.   Neurological: She has normal reflexes. She displays normal reflexes. No cranial nerve deficit. She exhibits normal muscle tone. Coordination normal.   Skin: Skin is warm and intact. No rash noted. No erythema. No pallor.   Along the flanks-areas of erythema.edema noted    Nursing note and vitals reviewed.      Significant Labs:   Blood Culture:   Recent Labs  Lab 04/03/18  2133 04/03/18  2204 04/05/18  0413 04/13/18  1616   LABBLOO No growth after 5 days. No growth after 5 days.  No growth after 5 days. No growth after 5 days.  No growth after 5 days.     BMP:   Recent Labs  Lab 04/24/18  0411  04/24/18  2139   *  < > 259*     < > 143   K 4.5  < > 4.3   *  < > 107   CO2 17*  < > 10*   BUN 25*  < > 28*   CREATININE 2.9*  < > 3.4*   CALCIUM 11.5*  < > 10.1   MG 2.1  --   --    < > = values in this interval not displayed.  All pertinent labs within the past 24 hours have been reviewed.    Significant Imaging: I have reviewed all pertinent imaging results/findings within the past 24 hours.
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
## 2018-04-25 NOTE — SUBJECTIVE & OBJECTIVE
Review of Systems   Unable to perform ROS: Patient nonverbal       Objective:     Vital Signs (Most Recent):  Temp: 98.7 °F (37.1 °C) (04/25/18 0302)  Pulse: (!) 113 (04/25/18 0715)  Resp: (!) 38 (04/25/18 0715)  BP: (!) 129/91 (04/25/18 0615)  SpO2: 97 % (04/25/18 0715) Vital Signs (24h Range):  Temp:  [98.6 °F (37 °C)-99.7 °F (37.6 °C)] 98.7 °F (37.1 °C)  Pulse:  [107-123] 113  Resp:  [19-42] 38  SpO2:  [96 %-100 %] 97 %  BP: ()/(32-91) 129/91     Weight: 120 kg (264 lb 8.8 oz)  Body mass index is 42.7 kg/m².      Intake/Output Summary (Last 24 hours) at 04/25/18 0842  Last data filed at 04/25/18 0600   Gross per 24 hour   Intake          4632.43 ml   Output             2145 ml   Net          2487.43 ml       Physical Exam   Constitutional: She appears well-developed and well-nourished. She appears toxic. She has a sickly appearance. She appears ill. She appears distressed.   Obese young female on mechanical ventilation via trach   HENT:   Head: Atraumatic.   Nose:       Mouth/Throat: Oropharynx is clear and moist.   Eyes: Scleral icterus is present. Pupils are unequal.   Pupils unequal right larger than left but both reactive to light.     Neck: Neck supple.       Obese    Cardiovascular: Regular rhythm.  Tachycardia present.  Exam reveals distant heart sounds.    Pulses:       Radial pulses are 1+ on the right side, and 1+ on the left side.        Dorsalis pedis pulses are 1+ on the right side, and 1+ on the left side.   Pulmonary/Chest: Accessory muscle usage present. Tachypnea noted. She is in respiratory distress. She has decreased breath sounds. She has rales.   Vent controlled resp effort   Abdominal: Bowel sounds are absent. There is hepatomegaly. There is no rebound and no guarding.       Morbidly obese.  Semi-soft to firm   Genitourinary:   Genitourinary Comments: Rodriguez in place   Musculoskeletal: Normal range of motion. She exhibits edema (trace edema BLE).   Mild general LE edema    Lymphadenopathy:     She has no cervical adenopathy.   Neurological: She is unresponsive.   Min withdrawal to pain.  No purposeful movements and eyes closed to pain.  Pupils unequal.   Skin: Skin is warm and dry. Capillary refill takes less than 2 seconds. No cyanosis.            Vents:  Vent Mode: A/C (04/25/18 0715)  Ventilator Initiated: Yes (04/05/18 1930)  Set Rate: 18 bmp (04/25/18 0715)  Vt Set: 0 mL (04/25/18 0715)  Pressure Support: 0 cmH20 (04/25/18 0715)  PEEP/CPAP: 5 cmH20 (04/25/18 0715)  Oxygen Concentration (%): 35 (04/25/18 0715)  Peak Airway Pressure: 31 cmH2O (04/25/18 0715)  Plateau Pressure: 0 cmH20 (04/25/18 0715)  Total Ve: 20.1 mL (04/25/18 0715)  F/VT Ratio<105 (RSBI): (!) 59.75 (04/25/18 0715)    Lines/Drains/Airways     Peripherally Inserted Central Catheter Line                 PICC Double Lumen 04/14/18 1000 right brachial 10 days          Central Venous Catheter Line                 Hemodialysis Catheter 04/24/18 1600 right femoral less than 1 day          Drain                 Rectal Tube 04/12/18 0710 rectal tube w/ balloon (indicate number of mLs) 13 days         Biliary Tube 04/13/18 1200 RLQ 11 days         Urethral Catheter 04/15/18 1250 Temperature probe 9 days         NG/OG Tube 04/22/18 1500 nasogastric Right nostril 2 days          Airway                 Surgical Airway 04/11/18 1403 Shiley Cuffed;Long;Proximal 13 days          Arterial Line                 Arterial Line 04/24/18 1800 Right Brachial less than 1 day                Significant Labs:    CBC/Anemia Profile:    Recent Labs  Lab 04/24/18  0411 04/24/18  1433 04/25/18  0411   WBC 37.01*  --  52.20*   HGB 8.0*  --  7.3*   HCT 25.7* 24* 24.6*     --  246   *  --  108*   RDW 25.9*  --  27.9*        Chemistries:    Recent Labs  Lab 04/24/18  0411 04/24/18  1446 04/24/18  2139 04/25/18  0010 04/25/18  0411    147* 143  --  139  139   K 4.5 4.0 4.3  --  5.1  5.1   * 113* 107  --  102  102    CO2 17* 11* 10*  --  8*  8*   BUN 25* 29* 28*  --  25*  25*   CREATININE 2.9* 3.4* 3.4*  --  3.4*  3.4*   CALCIUM 11.5* 11.2* 10.1  --  9.6  9.6   ALBUMIN 1.8*  --  1.8*  --  1.9*  1.9*   PROT 6.6  --   --   --  6.6   BILITOT 15.9*  --   --   --  18.3*   ALKPHOS 106  --   --   --  159*   ALT 16  --   --   --  66*   *  --   --   --  967*   MG 2.1  --   --  2.0 1.9   PHOS 4.4  --  6.0*  --  7.0*  7.0*       ABGs:   Recent Labs  Lab 04/25/18  0750   PH 7.069*   PCO2 27.6*   HCO3 8.0*   POCSATURATED 66*   BE -22     Coagulation:   Recent Labs  Lab 04/24/18  1105 04/25/18  0411   INR  --  2.9*   APTT 53.1*  --      Lactic Acid:   Recent Labs  Lab 04/24/18  1800   LACTATE >12.0*     All pertinent labs within the past 24 hours have been reviewed.    Significant Imaging:  CXR: I have reviewed all pertinent results/findings within the past 24 hours and my personal findings are:  mild interstitial infiltrates bilat but no focal infiltrate

## 2018-04-25 NOTE — ASSESSMENT & PLAN NOTE
Vent settings reviewed and adjusted.  Cont full vent support with PCV  FIO2 to keep SAO2 > 92%.   4/11 tracheostomy placed  Too tachypnic for SBT today  Cont VAP prophylaxis

## 2018-04-25 NOTE — ASSESSMENT & PLAN NOTE
21 y/o female with VIKRAM and sepsis:           VIKRAM (acute kidney injury)     Renal: VIKRAM due to sepsis and hypotension. No change in renal function  VIKRAM likely due to sepsis and hypotension  Suspect hypercalcemia contributing to renal failure  Vit D was topped yesterday, s Ca likely will improve  Remains critically ill  K normal  Will continue to assess for indications for HD, none today  Edema present, due to 3rd spacing, has low s alb due to shock liver     2. Hypotension :due to sepsis., BP stable  Continue pressor support when needed      4. Abnormal LFTs , s/p Cholecystostomy placement ,  ? shock liver , alcohic hepatitis , Bili better today      5. Meds reviewed,      6. Anemia : multifactorial, pRBC tx when indicated      7. Candida UTI : finished abx      8. C diff colitis , on abx              Plans and recommendations:  As discussed above and  No indications for diuretics at this point (low BP and already intubated/trached)  Total critical care time spent 40 minutes including time needed to review the records, the   patient evaluation, documentation, face-to-face discussion with the patient,   more than 50% of the time was spent on coordination of care and counseling.    Level V visit.

## 2018-04-25 NOTE — PROGRESS NOTES
[facility] -   Nephrology  Progress Note    Patient Name: Rafael Duron  MRN: [id number]  Admission Date: 4/3/2018  Hospital Length of Stay: 22 days  Attending Provider: Paul Stewart MD   Primary Care Physician: Herman Cowart MD  Principal Problem:Acute respiratory failure with hypoxia and hypercapnia. VIKRAM    Subjective:     HPI: 21 yo obese female with HTN, gestational DM  who presented to [facility] ED in [place], [place] on 4/2 with complaint of SOB and cough with known pregnancy. Workup revealed fetal demise (estimated at 22 weeks) and patient passed dead fetus but retained placenta. Placenta remnants were removed in OR vis D & C.  After admission to ICU she had seizure activity with , K+ 1.9 and Bicarb 11.  She also had EtOH level of 268.  She required intubation for airway protection per records. She as transferred to [facility] ICU on 4/3/18 for higher level of care.    Patient presented with septic shock at [facility] and was started on IV pressors and IV antibiotics. Patient was initially stabilized and extubated on 4/5/18. OB/GYN following. She developed syncopal episode on 4/5/18 associated with bradycardia. She was successfully resuscitated with IV fluids and levophed. She subsequently deteriorated and was re-intubated on 4/5/18. Abdominal US revealed massive hepatomegaly with liver span of 33 cm. Patient's condition improved and she was extubated again on 4/7/18. Patient coded on 4/8/18 and was successfully resuscitated and again intubated. Patient was also diagnosed with C. Diff colitis and MRSA bacteremia.   Nephrology was consulted to help with patient's electrolyte care, renal care and volume management. I saw and examined patient in her hospital room. Patient is intubated and not able to provide any additional history.   Patient remains on antibiotics and pressor support. Chart review revealed that hyponatremia and hypokalemia have now resolved. However,  hypocalcemia has persisted. In addition, patient's renal function has worsened during current admission and creatinine has increased from 0.8 on 4/6/18 to 1.6 on 4/9/18. Volume review showed positive I/O balance with + 24 liters since admission,. Patient's UOP has been fluctuating with 1 to 3 liters of urine per day. Current UOP about 100 cc/hour.       Interval History: Pt was seen and examined. Discussed with ICU team. No new events last pm.    Review of patient's allergies indicates:  No Known Allergies  Current Facility-Administered Medications   Medication Frequency    0.9%  NaCl infusion Continuous    0.9%  NaCl infusion Continuous    albuterol-ipratropium 2.5mg-0.5mg/3mL nebulizer solution 3 mL Q4H PRN    bisacodyl suppository 10 mg Daily PRN    dextrose 5 % 1,000 mL with sodium bicarbonate 1 mEq/mL (8.4 %) 150 mEq infusion Continuous    dextrose 50% injection 12.5 g PRN    famotidine (PF) injection 20 mg Daily    fentaNYL injection 50 mcg Q2H PRN    folic acid-vit B6-vit B12 2.5-25-2 mg tablet 1 tablet Daily    glucagon (human recombinant) injection 1 mg PRN    heparin (porcine) injection 4,000 Units PRN    heparin 25,000 units in dextrose 5% 250 mL (100 units/mL) infusion (heparin infusion) Continuous    insulin aspart U-100 pen 1-10 Units Q6H PRN    lactulose 20 gram/30 mL solution Soln 30 g TID    linezolid 600mg/300ml IVPB 600 mg Q12H    lorazepam (ATIVAN) injection 2 mg Q2H PRN    magnesium sulfate 2g in water 50mL IVPB (premix) PRN    micafungin 100 mg in sodium chloride 0.9 % 100 mL IVPB (ready to mix system) Q24H    norepinephrine 16 mg in dextrose 5 % 250 mL infusion Continuous    ondansetron injection 4 mg Q8H PRN    piperacillin-tazobactam 4.5 g in dextrose 5 % 100 mL IVPB (ready to mix system) Q12H    pneumoc 13-bobby conj-dip cr(PF) 0.5 mL vaccine x 1 dose    rifAXIMin tablet 550 mg BID    sodium chloride 0.9% flush 5 mL PRN    sodium phosphate 20.01 mmol in dextrose 5 %  250 mL IVPB PRN    sodium phosphate 30 mmol in dextrose 5 % 250 mL IVPB PRN    sodium phosphate 39.99 mmol in dextrose 5 % 250 mL IVPB PRN    thiamine tablet 100 mg Daily    vasopressin (PITRESSIN) 0.2 Units/mL in dextrose 5 % 100 mL infusion Continuous       Objective:     Vital Signs (Most Recent):  Temp: 99.1 °F (37.3 °C) (04/25/18 0705)  Pulse: (!) 115 (04/25/18 0915)  Resp: (!) 39 (04/25/18 0915)  BP: (!) 75/30 (04/25/18 0900)  SpO2: 95 % (04/25/18 0915)  O2 Device (Oxygen Therapy): ventilator (04/25/18 0750) Vital Signs (24h Range):  Temp:  [98.6 °F (37 °C)-99.7 °F (37.6 °C)] 99.1 °F (37.3 °C)  Pulse:  [107-123] 115  Resp:  [19-42] 39  SpO2:  [95 %-100 %] 95 %  BP: ()/(30-91) 75/30     Weight: 120 kg (264 lb 8.8 oz) (04/25/18 0600)  Body mass index is 42.7 kg/m².  Body surface area is 2.36 meters squared.    I/O last 3 completed shifts:  In: 5312.4 [I.V.:2492.4; NG/GT:1320; IV Piggyback:1500]  Out: 3634 [Urine:209; Drains:425; Stool:3000]    Physical Exam   Constitutional: She appears well-developed and well-nourished.   Neck: No JVD present.   Cardiovascular: Normal rate and regular rhythm.  Exam reveals no friction rub.    Pulmonary/Chest: Breath sounds normal.   Abdominal: Soft.   Musculoskeletal: She exhibits edema.   Skin: Skin is warm and dry.   Nursing note and vitals reviewed.      Significant Labs: reviewed  BMP  Lab Results   Component Value Date     04/25/2018     04/25/2018    K 5.1 04/25/2018    K 5.1 04/25/2018     04/25/2018     04/25/2018    CO2 8 (LL) 04/25/2018    CO2 8 (LL) 04/25/2018    BUN 25 (H) 04/25/2018    BUN 25 (H) 04/25/2018    CREATININE 3.4 (H) 04/25/2018    CREATININE 3.4 (H) 04/25/2018    CALCIUM 9.6 04/25/2018    CALCIUM 9.6 04/25/2018    ANIONGAP 29 (H) 04/25/2018    ANIONGAP 29 (H) 04/25/2018    ESTGFRAFRICA 21 (A) 04/25/2018    ESTGFRAFRICA 21 (A) 04/25/2018    EGFRNONAA 18 (A) 04/25/2018    EGFRNONAA 18 (A) 04/25/2018     Lab Results    Component Value Date    WBC 52.20 (HH) 04/25/2018    HGB 7.3 (L) 04/25/2018    HCT 24.6 (L) 04/25/2018     (H) 04/25/2018     04/25/2018     ABG    Recent Labs  Lab 04/25/18  0841   PH 7.085*   PO2 79*   PCO2 25.9*   HCO3 7.8*   BE -22       Significant Imaging: reviewed    Assessment/Plan:         23 y/o female with VIKRAM and sepsis:           VIKRAM (acute kidney injury)     Renal: VIKRAM due to sepsis and hypotension. No change in renal function  Pt appears worse  Metabolic/lactic acidosis is worse despite dialysis  Mild hyperkalemia despite dialysis  Remains critically ill  Poor prognosis  Discussed dialysis treatment with HD nurse  Will increase bicarbonate and decrease K baths  K normal  Edema present, due to 3rd spacing, has low s alb due to shock liver     2. Hypotension :due to sepsis., BP stable  Continue pressor support when needed      4. Liver failure: likely source of lactic acid  Abnormal LFTs      5. Meds reviewed,      6. Anemia : multifactorial, pRBC tx when indicated               Plans and recommendations:  As discussed above and  Total critical care time spent 50 minutes   Pt received multiple visits and evaluations during HD in ICU.            Luciana Queen MD  Nephrology  [facility]

## 2018-04-26 LAB
BACTERIA SPEC AEROBE CULT: NORMAL
BACTERIA SPEC AEROBE CULT: NORMAL
BACTERIA UR CULT: NORMAL
GRAM STN SPEC: NORMAL
MRSA SPEC QL CULT: NORMAL

## 2018-04-28 LAB — BACTERIA SPEC AEROBE CULT: NO GROWTH

## 2018-04-29 LAB
BACTERIA BLD CULT: NORMAL
BACTERIA BLD CULT: NORMAL

## 2018-05-02 LAB — BACTERIA SPEC ANAEROBE CULT: NORMAL

## 2018-05-15 LAB — FUNGUS BLD CULT: NORMAL

## 2018-05-21 LAB — FUNGUS SPEC CULT: NORMAL

## 2018-05-25 LAB — FUNGUS SPEC CULT: NORMAL

## 2018-06-22 LAB
ACID FAST MOD KINY STN SPEC: NORMAL
MYCOBACTERIUM SPEC QL CULT: NORMAL

## 2019-01-07 NOTE — ASSESSMENT & PLAN NOTE
Secondary to severe sepsis, chronic alcoholism, seizure etc.  Currently intubated, unable to evaluate.     good, to achieve stated therapy goals

## 2019-01-30 NOTE — PROGRESS NOTES
Pt remains on ventilator, no weaning trials today, pt getting art line at this time.   need for outpatient follow-up/return to ED if symptoms worsen, persist or questions arise

## 2019-08-21 NOTE — ASSESSMENT & PLAN NOTE
Currently 8.9 after 2 units PRBC transfusion at outside facility.  No active bleeding at this time.  Labs in AM.     stated

## 2020-01-14 NOTE — PLAN OF CARE
Chemotherapy Verification - SECONDARY RN       Height = 165 cm  Weight = 77 kg  BSA = 1.878 m2       Medication: Home infusion 5FU  Dose: 1920 mg/m2  Calculated Dose: 3606.9 mg                             (In mg/m2, AUC, mg/kg)       I confirm that this process was performed independently.    Problem: Patient Care Overview  Goal: Plan of Care Review  Outcome: Ongoing (interventions implemented as appropriate)  No acute issues overnight.  Pt will awaken and follow commands.  Current sedation / analgesia protocol is adequate.  Pt is still requiring vasopressin and  levophed at 0.14mcg/kg/min to maintain hemodynamics.  Urine output marginal and pt is 24L positive since admit.  Afebrile overnight.  Her son states that he will be coming up here today.

## 2021-01-18 NOTE — ASSESSMENT & PLAN NOTE
"Oncology Rooming Note    January 18, 2021 2:54 PM   Alis Hartman is a 66 year old female who presents for:    Chief Complaint   Patient presents with     Oncology Clinic Visit     colpo     Initial Vitals: BP (!) 142/89 (BP Location: Right arm, Patient Position: Chair, Cuff Size: Adult Large)   Pulse 74   Temp 98.2  F (36.8  C) (Oral)   Ht 1.6 m (5' 3\")   Wt 99.9 kg (220 lb 3.2 oz)   SpO2 100%   BMI 39.01 kg/m   Estimated body mass index is 39.01 kg/m  as calculated from the following:    Height as of this encounter: 1.6 m (5' 3\").    Weight as of this encounter: 99.9 kg (220 lb 3.2 oz). Body surface area is 2.11 meters squared.  No Pain (0) Comment: Data Unavailable   No LMP recorded. Patient is postmenopausal.  Allergies reviewed: Yes  Medications reviewed: Yes    Medications: Medication refills not needed today.  Pharmacy name entered into [facility]:    [id number] - [place], [place] - [address]  [id number] [place], [place] - [address] [id number] - [place], [place] - [address]    Clinical concerns: Yes Dr. Perez was notified.      Jewels Stone CMA              " Etiology at this time is unclear.  Will follow repeat cultures .  All previous positive cultures have been treated.  Urine culture was positive for klebsiella which was treated .  Respiratory cultures- MRSA and pseudomonas- both treated .    She developed worsening leucocytosis when antibiotics was stopped . WBC is now 37 .  Will restart empiric antimicrobial therapy with zyvox, meropenem and micafungin and will monitor wbc

## 2021-11-30 NOTE — HPI
22 y.o. female transferred from [facility] with sepsis, s/p miscarriage with retained placenta.  Re-intubated today.   Debridement Text: The wound edges were debrided prior to proceeding with the closure to facilitate wound healing.

## 2022-01-12 NOTE — PLAN OF CARE
Problem: Patient Care Overview  Goal: Plan of Care Review  Outcome: Ongoing (interventions implemented as appropriate)  Patient currently resting on vent support with 7.0 extra long shiley. Will continue to monitor.       English

## 2022-04-06 NOTE — ASSESSMENT & PLAN NOTE
Paperwork signed and faxed.     Vent settings reviewed and appropriate; FIO2 to keep SAO2 > 92%.   4/11 tracheostomy placed  Failed SBT yesterday  Cont VAP prophylaxis
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
## 2023-02-03 NOTE — ASSESSMENT & PLAN NOTE
No prior h/o seizures per mother.  One episode of seizure at outside facility, likely alcoholic with hyponatremia of 118 not helping.  Continue propofol and precedex    04/07/2018-No new seizure episodes ,likely related to alcohol abuse    Keystone Flap Text: The defect edges were debeveled with a #15 scalpel blade. Given the location of the defect, shape of the defect a keystone flap was deemed most appropriate. Using a sterile surgical marker, an appropriate keystone flap was drawn incorporating the defect, outlining the appropriate donor tissue and placing the expected incisions within the relaxed skin tension lines where possible. The area thus outlined was incised deep to adipose tissue with a #15 scalpel blade. The skin margins were undermined to an appropriate distance in all directions around the primary defect and laterally outward around the flap utilizing iris scissors. Following this, the designed flap was placed in the primary defect and sutured into place.

## 2023-04-17 NOTE — SUBJECTIVE & OBJECTIVE
Interval History: 04/18- She remains critically ill on vasopressor support, wbc is increasing .  Indium scan showed  diffuse pulmonary uptake.  She has completed 14 days of therapy with vanco/meropenem for pneumonia .  Serum procalcitionin is 7.27.      Review of Systems   Unable to perform ROS: Acuity of condition     Objective:     Vital Signs (Most Recent):  Temp: 100 °F (37.8 °C) (04/18/18 1115)  Pulse: 98 (04/18/18 1630)  Resp: (!) 33 (04/18/18 1115)  BP: 107/77 (04/18/18 1630)  SpO2: 98 % (04/18/18 1630) Vital Signs (24h Range):  Temp:  [98.6 °F (37 °C)-101.1 °F (38.4 °C)] 100 °F (37.8 °C)  Pulse:  [] 98  Resp:  [24-33] 33  SpO2:  [79 %-100 %] 98 %  BP: ()/() 107/77     Weight: 122 kg (268 lb 15.4 oz)  Body mass index is 43.41 kg/m².    Intake/Output Summary (Last 24 hours) at 04/18/18 1715  Last data filed at 04/18/18 1500   Gross per 24 hour   Intake          4252.45 ml   Output             2103 ml   Net          2149.45 ml      Physical Exam   Constitutional: She appears well-developed. No distress.   Morbidly obese    HENT:   Head: Normocephalic and atraumatic.   Mouth/Throat: Oropharynx is clear and moist. No oropharyngeal exudate.   Eyes: EOM are normal. Pupils are equal, round, and reactive to light. Scleral icterus is present.   Neck: Neck supple. No JVD present. Carotid bruit is not present. No tracheal deviation present. No thyroid mass and no thyromegaly present.   Trach in place    Cardiovascular: Normal rate, regular rhythm, normal heart sounds and intact distal pulses.  Exam reveals no gallop and no friction rub.    No murmur heard.  Pulmonary/Chest: No respiratory distress. She has no wheezes. She has rales. She exhibits no tenderness.   Abdominal: Soft. Bowel sounds are normal. She exhibits no distension, no abdominal bruit, no ascites and no mass. There is no hepatosplenomegaly. There is no tenderness. There is no rebound, no guarding and no CVA tenderness.   Cholecystostomy  in place    Musculoskeletal: She exhibits edema. She exhibits no tenderness.   Dependant edema    Lymphadenopathy:     She has no cervical adenopathy.   Neurological: She has normal reflexes. She displays normal reflexes. No cranial nerve deficit. She exhibits normal muscle tone. Coordination normal.   Skin: Skin is warm and intact. No rash noted. No erythema. No pallor.   Along the flanks-areas of erythema.edema noted    Nursing note and vitals reviewed.      Significant Labs:   Blood Culture: No results for input(s): LABBLOO in the last 48 hours.  BMP:   Recent Labs  Lab 04/18/18  0355   *      K 3.1*   CL 98   CO2 27   BUN 12   CREATININE 1.9*   CALCIUM 10.2   MG 1.4*     CBC:   Recent Labs  Lab 04/17/18  0415 04/18/18  0355   WBC 26.27* 29.54*   HGB 8.9* 9.0*   HCT 28.1* 28.0*    165     All pertinent labs within the past 24 hours have been reviewed.    Significant Imaging: I have reviewed and interpreted all pertinent imaging results/findings within the past 24 hours.   Odomzo Counseling- I discussed with the patient the risks of Odomzo including but not limited to nausea, vomiting, diarrhea, constipation, weight loss, changes in the sense of taste, decreased appetite, muscle spasms, and hair loss.  The patient verbalized understanding of the proper use and possible adverse effects of Odomzo.  All of the patient's questions and concerns were addressed.

## 2023-07-01 NOTE — PLAN OF CARE
Problem: Patient Care Overview  Goal: Plan of Care Review  Patient remains on continuous tube feedings this a.m.  Restraint order renewed at 0255.  Flexiseal, aiken, and gall bladder drain intact.  See MAR for medication doses and times of administration.        Pt aware results are not complete    Paolo)

## 2025-01-16 NOTE — ASSESSMENT & PLAN NOTE
Could be related to hepatic steatosis . Will rule out infectious etiology . CT scan of the abdomen showed distended gall bladder-will continue vanco/zosyn.  Follow cultures.  Prognosis is guarded.    4/25- Severe Lactic Acidosis-- see above   Prep ordered.

## (undated) DEVICE — SHEET THYROID W/ISO-BAC

## (undated) DEVICE — SEE MEDLINE ITEM 152622

## (undated) DEVICE — SYR 10CC LUER LOCK

## (undated) DEVICE — GLOVE PROTEXIS HYDROGEL SZ7.5

## (undated) DEVICE — SOL NS 1000CC

## (undated) DEVICE — SEE MEDLINE ITEM 157117

## (undated) DEVICE — COVER OVERHEAD SURG LT BLUE

## (undated) DEVICE — MANIFOLD 4 PORT

## (undated) DEVICE — NDL SAFETY 25G X 1.5 ECLIPSE

## (undated) DEVICE — ELECTRODE REM PLYHSV RETURN 9

## (undated) DEVICE — TUBE TRACH CUFF PROXIMAL XLT 7

## (undated) DEVICE — SEE MEDLINE ITEM 157027

## (undated) DEVICE — SEE MEDLINE ITEM 152739

## (undated) DEVICE — APPLICATOR CHLORAPREP ORN 26ML